# Patient Record
Sex: MALE | Race: WHITE | NOT HISPANIC OR LATINO | Employment: OTHER | URBAN - NONMETROPOLITAN AREA
[De-identification: names, ages, dates, MRNs, and addresses within clinical notes are randomized per-mention and may not be internally consistent; named-entity substitution may affect disease eponyms.]

---

## 2017-04-19 ENCOUNTER — OFFICE VISIT (OUTPATIENT)
Dept: OTOLARYNGOLOGY | Facility: CLINIC | Age: 82
End: 2017-04-19

## 2017-04-19 VITALS
HEART RATE: 80 BPM | BODY MASS INDEX: 35.84 KG/M2 | DIASTOLIC BLOOD PRESSURE: 80 MMHG | SYSTOLIC BLOOD PRESSURE: 140 MMHG | RESPIRATION RATE: 19 BRPM | TEMPERATURE: 98.3 F | HEIGHT: 71 IN | WEIGHT: 256 LBS

## 2017-04-19 DIAGNOSIS — S00.452A FOREIGN BODY IN EAR LOBE, LEFT, INITIAL ENCOUNTER: ICD-10-CM

## 2017-04-19 PROBLEM — S00.459A FOREIGN BODY IN EAR LOBE: Status: ACTIVE | Noted: 2017-04-19

## 2017-04-19 PROCEDURE — 69200 CLEAR OUTER EAR CANAL: CPT | Performed by: PHYSICIAN ASSISTANT

## 2017-04-19 RX ORDER — LOSARTAN POTASSIUM 50 MG/1
TABLET ORAL
Refills: 3 | COMMUNITY
Start: 2017-04-10

## 2017-04-19 RX ORDER — WARFARIN SODIUM 7.5 MG/1
TABLET ORAL
Refills: 1 | COMMUNITY
Start: 2017-04-10

## 2017-04-19 RX ORDER — HYDROCHLOROTHIAZIDE 12.5 MG/1
TABLET ORAL
Refills: 0 | COMMUNITY
Start: 2017-03-30

## 2017-04-19 RX ORDER — LANSOPRAZOLE 15 MG/1
15 CAPSULE, DELAYED RELEASE ORAL DAILY
COMMUNITY

## 2017-04-19 NOTE — PROGRESS NOTES
Procedure Note    Pre-operative Diagnosis: Foreign body of the left ear    Post-operative Diagnosis: same    Anesthesia: none    Procedure:  Removal of foreign body from left ear canal    Procedure Details:    Patient seated in exam chair and ear canal visualized with microscope and speculum. Foreign body viewed in medial part of left ear canal and removed with alligator forceps.     Condition:  Stable.  Patient tolerated procedure well.    Complications:  None

## 2017-06-09 ENCOUNTER — TELEPHONE (OUTPATIENT)
Dept: INTERNAL MEDICINE | Age: 82
End: 2017-06-09

## 2017-06-12 RX ORDER — CYANOCOBALAMIN 1000 UG/ML
1000 INJECTION INTRAMUSCULAR; SUBCUTANEOUS ONCE
Qty: 1 ML | Refills: 5 | Status: SHIPPED | OUTPATIENT
Start: 2017-06-12 | End: 2017-11-07 | Stop reason: ALTCHOICE

## 2017-06-19 RX ORDER — WARFARIN SODIUM 7.5 MG/1
TABLET ORAL
Qty: 96 TABLET | Refills: 5 | Status: SHIPPED | OUTPATIENT
Start: 2017-06-19 | End: 2018-06-22 | Stop reason: SDUPTHER

## 2017-06-21 ENCOUNTER — NURSE ONLY (OUTPATIENT)
Dept: INTERNAL MEDICINE | Age: 82
End: 2017-06-21

## 2017-06-21 DIAGNOSIS — Z79.01 LONG TERM (CURRENT) USE OF ANTICOAGULANTS: ICD-10-CM

## 2017-06-21 LAB
INTERNATIONAL NORMALIZATION RATIO, POC: 2.1
PROTHROMBIN TIME, POC: NORMAL

## 2017-07-19 ENCOUNTER — NURSE ONLY (OUTPATIENT)
Dept: INTERNAL MEDICINE | Age: 82
End: 2017-07-19
Payer: MEDICARE

## 2017-07-19 ENCOUNTER — ANTI-COAG VISIT (OUTPATIENT)
Dept: INTERNAL MEDICINE | Age: 82
End: 2017-07-19

## 2017-07-19 DIAGNOSIS — Z79.01 LONG TERM (CURRENT) USE OF ANTICOAGULANTS: ICD-10-CM

## 2017-07-19 DIAGNOSIS — Z79.01 LONG TERM (CURRENT) USE OF ANTICOAGULANTS: Primary | ICD-10-CM

## 2017-07-19 LAB
INTERNATIONAL NORMALIZATION RATIO, POC: 3.1
PROTHROMBIN TIME, POC: NORMAL

## 2017-07-19 PROCEDURE — 85610 PROTHROMBIN TIME: CPT | Performed by: INTERNAL MEDICINE

## 2017-08-16 ENCOUNTER — NURSE ONLY (OUTPATIENT)
Dept: INTERNAL MEDICINE | Age: 82
End: 2017-08-16
Payer: MEDICARE

## 2017-08-16 ENCOUNTER — ANTI-COAG VISIT (OUTPATIENT)
Dept: INTERNAL MEDICINE | Age: 82
End: 2017-08-16

## 2017-08-16 DIAGNOSIS — Z79.01 LONG TERM (CURRENT) USE OF ANTICOAGULANTS: Primary | ICD-10-CM

## 2017-08-16 DIAGNOSIS — Z79.01 LONG TERM (CURRENT) USE OF ANTICOAGULANTS: ICD-10-CM

## 2017-08-16 LAB
INTERNATIONAL NORMALIZATION RATIO, POC: 3.1
PROTHROMBIN TIME, POC: NORMAL

## 2017-08-16 PROCEDURE — 85610 PROTHROMBIN TIME: CPT | Performed by: INTERNAL MEDICINE

## 2017-09-12 ENCOUNTER — ANTI-COAG VISIT (OUTPATIENT)
Dept: INTERNAL MEDICINE | Age: 82
End: 2017-09-12

## 2017-09-12 ENCOUNTER — NURSE ONLY (OUTPATIENT)
Dept: INTERNAL MEDICINE | Age: 82
End: 2017-09-12
Payer: MEDICARE

## 2017-09-12 DIAGNOSIS — Z79.01 LONG TERM (CURRENT) USE OF ANTICOAGULANTS: ICD-10-CM

## 2017-09-12 DIAGNOSIS — Z79.01 LONG TERM (CURRENT) USE OF ANTICOAGULANTS: Primary | ICD-10-CM

## 2017-09-12 LAB
INTERNATIONAL NORMALIZATION RATIO, POC: 2.4
PROTHROMBIN TIME, POC: NORMAL

## 2017-09-12 PROCEDURE — 85610 PROTHROMBIN TIME: CPT | Performed by: INTERNAL MEDICINE

## 2017-09-18 RX ORDER — HYDROCHLOROTHIAZIDE 12.5 MG/1
TABLET ORAL
Qty: 90 TABLET | Refills: 3 | Status: SHIPPED | OUTPATIENT
Start: 2017-09-18 | End: 2018-06-04 | Stop reason: SDUPTHER

## 2017-09-19 DIAGNOSIS — E78.2 MIXED HYPERLIPIDEMIA: ICD-10-CM

## 2017-09-19 DIAGNOSIS — E55.9 UNSPECIFIED VITAMIN D DEFICIENCY: ICD-10-CM

## 2017-09-19 DIAGNOSIS — I10 HYPERTENSION, ESSENTIAL: Primary | ICD-10-CM

## 2017-09-20 ENCOUNTER — OFFICE VISIT (OUTPATIENT)
Dept: GASTROENTEROLOGY | Facility: CLINIC | Age: 82
End: 2017-09-20

## 2017-09-20 VITALS
BODY MASS INDEX: 35.14 KG/M2 | OXYGEN SATURATION: 98 % | HEART RATE: 66 BPM | TEMPERATURE: 97.5 F | HEIGHT: 71 IN | SYSTOLIC BLOOD PRESSURE: 136 MMHG | DIASTOLIC BLOOD PRESSURE: 80 MMHG | WEIGHT: 251 LBS

## 2017-09-20 DIAGNOSIS — K50.00 CROHN'S DISEASE OF SMALL INTESTINE WITHOUT COMPLICATION (HCC): Primary | ICD-10-CM

## 2017-09-20 DIAGNOSIS — Z86.010 HISTORY OF ADENOMATOUS POLYP OF COLON: ICD-10-CM

## 2017-09-20 PROCEDURE — 99213 OFFICE O/P EST LOW 20 MIN: CPT | Performed by: NURSE PRACTITIONER

## 2017-09-20 NOTE — PROGRESS NOTES
Chief Complaint   Patient presents with   • Colonoscopy     10-31-14 had colon     Subjective   HPI    Rayo Siddiqi is a 82 y.o. male who presents with a history of Crohn's Disease dx over 20 yr ago.   Status of disease is quiet and stable.  The severity is described as Mild.  He denies  abdominal cramping, diarrhea, bloody stool and anorexia.  Bowels will alternate between watery and normal.  Previous diagnostic test include  colonoscopy.  Currently maintained on Pentasa 4 pills tid.    Hx of colectomy (18 inches per pt) due to colon mass    CScope (Dr Schroeder) 2014 narrowing of anastomosis  CScope (Dr Schroeder) 2013 adenomatous polyp removed   CScope (Dr Schroeder) 2010 narrowing of anastomosis    Endoscopy (Dr Schroeder) 2014-normal    Past Medical History:   Diagnosis Date   • Crohn's disease    • DVT (deep venous thrombosis)      Outpatient Prescriptions Marked as Taking for the 9/20/17 encounter (Office Visit) with JUANY Aguila   Medication Sig Dispense Refill   • hydrochlorothiazide (HYDRODIURIL) 12.5 MG tablet TAKE ONE TABLET BY MOUTH DAILY  0   • lansoprazole (PREVACID) 15 MG capsule Take 15 mg by mouth Daily.     • losartan (COZAAR) 50 MG tablet TAKE ONE TABLET BY MOUTH DAILY  3   • Mesalamine (PENTASA PO) Take  by mouth.     • Multiple Vitamin (STRESS B PO) Take  by mouth.     • warfarin (COUMADIN) 7.5 MG tablet TAKE ONE TABLET BY MOUTH EVERY DAY EXCEPT ON SUNDAY TAKE 1.5 TABLETS BY MOUTH  1     No Known Allergies  Social History     Social History   • Marital status:      Spouse name: N/A   • Number of children: N/A   • Years of education: N/A     Occupational History   • Not on file.     Social History Main Topics   • Smoking status: Never Smoker   • Smokeless tobacco: Never Used   • Alcohol use No   • Drug use: No   • Sexual activity: Not on file     Other Topics Concern   • Not on file     Social History Narrative     Family History   Problem Relation Age of Onset   • No Known Problems Mother     • No Known Problems Father    • Colon cancer Neg Hx    • Esophageal cancer Neg Hx      Review of Systems   Constitutional: Negative for fatigue, fever and unexpected weight change.   HENT: Negative for hearing loss, sore throat and voice change.    Eyes: Negative for visual disturbance.   Respiratory: Negative for cough, shortness of breath and wheezing.    Cardiovascular: Negative for chest pain and palpitations.   Gastrointestinal: Negative for abdominal pain, blood in stool and vomiting.   Endocrine: Negative for polydipsia and polyuria.   Genitourinary: Negative for difficulty urinating, dysuria, hematuria and urgency.   Musculoskeletal: Negative for joint swelling and myalgias.   Skin: Negative for color change, rash and wound.   Neurological: Negative for dizziness, tremors, seizures and syncope.   Hematological: Does not bruise/bleed easily.   Psychiatric/Behavioral: Negative for agitation and confusion. The patient is not nervous/anxious.      Objective   Vitals:    09/20/17 0946   BP: 136/80   Pulse: 66   Temp: 97.5 °F (36.4 °C)   SpO2: 98%     Physical Exam   Constitutional: He is oriented to person, place, and time. He appears well-developed and well-nourished.   HENT:   Head: Normocephalic and atraumatic.   Eyes:   Pink, Nonicteric   Neck:   Global Assessment- supple. No JVD or lymphadenopathy   Cardiovascular: Normal rate, regular rhythm and normal heart sounds.  Exam reveals no gallop and no friction rub.    No murmur heard.  Pulmonary/Chest: Effort normal and breath sounds normal. No respiratory distress. He has no wheezes. He has no rales.   Inspection: Movements-Symmetrical   Abdominal: Soft. Bowel sounds are normal. He exhibits no distension and no mass. There is no tenderness. There is no rebound and no guarding.   Neurological: He is alert and oriented to person, place, and time.   General Exam-Deemed a reliable historian, able to converse without difficulty and Able to move all extremities  without difficulty     Imaging Results (most recent)     None        Assessment/Plan   Rayo was seen today for colonoscopy.    Diagnoses and all orders for this visit:    Crohn's disease of small intestine without complication  -     Case Request; Standing  -     Implement Anesthesia Orders Day of Procedure; Standing  -     Obtain Informed Consent; Standing  -     polyethylene glycol (GoLYTELY) 236 g solution; Take 3,785 mL by mouth 1 (One) Time for 1 dose. Take as directed    History of adenomatous polyp of colon  Comments:  2013    COLONOSCOPY WITH ANESTHESIA (N/A)     PCP prescribes Pentasa    Patient is to continue all blood pressure and cardiac medications prior to procedure.     Advised pt to stop ASA day prior to procedure and to stop use of NSAIDs, Fish Oil, and MV 5 days prior to procedure.  Tylenol based products are ok to take.  Pt verbalized understanding.    All risks, benefits, alternatives, and indications of colonoscopy procedure have been discussed with the patient. Risks to include perforation of the colon requiring possible surgery or colostomy, risk of bleeding from biopsies or removal of colon tissue, possibility of missing a colon polyp or cancer, or adverse drug reaction.  Benefits to include the diagnosis and management of disease of the colon and rectum. Alternatives to include barium enema, radiographic evaluation, lab testing or no intervention. Pt verbalizes understanding and agrees to proceed with procedure.

## 2017-10-06 ENCOUNTER — TELEPHONE (OUTPATIENT)
Dept: INTERNAL MEDICINE | Age: 82
End: 2017-10-06

## 2017-10-10 ENCOUNTER — TELEPHONE (OUTPATIENT)
Dept: GASTROENTEROLOGY | Facility: CLINIC | Age: 82
End: 2017-10-10

## 2017-10-10 NOTE — TELEPHONE ENCOUNTER
Called informed pt that per Dr Rg he could stop his warfarin 5 days before his colon apt pt voiced understanding

## 2017-10-11 DIAGNOSIS — Z79.01 LONG TERM CURRENT USE OF ANTICOAGULANT: Primary | ICD-10-CM

## 2017-10-24 DIAGNOSIS — E78.2 MIXED HYPERLIPIDEMIA: ICD-10-CM

## 2017-10-24 DIAGNOSIS — I10 HYPERTENSION, ESSENTIAL: ICD-10-CM

## 2017-10-24 DIAGNOSIS — E55.9 VITAMIN D DEFICIENCY: ICD-10-CM

## 2017-10-30 ENCOUNTER — ANESTHESIA (OUTPATIENT)
Dept: GASTROENTEROLOGY | Facility: HOSPITAL | Age: 82
End: 2017-10-30

## 2017-10-30 ENCOUNTER — TELEPHONE (OUTPATIENT)
Dept: GASTROENTEROLOGY | Facility: CLINIC | Age: 82
End: 2017-10-30

## 2017-10-30 ENCOUNTER — ANESTHESIA EVENT (OUTPATIENT)
Dept: GASTROENTEROLOGY | Facility: HOSPITAL | Age: 82
End: 2017-10-30

## 2017-10-30 ENCOUNTER — HOSPITAL ENCOUNTER (OUTPATIENT)
Facility: HOSPITAL | Age: 82
Setting detail: HOSPITAL OUTPATIENT SURGERY
Discharge: HOME | End: 2017-10-30
Attending: INTERNAL MEDICINE | Admitting: INTERNAL MEDICINE

## 2017-10-30 VITALS
DIASTOLIC BLOOD PRESSURE: 47 MMHG | BODY MASS INDEX: 34.79 KG/M2 | RESPIRATION RATE: 14 BRPM | SYSTOLIC BLOOD PRESSURE: 114 MMHG | WEIGHT: 243 LBS | TEMPERATURE: 97.8 F | HEIGHT: 70 IN | HEART RATE: 58 BPM | OXYGEN SATURATION: 98 %

## 2017-10-30 DIAGNOSIS — K50.00 CROHN'S DISEASE OF SMALL INTESTINE WITHOUT COMPLICATION (HCC): ICD-10-CM

## 2017-10-30 PROCEDURE — 45378 DIAGNOSTIC COLONOSCOPY: CPT | Performed by: INTERNAL MEDICINE

## 2017-10-30 PROCEDURE — 25010000002 PROPOFOL 10 MG/ML EMULSION: Performed by: NURSE ANESTHETIST, CERTIFIED REGISTERED

## 2017-10-30 RX ORDER — SODIUM CHLORIDE 9 MG/ML
500 INJECTION, SOLUTION INTRAVENOUS CONTINUOUS PRN
Status: DISCONTINUED | OUTPATIENT
Start: 2017-10-30 | End: 2017-10-30 | Stop reason: HOSPADM

## 2017-10-30 RX ORDER — SODIUM CHLORIDE 0.9 % (FLUSH) 0.9 %
3 SYRINGE (ML) INJECTION AS NEEDED
Status: DISCONTINUED | OUTPATIENT
Start: 2017-10-30 | End: 2017-10-30 | Stop reason: HOSPADM

## 2017-10-30 RX ORDER — PROPOFOL 10 MG/ML
VIAL (ML) INTRAVENOUS AS NEEDED
Status: DISCONTINUED | OUTPATIENT
Start: 2017-10-30 | End: 2017-10-30 | Stop reason: SURG

## 2017-10-30 RX ADMIN — PROPOFOL 180 MG: 10 INJECTION, EMULSION INTRAVENOUS at 09:21

## 2017-10-30 RX ADMIN — LIDOCAINE HYDROCHLORIDE 0.5 ML: 10 INJECTION, SOLUTION EPIDURAL; INFILTRATION; INTRACAUDAL; PERINEURAL at 07:50

## 2017-10-30 RX ADMIN — SODIUM CHLORIDE 500 ML: 9 INJECTION, SOLUTION INTRAVENOUS at 07:50

## 2017-10-30 NOTE — ANESTHESIA POSTPROCEDURE EVALUATION
"Patient: Rayo Siddiqi    Procedure Summary     Date Anesthesia Start Anesthesia Stop Room / Location    10/30/17 0914 0935  [facility] /  [facility]       Procedure Diagnosis Surgeon Provider    COLONOSCOPY WITH ANESTHESIA (N/A ) Crohn's disease of small intestine without complication  (Crohn's disease of small intestine without complication [K50.00]) DO Bebe Frausto, ELIANA          Anesthesia Type: general  Last vitals  BP   113/50 (10/30/17 0955)   Temp   97.8 °F (36.6 °C) (10/30/17 0726)   Pulse   59 (10/30/17 0955)   Resp   17 (10/30/17 0955)     SpO2   99 % (10/30/17 0955)     Post Anesthesia Care and Evaluation    Patient location during evaluation: bedside  Patient participation: complete - patient participated  Level of consciousness: awake and awake and alert  Pain score: 0  Pain management: adequate  Airway patency: patent  Anesthetic complications: No anesthetic complications  PONV Status: none  Cardiovascular status: acceptable  Respiratory status: acceptable  Hydration status: acceptable    Comments: Patient discharged according to acceptable Carly score per RN assessment. See nursing records for further information.     Blood pressure 113/50, pulse 59, temperature 97.8 °F (36.6 °C), temperature source Temporal Artery , resp. rate 17, height 70\" (177.8 cm), weight 243 lb (110 kg), SpO2 99 %.        "

## 2017-10-30 NOTE — ANESTHESIA PREPROCEDURE EVALUATION
Anesthesia Evaluation     Patient summary reviewed   no history of anesthetic complications:         Airway   Mallampati: II  TM distance: >3 FB  Neck ROM: full  no difficulty expected  Dental - normal exam     Pulmonary    (-) COPD, asthma, sleep apnea, not a smoker  Cardiovascular     PT is on anticoagulation therapy    (+) hypertension, DVT (on coumadin),     ROS comment: Off coumadin x5 days    Neuro/Psych  (-) seizures, TIA, CVA  GI/Hepatic/Renal/Endo    (-) liver disease, no renal disease, diabetes    ROS Comment: Crohns disease    Musculoskeletal     Abdominal    Substance History      OB/GYN          Other                                        Anesthesia Plan    ASA 3     general   total IV anesthesia  intravenous induction   Anesthetic plan and risks discussed with patient.

## 2017-11-01 LAB
ALBUMIN SERPL-MCNC: 4 G/DL (ref 3.5–5.2)
ALP BLD-CCNC: 69 U/L (ref 40–130)
ALT SERPL-CCNC: 17 U/L (ref 5–41)
ANION GAP SERPL CALCULATED.3IONS-SCNC: 16 MMOL/L (ref 7–19)
AST SERPL-CCNC: 21 U/L (ref 5–40)
BASOPHILS ABSOLUTE: 0.1 K/UL (ref 0–0.2)
BASOPHILS RELATIVE PERCENT: 0.7 % (ref 0–1)
BILIRUB SERPL-MCNC: 0.7 MG/DL (ref 0.2–1.2)
BUN BLDV-MCNC: 12 MG/DL (ref 8–23)
CALCIUM SERPL-MCNC: 9.4 MG/DL (ref 8.8–10.2)
CHLORIDE BLD-SCNC: 99 MMOL/L (ref 98–111)
CHOLESTEROL, TOTAL: 159 MG/DL (ref 160–199)
CO2: 28 MMOL/L (ref 22–29)
CREAT SERPL-MCNC: 1.3 MG/DL (ref 0.5–1.2)
EOSINOPHILS ABSOLUTE: 0.2 K/UL (ref 0–0.6)
EOSINOPHILS RELATIVE PERCENT: 2.2 % (ref 0–5)
GFR NON-AFRICAN AMERICAN: 53
GLUCOSE BLD-MCNC: 95 MG/DL (ref 74–109)
HCT VFR BLD CALC: 41.5 % (ref 42–52)
HDLC SERPL-MCNC: 52 MG/DL (ref 55–121)
HEMOGLOBIN: 14.1 G/DL (ref 14–18)
INR BLD: 1.27 (ref 0.88–1.18)
LDL CHOLESTEROL CALCULATED: 64 MG/DL
LYMPHOCYTES ABSOLUTE: 2.2 K/UL (ref 1.1–4.5)
LYMPHOCYTES RELATIVE PERCENT: 29.9 % (ref 20–40)
MCH RBC QN AUTO: 32.6 PG (ref 27–31)
MCHC RBC AUTO-ENTMCNC: 34 G/DL (ref 33–37)
MCV RBC AUTO: 95.8 FL (ref 80–94)
MONOCYTES ABSOLUTE: 0.5 K/UL (ref 0–0.9)
MONOCYTES RELATIVE PERCENT: 7.5 % (ref 0–10)
NEUTROPHILS ABSOLUTE: 4.3 K/UL (ref 1.5–7.5)
NEUTROPHILS RELATIVE PERCENT: 59.4 % (ref 50–65)
PDW BLD-RTO: 12.9 % (ref 11.5–14.5)
PLATELET # BLD: 178 K/UL (ref 130–400)
PMV BLD AUTO: 9.4 FL (ref 9.4–12.4)
POTASSIUM SERPL-SCNC: 3.3 MMOL/L (ref 3.5–5)
PROSTATE SPECIFIC ANTIGEN: 1.52 NG/ML (ref 0–4)
PROTHROMBIN TIME: 15.9 SEC (ref 12–14.6)
RBC # BLD: 4.33 M/UL (ref 4.7–6.1)
SODIUM BLD-SCNC: 143 MMOL/L (ref 136–145)
TOTAL PROTEIN: 6.7 G/DL (ref 6.6–8.7)
TRIGL SERPL-MCNC: 216 MG/DL (ref 0–149)
VITAMIN D 25-HYDROXY: 49 NG/ML
WBC # BLD: 7.2 K/UL (ref 4.8–10.8)

## 2017-11-06 RX ORDER — LANSOPRAZOLE 15 MG/1
15 CAPSULE, DELAYED RELEASE ORAL
COMMUNITY
End: 2019-02-27 | Stop reason: ALTCHOICE

## 2017-11-06 RX ORDER — MESALAMINE 250 MG/1
CAPSULE ORAL
Refills: 3 | COMMUNITY
Start: 2017-08-01 | End: 2018-02-15 | Stop reason: SDUPTHER

## 2017-11-06 RX ORDER — LOSARTAN POTASSIUM 50 MG/1
TABLET ORAL
COMMUNITY
Start: 2017-04-10 | End: 2017-12-28 | Stop reason: SDUPTHER

## 2017-11-07 ENCOUNTER — OFFICE VISIT (OUTPATIENT)
Dept: INTERNAL MEDICINE | Age: 82
End: 2017-11-07
Payer: MEDICARE

## 2017-11-07 VITALS
WEIGHT: 254 LBS | BODY MASS INDEX: 36.36 KG/M2 | HEART RATE: 64 BPM | SYSTOLIC BLOOD PRESSURE: 152 MMHG | DIASTOLIC BLOOD PRESSURE: 80 MMHG | HEIGHT: 70 IN | OXYGEN SATURATION: 96 %

## 2017-11-07 DIAGNOSIS — I87.2 DEEP VENOUS INSUFFICIENCY: Chronic | ICD-10-CM

## 2017-11-07 DIAGNOSIS — K21.9 GASTROESOPHAGEAL REFLUX DISEASE WITHOUT ESOPHAGITIS: Chronic | ICD-10-CM

## 2017-11-07 DIAGNOSIS — K50.119 CROHN'S DISEASE OF LARGE INTESTINE WITH COMPLICATION (HCC): Chronic | ICD-10-CM

## 2017-11-07 DIAGNOSIS — I10 ESSENTIAL HYPERTENSION: Chronic | ICD-10-CM

## 2017-11-07 DIAGNOSIS — E78.2 MIXED HYPERLIPIDEMIA: Chronic | ICD-10-CM

## 2017-11-07 DIAGNOSIS — M15.9 PRIMARY OSTEOARTHRITIS INVOLVING MULTIPLE JOINTS: Chronic | ICD-10-CM

## 2017-11-07 PROBLEM — M15.0 PRIMARY OSTEOARTHRITIS INVOLVING MULTIPLE JOINTS: Chronic | Status: ACTIVE | Noted: 2017-11-07

## 2017-11-07 PROCEDURE — 4040F PNEUMOC VAC/ADMIN/RCVD: CPT | Performed by: INTERNAL MEDICINE

## 2017-11-07 PROCEDURE — 1123F ACP DISCUSS/DSCN MKR DOCD: CPT | Performed by: INTERNAL MEDICINE

## 2017-11-07 PROCEDURE — G0439 PPPS, SUBSEQ VISIT: HCPCS | Performed by: INTERNAL MEDICINE

## 2017-11-07 PROCEDURE — 1036F TOBACCO NON-USER: CPT | Performed by: INTERNAL MEDICINE

## 2017-11-07 PROCEDURE — G8427 DOCREV CUR MEDS BY ELIG CLIN: HCPCS | Performed by: INTERNAL MEDICINE

## 2017-11-07 PROCEDURE — G0008 ADMIN INFLUENZA VIRUS VAC: HCPCS | Performed by: INTERNAL MEDICINE

## 2017-11-07 PROCEDURE — G8417 CALC BMI ABV UP PARAM F/U: HCPCS | Performed by: INTERNAL MEDICINE

## 2017-11-07 PROCEDURE — G8484 FLU IMMUNIZE NO ADMIN: HCPCS | Performed by: INTERNAL MEDICINE

## 2017-11-07 PROCEDURE — 90662 IIV NO PRSV INCREASED AG IM: CPT | Performed by: INTERNAL MEDICINE

## 2017-11-07 PROCEDURE — 99213 OFFICE O/P EST LOW 20 MIN: CPT | Performed by: INTERNAL MEDICINE

## 2017-11-07 ASSESSMENT — PATIENT HEALTH QUESTIONNAIRE - PHQ9
1. LITTLE INTEREST OR PLEASURE IN DOING THINGS: 0
SUM OF ALL RESPONSES TO PHQ QUESTIONS 1-9: 0
SUM OF ALL RESPONSES TO PHQ9 QUESTIONS 1 & 2: 0
2. FEELING DOWN, DEPRESSED OR HOPELESS: 0

## 2017-11-07 ASSESSMENT — ENCOUNTER SYMPTOMS
RHINORRHEA: 0
BLOOD IN STOOL: 0
SORE THROAT: 0
SINUS PRESSURE: 0
TROUBLE SWALLOWING: 0
DIARRHEA: 0
CONSTIPATION: 1
NAUSEA: 0
COUGH: 0
ABDOMINAL PAIN: 0
SHORTNESS OF BREATH: 0

## 2017-11-07 ASSESSMENT — ANXIETY QUESTIONNAIRES: GAD7 TOTAL SCORE: 0

## 2017-11-07 ASSESSMENT — LIFESTYLE VARIABLES: HOW OFTEN DO YOU HAVE A DRINK CONTAINING ALCOHOL: 0

## 2017-11-07 NOTE — PROGRESS NOTES
children: 2    Years of education: 23     Occupational History     Retired     Social History Main Topics    Smoking status: Never Smoker    Smokeless tobacco: Never Used    Alcohol use No    Drug use: No    Sexual activity: Not Currently     Partners: Female     Other Topics Concern    Not on file     Social History Narrative    No narrative on file       Consultants:   Patient Care Team:  Marie Soriano MD as PCP - General (Internal Medicine)  Dr Anitha Rothman - GI  Dr Marti Esparza - orthopedics  Dr Bertin Foster form completed by patient, reviewed and scanned into chart. Summary of HRA, and behavioral, psychosocial, cognitive and functional/safety screening results are documented in additional note within this encounter. Wt Readings from Last 3 Encounters:   11/07/17 254 lb (115.2 kg)     BP Readings from Last 3 Encounters:   11/07/17 (!) 150/80       Pertinent Physical Exam    Vitals:    11/07/17 1654   BP: (!) 150/80   Site: Left Arm   Position: Sitting   Cuff Size: Medium Adult   Pulse: 64   SpO2: 96%   Weight: 254 lb (115.2 kg)   Height: 5' 10\" (1.778 m)     Body mass index is 36.45 kg/m². The following problems were reviewed today and where indicated follow up appointments were made and/or referrals ordered.     Risk Factor Screenings with Interventions     Fall Risk:     Fall Risk Interventions:    · None indicated    Depression:     Depression Interventions:  · None indicated    Anxiety:     Anxiety Interventions:  · None indicated    Cognitive:     Cognitive Impairment Interventions:  · None indicated       Social History     Social History    Marital status:      Spouse name: Caryle Falco    Number of children: 2    Years of education: 23     Occupational History     Retired     Social History Main Topics    Smoking status: Never Smoker    Smokeless tobacco: Never Used    Alcohol use No    Drug use: No    Sexual activity: Not Currently     Partners: Female HISTORY      Fistula-in-[employer] repair    SUBTOTAL COLECTOMY      TONSILLECTOMY      TOTAL HIP ARTHROPLASTY        Family History   Problem Relation Age of Onset    Stroke Other     Other Other      atherosclerosis of extremities      Social History     Social History    Marital status:      Spouse name: Caryle Falco    Number of children: 2    Years of education: 23     Occupational History     Retired     Social History Main Topics    Smoking status: Never Smoker    Smokeless tobacco: Never Used    Alcohol use No    Drug use: No    Sexual activity: Not Currently     Partners: Female     Other Topics Concern    Not on file     Social History Narrative    No narrative on file      No Known Allergies   Current Outpatient Prescriptions   Medication Sig Dispense Refill    losartan (COZAAR) 50 MG tablet TAKE ONE TABLET BY MOUTH DAILY      PENTASA 250 MG extended release capsule TAKE THREE CAPSULES BY MOUTH FOUR TIMES DAILY  3    lansoprazole (PREVACID) 15 MG delayed release capsule Take 15 mg by mouth      hydrochlorothiazide (HYDRODIURIL) 12.5 MG tablet TAKE ONE TABLET BY MOUTH DAILY 90 tablet 3    warfarin (COUMADIN) 7.5 MG tablet TAKE ONE TABLET BY MOUTH EVERY DAY EXCEPT ON SUNDAY TAKE 1.5 TABLETS BY MOUTH (Patient taking differently: TAKE ONE TABLET BY MOUTH EVERY DAY) 96 tablet 5     No current facility-administered medications for this visit. Review of Systems   Constitutional: Negative for appetite change, fatigue, fever and unexpected weight change. HENT: Negative for congestion, ear pain, postnasal drip, rhinorrhea, sinus pressure, sore throat and trouble swallowing. Eyes: Negative for visual disturbance. Respiratory: Negative for cough and shortness of breath. Cardiovascular: Negative for chest pain, palpitations and leg swelling. Gastrointestinal: Positive for constipation (sometimes due to colonic narrowing).  Negative for abdominal pain, blood in stool, diarrhea and nausea. Genitourinary: Negative for dysuria, frequency, hematuria and urgency. Musculoskeletal: Negative for arthralgias and myalgias. Skin: Negative for pallor. Neurological: Negative for dizziness, syncope, weakness, light-headedness, numbness and headaches. Hematological: Negative for adenopathy. Does not bruise/bleed easily. Psychiatric/Behavioral: Negative for sleep disturbance. The patient is not nervous/anxious. BP (!) 150/80 (Site: Left Arm, Position: Sitting, Cuff Size: Medium Adult)   Pulse 64   Ht 5' 10\" (1.778 m)   Wt 254 lb (115.2 kg)   SpO2 96%   BMI 36.45 kg/m²    Physical Exam   Constitutional: He is oriented to person, place, and time. He appears well-developed and well-nourished. No distress. HENT:   Head: Normocephalic and atraumatic. Right Ear: External ear normal.   Left Ear: External ear normal.   Nose: Nose normal.   Mouth/Throat: Oropharynx is clear and moist.   Tympanic membranes normal   Eyes: Conjunctivae and EOM are normal. Pupils are equal, round, and reactive to light. Right eye exhibits no discharge. Left eye exhibits no discharge. No scleral icterus. Fundiscopic exam normal   Neck: No JVD present. No thyromegaly present. Cardiovascular: Normal rate, regular rhythm, normal heart sounds and intact distal pulses. Exam reveals no gallop. No murmur heard. Pulses:       Dorsalis pedis pulses are 2+ on the right side, and 2+ on the left side. Posterior tibial pulses are 2+ on the right side, and 2+ on the left side. No Carotid or abdominal bruits   Pulmonary/Chest: Effort normal and breath sounds normal. No respiratory distress. He exhibits no tenderness. Abdominal: Soft. Bowel sounds are normal. He exhibits no distension. Mass: exam limited by obesity. There is no tenderness. Musculoskeletal: Normal range of motion. He exhibits edema (trace). He exhibits no tenderness. Lymphadenopathy:     He has no cervical adenopathy.    Neurological: He

## 2017-11-14 DIAGNOSIS — I87.2 DEEP VENOUS INSUFFICIENCY: Chronic | ICD-10-CM

## 2017-11-14 DIAGNOSIS — I10 ESSENTIAL HYPERTENSION: Chronic | ICD-10-CM

## 2017-11-14 LAB
ANION GAP SERPL CALCULATED.3IONS-SCNC: 16 MMOL/L (ref 7–19)
BUN BLDV-MCNC: 14 MG/DL (ref 8–23)
CALCIUM SERPL-MCNC: 9.3 MG/DL (ref 8.8–10.2)
CHLORIDE BLD-SCNC: 101 MMOL/L (ref 98–111)
CO2: 25 MMOL/L (ref 22–29)
CREAT SERPL-MCNC: 1.3 MG/DL (ref 0.5–1.2)
GFR NON-AFRICAN AMERICAN: 53
GLUCOSE BLD-MCNC: 102 MG/DL (ref 74–109)
INR BLD: 2.05 (ref 0.88–1.18)
POTASSIUM SERPL-SCNC: 4.4 MMOL/L (ref 3.5–5)
PROTHROMBIN TIME: 23.3 SEC (ref 12–14.6)
SODIUM BLD-SCNC: 142 MMOL/L (ref 136–145)

## 2017-11-21 ENCOUNTER — TELEPHONE (OUTPATIENT)
Dept: INTERNAL MEDICINE | Age: 82
End: 2017-11-21

## 2017-12-19 ENCOUNTER — ANTI-COAG VISIT (OUTPATIENT)
Dept: INTERNAL MEDICINE | Age: 82
End: 2017-12-19

## 2017-12-19 ENCOUNTER — NURSE ONLY (OUTPATIENT)
Dept: INTERNAL MEDICINE | Age: 82
End: 2017-12-19
Payer: MEDICARE

## 2017-12-19 DIAGNOSIS — Z79.01 LONG TERM CURRENT USE OF ANTICOAGULANT THERAPY: Primary | ICD-10-CM

## 2017-12-19 DIAGNOSIS — Z79.01 LONG TERM CURRENT USE OF ANTICOAGULANT THERAPY: ICD-10-CM

## 2017-12-19 LAB
INTERNATIONAL NORMALIZATION RATIO, POC: 2.1
PROTHROMBIN TIME, POC: NORMAL

## 2017-12-19 PROCEDURE — 85610 PROTHROMBIN TIME: CPT | Performed by: INTERNAL MEDICINE

## 2017-12-19 NOTE — PROGRESS NOTES
Mr. Caren Ramirez was here today. INR today:2.1      INR Goal: 2.0-3.0    Dosing Plan  As of 12/19/2017    TTR:   53.7 % (5.7 mo)   Full instructions:   7.5 mg every day                 PLAN: CONTINUE CURRENT DOSE    NEXT COUMADIN CLINIC APT IS: 1/17/2018 AT 10 AM      [facility]  [phone]    Boogie ALVAREZ  The major complication associated with warfarin is bleeding due to excessive anticoagulation. Excessive bleeding, or hemorrhage, can occur from any area of the body, and patients on warfarin should report any falls or accidents, as well as signs or symptoms of bleeding or unusual bruising. Signs of unusual bleeding include bleeding from the gums, blood in the urine, bloody or dark stool, a nosebleed, or vomiting blood. Because the risk of bleeding increases as the INR rises, the INR is closely monitored and adjustments are made as needed to maintain the INR within the target range. Edwyna Sloan also cause skin necrosis or gangrene, which can cause dark red or black areas on the skin. This is a rare complication that may occur during the first several days of warfarin therapy. When to seek help  If there are obvious or subtle signs of bleeding, including the following, patients should call their healthcare provider immediately.   · Persistent nausea, stomach upset, or vomiting blood or other material that looks like coffee grounds   · Headaches, dizziness, or weakness   · Nosebleeds   · Dark red or brown urine   · Blood in the bowel movement or dark-colored stool   · Pain, discomfort, or swelling, especially after an injury   · After a serious fall or head injury, even if there are no other symptoms  The patient should also call if any of the following occurs:  · Bleeding from the gums after brushing the teeth   · Swelling or pain at an injection site   · Excessive menstrual bleeding or bleeding between menstrual periods   · Diarrhea, vomiting, or inability to eat

## 2017-12-29 RX ORDER — LOSARTAN POTASSIUM 50 MG/1
TABLET ORAL
Qty: 90 TABLET | Refills: 3 | Status: SHIPPED | OUTPATIENT
Start: 2017-12-29 | End: 2018-12-13 | Stop reason: SDUPTHER

## 2018-01-24 ENCOUNTER — NURSE ONLY (OUTPATIENT)
Dept: INTERNAL MEDICINE | Age: 83
End: 2018-01-24
Payer: MEDICARE

## 2018-01-24 ENCOUNTER — ANTI-COAG VISIT (OUTPATIENT)
Dept: INTERNAL MEDICINE | Age: 83
End: 2018-01-24

## 2018-01-24 DIAGNOSIS — Z79.01 LONG TERM CURRENT USE OF ANTICOAGULANT THERAPY: ICD-10-CM

## 2018-01-24 DIAGNOSIS — Z79.01 LONG TERM CURRENT USE OF ANTICOAGULANT THERAPY: Primary | ICD-10-CM

## 2018-01-24 LAB
INTERNATIONAL NORMALIZATION RATIO, POC: 2.5
PROTHROMBIN TIME, POC: NORMAL

## 2018-01-24 PROCEDURE — 85610 PROTHROMBIN TIME: CPT | Performed by: INTERNAL MEDICINE

## 2018-01-24 NOTE — PROGRESS NOTES
Mr. Edilberto Mioxn was here today. INR today:  2.5    INR Goal: 2.0-3.0    Dosing Plan  As of 1/24/2018    TTR:   61.7 % (6.9 mo)   Full instructions:   7.5 mg every day                 PLAN: CONTINUE CURRENT DOSE    NEXT COUMADIN CLINIC APT IS: 2/21/18 AT 10:00 AM        [facility]  [phone]    [place] ALVAREZ  The major complication associated with warfarin is bleeding due to excessive anticoagulation. Excessive bleeding, or hemorrhage, can occur from any area of the body, and patients on warfarin should report any falls or accidents, as well as signs or symptoms of bleeding or unusual bruising. Signs of unusual bleeding include bleeding from the gums, blood in the urine, bloody or dark stool, a nosebleed, or vomiting blood. Because the risk of bleeding increases as the INR rises, the INR is closely monitored and adjustments are made as needed to maintain the INR within the target range. Abbie Mas also cause skin necrosis or gangrene, which can cause dark red or black areas on the skin. This is a rare complication that may occur during the first several days of warfarin therapy. When to seek help  If there are obvious or subtle signs of bleeding, including the following, patients should call their healthcare provider immediately.   · Persistent nausea, stomach upset, or vomiting blood or other material that looks like coffee grounds   · Headaches, dizziness, or weakness   · Nosebleeds   · Dark red or brown urine   · Blood in the bowel movement or dark-colored stool   · Pain, discomfort, or swelling, especially after an injury   · After a serious fall or head injury, even if there are no other symptoms  The patient should also call if any of the following occurs:  · Bleeding from the gums after brushing the teeth   · Swelling or pain at an injection site   · Excessive menstrual bleeding or bleeding between menstrual periods   · Diarrhea, vomiting, or inability to and supplements can interfere with warfarin's effectiveness. After being stabilized on a particular warfarin dose, consult a healthcare provider before making major dietary changes (eg, starting a diet to lose weight, starting a nutritional supplement or vitamin). · Vitamin K  Eating an increased amount of foods rich in vitamin K can lower the prothrombin time and INR, making warfarin less effective, and potentially increasing the risk of blood clots. Patients who take warfarin should aim to eat a relatively similar amount of vitamin K each week. Some foods have a high level of vitamin K, including: kale, broccoli, spinach, bhanu or turnip greens, lettuce, [place] sprouts, and cabbage (table 1). It is not necessary to avoid these foods. However, the patient should eat a relatively similar amount on a regular basis rather than eating a large serving occasionally. · Cranberry juice  There have been mixed reports on the effect of cranberry juice in people who use warfarin to prevent blood clots. Some experts have reported that drinking cranberry juice while on warfarin can cause significant over-anticoagulation and bleeding [1]. However, a small study found that drinking one eight ounce serving of cranberry juice per day for seven days had no effect on the INR of seven men taking warfarin for atrial fibrillation [2]. It is possible that larger amounts could have a more significant effect. The best advice is probably to avoid consuming large amounts of cranberry juice, and to speak with a healthcare provider regarding any concerns about a possible interaction. · Alcohol  Chronic abuse of alcohol affects the body's ability to handle warfarin. Patients on warfarin therapy should avoid drinking alcohol on a daily basis. Alcohol should be limited to no more than one to two servings of alcohol occasionally.  In addition, drinking excessive amounts of alcohol can increase the risk of injury, and therefore cup 150   Johanny greens, frozen (cooked, drained) 1/2 cup 530   Turnip greens, frozen (cooked, drained) 1/2 cup 425   [place]rucible sprouts, frozen (cooked, drained) 1/2 cup 110   Moderate level vitamin K foods   Asparagus, frozen (cooked, drained) 1/2 cup  4 goff 72  48   Asparagus, fresh (cooked, drained) 4 goff 30   Broccoli, frozen (cooked, drained) 1/2 cup 60   Broccoli, fresh (cooked, drained) 1 spear 52   Broccoli, raw 1/2 cup 40   Lettuce (butterhead, [place], barron) 1/2 head 80   Lettuce (iceberg, crisphead) 1/2 head 65   Lettuce (yovanny, cos) 1 cup 57   Lettuce (green leaf) 1 cup 97   Okra, fresh (cooked, drained) 1/2 cup 32   Okra, frozen (cooked, drained) 1/2 cup 44   Cabbage (cooked, drained) 1/2 cup 73   Cabbage, raw 1/2 cup 21   Cabbage, olga (raw) 1/2 cup 24   Cabbage, Chinese (cooked, drained) 1/2 cup 28   Coleslaw (fast food-type) 3/4 cup 56   Sauerkraut, canned 1/2 cup 41   Peas, frozen, with pod (cooked, drained) 1/2 cup 24   Peas, fresh, with pod (cooked, drained) 1/2 cup 20   Peas, green, frozen (cooked, drained) 1/2 cup 18   Celery, raw 1/2 cup 17   Beans, green or yellow, fresh (cooked, drained) 1/2 cup 10   Oil, canola 1 tablespoon 17   Oil, olive 1 tablespoon 8   Oil, other (including peanut, sesame, safflower, corn, sunflower, soybean) 1 tablespoon 3 or less   Green tea, brewed in hot water 3.5 ounces 0.3   Data from: [employer]. USDA Nutrient Database for Standard Reference. Nutrient Data Laboratory Home Page, [employer]ca.

## 2018-02-16 RX ORDER — MESALAMINE 250 MG/1
CAPSULE ORAL
Qty: 1080 CAPSULE | Refills: 5 | Status: SHIPPED | OUTPATIENT
Start: 2018-02-16 | End: 2019-02-21 | Stop reason: SDUPTHER

## 2018-02-20 ENCOUNTER — NURSE ONLY (OUTPATIENT)
Dept: INTERNAL MEDICINE | Age: 83
End: 2018-02-20
Payer: MEDICARE

## 2018-02-20 ENCOUNTER — ANTI-COAG VISIT (OUTPATIENT)
Dept: INTERNAL MEDICINE | Age: 83
End: 2018-02-20

## 2018-02-20 DIAGNOSIS — Z79.01 LONG TERM CURRENT USE OF ANTICOAGULANT THERAPY: ICD-10-CM

## 2018-02-20 DIAGNOSIS — Z79.01 LONG TERM CURRENT USE OF ANTICOAGULANT THERAPY: Primary | ICD-10-CM

## 2018-02-20 LAB
INTERNATIONAL NORMALIZATION RATIO, POC: 2.6
PROTHROMBIN TIME, POC: NORMAL

## 2018-02-20 PROCEDURE — 85610 PROTHROMBIN TIME: CPT | Performed by: INTERNAL MEDICINE

## 2018-03-21 ENCOUNTER — NURSE ONLY (OUTPATIENT)
Dept: INTERNAL MEDICINE | Age: 83
End: 2018-03-21
Payer: MEDICARE

## 2018-03-21 ENCOUNTER — ANTI-COAG VISIT (OUTPATIENT)
Dept: INTERNAL MEDICINE | Age: 83
End: 2018-03-21

## 2018-03-21 DIAGNOSIS — Z79.01 LONG TERM CURRENT USE OF ANTICOAGULANT THERAPY: ICD-10-CM

## 2018-03-21 DIAGNOSIS — Z79.01 LONG TERM CURRENT USE OF ANTICOAGULANT THERAPY: Primary | ICD-10-CM

## 2018-03-21 LAB
INTERNATIONAL NORMALIZATION RATIO, POC: 2.2
PROTHROMBIN TIME, POC: NORMAL

## 2018-03-21 PROCEDURE — 85610 PROTHROMBIN TIME: CPT | Performed by: INTERNAL MEDICINE

## 2018-03-21 NOTE — PROGRESS NOTES
bleeding. Warfarin and medications  A number of medications, herbs, and vitamins can interact with warfarin (table 2 and table 3). This interaction may affect the action of warfarin or the other medication. If warfarin is affected, the dose may need to be adjusted (up or down) to maintain an optimal coagulation effect. Patients who take warfarin should consult with their clinician before taking any new medication, including over-the-counter (non-prescription) drugs, herbal medicines, vitamins, or any other products. Some of the most common over-the-counter pain relievers, including acetaminophen (Tylenol®), aspirin, and nonsteroidal antiinflammatory drugs (such as ibuprofen [Advil®]) and naproxen (Aleve®), enhance the anticoagulant effects of warfarin. Vitamin E may increase the anticoagulant effects of warfarin. Consult a healthcare provider before adding or changing a dose of vitamin E or any other vitamin. Wear medical identification  People who require long-term warfarin should wear a bracelet, necklace, or similar alert tag at all times. If an accident occurs and the person is too ill to explain their condition, this will help responders provide appropriate care. The alert tag should include a list of major medical conditions and the reason warfarin is needed (eg, atrial fibrillation), as well as the name and phone number of an emergency contact. One device, Medic Alert®, provides a toll-free number that emergency medical workers can call to find out a person's medical history, list of medications, family emergency contact numbers, and healthcare provider names and numbers.     GRAPHICS: Table 1  Foods with moderate to high levels of vitamin K  Food name Serving size Vitamin K (micrograms)   High level vitamin K foods   Kale, frozen (cooked or boiled, drained) 1/2 cup 570   Kale, fresh, (cooked or boiled, drained) 1/2 cup 530   Spinach, frozen (cooked or boiled, drained) 1/2 cup 514   Spinach, raw 1 cup 150   Johanny greens, frozen (cooked, drained) 1/2 cup 530   Turnip greens, frozen (cooked, drained) 1/2 cup 425   Watson sprouts, frozen (cooked, drained) 1/2 cup 110   Moderate level vitamin K foods   Asparagus, frozen (cooked, drained) 1/2 cup  4 goff 72  48   Asparagus, fresh (cooked, drained) 4 goff 30   Broccoli, frozen (cooked, drained) 1/2 cup 60   Broccoli, fresh (cooked, drained) 1 spear 52   Broccoli, raw 1/2 cup 40   Lettuce (butterhead, Harford, barron) 1/2 head 80   Lettuce (iceberg, crisphead) 1/2 head 65   Lettuce (yovanny, cos) 1 cup 57   Lettuce (green leaf) 1 cup 97   Okra, fresh (cooked, drained) 1/2 cup 32   Okra, frozen (cooked, drained) 1/2 cup 44   Cabbage (cooked, drained) 1/2 cup 73   Cabbage, raw 1/2 cup 21   Cabbage, olga (raw) 1/2 cup 24   Cabbage, Chinese (cooked, drained) 1/2 cup 28   Coleslaw (fast food-type) 3/4 cup 56   Sauerkraut, canned 1/2 cup 41   Peas, frozen, with pod (cooked, drained) 1/2 cup 24   Peas, fresh, with pod (cooked, drained) 1/2 cup 20   Peas, green, frozen (cooked, drained) 1/2 cup 18   Celery, raw 1/2 cup 17   Beans, green or yellow, fresh (cooked, drained) 1/2 cup 10   Oil, canola 1 tablespoon 17   Oil, olive 1 tablespoon 8   Oil, other (including peanut, sesame, safflower, corn, sunflower, soybean) 1 tablespoon 3 or less   Green tea, brewed in hot water 3.5 ounces 0.3   Data from: [employer]. USDA Nutrient Database for Standard Reference. Nutrient Data Laboratory Home Page, [employer].

## 2018-04-18 ENCOUNTER — ANTI-COAG VISIT (OUTPATIENT)
Dept: INTERNAL MEDICINE | Age: 83
End: 2018-04-18

## 2018-04-18 ENCOUNTER — NURSE ONLY (OUTPATIENT)
Dept: INTERNAL MEDICINE | Age: 83
End: 2018-04-18
Payer: MEDICARE

## 2018-04-18 DIAGNOSIS — Z79.01 LONG TERM CURRENT USE OF ANTICOAGULANT THERAPY: Primary | ICD-10-CM

## 2018-04-18 DIAGNOSIS — Z79.01 LONG TERM CURRENT USE OF ANTICOAGULANT THERAPY: ICD-10-CM

## 2018-04-18 LAB
INTERNATIONAL NORMALIZATION RATIO, POC: 2.5
PROTHROMBIN TIME, POC: NORMAL

## 2018-04-18 PROCEDURE — 85610 PROTHROMBIN TIME: CPT | Performed by: INTERNAL MEDICINE

## 2018-05-07 ENCOUNTER — ANTI-COAG VISIT (OUTPATIENT)
Dept: INTERNAL MEDICINE | Age: 83
End: 2018-05-07

## 2018-05-07 ENCOUNTER — NURSE ONLY (OUTPATIENT)
Dept: INTERNAL MEDICINE | Age: 83
End: 2018-05-07
Payer: MEDICARE

## 2018-05-07 ENCOUNTER — OFFICE VISIT (OUTPATIENT)
Dept: INTERNAL MEDICINE | Age: 83
End: 2018-05-07
Payer: MEDICARE

## 2018-05-07 VITALS
WEIGHT: 257.8 LBS | SYSTOLIC BLOOD PRESSURE: 118 MMHG | BODY MASS INDEX: 36.91 KG/M2 | HEIGHT: 70 IN | HEART RATE: 66 BPM | DIASTOLIC BLOOD PRESSURE: 68 MMHG | OXYGEN SATURATION: 94 %

## 2018-05-07 DIAGNOSIS — I35.8 AORTIC SYSTOLIC MURMUR ON EXAMINATION: Primary | Chronic | ICD-10-CM

## 2018-05-07 DIAGNOSIS — K21.9 GASTROESOPHAGEAL REFLUX DISEASE WITHOUT ESOPHAGITIS: Chronic | ICD-10-CM

## 2018-05-07 DIAGNOSIS — Z79.01 LONG TERM CURRENT USE OF ANTICOAGULANT THERAPY: Primary | ICD-10-CM

## 2018-05-07 DIAGNOSIS — K50.119 CROHN'S DISEASE OF LARGE INTESTINE WITH COMPLICATION (HCC): Chronic | ICD-10-CM

## 2018-05-07 DIAGNOSIS — I87.2 DEEP VENOUS INSUFFICIENCY: Chronic | ICD-10-CM

## 2018-05-07 DIAGNOSIS — I10 ESSENTIAL HYPERTENSION: Chronic | ICD-10-CM

## 2018-05-07 DIAGNOSIS — Z79.01 LONG TERM CURRENT USE OF ANTICOAGULANT THERAPY: ICD-10-CM

## 2018-05-07 DIAGNOSIS — M15.9 PRIMARY OSTEOARTHRITIS INVOLVING MULTIPLE JOINTS: Chronic | ICD-10-CM

## 2018-05-07 DIAGNOSIS — E78.2 MIXED HYPERLIPIDEMIA: Chronic | ICD-10-CM

## 2018-05-07 LAB
INTERNATIONAL NORMALIZATION RATIO, POC: 2.9
PROTHROMBIN TIME, POC: NORMAL

## 2018-05-07 PROCEDURE — 99214 OFFICE O/P EST MOD 30 MIN: CPT | Performed by: INTERNAL MEDICINE

## 2018-05-07 PROCEDURE — 1123F ACP DISCUSS/DSCN MKR DOCD: CPT | Performed by: INTERNAL MEDICINE

## 2018-05-07 PROCEDURE — 85610 PROTHROMBIN TIME: CPT | Performed by: INTERNAL MEDICINE

## 2018-05-07 PROCEDURE — G8427 DOCREV CUR MEDS BY ELIG CLIN: HCPCS | Performed by: INTERNAL MEDICINE

## 2018-05-07 PROCEDURE — 4040F PNEUMOC VAC/ADMIN/RCVD: CPT | Performed by: INTERNAL MEDICINE

## 2018-05-07 PROCEDURE — 1036F TOBACCO NON-USER: CPT | Performed by: INTERNAL MEDICINE

## 2018-05-07 PROCEDURE — G8417 CALC BMI ABV UP PARAM F/U: HCPCS | Performed by: INTERNAL MEDICINE

## 2018-05-07 ASSESSMENT — ENCOUNTER SYMPTOMS
ABDOMINAL PAIN: 0
DIARRHEA: 0
NAUSEA: 0
SHORTNESS OF BREATH: 0
COUGH: 0

## 2018-05-10 ENCOUNTER — HOSPITAL ENCOUNTER (OUTPATIENT)
Dept: NON INVASIVE DIAGNOSTICS | Age: 83
Discharge: HOME | End: 2018-05-10
Payer: MEDICARE

## 2018-05-10 DIAGNOSIS — I35.8 AORTIC SYSTOLIC MURMUR ON EXAMINATION: Chronic | ICD-10-CM

## 2018-05-10 LAB
LV EF: 58 %
LVEF MODALITY: NORMAL

## 2018-05-10 PROCEDURE — 2580000003 HC RX 258: Performed by: INTERNAL MEDICINE

## 2018-05-10 PROCEDURE — 6360000004 HC RX CONTRAST MEDICATION: Performed by: INTERNAL MEDICINE

## 2018-05-10 PROCEDURE — C8929 TTE W OR WO FOL WCON,DOPPLER: HCPCS

## 2018-05-10 RX ORDER — SODIUM CHLORIDE 0.9 % (FLUSH) 0.9 %
10 SYRINGE (ML) INJECTION PRN
Status: ACTIVE | OUTPATIENT
Start: 2018-05-10 | End: 2018-05-11

## 2018-05-10 RX ADMIN — Medication 10 ML: at 13:02

## 2018-05-10 RX ADMIN — PERFLUTREN 2.2 MG: 6.52 INJECTION, SUSPENSION INTRAVENOUS at 13:05

## 2018-05-19 PROBLEM — I35.0 NONRHEUMATIC AORTIC VALVE STENOSIS: Chronic | Status: ACTIVE | Noted: 2018-05-19

## 2018-05-21 ENCOUNTER — TELEPHONE (OUTPATIENT)
Dept: INTERNAL MEDICINE | Age: 83
End: 2018-05-21

## 2018-05-21 DIAGNOSIS — I35.0 AORTIC STENOSIS, MODERATE: Primary | ICD-10-CM

## 2018-05-30 ENCOUNTER — NURSE ONLY (OUTPATIENT)
Dept: INTERNAL MEDICINE | Age: 83
End: 2018-05-30
Payer: MEDICARE

## 2018-05-30 DIAGNOSIS — Z79.01 LONG TERM CURRENT USE OF ANTICOAGULANT THERAPY: Primary | ICD-10-CM

## 2018-05-30 LAB
INTERNATIONAL NORMALIZATION RATIO, POC: 2.8
PROTHROMBIN TIME, POC: NORMAL

## 2018-05-30 PROCEDURE — 85610 PROTHROMBIN TIME: CPT | Performed by: INTERNAL MEDICINE

## 2018-06-04 RX ORDER — HYDROCHLOROTHIAZIDE 12.5 MG/1
TABLET ORAL
Qty: 90 TABLET | Refills: 3 | Status: SHIPPED | OUTPATIENT
Start: 2018-06-04 | End: 2018-11-13

## 2018-06-12 ENCOUNTER — NURSE ONLY (OUTPATIENT)
Dept: INTERNAL MEDICINE | Age: 83
End: 2018-06-12
Payer: MEDICARE

## 2018-06-12 DIAGNOSIS — Z79.01 LONG TERM CURRENT USE OF ANTICOAGULANT THERAPY: Primary | ICD-10-CM

## 2018-06-12 LAB
INTERNATIONAL NORMALIZATION RATIO, POC: 2.6
PROTHROMBIN TIME, POC: NORMAL

## 2018-06-12 PROCEDURE — 85610 PROTHROMBIN TIME: CPT | Performed by: INTERNAL MEDICINE

## 2018-06-22 RX ORDER — WARFARIN SODIUM 7.5 MG/1
TABLET ORAL
Qty: 90 TABLET | Refills: 3 | Status: SHIPPED | OUTPATIENT
Start: 2018-06-22 | End: 2019-05-28 | Stop reason: SDUPTHER

## 2018-06-26 RX ORDER — WARFARIN SODIUM 7.5 MG/1
TABLET ORAL
Qty: 96 TABLET | Refills: 3 | Status: SHIPPED | OUTPATIENT
Start: 2018-06-26 | End: 2018-11-13 | Stop reason: SDUPTHER

## 2018-07-11 ENCOUNTER — NURSE ONLY (OUTPATIENT)
Dept: INTERNAL MEDICINE | Age: 83
End: 2018-07-11
Payer: MEDICARE

## 2018-07-11 DIAGNOSIS — I87.2 DEEP VENOUS INSUFFICIENCY: Chronic | ICD-10-CM

## 2018-07-11 DIAGNOSIS — Z79.01 LONG TERM CURRENT USE OF ANTICOAGULANT THERAPY: Primary | ICD-10-CM

## 2018-07-11 LAB
INTERNATIONAL NORMALIZATION RATIO, POC: 2.4
PROTHROMBIN TIME, POC: NORMAL

## 2018-07-11 PROCEDURE — 85610 PROTHROMBIN TIME: CPT | Performed by: INTERNAL MEDICINE

## 2018-08-15 ENCOUNTER — NURSE ONLY (OUTPATIENT)
Dept: INTERNAL MEDICINE | Age: 83
End: 2018-08-15
Payer: MEDICARE

## 2018-08-15 DIAGNOSIS — Z79.01 LONG TERM CURRENT USE OF ANTICOAGULANT THERAPY: ICD-10-CM

## 2018-08-15 DIAGNOSIS — I35.8 AORTIC SYSTOLIC MURMUR ON EXAMINATION: Primary | Chronic | ICD-10-CM

## 2018-08-15 LAB
INTERNATIONAL NORMALIZATION RATIO, POC: 2.4
PROTHROMBIN TIME, POC: NORMAL

## 2018-08-15 PROCEDURE — 85610 PROTHROMBIN TIME: CPT | Performed by: INTERNAL MEDICINE

## 2018-08-15 NOTE — PROGRESS NOTES
Mr. Ken Sloan was here today. INR today:   Results for orders placed or performed in visit on 08/15/18   POCT INR   Result Value Ref Range    INR 2.4     Protime       INR Goal: 2.0-3.0    Dosing Plan  As of 8/15/2018    TTR:   80.7 % (1.1 y)   Full warfarin instructions:   3.75 mg on Wed; 7.5 mg all other days          PLAN: CONTINUE CURRENT DOSE  NEXT COUMADIN CLINIC APT IS: 9/13/2018 at 1:30pm    Dr. Joy Hsieh 8/22/18 at 2:00pm    [address] [place] [address] [phone]  Summer Hours:  Tuesday's-   11:12am-10:80pm  Wednesday's- 11:12am-10:80pm  Thursday's-  6:30am-4:30pm  IF IT'S AN EMERGENCY, PLEASE CALL 911 OR GO TO YOUR NEAREST EMERGENCY ROOM. IF UNABLE TO REACH COUMADIN CLINIC, [address] [place] [address] Rd, [phone].

## 2018-08-22 ENCOUNTER — OFFICE VISIT (OUTPATIENT)
Dept: CARDIOLOGY | Age: 83
End: 2018-08-22
Payer: MEDICARE

## 2018-08-22 VITALS
HEIGHT: 70 IN | BODY MASS INDEX: 36.51 KG/M2 | DIASTOLIC BLOOD PRESSURE: 80 MMHG | RESPIRATION RATE: 18 BRPM | HEART RATE: 66 BPM | SYSTOLIC BLOOD PRESSURE: 126 MMHG | WEIGHT: 255 LBS

## 2018-08-22 DIAGNOSIS — I10 ESSENTIAL HYPERTENSION: Primary | Chronic | ICD-10-CM

## 2018-08-22 DIAGNOSIS — I35.8 AORTIC SYSTOLIC MURMUR ON EXAMINATION: Chronic | ICD-10-CM

## 2018-08-22 DIAGNOSIS — I35.0 NONRHEUMATIC AORTIC VALVE STENOSIS: Chronic | ICD-10-CM

## 2018-08-22 PROCEDURE — G8417 CALC BMI ABV UP PARAM F/U: HCPCS | Performed by: INTERNAL MEDICINE

## 2018-08-22 PROCEDURE — 99204 OFFICE O/P NEW MOD 45 MIN: CPT | Performed by: INTERNAL MEDICINE

## 2018-08-22 PROCEDURE — 1101F PT FALLS ASSESS-DOCD LE1/YR: CPT | Performed by: INTERNAL MEDICINE

## 2018-08-22 PROCEDURE — 93000 ELECTROCARDIOGRAM COMPLETE: CPT | Performed by: INTERNAL MEDICINE

## 2018-08-22 PROCEDURE — 1123F ACP DISCUSS/DSCN MKR DOCD: CPT | Performed by: INTERNAL MEDICINE

## 2018-08-22 PROCEDURE — 1036F TOBACCO NON-USER: CPT | Performed by: INTERNAL MEDICINE

## 2018-08-22 PROCEDURE — 4040F PNEUMOC VAC/ADMIN/RCVD: CPT | Performed by: INTERNAL MEDICINE

## 2018-08-22 PROCEDURE — G8427 DOCREV CUR MEDS BY ELIG CLIN: HCPCS | Performed by: INTERNAL MEDICINE

## 2018-08-22 ASSESSMENT — ENCOUNTER SYMPTOMS: SHORTNESS OF BREATH: 0

## 2018-08-22 NOTE — PROGRESS NOTES
non-tender  Cardio - No jugular venous distension                Clear s1 s2, no gallop, rub, 2/6 systolic murmur best heard at the right upper sternal border                No edema, normal pulses  Resp - Normal effort, Clear to auscultation bilaterally  GI - abdomen soft, non-tender, no hepatosplenomegaly  Skin - warm and dry; no rashes  Psych - A+O x 3, normal affect    Lab Results   Component Value Date    CREATININE 1.2 04/30/2018    CREATININE 1.3 11/14/2017    CREATININE 1.3 11/01/2017    CREATININE 0.9 07/04/2011    CREATININE 0.9 06/30/2011    CREATININE 0.8 06/28/2011    HGB 14.1 04/30/2018    HGB 14.1 11/01/2017    HGB 15.6 09/24/2014    HGB 15.1 05/24/2011       ECG 08/22/18  Sinus rhythm, right bundle branch block    TTE 5/18   1. Moderate aortic stenosis   2. Normal LV systolic function (estimated LV EF 55-60%)   3. Grade I diastolic dysfunction (impaired relaxation) with normal left   atrial pressure   4. Mild mitral regurgitation   5. Mild tricuspid regurgitation; estimated RVSP of at least 26 mmHg     Assessment, Recommendations, & Plan:  82 y.o. male with fatigue, moderate aortic stenosis, hypertension    Fatigue - likely related to his sleep habits, counseled him on this, he does not snore and there has been no noticeable apneic events according to his wife. Aortic stenosis - We discussed at length the pathophysiology and natural history of AS. We also discussed signs and symptoms related to severe aortic stenosis. I described the treatment options for severe AS which included  surgery, TAVR, and medical management/palliation and associated outcomes of each. At this time, the patient has moderate aortic stenosis, and monitoring is needed. We will repeat a transthoracic echo in its months. Did explain about TAVR as I think he will be a candidate for that mainly because of his age and weight, when the time comes.   I also explained that because of his right bundle branch block that he has a 50%

## 2018-09-13 ENCOUNTER — NURSE ONLY (OUTPATIENT)
Dept: INTERNAL MEDICINE | Age: 83
End: 2018-09-13
Payer: MEDICARE

## 2018-09-13 DIAGNOSIS — Z79.01 LONG TERM CURRENT USE OF ANTICOAGULANT THERAPY: Primary | ICD-10-CM

## 2018-09-13 LAB
INTERNATIONAL NORMALIZATION RATIO, POC: 2.4
PROTHROMBIN TIME, POC: NORMAL

## 2018-09-13 PROCEDURE — 85610 PROTHROMBIN TIME: CPT | Performed by: INTERNAL MEDICINE

## 2018-10-05 ENCOUNTER — TELEPHONE (OUTPATIENT)
Dept: INTERNAL MEDICINE | Age: 83
End: 2018-10-05

## 2018-10-08 NOTE — TELEPHONE ENCOUNTER
Pt was notified of this. I tried to contact Dr. Mylene Tierney office, but they were out to lunch. I will try back later.

## 2018-10-18 ENCOUNTER — NURSE ONLY (OUTPATIENT)
Dept: INTERNAL MEDICINE | Age: 83
End: 2018-10-18
Payer: MEDICARE

## 2018-10-18 DIAGNOSIS — Z79.01 LONG TERM CURRENT USE OF ANTICOAGULANT THERAPY: Primary | ICD-10-CM

## 2018-10-18 LAB
INTERNATIONAL NORMALIZATION RATIO, POC: 3.6
PROTHROMBIN TIME, POC: NORMAL

## 2018-10-18 PROCEDURE — 85610 PROTHROMBIN TIME: CPT | Performed by: INTERNAL MEDICINE

## 2018-11-06 ENCOUNTER — NURSE ONLY (OUTPATIENT)
Dept: INTERNAL MEDICINE | Age: 83
End: 2018-11-06
Payer: MEDICARE

## 2018-11-06 DIAGNOSIS — I87.2 DEEP VENOUS INSUFFICIENCY: Chronic | ICD-10-CM

## 2018-11-06 DIAGNOSIS — E78.2 MIXED HYPERLIPIDEMIA: Chronic | ICD-10-CM

## 2018-11-06 DIAGNOSIS — M15.9 PRIMARY OSTEOARTHRITIS INVOLVING MULTIPLE JOINTS: Chronic | ICD-10-CM

## 2018-11-06 DIAGNOSIS — K50.119 CROHN'S DISEASE OF LARGE INTESTINE WITH COMPLICATION (HCC): Chronic | ICD-10-CM

## 2018-11-06 DIAGNOSIS — Z79.01 LONG TERM CURRENT USE OF ANTICOAGULANT THERAPY: Primary | ICD-10-CM

## 2018-11-06 DIAGNOSIS — I10 ESSENTIAL HYPERTENSION: Chronic | ICD-10-CM

## 2018-11-06 DIAGNOSIS — K21.9 GASTROESOPHAGEAL REFLUX DISEASE WITHOUT ESOPHAGITIS: Chronic | ICD-10-CM

## 2018-11-06 DIAGNOSIS — Z79.01 LONG TERM CURRENT USE OF ANTICOAGULANT THERAPY: ICD-10-CM

## 2018-11-06 DIAGNOSIS — I35.8 AORTIC SYSTOLIC MURMUR ON EXAMINATION: Chronic | ICD-10-CM

## 2018-11-06 LAB
ALBUMIN SERPL-MCNC: 3.9 G/DL (ref 3.5–5.2)
ALP BLD-CCNC: 71 U/L (ref 40–130)
ALT SERPL-CCNC: 17 U/L (ref 5–41)
ANION GAP SERPL CALCULATED.3IONS-SCNC: 18 MMOL/L (ref 7–19)
AST SERPL-CCNC: 17 U/L (ref 5–40)
BASOPHILS ABSOLUTE: 0.1 K/UL (ref 0–0.2)
BASOPHILS RELATIVE PERCENT: 1 % (ref 0–1)
BILIRUB SERPL-MCNC: 0.6 MG/DL (ref 0.2–1.2)
BUN BLDV-MCNC: 21 MG/DL (ref 8–23)
CALCIUM SERPL-MCNC: 9.8 MG/DL (ref 8.8–10.2)
CHLORIDE BLD-SCNC: 104 MMOL/L (ref 98–111)
CHOLESTEROL, TOTAL: 203 MG/DL (ref 160–199)
CO2: 20 MMOL/L (ref 22–29)
CREAT SERPL-MCNC: 1.4 MG/DL (ref 0.5–1.2)
EOSINOPHILS ABSOLUTE: 0.1 K/UL (ref 0–0.6)
EOSINOPHILS RELATIVE PERCENT: 1.8 % (ref 0–5)
GFR NON-AFRICAN AMERICAN: 49
GLUCOSE BLD-MCNC: 104 MG/DL (ref 74–109)
HCT VFR BLD CALC: 39.6 % (ref 42–52)
HDLC SERPL-MCNC: 49 MG/DL (ref 55–121)
HEMOGLOBIN: 13.4 G/DL (ref 14–18)
INTERNATIONAL NORMALIZATION RATIO, POC: 2.7
LDL CHOLESTEROL CALCULATED: 91 MG/DL
LYMPHOCYTES ABSOLUTE: 2.3 K/UL (ref 1.1–4.5)
LYMPHOCYTES RELATIVE PERCENT: 32 % (ref 20–40)
MCH RBC QN AUTO: 32.8 PG (ref 27–31)
MCHC RBC AUTO-ENTMCNC: 33.8 G/DL (ref 33–37)
MCV RBC AUTO: 97.1 FL (ref 80–94)
MONOCYTES ABSOLUTE: 0.5 K/UL (ref 0–0.9)
MONOCYTES RELATIVE PERCENT: 6.7 % (ref 0–10)
NEUTROPHILS ABSOLUTE: 4.2 K/UL (ref 1.5–7.5)
NEUTROPHILS RELATIVE PERCENT: 58.1 % (ref 50–65)
PDW BLD-RTO: 13.1 % (ref 11.5–14.5)
PLATELET # BLD: 176 K/UL (ref 130–400)
PMV BLD AUTO: 9.5 FL (ref 9.4–12.4)
POTASSIUM SERPL-SCNC: 4.4 MMOL/L (ref 3.5–5)
PROTHROMBIN TIME, POC: NORMAL
RBC # BLD: 4.08 M/UL (ref 4.7–6.1)
SODIUM BLD-SCNC: 142 MMOL/L (ref 136–145)
TOTAL PROTEIN: 6.7 G/DL (ref 6.6–8.7)
TRIGL SERPL-MCNC: 314 MG/DL (ref 0–149)
WBC # BLD: 7.3 K/UL (ref 4.8–10.8)

## 2018-11-06 PROCEDURE — 85610 PROTHROMBIN TIME: CPT | Performed by: INTERNAL MEDICINE

## 2018-11-06 NOTE — PROGRESS NOTES
Mr. Jasbir Jurado was here today. INR today:   Results for orders placed or performed in visit on 11/06/18   POCT INR   Result Value Ref Range    INR 2.7     Protime       INR Goal: 2.0-3.0    Dosing Plan  As of 11/6/2018    TTR:   77.8 % (1.4 y)   Full warfarin instructions:   3.75 mg on Wed; 7.5 mg all other days              PLAN: CONTINUE CURRENT DOSE  NEXT COUMADIN CLINIC APT IS: 11/13/2018 AT 11:00AM    Coumadin Clinic Hours  Tuesday 7:30am - 4:00pm  Wednesday 7:30am - 4:00pm  Thursday 7:30am - 4:00pm    IF IT'S AN EMERGENCY, PLEASE CALL 911 OR GO TO YOUR NEAREST EMERGENCY ROOM. [facility] INTERNAL MEDICINE COUMADIN CLINIC [phone]  IF UNABLE TO REACH COUMADIN CLINIC, [address] [place] [address] Rd, [phone].

## 2018-11-13 ENCOUNTER — OFFICE VISIT (OUTPATIENT)
Dept: INTERNAL MEDICINE | Age: 83
End: 2018-11-13
Payer: MEDICARE

## 2018-11-13 VITALS
DIASTOLIC BLOOD PRESSURE: 64 MMHG | BODY MASS INDEX: 36.28 KG/M2 | SYSTOLIC BLOOD PRESSURE: 118 MMHG | HEIGHT: 70 IN | WEIGHT: 253.4 LBS | HEART RATE: 60 BPM | OXYGEN SATURATION: 93 %

## 2018-11-13 DIAGNOSIS — E53.8 VITAMIN B12 DEFICIENCY: ICD-10-CM

## 2018-11-13 DIAGNOSIS — I87.2 DEEP VENOUS INSUFFICIENCY: Chronic | ICD-10-CM

## 2018-11-13 DIAGNOSIS — Z23 NEED FOR PROPHYLACTIC VACCINATION AGAINST STREPTOCOCCUS PNEUMONIAE (PNEUMOCOCCUS): ICD-10-CM

## 2018-11-13 DIAGNOSIS — Z79.01 LONG TERM CURRENT USE OF ANTICOAGULANT THERAPY: ICD-10-CM

## 2018-11-13 DIAGNOSIS — E78.2 MIXED HYPERLIPIDEMIA: Primary | Chronic | ICD-10-CM

## 2018-11-13 DIAGNOSIS — K21.9 GASTROESOPHAGEAL REFLUX DISEASE WITHOUT ESOPHAGITIS: Chronic | ICD-10-CM

## 2018-11-13 DIAGNOSIS — M15.9 PRIMARY OSTEOARTHRITIS INVOLVING MULTIPLE JOINTS: Chronic | ICD-10-CM

## 2018-11-13 DIAGNOSIS — K50.119 CROHN'S DISEASE OF LARGE INTESTINE WITH COMPLICATION (HCC): Chronic | ICD-10-CM

## 2018-11-13 DIAGNOSIS — I10 ESSENTIAL HYPERTENSION: Chronic | ICD-10-CM

## 2018-11-13 DIAGNOSIS — Z23 NEED FOR PROPHYLACTIC VACCINATION AND INOCULATION AGAINST VARICELLA: ICD-10-CM

## 2018-11-13 DIAGNOSIS — Z23 NEED FOR PROPHYLACTIC VACCINATION AGAINST DIPHTHERIA-TETANUS-PERTUSSIS (DTP): ICD-10-CM

## 2018-11-13 DIAGNOSIS — Z23 FLU VACCINE NEED: ICD-10-CM

## 2018-11-13 DIAGNOSIS — I35.0 NONRHEUMATIC AORTIC VALVE STENOSIS: Chronic | ICD-10-CM

## 2018-11-13 DIAGNOSIS — N18.30 CKD (CHRONIC KIDNEY DISEASE) STAGE 3, GFR 30-59 ML/MIN (HCC): Chronic | ICD-10-CM

## 2018-11-13 PROCEDURE — 90662 IIV NO PRSV INCREASED AG IM: CPT | Performed by: INTERNAL MEDICINE

## 2018-11-13 PROCEDURE — G0439 PPPS, SUBSEQ VISIT: HCPCS | Performed by: INTERNAL MEDICINE

## 2018-11-13 PROCEDURE — 4040F PNEUMOC VAC/ADMIN/RCVD: CPT | Performed by: INTERNAL MEDICINE

## 2018-11-13 PROCEDURE — 90732 PPSV23 VACC 2 YRS+ SUBQ/IM: CPT | Performed by: INTERNAL MEDICINE

## 2018-11-13 PROCEDURE — G8482 FLU IMMUNIZE ORDER/ADMIN: HCPCS | Performed by: INTERNAL MEDICINE

## 2018-11-13 PROCEDURE — 99213 OFFICE O/P EST LOW 20 MIN: CPT | Performed by: INTERNAL MEDICINE

## 2018-11-13 PROCEDURE — G8427 DOCREV CUR MEDS BY ELIG CLIN: HCPCS | Performed by: INTERNAL MEDICINE

## 2018-11-13 PROCEDURE — 1101F PT FALLS ASSESS-DOCD LE1/YR: CPT | Performed by: INTERNAL MEDICINE

## 2018-11-13 PROCEDURE — 1123F ACP DISCUSS/DSCN MKR DOCD: CPT | Performed by: INTERNAL MEDICINE

## 2018-11-13 PROCEDURE — G0008 ADMIN INFLUENZA VIRUS VAC: HCPCS | Performed by: INTERNAL MEDICINE

## 2018-11-13 PROCEDURE — G0009 ADMIN PNEUMOCOCCAL VACCINE: HCPCS | Performed by: INTERNAL MEDICINE

## 2018-11-13 PROCEDURE — G8417 CALC BMI ABV UP PARAM F/U: HCPCS | Performed by: INTERNAL MEDICINE

## 2018-11-13 PROCEDURE — 1036F TOBACCO NON-USER: CPT | Performed by: INTERNAL MEDICINE

## 2018-11-13 RX ORDER — CYANOCOBALAMIN 1000 UG/ML
INJECTION INTRAMUSCULAR; SUBCUTANEOUS
Qty: 6 ML | Refills: 0 | Status: SHIPPED | OUTPATIENT
Start: 2018-11-13 | End: 2018-12-26 | Stop reason: SDUPTHER

## 2018-11-13 RX ORDER — HYDROCHLOROTHIAZIDE 12.5 MG/1
12.5 TABLET ORAL DAILY
COMMUNITY
End: 2019-08-15

## 2018-11-13 ASSESSMENT — ENCOUNTER SYMPTOMS
BACK PAIN: 0
COUGH: 0
COLOR CHANGE: 0
SORE THROAT: 0
CONSTIPATION: 0
RHINORRHEA: 0
NAUSEA: 0
SINUS PRESSURE: 0
DIARRHEA: 1
SHORTNESS OF BREATH: 0
TROUBLE SWALLOWING: 0
ABDOMINAL PAIN: 0
BLOOD IN STOOL: 0

## 2018-11-13 ASSESSMENT — PATIENT HEALTH QUESTIONNAIRE - PHQ9
SUM OF ALL RESPONSES TO PHQ QUESTIONS 1-9: 0
SUM OF ALL RESPONSES TO PHQ QUESTIONS 1-9: 0

## 2018-11-13 ASSESSMENT — ANXIETY QUESTIONNAIRES: GAD7 TOTAL SCORE: 0

## 2018-11-13 ASSESSMENT — LIFESTYLE VARIABLES: HOW OFTEN DO YOU HAVE A DRINK CONTAINING ALCOHOL: 0

## 2018-11-13 NOTE — PROGRESS NOTES
Spouse name: Chantal Dennis Number of children: 2    Years of education: 23     Occupational History     Retired     Social History Main Topics    Smoking status: Never Smoker    Smokeless tobacco: Never Used    Alcohol use No    Drug use: No    Sexual activity: Not Currently     Partners: Female     Other Topics Concern    Not on file     Social History Narrative    No narrative on file      No Known Allergies   Current Outpatient Prescriptions   Medication Sig Dispense Refill    Tdap (ADACEL) 5-2-15.5 LF-MCG/0.5 injection Inject 0.5 mLs into the muscle once for 1 dose 0.5 mL 0    zoster recombinant adjuvanted vaccine (SHINGRIX) 50 MCG/0.5ML SUSR injection 50 MCG IM then repeat 2-6 months. 0.5 mL 1    hydrochlorothiazide (HYDRODIURIL) 12.5 MG tablet Take 12.5 mg by mouth daily      cyanocobalamin 1000 MCG/ML injection monthly 6 mL 0    Cholecalciferol (VITAMIN D3) 5000 units TABS Take by mouth      B Complex-C-E-Zn (STRESS FORMULA/ZINC PO) Take by mouth      warfarin (COUMADIN) 7.5 MG tablet Take one tablet daily except on Wednesdays take one-half tablet. 90 tablet 3    PENTASA 250 MG extended release capsule TAKE THREE CAPSULES BY MOUTH FOUR TIMES DAILY 1080 capsule 5    losartan (COZAAR) 50 MG tablet TAKE ONE TABLET BY MOUTH DAILY 90 tablet 3    lansoprazole (PREVACID) 15 MG delayed release capsule Take 15 mg by mouth       No current facility-administered medications for this visit. Review of Systems   Constitutional: Negative for fatigue, fever and unexpected weight change. HENT: Negative for ear pain, postnasal drip, rhinorrhea, sinus pressure, sore throat and trouble swallowing. Eyes: Negative for visual disturbance. Respiratory: Negative for cough and shortness of breath. Cardiovascular: Negative for chest pain, palpitations and leg swelling. Gastrointestinal: Positive for diarrhea (per HPI). Negative for abdominal pain, blood in stool, constipation and nausea. List   Diagnosis    Long term current use of anticoagulant therapy    Essential hypertension    Cro[facility]'s disease of large intestine with complication ([facility]    Mixed hyperlipidemia    Deep venous insufficiency    Primary osteoarthritis involving multiple joints    Gastroesophageal reflux disease without esophagitis    Aortic systolic murmur on examination - suspect aortic stenosis    Nonrheumatic aortic valve stenosis    CKD (chronic kidney disease) stage 3, GFR 30-59 ml/min ([facility])       DIAGNOSES:    ICD-10-CM    1. Mixed hyperlipidemia E78.2 Comprehensive Metabolic Panel     Lipid Panel   2. Need for prophylactic vaccination against Streptococcus pneumoniae (pneumococcus) Z23 Pneumococcal polysaccharide vaccine 23-valent PPSV23   3. Need for prophylactic vaccination against diphtheria-tetanus-pertussis (DTP) Z23 Tdap (ADACEL) 5-2-15.5 LF-MCG/0.5 injection   4. Need for prophylactic vaccination and inoculation against varicella Z23 zoster recombinant adjuvanted vaccine (SHINGRIX) 50 MCG/0.5ML SUSR injection   5. Flu vaccine need Z23 INFLUENZA, HIGH DOSE, 65 YRS +, IM, PF, PREFILL SYR, 0.5ML (FLUZONE HD)   6. Vitamin B12 deficiency E53.8 CBC   7. Deep venous insufficiency I87.2    8. Essential hypertension I10 Comprehensive Metabolic Panel     CBC   9. Nonrheumatic aortic valve stenosis I35.0    10. Crohn's disease of large intestine with complication ([facility] Q94.941    11. Gastroesophageal reflux disease without esophagitis K21.9    12. Primary osteoarthritis involving multiple joints M15.0 CBC   13. Long term current use of anticoagulant therapy Z79.01 CBC   14.  CKD (chronic kidney disease) stage 3, GFR 30-59 ml/min ([facility]) N18.3         Orders Placed This Encounter   Procedures    Pneumococcal polysaccharide vaccine 23-valent PPSV23    INFLUENZA, HIGH DOSE, 65 YRS +, IM, PF, PREFILL SYR, 0.5ML (FLUZONE HD)    Comprehensive Metabolic Panel    CBC    Lipid Panel          New Prescriptions    CYANOCOBALAMIN 1000 MCG/ML INJECTION    monthly    TDAP (ADACEL) 5-2-15.5 LF-MCG/0.5 INJECTION    Inject 0.5 mLs into the muscle once for 1 dose    ZOSTER RECOMBINANT ADJUVANTED VACCINE (SHINGRIX) 50 MCG/0.5ML SUSR INJECTION    50 MCG IM then repeat 2-6 months. ASSESSMENT/PLAN:  He appears stable from a standpoint of his aortic stenosis. He will need an echocardiogram in May. Blood pressure remains stable. Lipids are fair with continued increase in triglycerides. Diet and weight Loss discussed. His renal function is generally stable but needs to be monitored closely. He has had some intermittent diarrhea and will continue Pentasa and should this worsen he will need to see Dr. Nadeem Stevens. I will arrange follow-up with Dr. Sylvia Sanders in 6 months with a CMP lipid CBC.

## 2018-12-06 ENCOUNTER — NURSE ONLY (OUTPATIENT)
Dept: INTERNAL MEDICINE | Age: 83
End: 2018-12-06
Payer: MEDICARE

## 2018-12-06 DIAGNOSIS — Z79.01 LONG TERM CURRENT USE OF ANTICOAGULANT THERAPY: Primary | ICD-10-CM

## 2018-12-06 LAB
INTERNATIONAL NORMALIZATION RATIO, POC: 3.2
PROTHROMBIN TIME, POC: NORMAL

## 2018-12-06 PROCEDURE — 85610 PROTHROMBIN TIME: CPT | Performed by: INTERNAL MEDICINE

## 2018-12-13 RX ORDER — LOSARTAN POTASSIUM 50 MG/1
TABLET ORAL
Qty: 90 TABLET | Refills: 3 | Status: SHIPPED | OUTPATIENT
Start: 2018-12-13 | End: 2019-09-04

## 2018-12-26 RX ORDER — CYANOCOBALAMIN 1000 UG/ML
INJECTION INTRAMUSCULAR; SUBCUTANEOUS
Qty: 10 ML | Refills: 0 | Status: SHIPPED | OUTPATIENT
Start: 2018-12-26 | End: 2019-11-11 | Stop reason: SDUPTHER

## 2019-01-03 ENCOUNTER — NURSE ONLY (OUTPATIENT)
Dept: INTERNAL MEDICINE | Age: 84
End: 2019-01-03
Payer: MEDICARE

## 2019-01-03 DIAGNOSIS — Z79.01 LONG TERM CURRENT USE OF ANTICOAGULANT THERAPY: Primary | ICD-10-CM

## 2019-01-03 LAB
INTERNATIONAL NORMALIZATION RATIO, POC: 2.1
PROTHROMBIN TIME, POC: NORMAL

## 2019-01-03 PROCEDURE — 85610 PROTHROMBIN TIME: CPT | Performed by: INTERNAL MEDICINE

## 2019-01-30 ENCOUNTER — NURSE ONLY (OUTPATIENT)
Dept: INTERNAL MEDICINE | Age: 84
End: 2019-01-30
Payer: MEDICARE

## 2019-01-30 DIAGNOSIS — Z79.01 LONG TERM CURRENT USE OF ANTICOAGULANT THERAPY: Primary | ICD-10-CM

## 2019-01-30 LAB
INTERNATIONAL NORMALIZATION RATIO, POC: 3.1
PROTHROMBIN TIME, POC: NORMAL

## 2019-01-30 PROCEDURE — 85610 PROTHROMBIN TIME: CPT | Performed by: INTERNAL MEDICINE

## 2019-02-21 ENCOUNTER — HOSPITAL ENCOUNTER (OUTPATIENT)
Dept: NON INVASIVE DIAGNOSTICS | Age: 84
Discharge: HOME | End: 2019-02-21
Payer: MEDICARE

## 2019-02-21 LAB
LV EF: 55 %
LVEF MODALITY: NORMAL

## 2019-02-21 PROCEDURE — 93306 TTE W/DOPPLER COMPLETE: CPT

## 2019-02-22 RX ORDER — MESALAMINE 250 MG/1
CAPSULE ORAL
Qty: 1080 CAPSULE | Refills: 3 | Status: SHIPPED | OUTPATIENT
Start: 2019-02-22 | End: 2019-12-11 | Stop reason: SDUPTHER

## 2019-02-27 ENCOUNTER — OFFICE VISIT (OUTPATIENT)
Dept: INTERNAL MEDICINE | Age: 84
End: 2019-02-27
Payer: MEDICARE

## 2019-02-27 ENCOUNTER — NURSE ONLY (OUTPATIENT)
Dept: INTERNAL MEDICINE | Age: 84
End: 2019-02-27
Payer: MEDICARE

## 2019-02-27 ENCOUNTER — OFFICE VISIT (OUTPATIENT)
Dept: CARDIOLOGY | Age: 84
End: 2019-02-27
Payer: MEDICARE

## 2019-02-27 VITALS
WEIGHT: 255 LBS | HEART RATE: 76 BPM | BODY MASS INDEX: 36.51 KG/M2 | HEIGHT: 70 IN | DIASTOLIC BLOOD PRESSURE: 64 MMHG | SYSTOLIC BLOOD PRESSURE: 116 MMHG

## 2019-02-27 VITALS
SYSTOLIC BLOOD PRESSURE: 120 MMHG | BODY MASS INDEX: 36.42 KG/M2 | WEIGHT: 254.4 LBS | HEART RATE: 80 BPM | TEMPERATURE: 98.8 F | OXYGEN SATURATION: 93 % | DIASTOLIC BLOOD PRESSURE: 80 MMHG | HEIGHT: 70 IN

## 2019-02-27 DIAGNOSIS — Z79.01 LONG TERM CURRENT USE OF ANTICOAGULANT THERAPY: ICD-10-CM

## 2019-02-27 DIAGNOSIS — R05.9 COUGH: ICD-10-CM

## 2019-02-27 DIAGNOSIS — I87.2 DEEP VENOUS INSUFFICIENCY: Primary | Chronic | ICD-10-CM

## 2019-02-27 DIAGNOSIS — J34.89 STUFFY AND RUNNY NOSE: Primary | ICD-10-CM

## 2019-02-27 DIAGNOSIS — I10 ESSENTIAL HYPERTENSION: Primary | ICD-10-CM

## 2019-02-27 DIAGNOSIS — J06.9 UPPER RESPIRATORY TRACT INFECTION, UNSPECIFIED TYPE: ICD-10-CM

## 2019-02-27 DIAGNOSIS — R09.81 CONGESTION OF NASAL SINUS: ICD-10-CM

## 2019-02-27 DIAGNOSIS — I35.0 MODERATE AORTIC STENOSIS: ICD-10-CM

## 2019-02-27 LAB
INTERNATIONAL NORMALIZATION RATIO, POC: 3
PROTHROMBIN TIME, POC: NORMAL

## 2019-02-27 PROCEDURE — G8482 FLU IMMUNIZE ORDER/ADMIN: HCPCS | Performed by: PHYSICIAN ASSISTANT

## 2019-02-27 PROCEDURE — 1036F TOBACCO NON-USER: CPT | Performed by: NURSE PRACTITIONER

## 2019-02-27 PROCEDURE — G8482 FLU IMMUNIZE ORDER/ADMIN: HCPCS | Performed by: NURSE PRACTITIONER

## 2019-02-27 PROCEDURE — G8427 DOCREV CUR MEDS BY ELIG CLIN: HCPCS | Performed by: NURSE PRACTITIONER

## 2019-02-27 PROCEDURE — G8427 DOCREV CUR MEDS BY ELIG CLIN: HCPCS | Performed by: PHYSICIAN ASSISTANT

## 2019-02-27 PROCEDURE — 99213 OFFICE O/P EST LOW 20 MIN: CPT | Performed by: PHYSICIAN ASSISTANT

## 2019-02-27 PROCEDURE — 99213 OFFICE O/P EST LOW 20 MIN: CPT | Performed by: NURSE PRACTITIONER

## 2019-02-27 PROCEDURE — 1101F PT FALLS ASSESS-DOCD LE1/YR: CPT | Performed by: PHYSICIAN ASSISTANT

## 2019-02-27 PROCEDURE — 1036F TOBACCO NON-USER: CPT | Performed by: PHYSICIAN ASSISTANT

## 2019-02-27 PROCEDURE — G8417 CALC BMI ABV UP PARAM F/U: HCPCS | Performed by: NURSE PRACTITIONER

## 2019-02-27 PROCEDURE — 85610 PROTHROMBIN TIME: CPT | Performed by: INTERNAL MEDICINE

## 2019-02-27 PROCEDURE — 4040F PNEUMOC VAC/ADMIN/RCVD: CPT | Performed by: NURSE PRACTITIONER

## 2019-02-27 PROCEDURE — G8417 CALC BMI ABV UP PARAM F/U: HCPCS | Performed by: PHYSICIAN ASSISTANT

## 2019-02-27 PROCEDURE — 1123F ACP DISCUSS/DSCN MKR DOCD: CPT | Performed by: PHYSICIAN ASSISTANT

## 2019-02-27 PROCEDURE — 1123F ACP DISCUSS/DSCN MKR DOCD: CPT | Performed by: NURSE PRACTITIONER

## 2019-02-27 PROCEDURE — 4040F PNEUMOC VAC/ADMIN/RCVD: CPT | Performed by: PHYSICIAN ASSISTANT

## 2019-02-27 PROCEDURE — 1101F PT FALLS ASSESS-DOCD LE1/YR: CPT | Performed by: NURSE PRACTITIONER

## 2019-02-27 RX ORDER — DEXTROMETHORPHAN POLISTIREX 30 MG/5ML
60 SUSPENSION ORAL 2 TIMES DAILY PRN
Qty: 148 ML | Refills: 0 | Status: SHIPPED | OUTPATIENT
Start: 2019-02-27 | End: 2019-03-09

## 2019-02-27 RX ORDER — METHYLPREDNISOLONE 4 MG/1
TABLET ORAL
Qty: 1 TABLET | Refills: 0 | Status: SHIPPED | OUTPATIENT
Start: 2019-02-27 | End: 2019-03-05

## 2019-02-27 RX ORDER — BENZONATATE 200 MG/1
200 CAPSULE ORAL 3 TIMES DAILY PRN
Qty: 30 CAPSULE | Refills: 0 | Status: SHIPPED | OUTPATIENT
Start: 2019-02-27 | End: 2022-07-01 | Stop reason: SDUPTHER

## 2019-02-27 RX ORDER — CEFDINIR 300 MG/1
300 CAPSULE ORAL 2 TIMES DAILY
Qty: 20 CAPSULE | Refills: 0 | Status: SHIPPED | OUTPATIENT
Start: 2019-02-27 | End: 2019-03-09

## 2019-02-27 ASSESSMENT — ENCOUNTER SYMPTOMS
DIARRHEA: 0
COLOR CHANGE: 0
SINUS PRESSURE: 0
EYE REDNESS: 0
WHEEZING: 0
VOMITING: 0
EYE PAIN: 0
NAUSEA: 0
BACK PAIN: 0
ABDOMINAL PAIN: 0
SHORTNESS OF BREATH: 0
CHEST TIGHTNESS: 0
PHOTOPHOBIA: 0
SORE THROAT: 0
VOICE CHANGE: 1
RHINORRHEA: 1
COUGH: 1
CONSTIPATION: 0

## 2019-02-27 ASSESSMENT — PATIENT HEALTH QUESTIONNAIRE - PHQ9
2. FEELING DOWN, DEPRESSED OR HOPELESS: 0
SUM OF ALL RESPONSES TO PHQ QUESTIONS 1-9: 0
1. LITTLE INTEREST OR PLEASURE IN DOING THINGS: 0
SUM OF ALL RESPONSES TO PHQ9 QUESTIONS 1 & 2: 0
SUM OF ALL RESPONSES TO PHQ QUESTIONS 1-9: 0

## 2019-03-26 ENCOUNTER — NURSE ONLY (OUTPATIENT)
Dept: INTERNAL MEDICINE | Age: 84
End: 2019-03-26
Payer: MEDICARE

## 2019-03-26 DIAGNOSIS — Z79.01 LONG TERM CURRENT USE OF ANTICOAGULANT THERAPY: Primary | ICD-10-CM

## 2019-03-26 LAB
INTERNATIONAL NORMALIZATION RATIO, POC: 2.4
PROTHROMBIN TIME, POC: NORMAL

## 2019-03-26 PROCEDURE — 85610 PROTHROMBIN TIME: CPT | Performed by: INTERNAL MEDICINE

## 2019-04-23 ENCOUNTER — NURSE ONLY (OUTPATIENT)
Dept: INTERNAL MEDICINE | Age: 84
End: 2019-04-23
Payer: MEDICARE

## 2019-04-23 DIAGNOSIS — Z79.01 LONG TERM CURRENT USE OF ANTICOAGULANT THERAPY: Primary | ICD-10-CM

## 2019-04-23 LAB
INTERNATIONAL NORMALIZATION RATIO, POC: 2.4
PROTHROMBIN TIME, POC: NORMAL

## 2019-04-23 PROCEDURE — 85610 PROTHROMBIN TIME: CPT | Performed by: FAMILY MEDICINE

## 2019-04-24 DIAGNOSIS — I10 ESSENTIAL HYPERTENSION: Chronic | ICD-10-CM

## 2019-04-24 DIAGNOSIS — Z79.01 LONG TERM CURRENT USE OF ANTICOAGULANT THERAPY: ICD-10-CM

## 2019-04-24 DIAGNOSIS — E53.8 VITAMIN B12 DEFICIENCY: ICD-10-CM

## 2019-04-24 DIAGNOSIS — E78.2 MIXED HYPERLIPIDEMIA: Chronic | ICD-10-CM

## 2019-04-24 DIAGNOSIS — M15.9 PRIMARY OSTEOARTHRITIS INVOLVING MULTIPLE JOINTS: Chronic | ICD-10-CM

## 2019-04-24 LAB
ALBUMIN SERPL-MCNC: 3.9 G/DL (ref 3.5–5.2)
ALP BLD-CCNC: 71 U/L (ref 40–130)
ALT SERPL-CCNC: 15 U/L (ref 5–41)
ANION GAP SERPL CALCULATED.3IONS-SCNC: 11 MMOL/L (ref 7–19)
AST SERPL-CCNC: 23 U/L (ref 5–40)
BILIRUB SERPL-MCNC: 0.6 MG/DL (ref 0.2–1.2)
BUN BLDV-MCNC: 21 MG/DL (ref 8–23)
CALCIUM SERPL-MCNC: 9.6 MG/DL (ref 8.8–10.2)
CHLORIDE BLD-SCNC: 107 MMOL/L (ref 98–111)
CHOLESTEROL, TOTAL: 190 MG/DL (ref 160–199)
CO2: 23 MMOL/L (ref 22–29)
CREAT SERPL-MCNC: 1.3 MG/DL (ref 0.5–1.2)
GFR NON-AFRICAN AMERICAN: 53
GLUCOSE BLD-MCNC: 103 MG/DL (ref 74–109)
HCT VFR BLD CALC: 40.2 % (ref 42–52)
HDLC SERPL-MCNC: 54 MG/DL (ref 55–121)
HEMOGLOBIN: 13.8 G/DL (ref 14–18)
LDL CHOLESTEROL CALCULATED: 97 MG/DL
MCH RBC QN AUTO: 33.6 PG (ref 27–31)
MCHC RBC AUTO-ENTMCNC: 34.3 G/DL (ref 33–37)
MCV RBC AUTO: 97.8 FL (ref 80–94)
PDW BLD-RTO: 13.2 % (ref 11.5–14.5)
PLATELET # BLD: 170 K/UL (ref 130–400)
PMV BLD AUTO: 9.3 FL (ref 9.4–12.4)
POTASSIUM SERPL-SCNC: 4.2 MMOL/L (ref 3.5–5)
RBC # BLD: 4.11 M/UL (ref 4.7–6.1)
SODIUM BLD-SCNC: 141 MMOL/L (ref 136–145)
TOTAL PROTEIN: 6.9 G/DL (ref 6.6–8.7)
TRIGL SERPL-MCNC: 195 MG/DL (ref 0–149)
WBC # BLD: 6.5 K/UL (ref 4.8–10.8)

## 2019-05-01 ENCOUNTER — OFFICE VISIT (OUTPATIENT)
Dept: PRIMARY CARE CLINIC | Age: 84
End: 2019-05-01
Payer: MEDICARE

## 2019-05-01 VITALS
HEIGHT: 67 IN | WEIGHT: 256 LBS | HEART RATE: 76 BPM | SYSTOLIC BLOOD PRESSURE: 111 MMHG | BODY MASS INDEX: 40.18 KG/M2 | TEMPERATURE: 98 F | OXYGEN SATURATION: 98 % | DIASTOLIC BLOOD PRESSURE: 69 MMHG

## 2019-05-01 DIAGNOSIS — H35.3221 EXUDATIVE AGE-RELATED MACULAR DEGENERATION OF LEFT EYE WITH ACTIVE CHOROIDAL NEOVASCULARIZATION (HCC): ICD-10-CM

## 2019-05-01 DIAGNOSIS — I35.0 NONRHEUMATIC AORTIC VALVE STENOSIS: Chronic | ICD-10-CM

## 2019-05-01 DIAGNOSIS — K56.51 INTESTINAL ADHESIONS WITH PARTIAL OBSTRUCTION (HCC): ICD-10-CM

## 2019-05-01 DIAGNOSIS — N18.30 CHRONIC KIDNEY DISEASE, STAGE III (MODERATE) (HCC): ICD-10-CM

## 2019-05-01 DIAGNOSIS — I50.30 DIASTOLIC CONGESTIVE HEART FAILURE WITH PRESERVED LEFT VENTRICULAR FUNCTION, NYHA CLASS 2 (HCC): ICD-10-CM

## 2019-05-01 DIAGNOSIS — E66.01 MORBID OBESITY WITH BMI OF 40.0-44.9, ADULT (HCC): ICD-10-CM

## 2019-05-01 DIAGNOSIS — I10 ESSENTIAL HYPERTENSION: ICD-10-CM

## 2019-05-01 DIAGNOSIS — K50.119 CROHN'S DISEASE OF LARGE INTESTINE WITH COMPLICATION (HCC): Chronic | ICD-10-CM

## 2019-05-01 DIAGNOSIS — I87.2 DEEP VENOUS INSUFFICIENCY: Chronic | ICD-10-CM

## 2019-05-01 DIAGNOSIS — Z90.49 S/P PARTIAL RESECTION OF COLON: Primary | ICD-10-CM

## 2019-05-01 PROCEDURE — G8427 DOCREV CUR MEDS BY ELIG CLIN: HCPCS | Performed by: FAMILY MEDICINE

## 2019-05-01 PROCEDURE — G8417 CALC BMI ABV UP PARAM F/U: HCPCS | Performed by: FAMILY MEDICINE

## 2019-05-01 PROCEDURE — 1123F ACP DISCUSS/DSCN MKR DOCD: CPT | Performed by: FAMILY MEDICINE

## 2019-05-01 PROCEDURE — 4040F PNEUMOC VAC/ADMIN/RCVD: CPT | Performed by: FAMILY MEDICINE

## 2019-05-01 PROCEDURE — 1036F TOBACCO NON-USER: CPT | Performed by: FAMILY MEDICINE

## 2019-05-01 PROCEDURE — 99204 OFFICE O/P NEW MOD 45 MIN: CPT | Performed by: FAMILY MEDICINE

## 2019-05-01 ASSESSMENT — ENCOUNTER SYMPTOMS
COLOR CHANGE: 0
SORE THROAT: 0
RHINORRHEA: 0
COUGH: 0
EYE ITCHING: 0
NAUSEA: 0
SHORTNESS OF BREATH: 1
ABDOMINAL PAIN: 0

## 2019-05-01 NOTE — PATIENT INSTRUCTIONS
Start flonase (fluticasone), 2 sprays once a day each nostril for 2 weeks. Then use it just one spray each nostril daily. Try not to suck it up your nose, blow your nose, or wash it out with other nasal sprays for at least 30 minutes.   (Generic [fluticasone]  is available over the counter at [facility])

## 2019-05-01 NOTE — PROGRESS NOTES
Edward Rendon is a 82 y.o. male who presents today for   Chief Complaint   Patient presents with   [address]     Dr Hussein Martin Transfer        HPI  Patient is here for establishing today. Has h/o crohn's and DVT. On coumadin. He has h/o recurrent partial large bowel obstruction. Gets colonoscopy every 3 yrs, has scar tissue at site of resection. Has AS monitored w/ echo. Grade 2 diastolic dysfunction noted, but normal EF. Pt has some exertional dyspnea. No change in PMH, family, social, or surgical history unless mentioned above. Review of Systems   Constitutional: Positive for fatigue. Negative for chills and fever. HENT: Negative for rhinorrhea and sore throat. Eyes: Negative for itching and visual disturbance. Respiratory: Positive for shortness of breath. Negative for cough. Cardiovascular: Negative for chest pain and palpitations. Gastrointestinal: Negative for abdominal pain and nausea. Endocrine: Negative for polydipsia and polyuria. Genitourinary: Negative for dysuria and frequency. Musculoskeletal: Negative for arthralgias and myalgias. Skin: Negative for color change and rash. Allergic/Immunologic: Negative for environmental allergies and food allergies. Neurological: Negative for dizziness and light-headedness. Hematological: Negative for adenopathy. Does not bruise/bleed easily. Psychiatric/Behavioral: Negative for dysphoric mood. The patient is not nervous/anxious.            Past Medical History:   Diagnosis Date    Cholelithiasis     CKD (chronic kidney disease) stage 3, GFR 30-59 ml/min ([facility] 11/13/2018    Crohn's disease of large intestine with complication ([facility] 70/6/6863    Deep venous insufficiency 11/7/2017    Essential hypertension 11/7/2017    Gastroesophageal reflux disease without esophagitis 11/7/2017    Heart murmur     Mixed hyperlipidemia 11/7/2017    Nonrheumatic aortic valve stenosis 5/19/2018    Primary osteoarthritis involving multiple joints 11/7/2017    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[facility]) 2002       Current Outpatient Medications   Medication Sig Dispense Refill    PENTASA 250 MG extended release capsule TAKE THREE CAPSULES BY MOUTH FOUR TIMES DAILY 1080 capsule 3    cyanocobalamin 1000 MCG/ML injection monthly 10 mL 0    losartan (COZAAR) 50 MG tablet TAKE ONE TABLET BY MOUTH DAILY 90 tablet 3    hydrochlorothiazide (HYDRODIURIL) 12.5 MG tablet Take 12.5 mg by mouth daily      Cholecalciferol (VITAMIN D3) 5000 units TABS Take by mouth      B Complex-C-E-Zn (STRESS FORMULA/ZINC PO) Take by mouth      warfarin (COUMADIN) 7.5 MG tablet Take one tablet daily except on Wednesdays take one-half tablet. 90 tablet 3     No current facility-administered medications for this visit. No Known Allergies    Past Surgical History:   Procedure Laterality Date    CATARACT REMOVAL WITH IMPLANT  2018    OTHER SURGICAL HISTORY      Fistula-in-ano repair    SUBTOTAL COLECTOMY      TONSILLECTOMY      TOTAL HIP ARTHROPLASTY         Social History     Tobacco Use    Smoking status: Never Smoker    Smokeless tobacco: Never Used   Substance Use Topics    Alcohol use: No    Drug use: No       Family History   Problem Relation Age of Onset    Stroke Other     Other Other         atherosclerosis of extremities       /69   Pulse 76   Temp 98 °F (36.7 °C) (Temporal)   Ht 5' 7\" (1.702 m)   Wt 256 lb (116.1 kg)   SpO2 98%   BMI 40.10 kg/m²     Physical Exam   Constitutional: He is oriented to person, place, and time. He appears well-developed and well-nourished. Non-toxic appearance. No distress. HENT:   Head: Normocephalic and atraumatic. Not macrocephalic and not microcephalic. Right Ear: External ear normal. No drainage. No decreased hearing is noted. Left Ear: External ear normal. No drainage. No decreased hearing is noted. Nose: Nose normal. No rhinorrhea or nasal deformity.    Mouth/Throat: Uvula is midline, No cyanosis or erythema (on head, neck, face, extremities). No pallor. Nails show no clubbing. Psychiatric: He has a normal mood and affect. His speech is normal and behavior is normal. Judgment and thought content normal. Cognition and memory are normal.   Nursing note and vitals reviewed. Assessment:    ICD-10-CM    1. S/P partial resection of colon Z90.49    2. Morbid obesity with BMI of 40.0-44.9, adult ([facility] E66.01     Z68.41    3. Chronic kidney disease, stage III (moderate) ([facility]) N18.3 TSH 3RD GENERATION     CBC Auto Differential   4. Essential hypertension I10 Lipid Panel     Comprehensive Metabolic Panel     Urinalysis     TSH 3RD GENERATION   5. Intestinal adhesions with partial obstruction ([facility]) K56.51    6. 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 choroidal neovascularization ([facility]) H35.3221 TSH 3RD GENERATION   7. Crohn's disease of large intestine with complication ([facility] F62.831    8. Nonrheumatic aortic valve stenosis I35.0    9. Deep venous insufficiency I87.2    10. Diastolic congestive heart failure with preserved left ventricular function, NYHA class 2 ([facility]) I50.30        Plan:   High risk for full LBO and needs to alert me asap of GI changes. Pt is high risk w/ colonoscopies at his age. Repeat next and likelly stop  As pt is too high risk for resection of bowel. Would suggest no surgery unless EF or diastolic failure or inc LA pressure develops. Pt to stay w/ optho for eye tx.     Orders Placed This Encounter   Procedures    Lipid Panel     Standing Status:   Standing     Number of Occurrences:   15     Standing Expiration Date:   4/13/2030     Order Specific Question:   Is Patient Fasting?/# of Hours     Answer:   yes/8 hrs    Comprehensive Metabolic Panel     Standing Status:   Standing     Number of Occurrences:   20     Standing Expiration Date:   4/28/2029    Urinalysis     Standing Status:   Standing     Number of Occurrences:   15     Standing Expiration Date:   4/28/2029    TSH 3RD GENERATION     Standing Status:   Standing     Number of Occurrences:   15     Standing Expiration Date:   4/13/2030    CBC Auto Differential     Standing Status:   Standing     Number of Occurrences:   20     Standing Expiration Date:   4/28/2029     No orders of the defined types were placed in this encounter. There are no discontinued medications. Patient Instructions   Start flonase (fluticasone), 2 sprays once a day each nostril for 2 weeks. Then use it just one spray each nostril daily. Try not to suck it up your nose, blow your nose, or wash it out with other nasal sprays for at least 30 minutes. (Generic [fluticasone]  is available over the counter at [facility])       Patient given educational handouts and has had all questions answered. Patient voices understanding and agrees to plans along with risks and benefits of plan. Patient isinstructed to continue prior meds, diet, and exercise plans unless instructed otherwise. Patient agrees to follow up as instructed and sooner if needed. Patient agrees to go to ER if condition becomes emergent. Notesmay be completed with dictation device and spelling errors may occur. Return in about 6 months (around 11/1/2019) for medicare AW.

## 2019-05-08 ENCOUNTER — TELEPHONE (OUTPATIENT)
Dept: PRIMARY CARE CLINIC | Age: 84
End: 2019-05-08

## 2019-05-08 NOTE — TELEPHONE ENCOUNTER
KEISHA Coon requested to have an appointment with @coumadin clinic@ 5/30/19 around 1:30. Would like a call back     No action is required at this time. Thank you.

## 2019-05-20 ENCOUNTER — TELEPHONE (OUTPATIENT)
Dept: PRIMARY CARE CLINIC | Age: 84
End: 2019-05-20

## 2019-05-20 NOTE — TELEPHONE ENCOUNTER
----- Message from Alexsandra Holbrook MD sent at 5/17/2019  6:17 AM CDT -----  [employer] message sent to patient about results and plan. Can we also please call the patient to let them know? Thank you! Can we see if patient uses coumadin clinic or will be using us for management of INR?

## 2019-05-22 ENCOUNTER — NURSE ONLY (OUTPATIENT)
Dept: INTERNAL MEDICINE | Age: 84
End: 2019-05-22
Payer: MEDICARE

## 2019-05-22 DIAGNOSIS — Z79.01 LONG TERM CURRENT USE OF ANTICOAGULANT THERAPY: Primary | ICD-10-CM

## 2019-05-22 LAB
INTERNATIONAL NORMALIZATION RATIO, POC: 2.4
PROTHROMBIN TIME, POC: NORMAL

## 2019-05-22 PROCEDURE — 85610 PROTHROMBIN TIME: CPT | Performed by: FAMILY MEDICINE

## 2019-05-28 RX ORDER — WARFARIN SODIUM 7.5 MG/1
TABLET ORAL
Qty: 90 TABLET | Refills: 3 | Status: SHIPPED | OUTPATIENT
Start: 2019-05-28 | End: 2020-06-29

## 2019-06-20 ENCOUNTER — NURSE ONLY (OUTPATIENT)
Dept: INTERNAL MEDICINE | Age: 84
End: 2019-06-20
Payer: MEDICARE

## 2019-06-20 ENCOUNTER — TELEPHONE (OUTPATIENT)
Dept: INTERNAL MEDICINE | Age: 84
End: 2019-06-20

## 2019-06-20 DIAGNOSIS — Z79.01 LONG TERM CURRENT USE OF ANTICOAGULANT THERAPY: Primary | ICD-10-CM

## 2019-06-20 LAB
INTERNATIONAL NORMALIZATION RATIO, POC: 2.9
PROTHROMBIN TIME, POC: NORMAL

## 2019-06-20 PROCEDURE — 85610 PROTHROMBIN TIME: CPT | Performed by: FAMILY MEDICINE

## 2019-06-20 NOTE — TELEPHONE ENCOUNTER
Tried to call pt and let him know he could come in at 8 am in the morning to call and let someone know if he wants that spot,

## 2019-06-20 NOTE — TELEPHONE ENCOUNTER
Patient here today for Coumadin Clinic. Patient c/o of cough and congestion and requested an appt with Dr. Jovan Corral. Patient informed that Dr. Jovan Corral is out of office today and other providers schedules are full. I advised him he could go to [address] [place] [address], that they would have access to his records, but pt declined. He stated that he would rather see Dr. Jovan Corral.     Dr. Isabel Owusu schedule is full for tomorrow also, anywhere that he could be worked in??     Please call patient

## 2019-06-20 NOTE — PROGRESS NOTES
Mr. Chino Hernandez was here today. INR today:   Results for orders placed or performed in visit on 06/20/19   POCT INR   Result Value Ref Range    INR 2.9     Protime     INR Goal: 2.0-3.0    Dosing Plan  As of 6/20/2019    TTR:   78.1 % (2 y)   Full warfarin instructions:   6/20: 3.75 mg; Otherwise 3.75 mg every Wed; 7.5 mg all other days            PLAN: TAKE 1/2 TABLET TONIGHT, THEN CONTINUE CURRENT DOSE  NEXT COUMADIN CLINIC APT IS: 7/17/2019 AT 1:00PM    Coumadin Clinic Hours  Tuesday 7:30am - 4:00pm  Wednesday 7:30am - 4:00pm  Thursday 7:30am - 4:00pm  IF IT'S AN EMERGENCY, PLEASE CALL 911 OR GO TO YOUR NEAREST EMERGENCY ROOM. [facility] INTERNAL MEDICINE COUMADIN CLINIC [phone]  IF UNABLE TO REACH COUMADIN CLINIC, [address] [place] [address], [phone].

## 2019-06-21 ENCOUNTER — OFFICE VISIT (OUTPATIENT)
Dept: PRIMARY CARE CLINIC | Age: 84
End: 2019-06-21
Payer: MEDICARE

## 2019-06-21 VITALS
RESPIRATION RATE: 20 BRPM | OXYGEN SATURATION: 97 % | SYSTOLIC BLOOD PRESSURE: 136 MMHG | HEART RATE: 70 BPM | TEMPERATURE: 98 F | BODY MASS INDEX: 40.1 KG/M2 | DIASTOLIC BLOOD PRESSURE: 70 MMHG | WEIGHT: 256 LBS

## 2019-06-21 DIAGNOSIS — J40 SINOBRONCHITIS: Primary | ICD-10-CM

## 2019-06-21 DIAGNOSIS — J32.9 SINOBRONCHITIS: Primary | ICD-10-CM

## 2019-06-21 PROCEDURE — 4040F PNEUMOC VAC/ADMIN/RCVD: CPT | Performed by: FAMILY MEDICINE

## 2019-06-21 PROCEDURE — 1123F ACP DISCUSS/DSCN MKR DOCD: CPT | Performed by: FAMILY MEDICINE

## 2019-06-21 PROCEDURE — G8417 CALC BMI ABV UP PARAM F/U: HCPCS | Performed by: FAMILY MEDICINE

## 2019-06-21 PROCEDURE — 99213 OFFICE O/P EST LOW 20 MIN: CPT | Performed by: FAMILY MEDICINE

## 2019-06-21 PROCEDURE — 1036F TOBACCO NON-USER: CPT | Performed by: FAMILY MEDICINE

## 2019-06-21 PROCEDURE — G8427 DOCREV CUR MEDS BY ELIG CLIN: HCPCS | Performed by: FAMILY MEDICINE

## 2019-06-21 RX ORDER — DEXTROMETHORPHAN HYDROBROMIDE AND PROMETHAZINE HYDROCHLORIDE 15; 6.25 MG/5ML; MG/5ML
5 SYRUP ORAL 4 TIMES DAILY PRN
Qty: 240 ML | Refills: 1 | Status: SHIPPED | OUTPATIENT
Start: 2019-06-21 | End: 2019-06-21 | Stop reason: SDUPTHER

## 2019-06-21 RX ORDER — AMOXICILLIN AND CLAVULANATE POTASSIUM 875; 125 MG/1; MG/1
1 TABLET, FILM COATED ORAL 2 TIMES DAILY
Qty: 20 TABLET | Refills: 0 | Status: SHIPPED | OUTPATIENT
Start: 2019-06-21 | End: 2019-07-01

## 2019-06-21 RX ORDER — CETIRIZINE HYDROCHLORIDE 10 MG/1
10 TABLET ORAL DAILY
Qty: 90 TABLET | Refills: 3 | Status: ON HOLD | OUTPATIENT
Start: 2019-06-21 | End: 2020-12-21

## 2019-06-21 RX ORDER — DEXTROMETHORPHAN HYDROBROMIDE AND PROMETHAZINE HYDROCHLORIDE 15; 6.25 MG/5ML; MG/5ML
5 SYRUP ORAL 4 TIMES DAILY PRN
Qty: 473 ML | Refills: 1 | Status: SHIPPED | OUTPATIENT
Start: 2019-06-21 | End: 2019-07-05

## 2019-06-21 RX ORDER — PREDNISONE 10 MG/1
30 TABLET ORAL DAILY
Qty: 15 TABLET | Refills: 0 | Status: SHIPPED | OUTPATIENT
Start: 2019-06-21 | End: 2019-06-26

## 2019-06-21 RX ORDER — FLUTICASONE PROPIONATE 50 MCG
1 SPRAY, SUSPENSION (ML) NASAL DAILY
Qty: 2 BOTTLE | Refills: 1 | Status: SHIPPED | OUTPATIENT
Start: 2019-06-21 | End: 2021-04-27 | Stop reason: ALTCHOICE

## 2019-06-21 ASSESSMENT — ENCOUNTER SYMPTOMS
RHINORRHEA: 1
NAUSEA: 0
VOMITING: 0
WHEEZING: 0
SORE THROAT: 1
COUGH: 1
ABDOMINAL PAIN: 0
CHEST TIGHTNESS: 0
CONSTIPATION: 0
SHORTNESS OF BREATH: 0
DIARRHEA: 0

## 2019-06-21 NOTE — PROGRESS NOTES
Lizette Gilliland is a 82 y.o. male who presents today for   Chief Complaint   Patient presents with    Cough    Congestion       HPI  Patient is here for acute visit for ongoing cough that keeps him up at night. He was battling a small cold a couple weeks ago and he notes that has only improved slightly and then it started the worsened again. He denies any fever chills but he does feel more fatigued and tired. He denies any recent pneumonia. No change in PMH, family, social, or surgical history unless mentioned above. Review of Systems   Constitutional: Positive for fatigue. Negative for chills and fever. HENT: Positive for congestion, rhinorrhea, sneezing and sore throat. Respiratory: Positive for cough. Negative for chest tightness, shortness of breath and wheezing. Cardiovascular: Negative for chest pain, palpitations and leg swelling. Gastrointestinal: Negative for abdominal pain, constipation, diarrhea, nausea and vomiting. Genitourinary: Negative for difficulty urinating, dysuria and frequency.           Past Medical History:   Diagnosis Date    Cholelithiasis     CKD (chronic kidney disease) stage 3, GFR 30-59 ml/min ([facility] [address] 11/13/2018    Crohn's disease of large intestine with complication ([facility] [address] 53/9/2606    Deep venous insufficiency 11/7/2017    Essential hypertension 11/7/2017    Gastroesophageal reflux disease without esophagitis 11/7/2017    Heart murmur     Mixed hyperlipidemia 11/7/2017    Nonrheumatic aortic valve stenosis 5/19/2018    Primary osteoarthritis involving multiple joints 11/7/2017    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HCC) 2002       Current Outpatient Medications   Medication Sig Dispense Refill    amoxicillin-clavulanate (AUGMENTIN) 875-125 MG per tablet Take 1 tablet by mouth 2 times daily for 10 days 20 tablet 0    fluticasone (FLONASE) 50 MCG/ACT nasal spray 1 spray by Each Nostril route daily 2 Bottle 1    predniSONE (DELTASONE) 10 MG tablet Take 3 tablets by mouth daily for 5 days 15 tablet 0    promethazine-dextromethorphan (PROMETHAZINE-DM) 6.25-15 MG/5ML syrup Take 5 mLs by mouth 4 times daily as needed for Cough 473 mL 1    cetirizine (ZYRTEC) 10 MG tablet Take 1 tablet by mouth daily 90 tablet 3    warfarin (COUMADIN) 7.5 MG tablet TAKE ONE TABLET BY MOUTH DAILY EXCEPT ON WEDNESDAY TAKE 1/2 TABLET 90 tablet 3    PENTASA 250 MG extended release capsule TAKE THREE CAPSULES BY MOUTH FOUR TIMES DAILY 1080 capsule 3    cyanocobalamin 1000 MCG/ML injection monthly 10 mL 0    losartan (COZAAR) 50 MG tablet TAKE ONE TABLET BY MOUTH DAILY 90 tablet 3    hydrochlorothiazide (HYDRODIURIL) 12.5 MG tablet Take 12.5 mg by mouth daily      Cholecalciferol (VITAMIN D3) 5000 units TABS Take by mouth      B Complex-C-E-Zn (STRESS FORMULA/ZINC PO) Take by mouth       No current facility-administered medications for this visit. No Known Allergies    Past Surgical History:   Procedure Laterality Date    CATARACT REMOVAL WITH IMPLANT  2018    OTHER SURGICAL HISTORY      Fistula-in-ano repair    SUBTOTAL COLECTOMY      TONSILLECTOMY      TOTAL HIP ARTHROPLASTY         Social History     Tobacco Use    Smoking status: Never Smoker    Smokeless tobacco: Never Used   Substance Use Topics    Alcohol use: No    Drug use: No       Family History   Problem Relation Age of Onset    Stroke Other     Other Other         atherosclerosis of extremities       /70   Pulse 70   Temp 98 °F (36.7 °C)   Resp 20   Wt 256 lb (116.1 kg)   SpO2 97%   BMI 40.10 kg/m²     Physical Exam   Constitutional: He is oriented to person, place, and time. He appears well-developed and well-nourished. Non-toxic appearance. No distress. HENT:   Head: Normocephalic and atraumatic. Cardiovascular: Normal rate, regular rhythm, normal heart sounds and intact distal pulses. Exam reveals no gallop and no friction rub. No murmur heard.   Pulmonary/Chest: Effort normal. No respiratory distress. He has decreased breath sounds. He has wheezes. He has no rales. He exhibits no tenderness. Abdominal: Soft. Bowel sounds are normal. He exhibits no distension and no mass. There is no tenderness. There is no rebound and no guarding. Musculoskeletal:        Right lower leg: He exhibits no edema. Left lower leg: He exhibits no edema. Neurological: He is alert and oriented to person, place, and time. Coordination and gait normal.   Skin: Skin is warm and dry. He is not diaphoretic. No cyanosis. Nails show no clubbing. Nursing note and vitals reviewed. Assessment:    ICD-10-CM    1. Sinobronchitis J32.9     J40        Plan:   Patient appears to have the above diagnosis. I will go ahead and treat him of Augmentin prednisone along with promethazine dextro mature thing cough syrup. If not improving by Monday or Tuesday, we will do a to view chest x-ray to rule out any ongoing consolidation. If he continues to have postnasal drip issues, we will refer to ENT. He is a start using Flonase that he has a home daily  No orders of the defined types were placed in this encounter.     Orders Placed This Encounter   Medications    DISCONTD: promethazine-dextromethorphan (PROMETHAZINE-DM) 6.25-15 MG/5ML syrup     Sig: Take 5 mLs by mouth 4 times daily as needed for Cough     Dispense:  240 mL     Refill:  1    amoxicillin-clavulanate (AUGMENTIN) 875-125 MG per tablet     Sig: Take 1 tablet by mouth 2 times daily for 10 days     Dispense:  20 tablet     Refill:  0    fluticasone (FLONASE) 50 MCG/ACT nasal spray     Si spray by Each Nostril route daily     Dispense:  2 Bottle     Refill:  1    predniSONE (DELTASONE) 10 MG tablet     Sig: Take 3 tablets by mouth daily for 5 days     Dispense:  15 tablet     Refill:  0    promethazine-dextromethorphan (PROMETHAZINE-DM) 6.25-15 MG/5ML syrup     Sig: Take 5 mLs by mouth 4 times daily as needed for Cough     Dispense:  473 mL     Refill:  1    cetirizine (ZYRTEC) 10 MG tablet     Sig: Take 1 tablet by mouth daily     Dispense:  90 tablet     Refill:  3        Medications Discontinued During This Encounter   Medication Reason    promethazine-dextromethorphan (PROMETHAZINE-DM) 6.25-15 MG/5ML syrup REORDER     There are no Patient Instructions on file for this visit. Patient given educational handouts and has had all questions answered. Patient voices understanding and agrees to plans along with risks and benefits of plan. Patient isinstructed to continue prior meds, diet, and exercise plans unless instructed otherwise. Patient agrees to follow up as instructed and sooner if needed. Patient agrees to go to ER if condition becomes emergent. Notesmay be completed with dictation device and spelling errors may occur. No follow-ups on file.

## 2019-06-24 RX ORDER — DEXTROMETHORPHAN HYDROBROMIDE AND PROMETHAZINE HYDROCHLORIDE 15; 6.25 MG/5ML; MG/5ML
SYRUP ORAL
Qty: 240 ML | Refills: 1 | Status: SHIPPED | OUTPATIENT
Start: 2019-06-24 | End: 2020-10-28

## 2019-06-25 ENCOUNTER — ANTI-COAG VISIT (OUTPATIENT)
Dept: PRIMARY CARE CLINIC | Age: 84
End: 2019-06-25
Payer: MEDICARE

## 2019-06-25 DIAGNOSIS — Z79.01 LONG TERM CURRENT USE OF ANTICOAGULANT THERAPY: ICD-10-CM

## 2019-06-25 PROCEDURE — 93793 ANTICOAG MGMT PT WARFARIN: CPT | Performed by: FAMILY MEDICINE

## 2019-06-25 NOTE — Clinical Note
Can we see if pt is in coumadin clinic, if so please let me know so we can make note of it. If someone is with coumadin clinic then I just let bandar handle it.

## 2019-06-26 NOTE — PROGRESS NOTES
Continue current dosage, recheck INR  in 1 week if on weekly regimen or 4 weeks if by external lab or office based lab.

## 2019-07-03 ENCOUNTER — TELEPHONE (OUTPATIENT)
Dept: INTERNAL MEDICINE | Age: 84
End: 2019-07-03

## 2019-07-05 ENCOUNTER — ANTI-COAG VISIT (OUTPATIENT)
Dept: PRIMARY CARE CLINIC | Age: 84
End: 2019-07-05

## 2019-07-05 DIAGNOSIS — Z79.01 LONG TERM CURRENT USE OF ANTICOAGULANT THERAPY: ICD-10-CM

## 2019-07-17 ENCOUNTER — NURSE ONLY (OUTPATIENT)
Dept: INTERNAL MEDICINE | Age: 84
End: 2019-07-17
Payer: MEDICARE

## 2019-07-17 DIAGNOSIS — Z79.01 LONG TERM CURRENT USE OF ANTICOAGULANT THERAPY: Primary | ICD-10-CM

## 2019-07-17 LAB
INTERNATIONAL NORMALIZATION RATIO, POC: 2
PROTHROMBIN TIME, POC: NORMAL

## 2019-07-17 PROCEDURE — 85610 PROTHROMBIN TIME: CPT | Performed by: FAMILY MEDICINE

## 2019-08-14 ENCOUNTER — NURSE ONLY (OUTPATIENT)
Dept: INTERNAL MEDICINE | Age: 84
End: 2019-08-14
Payer: MEDICARE

## 2019-08-14 DIAGNOSIS — Z79.01 LONG TERM CURRENT USE OF ANTICOAGULANT THERAPY: Primary | ICD-10-CM

## 2019-08-14 LAB
INTERNATIONAL NORMALIZATION RATIO, POC: 2.6
PROTHROMBIN TIME, POC: NORMAL

## 2019-08-14 PROCEDURE — 85610 PROTHROMBIN TIME: CPT | Performed by: FAMILY MEDICINE

## 2019-08-14 PROCEDURE — 93793 ANTICOAG MGMT PT WARFARIN: CPT | Performed by: FAMILY MEDICINE

## 2019-08-15 RX ORDER — HYDROCHLOROTHIAZIDE 12.5 MG/1
TABLET ORAL
Qty: 90 TABLET | Refills: 3 | Status: SHIPPED | OUTPATIENT
Start: 2019-08-15 | End: 2020-09-01 | Stop reason: SDUPTHER

## 2019-09-04 ENCOUNTER — NURSE ONLY (OUTPATIENT)
Dept: INTERNAL MEDICINE | Age: 84
End: 2019-09-04
Payer: MEDICARE

## 2019-09-04 ENCOUNTER — OFFICE VISIT (OUTPATIENT)
Dept: CARDIOLOGY | Age: 84
End: 2019-09-04
Payer: MEDICARE

## 2019-09-04 VITALS
DIASTOLIC BLOOD PRESSURE: 72 MMHG | SYSTOLIC BLOOD PRESSURE: 114 MMHG | WEIGHT: 245 LBS | BODY MASS INDEX: 37.13 KG/M2 | HEIGHT: 68 IN | HEART RATE: 80 BPM

## 2019-09-04 DIAGNOSIS — I35.0 NONRHEUMATIC AORTIC VALVE STENOSIS: Primary | ICD-10-CM

## 2019-09-04 DIAGNOSIS — Z79.01 LONG TERM CURRENT USE OF ANTICOAGULANT THERAPY: Primary | ICD-10-CM

## 2019-09-04 LAB
INTERNATIONAL NORMALIZATION RATIO, POC: 2
PROTHROMBIN TIME, POC: NORMAL

## 2019-09-04 PROCEDURE — 85610 PROTHROMBIN TIME: CPT | Performed by: FAMILY MEDICINE

## 2019-09-04 PROCEDURE — 93793 ANTICOAG MGMT PT WARFARIN: CPT | Performed by: FAMILY MEDICINE

## 2019-09-04 PROCEDURE — 99213 OFFICE O/P EST LOW 20 MIN: CPT | Performed by: INTERNAL MEDICINE

## 2019-09-04 RX ORDER — LOSARTAN POTASSIUM 50 MG/1
25 TABLET ORAL DAILY
Qty: 90 TABLET | Refills: 3 | Status: SHIPPED | OUTPATIENT
Start: 2019-09-04 | End: 2020-02-20 | Stop reason: SDUPTHER

## 2019-09-04 ASSESSMENT — ENCOUNTER SYMPTOMS
VOMITING: 0
BLOOD IN STOOL: 0
BACK PAIN: 0
WHEEZING: 0
DIARRHEA: 0
COUGH: 0
SHORTNESS OF BREATH: 0
ABDOMINAL PAIN: 0
ABDOMINAL DISTENTION: 0

## 2019-09-04 NOTE — PROGRESS NOTES
[facility]  Cardiology Office Note  [place] [address]  Phone: [phone]  Fax: [phone]                            Date:  9/4/2019  Patient: Diana Buchanan  Age:  82 y.o., 1935    Referral: Sha Ro MD      PROBLEM LIST:    Patient Active Problem List    Diagnosis Date Noted    S/P partial resection of colon 05/01/2019     Priority: Low    Intestinal adhesions with partial obstruction ([address] 05/01/2019     Priority: Low    Diastolic congestive heart failure with preserved left ventricular function, NYHA class 2 ([address] 05/01/2019     Priority: Low    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 choroidal neovascularization ([address] 05/01/2019     Priority: Low    Morbid obesity with BMI of 40.0-44.9, adult ([address] 05/01/2019     Priority: Low    Chronic kidney disease, stage III (moderate) ([address] 11/13/2018     Priority: Low    Nonrheumatic aortic valve stenosis 05/19/2018     Priority: Low    Aortic systolic murmur on examination - suspect aortic stenosis 05/07/2018     Priority: Low    Essential hypertension 11/07/2017     Priority: Low    Crohn's disease of large intestine with complication ([address] 72/14/6831     Priority: Low    Mixed hyperlipidemia 11/07/2017     Priority: Low    Deep venous insufficiency 11/07/2017     Priority: Low    Primary osteoarthritis involving multiple joints 11/07/2017     Priority: Low    Gastroesophageal reflux disease without esophagitis 11/07/2017     Priority: Low    Long term current use of anticoagulant therapy 06/21/2017     Priority: Low     Overview Note:     Updating Deprecated Diagnoses       1. Moderate to severe aortic stenosis, mean gradient 26 mmHg, aortic valve area 1.02 cm² (echo February 2019), normal LV ejection fraction. 2.  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 3.  Crohn's disease.   4. joints 11/7/2017    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[address] 2002       Past Surgical History:      Procedure Laterality Date    CATARACT REMOVAL WITH IMPLANT  2018    OTHER SURGICAL HISTORY      Fistula-in-ano repair    SKIN CANCER EXCISION      SUBTOTAL COLECTOMY      TONSILLECTOMY      TOTAL HIP ARTHROPLASTY         Medications:  Current Outpatient Medications   Medication Sig Dispense Refill    lansoprazole (PREVACID SOLUTAB) 15 MG disintegrating tablet Take 15 mg by mouth daily      losartan (COZAAR) 50 MG tablet Take 0.5 tablets by mouth daily 90 tablet 3    hydrochlorothiazide (HYDRODIURIL) 12.5 MG tablet TAKE ONE TABLET BY MOUTH DAILY 90 tablet 3    promethazine-dextromethorphan (PROMETHAZINE-DM) 6.25-15 MG/5ML syrup TAKE FIVE MLS BY MOUTH FOUR TIMES DAILY AS NEEDED FOR COUGH 240 mL 1    fluticasone (FLONASE) 50 MCG/ACT nasal spray 1 spray by Each Nostril route daily 2 Bottle 1    cetirizine (ZYRTEC) 10 MG tablet Take 1 tablet by mouth daily 90 tablet 3    warfarin (COUMADIN) 7.5 MG tablet TAKE ONE TABLET BY MOUTH DAILY EXCEPT ON WEDNESDAY TAKE 1/2 TABLET 90 tablet 3    PENTASA 250 MG extended release capsule TAKE THREE CAPSULES BY MOUTH FOUR TIMES DAILY 1080 capsule 3    cyanocobalamin 1000 MCG/ML injection monthly 10 mL 0    Cholecalciferol (VITAMIN D3) 5000 units TABS Take by mouth      B Complex-C-E-Zn (STRESS FORMULA/ZINC PO) Take by mouth       No current facility-administered medications for this visit. Allergies:  Patient has no known allergies.     Past Social History:  Social History     Socioeconomic History    Marital status:      Spouse name: Rigoberto Tovar    Number of children: 2    Years of education: 23    Highest education level: Not on file   Occupational History     Employer: RETIRED   Social Needs    Financial resource strain: Not on file    Food insecurity:     Worry: Not on file     Inability: Not on file    Transportation needs:     Medical: Not on file respiratory distress. He has no wheezes. He has no rales. Abdominal: Soft. Bowel sounds are normal. He exhibits no distension and no mass. There is no hepatosplenomegaly. There is no tenderness. There is no rebound and no guarding. Musculoskeletal: He exhibits no edema or deformity. Neurological: He is alert and oriented to person, place, and time. He displays normal reflexes. He exhibits normal muscle tone. Coordination normal.   Skin: Skin is warm. No rash noted. No erythema. No pallor. Psychiatric: He has a normal mood and affect. Labs:   CBC: No results for input(s): WBC, HGB, HCT, PLT in the last 72 hours. BMP:No results for input(s): NA, K, CO2, BUN, CREATININE, LABGLOM, GLUCOSE in the last 72 hours. BNP: No results for input(s): BNP in the last 72 hours. PT/INR:   Recent Labs     09/04/19  1332   INR 2.0     APTT:No results for input(s): APTT in the last 72 hours. CARDIAC ENZYMES:No results for input(s): CKTOTAL, CKMB, CKMBINDEX, TROPONINI in the last 72 hours. FASTING LIPID PANEL:  Lab Results   Component Value Date    HDL 54 04/24/2019    LDLCALC 97 04/24/2019    TRIG 195 04/24/2019     LIVER PROFILE:No results for input(s): AST, ALT, LABALBU in the last 72 hours. Imaging:          ASSESSMENT and PLAN:    51-year-old gentleman with past medical history of moderate to severe aortic stenosis, mean gradient 26 mmHg, hypertension, Crohn's disease, right leg DVT with chronic leg swelling on anticoagulation, CKD stage III presenting for follow-up evaluation. 1.  He does report a slight increase in his fatigue and decrease in his activity level. His blood pressures also appear on the lower side. I have reduced his Cozaar down to 25 mg daily. His aortic valve does need to be re-looked at. There was some progression from last year with a mean gradient increasing to 26 mmHg in February. Repeat echo is being scheduled. I have discussed with him the possibility of TAVR.   He will

## 2019-09-06 ENCOUNTER — HOSPITAL ENCOUNTER (OUTPATIENT)
Dept: NON INVASIVE DIAGNOSTICS | Age: 84
Discharge: HOME | End: 2019-09-06
Payer: MEDICARE

## 2019-09-06 LAB
LV EF: 58 %
LVEF MODALITY: NORMAL

## 2019-09-06 PROCEDURE — 93306 TTE W/DOPPLER COMPLETE: CPT

## 2019-09-09 ENCOUNTER — TELEPHONE (OUTPATIENT)
Dept: CARDIOLOGY | Age: 84
End: 2019-09-09

## 2019-10-02 ENCOUNTER — NURSE ONLY (OUTPATIENT)
Dept: INTERNAL MEDICINE | Age: 84
End: 2019-10-02
Payer: MEDICARE

## 2019-10-02 DIAGNOSIS — Z79.01 LONG TERM CURRENT USE OF ANTICOAGULANT THERAPY: Primary | ICD-10-CM

## 2019-10-02 LAB
INTERNATIONAL NORMALIZATION RATIO, POC: 2.4
PROTHROMBIN TIME, POC: NORMAL

## 2019-10-02 PROCEDURE — 85610 PROTHROMBIN TIME: CPT | Performed by: FAMILY MEDICINE

## 2019-10-02 PROCEDURE — 93793 ANTICOAG MGMT PT WARFARIN: CPT | Performed by: FAMILY MEDICINE

## 2019-10-30 ENCOUNTER — NURSE ONLY (OUTPATIENT)
Dept: INTERNAL MEDICINE | Age: 84
End: 2019-10-30
Payer: MEDICARE

## 2019-10-30 DIAGNOSIS — Z79.01 LONG TERM CURRENT USE OF ANTICOAGULANT THERAPY: Primary | ICD-10-CM

## 2019-10-30 LAB
INTERNATIONAL NORMALIZATION RATIO, POC: 1.9
PROTHROMBIN TIME, POC: NORMAL

## 2019-10-30 PROCEDURE — 93793 ANTICOAG MGMT PT WARFARIN: CPT | Performed by: FAMILY MEDICINE

## 2019-10-30 PROCEDURE — 85610 PROTHROMBIN TIME: CPT | Performed by: FAMILY MEDICINE

## 2019-11-11 RX ORDER — CYANOCOBALAMIN 1000 UG/ML
INJECTION INTRAMUSCULAR; SUBCUTANEOUS
Qty: 10 ML | Refills: 0 | Status: SHIPPED | OUTPATIENT
Start: 2019-11-11 | End: 2019-11-26 | Stop reason: SDUPTHER

## 2019-11-13 DIAGNOSIS — N18.30 CHRONIC KIDNEY DISEASE, STAGE III (MODERATE) (HCC): ICD-10-CM

## 2019-11-13 DIAGNOSIS — I10 ESSENTIAL HYPERTENSION: ICD-10-CM

## 2019-11-13 DIAGNOSIS — H35.3221 EXUDATIVE AGE-RELATED MACULAR DEGENERATION OF LEFT EYE WITH ACTIVE CHOROIDAL NEOVASCULARIZATION (HCC): ICD-10-CM

## 2019-11-13 LAB
ALBUMIN SERPL-MCNC: 4.3 G/DL (ref 3.5–5.2)
ALP BLD-CCNC: 82 U/L (ref 40–130)
ALT SERPL-CCNC: 17 U/L (ref 5–41)
ANION GAP SERPL CALCULATED.3IONS-SCNC: 14 MMOL/L (ref 7–19)
AST SERPL-CCNC: 23 U/L (ref 5–40)
BASOPHILS ABSOLUTE: 0.1 K/UL (ref 0–0.2)
BASOPHILS RELATIVE PERCENT: 0.9 % (ref 0–1)
BILIRUB SERPL-MCNC: 0.7 MG/DL (ref 0.2–1.2)
BILIRUBIN URINE: NEGATIVE
BLOOD, URINE: NEGATIVE
BUN BLDV-MCNC: 20 MG/DL (ref 8–23)
CALCIUM SERPL-MCNC: 9.6 MG/DL (ref 8.8–10.2)
CHLORIDE BLD-SCNC: 105 MMOL/L (ref 98–111)
CHOLESTEROL, TOTAL: 191 MG/DL (ref 160–199)
CLARITY: ABNORMAL
CO2: 23 MMOL/L (ref 22–29)
COLOR: YELLOW
CREAT SERPL-MCNC: 1.3 MG/DL (ref 0.5–1.2)
EOSINOPHILS ABSOLUTE: 0.1 K/UL (ref 0–0.6)
EOSINOPHILS RELATIVE PERCENT: 1.6 % (ref 0–5)
GFR NON-AFRICAN AMERICAN: 53
GLUCOSE BLD-MCNC: 99 MG/DL (ref 74–109)
GLUCOSE URINE: NEGATIVE MG/DL
HCT VFR BLD CALC: 43.3 % (ref 42–52)
HDLC SERPL-MCNC: 56 MG/DL (ref 55–121)
HEMOGLOBIN: 14.6 G/DL (ref 14–18)
IMMATURE GRANULOCYTES #: 0 K/UL
KETONES, URINE: NEGATIVE MG/DL
LDL CHOLESTEROL CALCULATED: 100 MG/DL
LEUKOCYTE ESTERASE, URINE: NEGATIVE
LYMPHOCYTES ABSOLUTE: 1.7 K/UL (ref 1.1–4.5)
LYMPHOCYTES RELATIVE PERCENT: 29.7 % (ref 20–40)
MCH RBC QN AUTO: 33.3 PG (ref 27–31)
MCHC RBC AUTO-ENTMCNC: 33.7 G/DL (ref 33–37)
MCV RBC AUTO: 98.6 FL (ref 80–94)
MONOCYTES ABSOLUTE: 0.4 K/UL (ref 0–0.9)
MONOCYTES RELATIVE PERCENT: 6.7 % (ref 0–10)
NEUTROPHILS ABSOLUTE: 3.5 K/UL (ref 1.5–7.5)
NEUTROPHILS RELATIVE PERCENT: 60.6 % (ref 50–65)
NITRITE, URINE: NEGATIVE
PDW BLD-RTO: 13.1 % (ref 11.5–14.5)
PH UA: 5.5 (ref 5–8)
PLATELET # BLD: 159 K/UL (ref 130–400)
PMV BLD AUTO: 9.8 FL (ref 9.4–12.4)
POTASSIUM SERPL-SCNC: 4.4 MMOL/L (ref 3.5–5)
PROTEIN UA: NEGATIVE MG/DL
RBC # BLD: 4.39 M/UL (ref 4.7–6.1)
SODIUM BLD-SCNC: 142 MMOL/L (ref 136–145)
SPECIFIC GRAVITY UA: 1.02 (ref 1–1.03)
TOTAL PROTEIN: 7.3 G/DL (ref 6.6–8.7)
TRIGL SERPL-MCNC: 177 MG/DL (ref 0–149)
UROBILINOGEN, URINE: 0.2 E.U./DL
WBC # BLD: 5.8 K/UL (ref 4.8–10.8)

## 2019-11-15 LAB — TSH, 3RD GENERATION: 3.97 MU/L (ref 0.3–4)

## 2019-11-20 ENCOUNTER — OFFICE VISIT (OUTPATIENT)
Dept: PRIMARY CARE CLINIC | Age: 84
End: 2019-11-20
Payer: MEDICARE

## 2019-11-20 VITALS
BODY MASS INDEX: 39.05 KG/M2 | DIASTOLIC BLOOD PRESSURE: 72 MMHG | TEMPERATURE: 97.8 F | WEIGHT: 248.8 LBS | HEART RATE: 78 BPM | HEIGHT: 67 IN | OXYGEN SATURATION: 97 % | RESPIRATION RATE: 14 BRPM | SYSTOLIC BLOOD PRESSURE: 128 MMHG

## 2019-11-20 DIAGNOSIS — Z00.00 ROUTINE GENERAL MEDICAL EXAMINATION AT A HEALTH CARE FACILITY: Primary | ICD-10-CM

## 2019-11-20 DIAGNOSIS — I50.30 DIASTOLIC CONGESTIVE HEART FAILURE WITH PRESERVED LEFT VENTRICULAR FUNCTION, NYHA CLASS 2 (HCC): ICD-10-CM

## 2019-11-20 DIAGNOSIS — I35.0 NONRHEUMATIC AORTIC VALVE STENOSIS: Chronic | ICD-10-CM

## 2019-11-20 DIAGNOSIS — I10 ESSENTIAL HYPERTENSION: Chronic | ICD-10-CM

## 2019-11-20 DIAGNOSIS — N18.30 CHRONIC KIDNEY DISEASE, STAGE III (MODERATE) (HCC): Chronic | ICD-10-CM

## 2019-11-20 DIAGNOSIS — K50.119 CROHN'S DISEASE OF LARGE INTESTINE WITH COMPLICATION (HCC): Chronic | ICD-10-CM

## 2019-11-20 PROCEDURE — G8427 DOCREV CUR MEDS BY ELIG CLIN: HCPCS | Performed by: FAMILY MEDICINE

## 2019-11-20 PROCEDURE — 1036F TOBACCO NON-USER: CPT | Performed by: FAMILY MEDICINE

## 2019-11-20 PROCEDURE — G8417 CALC BMI ABV UP PARAM F/U: HCPCS | Performed by: FAMILY MEDICINE

## 2019-11-20 PROCEDURE — G8482 FLU IMMUNIZE ORDER/ADMIN: HCPCS | Performed by: FAMILY MEDICINE

## 2019-11-20 PROCEDURE — 90686 IIV4 VACC NO PRSV 0.5 ML IM: CPT | Performed by: FAMILY MEDICINE

## 2019-11-20 PROCEDURE — 1123F ACP DISCUSS/DSCN MKR DOCD: CPT | Performed by: FAMILY MEDICINE

## 2019-11-20 PROCEDURE — G0439 PPPS, SUBSEQ VISIT: HCPCS | Performed by: FAMILY MEDICINE

## 2019-11-20 PROCEDURE — G0008 ADMIN INFLUENZA VIRUS VAC: HCPCS | Performed by: FAMILY MEDICINE

## 2019-11-20 PROCEDURE — 4040F PNEUMOC VAC/ADMIN/RCVD: CPT | Performed by: FAMILY MEDICINE

## 2019-11-20 PROCEDURE — 99213 OFFICE O/P EST LOW 20 MIN: CPT | Performed by: FAMILY MEDICINE

## 2019-11-20 ASSESSMENT — PATIENT HEALTH QUESTIONNAIRE - PHQ9
SUM OF ALL RESPONSES TO PHQ QUESTIONS 1-9: 0
SUM OF ALL RESPONSES TO PHQ QUESTIONS 1-9: 0

## 2019-11-20 ASSESSMENT — ENCOUNTER SYMPTOMS
CHEST TIGHTNESS: 0
CONSTIPATION: 0
VOMITING: 0
COUGH: 0
NAUSEA: 0
ABDOMINAL PAIN: 0
SHORTNESS OF BREATH: 0
WHEEZING: 0
DIARRHEA: 0

## 2019-11-20 ASSESSMENT — LIFESTYLE VARIABLES: HOW OFTEN DO YOU HAVE A DRINK CONTAINING ALCOHOL: 0

## 2019-11-26 DIAGNOSIS — E53.8 VITAMIN B 12 DEFICIENCY: Primary | ICD-10-CM

## 2019-11-26 RX ORDER — CYANOCOBALAMIN 1000 UG/ML
INJECTION INTRAMUSCULAR; SUBCUTANEOUS
Qty: 10 ML | Refills: 0 | Status: SHIPPED | OUTPATIENT
Start: 2019-11-26 | End: 2021-04-21

## 2019-12-04 ENCOUNTER — NURSE ONLY (OUTPATIENT)
Dept: INTERNAL MEDICINE | Age: 84
End: 2019-12-04
Payer: MEDICARE

## 2019-12-04 DIAGNOSIS — Z79.01 LONG TERM CURRENT USE OF ANTICOAGULANT THERAPY: ICD-10-CM

## 2019-12-04 DIAGNOSIS — I87.2 DEEP VENOUS INSUFFICIENCY: Primary | ICD-10-CM

## 2019-12-04 LAB
INTERNATIONAL NORMALIZATION RATIO, POC: 1.9
PROTHROMBIN TIME, POC: NORMAL

## 2019-12-04 PROCEDURE — 85610 PROTHROMBIN TIME: CPT | Performed by: FAMILY MEDICINE

## 2019-12-04 PROCEDURE — 93793 ANTICOAG MGMT PT WARFARIN: CPT | Performed by: FAMILY MEDICINE

## 2019-12-17 RX ORDER — MESALAMINE 250 MG/1
CAPSULE ORAL
Qty: 1080 CAPSULE | Refills: 3 | Status: SHIPPED | OUTPATIENT
Start: 2019-12-17 | End: 2020-09-01 | Stop reason: SDUPTHER

## 2020-01-08 ENCOUNTER — NURSE ONLY (OUTPATIENT)
Dept: INTERNAL MEDICINE | Age: 85
End: 2020-01-08
Payer: MEDICARE

## 2020-01-08 LAB
INTERNATIONAL NORMALIZATION RATIO, POC: 2.2
PROTHROMBIN TIME, POC: NORMAL

## 2020-01-08 PROCEDURE — 93793 ANTICOAG MGMT PT WARFARIN: CPT | Performed by: FAMILY MEDICINE

## 2020-01-08 PROCEDURE — 85610 PROTHROMBIN TIME: CPT | Performed by: FAMILY MEDICINE

## 2020-01-08 NOTE — PROGRESS NOTES
Mr. Cece Carbone was here today. INR today:   Results for orders placed or performed in visit on 01/08/20   POCT INR   Result Value Ref Range    INR 2.2     Protime       INR Goal: 2.0-3.0    Dosing Plan  As of 1/8/2020    TTR:   77.3 % (2.5 y)   Full warfarin instructions:   3.75 mg every Wed; 7.5 mg all other days            PLAN: CONTINUE CURRENT DOSE  NEXT COUMADIN CLINIC APT IS: 02/05/20 @ 1:15pm     [facility] [phone]    Summer Hours:  Tuesday's-   11:12am-10:80pm  Wednesday's- 11:12am-10:80pm  Thursday's-  6:30am-4:30pm    IF IT'S AN EMERGENCY, PLEASE CALL 911 OR GO TO YOUR NEAREST EMERGENCY ROOM. IF UNABLE TO REACH COUMADIN CLINIC, [address] [place] [address] Rd, [phone].

## 2020-02-05 ENCOUNTER — NURSE ONLY (OUTPATIENT)
Dept: INTERNAL MEDICINE | Age: 85
End: 2020-02-05
Payer: MEDICARE

## 2020-02-05 LAB
INTERNATIONAL NORMALIZATION RATIO, POC: 2.1
PROTHROMBIN TIME, POC: NORMAL

## 2020-02-05 PROCEDURE — 93793 ANTICOAG MGMT PT WARFARIN: CPT | Performed by: FAMILY MEDICINE

## 2020-02-05 PROCEDURE — 85610 PROTHROMBIN TIME: CPT | Performed by: FAMILY MEDICINE

## 2020-02-20 ENCOUNTER — OFFICE VISIT (OUTPATIENT)
Dept: PRIMARY CARE CLINIC | Age: 85
End: 2020-02-20
Payer: MEDICARE

## 2020-02-20 VITALS
BODY MASS INDEX: 39.71 KG/M2 | WEIGHT: 253 LBS | HEIGHT: 67 IN | DIASTOLIC BLOOD PRESSURE: 70 MMHG | HEART RATE: 90 BPM | OXYGEN SATURATION: 94 % | TEMPERATURE: 99.8 F | SYSTOLIC BLOOD PRESSURE: 128 MMHG

## 2020-02-20 PROCEDURE — G8482 FLU IMMUNIZE ORDER/ADMIN: HCPCS | Performed by: FAMILY MEDICINE

## 2020-02-20 PROCEDURE — 1036F TOBACCO NON-USER: CPT | Performed by: FAMILY MEDICINE

## 2020-02-20 PROCEDURE — G8427 DOCREV CUR MEDS BY ELIG CLIN: HCPCS | Performed by: FAMILY MEDICINE

## 2020-02-20 PROCEDURE — G8417 CALC BMI ABV UP PARAM F/U: HCPCS | Performed by: FAMILY MEDICINE

## 2020-02-20 PROCEDURE — 4040F PNEUMOC VAC/ADMIN/RCVD: CPT | Performed by: FAMILY MEDICINE

## 2020-02-20 PROCEDURE — 99213 OFFICE O/P EST LOW 20 MIN: CPT | Performed by: FAMILY MEDICINE

## 2020-02-20 PROCEDURE — 1123F ACP DISCUSS/DSCN MKR DOCD: CPT | Performed by: FAMILY MEDICINE

## 2020-02-20 RX ORDER — LOSARTAN POTASSIUM 50 MG/1
50 TABLET ORAL DAILY
Qty: 90 TABLET | Refills: 3 | Status: SHIPPED | OUTPATIENT
Start: 2020-02-20 | End: 2020-09-01

## 2020-02-20 RX ORDER — PREDNISONE 10 MG/1
30 TABLET ORAL DAILY
Qty: 15 TABLET | Refills: 0 | Status: SHIPPED | OUTPATIENT
Start: 2020-02-20 | End: 2020-02-25

## 2020-02-20 RX ORDER — GUAIFENESIN AND CODEINE PHOSPHATE 100; 10 MG/5ML; MG/5ML
5 SOLUTION ORAL 3 TIMES DAILY PRN
Qty: 100 ML | Refills: 0 | Status: SHIPPED | OUTPATIENT
Start: 2020-02-20 | End: 2020-02-27

## 2020-02-20 RX ORDER — AMOXICILLIN AND CLAVULANATE POTASSIUM 875; 125 MG/1; MG/1
1 TABLET, FILM COATED ORAL 2 TIMES DAILY
Qty: 20 TABLET | Refills: 0 | Status: SHIPPED | OUTPATIENT
Start: 2020-02-20 | End: 2020-03-01

## 2020-02-20 ASSESSMENT — ENCOUNTER SYMPTOMS
SORE THROAT: 1
RHINORRHEA: 1
ABDOMINAL PAIN: 0
CONSTIPATION: 0
WHEEZING: 1
SHORTNESS OF BREATH: 0
CHEST TIGHTNESS: 0
VOMITING: 0
COUGH: 1
DIARRHEA: 0
NAUSEA: 0

## 2020-02-20 ASSESSMENT — PATIENT HEALTH QUESTIONNAIRE - PHQ9
1. LITTLE INTEREST OR PLEASURE IN DOING THINGS: 0
SUM OF ALL RESPONSES TO PHQ9 QUESTIONS 1 & 2: 0
SUM OF ALL RESPONSES TO PHQ QUESTIONS 1-9: 0
2. FEELING DOWN, DEPRESSED OR HOPELESS: 0
SUM OF ALL RESPONSES TO PHQ QUESTIONS 1-9: 0

## 2020-02-20 NOTE — PATIENT INSTRUCTIONS
Please arrive 15 minutes early to next follow up appointment in 1 week or schedule an appointment sooner if needed.

## 2020-02-20 NOTE — PROGRESS NOTES
Carey Bui is a 82 y.o. male who presents today for   Chief Complaint   Patient presents with    Cough    Nasal Congestion       HPI  Patient is here for acute cough and congestion x3 days. He reports a mild fever and wheezing. He denies SOB but c/o chest and abd myalgias due to excessive coughing. Pt states he has been taking an antihistamine. He reports previous decrease in Losartan dosage due to a lower BP recording. No change in PMH, family, social, or surgical history unless mentioned above. Review of Systems   Constitutional: Positive for fatigue and fever. Negative for chills. HENT: Positive for congestion, rhinorrhea, sneezing and sore throat. Respiratory: Positive for cough and wheezing. Negative for chest tightness and shortness of breath. Cardiovascular: Negative for chest pain, palpitations and leg swelling. Gastrointestinal: Negative for abdominal pain, constipation, diarrhea, nausea and vomiting. Genitourinary: Negative for difficulty urinating, dysuria and frequency. Musculoskeletal: Positive for myalgias.        Past Medical History:   Diagnosis Date    Cholelithiasis     CKD (chronic kidney disease) stage 3, GFR 30-59 ml/min ([facility] [address] 11/13/2018    Crohn's disease of large intestine with complication ([facility] [address] 76/3/6191    Deep venous insufficiency 11/7/2017    Essential hypertension 11/7/2017    Gastroesophageal reflux disease without esophagitis 11/7/2017    Heart murmur     Mixed hyperlipidemia 11/7/2017    Nonrheumatic aortic valve stenosis 5/19/2018    Primary osteoarthritis involving multiple joints 11/7/2017    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HCC) 2002       Current Outpatient Medications   Medication Sig Dispense Refill    predniSONE (DELTASONE) 10 MG tablet Take 3 tablets by mouth daily for 5 days 15 tablet 0    amoxicillin-clavulanate (AUGMENTIN) 875-125 MG per tablet Take 1 tablet by mouth 2 times daily for 10 days 20 tablet 0    guaiFENesin-codeine (CHERATUSSIN AC) 100-10 MG/5ML syrup Take 5 mLs by mouth 3 times daily as needed for Cough for up to 7 days. 100 mL 0    losartan (COZAAR) 50 MG tablet Take 1 tablet by mouth daily 90 tablet 3    PENTASA 250 MG extended release capsule TAKE THREE CAPSULES BY MOUTH FOUR TIMES DAILY 1080 capsule 3    cyanocobalamin 1000 MCG/ML injection monthly 10 mL 0    lansoprazole (PREVACID SOLUTAB) 15 MG disintegrating tablet Take 15 mg by mouth daily      hydrochlorothiazide (HYDRODIURIL) 12.5 MG tablet TAKE ONE TABLET BY MOUTH DAILY 90 tablet 3    promethazine-dextromethorphan (PROMETHAZINE-DM) 6.25-15 MG/5ML syrup TAKE FIVE MLS BY MOUTH FOUR TIMES DAILY AS NEEDED FOR COUGH 240 mL 1    fluticasone (FLONASE) 50 MCG/ACT nasal spray 1 spray by Each Nostril route daily 2 Bottle 1    cetirizine (ZYRTEC) 10 MG tablet Take 1 tablet by mouth daily 90 tablet 3    warfarin (COUMADIN) 7.5 MG tablet TAKE ONE TABLET BY MOUTH DAILY EXCEPT ON WEDNESDAY TAKE 1/2 TABLET 90 tablet 3    Cholecalciferol (VITAMIN D3) 5000 units TABS Take by mouth      B Complex-C-E-Zn (STRESS FORMULA/ZINC PO) Take by mouth       No current facility-administered medications for this visit. No Known Allergies    Past Surgical History:   Procedure Laterality Date    CATARACT REMOVAL WITH IMPLANT  2018    OTHER SURGICAL HISTORY      Fistula-in-ano repair    SKIN CANCER EXCISION      SUBTOTAL COLECTOMY      TONSILLECTOMY      TOTAL HIP ARTHROPLASTY         Social History     Tobacco Use    Smoking status: Never Smoker    Smokeless tobacco: Never Used   Substance Use Topics    Alcohol use: No    Drug use: No       Family History   Problem Relation Age of Onset    Stroke Other     Other Other         atherosclerosis of extremities       /70   Pulse 90   Temp 99.8 °F (37.7 °C)   Ht 5' 7\" (1.702 m)   Wt 253 lb (114.8 kg)   SpO2 94%   BMI 39.63 kg/m²     Physical Exam  Vitals signs and nursing note reviewed.    Constitutional: General: He is not in acute distress. Appearance: He is well-developed. He is ill-appearing. He is not toxic-appearing or diaphoretic. HENT:      Head: Normocephalic and atraumatic. Right Ear: Hearing, tympanic membrane, ear canal and external ear normal.      Left Ear: Hearing, tympanic membrane, ear canal and external ear normal.      Nose: Mucosal edema and rhinorrhea present. Mouth/Throat:      Pharynx: Uvula midline. Posterior oropharyngeal erythema present. No oropharyngeal exudate. Cardiovascular:      Rate and Rhythm: Normal rate and regular rhythm. Heart sounds: Normal heart sounds. No murmur. No friction rub. No gallop. Pulmonary:      Effort: Pulmonary effort is normal. No respiratory distress. Breath sounds: Wheezing (b/l course ) present. No rales. Chest:      Chest wall: No tenderness. Abdominal:      General: Bowel sounds are normal. There is no distension. Palpations: Abdomen is soft. There is no mass. Tenderness: There is no abdominal tenderness. There is no guarding or rebound. Musculoskeletal:      Right lower leg: No edema. Left lower leg: No edema. Skin:     General: Skin is warm and dry. Nails: There is no clubbing. Neurological:      Mental Status: He is alert and oriented to person, place, and time. Coordination: Coordination normal.      Gait: Gait normal.         Assessment:    ICD-10-CM    1. Acute bronchitis, unspecified organism J20.9 guaiFENesin-codeine (CHERATUSSIN AC) 100-10 MG/5ML syrup   2. Essential hypertension I10        Plan:   Agree with prior decrease of Losartan but based on rebound HTN that occurred, doing well back on full dose. No orders of the defined types were placed in this encounter.     Orders Placed This Encounter   Medications    predniSONE (DELTASONE) 10 MG tablet     Sig: Take 3 tablets by mouth daily for 5 days     Dispense:  15 tablet     Refill:  0    amoxicillin-clavulanate (AUGMENTIN) 875-125 MG per tablet     Sig: Take 1 tablet by mouth 2 times daily for 10 days     Dispense:  20 tablet     Refill:  0    guaiFENesin-codeine (CHERATUSSIN AC) 100-10 MG/5ML syrup     Sig: Take 5 mLs by mouth 3 times daily as needed for Cough for up to 7 days. Dispense:  100 mL     Refill:  0     Reduce doses taken as pain becomes manageable    losartan (COZAAR) 50 MG tablet     Sig: Take 1 tablet by mouth daily     Dispense:  90 tablet     Refill:  3     This prescription was filled on 12/13/2018. Any refills authorized will be placed on file. Medications Discontinued During This Encounter   Medication Reason    losartan (COZAAR) 50 MG tablet REORDER     Patient Instructions   Please arrive 15 minutes early to next follow up appointment in 1 week or schedule an appointment sooner if needed. Patient given educational handouts and has had all questions answered. Patient voices understanding and agrees to plans along with risks and benefits of plan. Patient isinstructed to continue prior meds, diet, and exercise plans unless instructed otherwise. Patient agrees to follow up as instructed and sooner if needed. Patient agrees to go to ER if condition becomes emergent. Notesmay be completed with dictation device and spelling errors may occur. Mandy Aggarwal am scribing for and in the presence of Dr. William Dempsey. 2/20/2020   I, Dr. Janina Olivarez, the medical provider for the encounter with patient on 2/26/2020 at 4:56 PM have reviewed my scribe's documentation in earnest and take sole ownership of the intellectual property represented in documentation by my medical scribe and myself within this document. Some errors may occur in proofreading. Return in about 1 week (around 2/27/2020) for f/u bronchitis .

## 2020-02-27 ENCOUNTER — OFFICE VISIT (OUTPATIENT)
Dept: PRIMARY CARE CLINIC | Age: 85
End: 2020-02-27
Payer: MEDICARE

## 2020-02-27 VITALS
HEART RATE: 61 BPM | DIASTOLIC BLOOD PRESSURE: 76 MMHG | WEIGHT: 248 LBS | OXYGEN SATURATION: 95 % | SYSTOLIC BLOOD PRESSURE: 120 MMHG | BODY MASS INDEX: 38.84 KG/M2 | TEMPERATURE: 98.8 F

## 2020-02-27 PROCEDURE — 99214 OFFICE O/P EST MOD 30 MIN: CPT | Performed by: FAMILY MEDICINE

## 2020-02-27 PROCEDURE — 4040F PNEUMOC VAC/ADMIN/RCVD: CPT | Performed by: FAMILY MEDICINE

## 2020-02-27 PROCEDURE — G8428 CUR MEDS NOT DOCUMENT: HCPCS | Performed by: FAMILY MEDICINE

## 2020-02-27 PROCEDURE — G8417 CALC BMI ABV UP PARAM F/U: HCPCS | Performed by: FAMILY MEDICINE

## 2020-02-27 PROCEDURE — G8482 FLU IMMUNIZE ORDER/ADMIN: HCPCS | Performed by: FAMILY MEDICINE

## 2020-02-27 PROCEDURE — 1123F ACP DISCUSS/DSCN MKR DOCD: CPT | Performed by: FAMILY MEDICINE

## 2020-02-27 PROCEDURE — 1036F TOBACCO NON-USER: CPT | Performed by: FAMILY MEDICINE

## 2020-02-27 ASSESSMENT — ENCOUNTER SYMPTOMS
VOMITING: 0
CHEST TIGHTNESS: 0
WHEEZING: 0
COUGH: 1
CONSTIPATION: 0
SHORTNESS OF BREATH: 0
ABDOMINAL PAIN: 0
DIARRHEA: 0
NAUSEA: 0

## 2020-02-27 NOTE — PROGRESS NOTES
Stevo Mo is a 82 y.o. male who presents today for   Chief Complaint   Patient presents with    URI     One week follow up. Feeling better but still has chest congestion and cough. HPI  Patient is here for f/u URI. He states his breathing has improved. He denies CP but notes ongoing cough and chest congestion. He reports taking Robitussin for his cough. He denies vomiting but states he has diarrhea due to medication and Crohn's disease. His multiple other complications related to age including CHF and macular degeneration. Denies any changes in these. Denies any increased lower extremity swelling. No change in PMH, family, social, or surgical history unless mentioned above. Review of Systems   Constitutional: Negative for chills and fever. Respiratory: Positive for cough. Negative for chest tightness, shortness of breath and wheezing. Chest congestion   Cardiovascular: Negative for chest pain, palpitations and leg swelling. Gastrointestinal: Negative for abdominal pain, constipation, diarrhea, nausea and vomiting. Genitourinary: Negative for difficulty urinating, dysuria and frequency.        Past Medical History:   Diagnosis Date    Cholelithiasis     CKD (chronic kidney disease) stage 3, GFR 30-59 ml/min ([facility] 11/13/2018    Crohn's disease of large intestine with complication ([facility] 24/7/1505    Deep venous insufficiency 11/7/2017    Essential hypertension 11/7/2017    Gastroesophageal reflux disease without esophagitis 11/7/2017    Heart murmur     Mixed hyperlipidemia 11/7/2017    Nonrheumatic aortic valve stenosis 5/19/2018    Primary osteoarthritis involving multiple joints 11/7/2017    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HCC) 2002       Current Outpatient Medications   Medication Sig Dispense Refill    Dextromethorphan-guaiFENesin (ROBITUSSIN COLD COUGH+ CHEST PO) Take by mouth      losartan (COZAAR) 50 MG tablet Take 1 tablet by mouth daily 90 tablet 3    PENTASA 250 MG ear effusion (purulent) is present. Mouth/Throat:      Pharynx: Posterior oropharyngeal erythema present. Cardiovascular:      Rate and Rhythm: Normal rate and regular rhythm. Heart sounds: Normal heart sounds. No murmur. No friction rub. No gallop. Pulmonary:      Effort: Pulmonary effort is normal. No respiratory distress. Breath sounds: Wheezing present. No rales. Chest:      Chest wall: No tenderness. Abdominal:      General: Bowel sounds are normal. There is no distension. Palpations: Abdomen is soft. There is no mass. Tenderness: There is no abdominal tenderness. There is no guarding or rebound. Musculoskeletal:      Right lower leg: No edema. Left lower leg: No edema. Skin:     General: Skin is warm and dry. Nails: There is no clubbing. Neurological:      Mental Status: He is alert and oriented to person, place, and time. Coordination: Coordination normal.      Gait: Gait normal.         Assessment:    ICD-10-CM    1. 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 choroidal neovascularization ([facility] H35.3221    2. Diastolic congestive heart failure with preserved left ventricular function, NYHA class 2 ([facility]) I50.30    3. Crohn's disease of large intestine with complication ([facility] V30.104    4. Morbid obesity with BMI of 40.0-44.9, adult ([facility] E66.01     Z68.41        Plan:   If any relapse, pt and wife to see provider immediately. Improving but high risk for relapse. Chronic conditions at baseline. Very high risk continue to watch closely  No orders of the defined types were placed in this encounter. No orders of the defined types were placed in this encounter. There are no discontinued medications. There are no Patient Instructions on file for this visit. Patient given educational handouts and has had all questions answered. Patient voices understanding and agrees to plans along with risks and benefits of plan.  Patient isinstructed to continue prior meds, diet, and exercise plans unless instructed otherwise. Patient agrees to follow up as instructed and sooner if needed. Patient agrees to go to ER if condition becomes emergent. Notesmay be completed with dictation device and spelling errors may occur. Escobar Viramontes, arthur scribing for and in the presence of Dr. Thong Mckeon. 3/4/2020   I, Dr. Jed Oseguera, the medical provider for the encounter with patient on 3/4/2020 at 7:27 AM have reviewed my scribe's documentation in earnest and take sole ownership of the intellectual property represented in documentation by my medical scribe and myself within this document. Some errors may occur in proofreading. Return if symptoms worsen or fail to improve.

## 2020-03-06 ENCOUNTER — ANTI-COAG VISIT (OUTPATIENT)
Dept: INTERNAL MEDICINE | Age: 85
End: 2020-03-06
Payer: MEDICARE

## 2020-03-06 LAB — INTERNATIONAL NORMALIZATION RATIO, POC: 1.8

## 2020-03-06 PROCEDURE — 93793 ANTICOAG MGMT PT WARFARIN: CPT | Performed by: INTERNAL MEDICINE

## 2020-03-06 PROCEDURE — 85610 PROTHROMBIN TIME: CPT | Performed by: NURSE PRACTITIONER

## 2020-03-06 NOTE — PROGRESS NOTES
Mr. Kellie Beltran was here today. INR today:   Results for orders placed or performed in visit on 03/06/20   POCT INR   Result Value Ref Range    INR 1.8      INR Goal: 2.0-3.0    Dosing Plan  As of 3/6/2020    TTR:   76.6 % (2.7 y)   Full warfarin instructions:   3/6: 11.25 mg; Otherwise 3.75 mg every Wed; 7.5 mg all other days            PLAN: TAKE 11.25MG TONIGHT, THEN CONTINUE CURRENT DOSE  NEXT COUMADIN CLINIC APT IS: 3/18/2020 AT 1:15PM      IF IT'S AN EMERGENCY, PLEASE CALL 911 OR GO TO YOUR NEAREST EMERGENCY ROOM. [facility] INTERNAL MEDICINE COUMADIN CLINIC [phone]  IF UNABLE TO REACH COUMADIN CLINIC, [address] [place] [address], [phone].

## 2020-03-18 ENCOUNTER — ANTI-COAG VISIT (OUTPATIENT)
Dept: INTERNAL MEDICINE | Age: 85
End: 2020-03-18
Payer: MEDICARE

## 2020-03-18 LAB — INTERNATIONAL NORMALIZATION RATIO, POC: 2.7

## 2020-03-18 PROCEDURE — 93793 ANTICOAG MGMT PT WARFARIN: CPT | Performed by: FAMILY MEDICINE

## 2020-03-18 PROCEDURE — 85610 PROTHROMBIN TIME: CPT | Performed by: FAMILY MEDICINE

## 2020-04-10 ENCOUNTER — TELEPHONE (OUTPATIENT)
Dept: INTERNAL MEDICINE | Age: 85
End: 2020-04-10

## 2020-04-10 NOTE — TELEPHONE ENCOUNTER
Patient called, he declined home INR monitoring at this time. He wants to wait another week or 2 before coming in for INR check.

## 2020-04-28 ENCOUNTER — ANTI-COAG VISIT (OUTPATIENT)
Dept: INTERNAL MEDICINE | Age: 85
End: 2020-04-28
Payer: MEDICARE

## 2020-04-28 LAB — INTERNATIONAL NORMALIZATION RATIO, POC: 2.6

## 2020-04-28 PROCEDURE — 85610 PROTHROMBIN TIME: CPT | Performed by: FAMILY MEDICINE

## 2020-04-28 PROCEDURE — 93793 ANTICOAG MGMT PT WARFARIN: CPT | Performed by: FAMILY MEDICINE

## 2020-05-13 ENCOUNTER — TELEPHONE (OUTPATIENT)
Dept: PRIMARY CARE CLINIC | Age: 85
End: 2020-05-13

## 2020-05-13 NOTE — TELEPHONE ENCOUNTER
[facility] has an upcoming appt on 5/26/2020. If labs are needed prior to this appointment, please ensure active orders are available as I have made him aware of our walk-in hours for the lab. If they are not needed, please contact patient to advise. Thank you. *Lab orders in chart are over a year old. New lab orders may need to be entered?  Thanks

## 2020-06-11 ENCOUNTER — NURSE ONLY (OUTPATIENT)
Dept: INTERNAL MEDICINE | Age: 85
End: 2020-06-11
Payer: MEDICARE

## 2020-06-11 LAB — INTERNATIONAL NORMALIZATION RATIO, POC: 2.7

## 2020-06-11 PROCEDURE — 93793 ANTICOAG MGMT PT WARFARIN: CPT | Performed by: NURSE PRACTITIONER

## 2020-06-11 PROCEDURE — 85610 PROTHROMBIN TIME: CPT | Performed by: NURSE PRACTITIONER

## 2020-06-29 RX ORDER — WARFARIN SODIUM 7.5 MG/1
TABLET ORAL
Qty: 90 TABLET | Refills: 3 | Status: SHIPPED | OUTPATIENT
Start: 2020-06-29 | End: 2021-04-21

## 2020-07-15 ENCOUNTER — ANTI-COAG VISIT (OUTPATIENT)
Dept: INTERNAL MEDICINE | Age: 85
End: 2020-07-15
Payer: MEDICARE

## 2020-07-15 LAB — INTERNATIONAL NORMALIZATION RATIO, POC: 3.9

## 2020-07-15 PROCEDURE — 85610 PROTHROMBIN TIME: CPT | Performed by: FAMILY MEDICINE

## 2020-07-15 PROCEDURE — 93793 ANTICOAG MGMT PT WARFARIN: CPT | Performed by: FAMILY MEDICINE

## 2020-07-15 NOTE — PROGRESS NOTES
Mr. Khushi Beverly was here today. INR today:   Results for orders placed or performed in visit on 07/15/20   POCT INR   Result Value Ref Range    INR 3.9      INR Goal: 2.0-3.0    Dosing Plan  As of 7/15/2020    TTR:   76.8 % (3 y)   Full warfarin instructions:   7/15: Hold; 7/16: Hold; 7/17: 3.75 mg; Otherwise 3.75 mg every Wed; 7.5 mg all other days            PLAN: HOLD TONIGHT AND TOMORROW, THEN TAKE 3.75MG FRI, THEN RESUME, THEN  CONTINUE CURRENT DOSE  NEXT COUMADIN CLINIC APT IS: 7/29/2020 AT 1:30PM    Pt denies any change in diet or medications. IF IT'S AN EMERGENCY, PLEASE CALL 911 OR GO TO YOUR NEAREST EMERGENCY ROOM. [facility] INTERNAL MEDICINE COUMADIN CLINIC [phone]  IF UNABLE TO REACH COUMADIN CLINIC, [address] [place] [address], [phone].

## 2020-07-29 ENCOUNTER — ANTI-COAG VISIT (OUTPATIENT)
Dept: INTERNAL MEDICINE | Age: 85
End: 2020-07-29
Payer: MEDICARE

## 2020-07-29 LAB — INTERNATIONAL NORMALIZATION RATIO, POC: 3

## 2020-07-29 PROCEDURE — 85610 PROTHROMBIN TIME: CPT | Performed by: FAMILY MEDICINE

## 2020-07-29 PROCEDURE — 93793 ANTICOAG MGMT PT WARFARIN: CPT | Performed by: FAMILY MEDICINE

## 2020-07-29 NOTE — PROGRESS NOTES
Mr. Khushi Beverly was here today. INR today:   Results for orders placed or performed in visit on 07/29/20   POCT INR   Result Value Ref Range    INR 3.0      INR Goal: 2.0-3.0    Dosing Plan  As of 7/29/2020    TTR:   75.8 % (3.1 y)   Full warfarin instructions:   7/29: Hold; Otherwise 3.75 mg every Wed; 7.5 mg all other days            PLAN: HOLD TONIGHT, THEN CONTINUE CURRENT DOSE  NEXT COUMADIN CLINIC APT IS: 8/25/2020 AT 1:30PM    COUMADIN CLINIC IS MOVING TO PRIMARY [facility]ARE, [address]    IF IT'S AN EMERGENCY, PLEASE CALL 911 OR GO TO YOUR NEAREST EMERGENCY ROOM. [facility] INTERNAL MEDICINE COUMADIN CLINIC [phone]  IF UNABLE TO REACH COUMADIN CLINIC, [address] [place] [address] Rd, [phone].

## 2020-08-25 ENCOUNTER — ANTI-COAG VISIT (OUTPATIENT)
Dept: INTERNAL MEDICINE | Age: 85
End: 2020-08-25
Payer: MEDICARE

## 2020-08-25 LAB — INTERNATIONAL NORMALIZATION RATIO, POC: 1.9

## 2020-08-25 PROCEDURE — 93793 ANTICOAG MGMT PT WARFARIN: CPT | Performed by: FAMILY MEDICINE

## 2020-08-25 PROCEDURE — 85610 PROTHROMBIN TIME: CPT | Performed by: FAMILY MEDICINE

## 2020-08-25 NOTE — PROGRESS NOTES
Mr. Swapna Gillespie was here today. INR today:   Results for orders placed or performed in visit on 08/25/20   POCT INR   Result Value Ref Range    INR 1.9      INR Goal: 2.0-3.0    Dosing Plan  As of 8/25/2020    TTR:   76.2 % (3.2 y)   Full warfarin instructions:   8/26: 7.5 mg; Otherwise 3.75 mg every Wed; 7.5 mg all other days            PLAN: TAKE 7.5MG TOMORROW, THEN CONTINUE CURRENT DOSE  NEXT COUMADIN CLINIC APT IS: 9/22/2020 AT 1:00PM      IF IT'S AN EMERGENCY, PLEASE CALL 911 OR GO TO YOUR NEAREST EMERGENCY ROOM. [facility] INTERNAL MEDICINE COUMADIN CLINIC [phone]  IF UNABLE TO REACH COUMADIN CLINIC, [address] [place] [address] Rd, [phone].

## 2020-09-01 RX ORDER — HYDROCHLOROTHIAZIDE 12.5 MG/1
TABLET ORAL
Qty: 90 TABLET | Refills: 1 | OUTPATIENT
Start: 2020-09-01 | End: 2020-11-18 | Stop reason: ALTCHOICE

## 2020-09-01 RX ORDER — LOSARTAN POTASSIUM 50 MG/1
TABLET ORAL
Qty: 90 TABLET | Refills: 0 | Status: SHIPPED | OUTPATIENT
Start: 2020-09-01 | End: 2021-03-09

## 2020-09-01 NOTE — TELEPHONE ENCOUNTER
Cecelia Gee called to request a refill on his medication.       Last office visit : 2020   Next office visit : 2020     Requested Prescriptions     Pending Prescriptions Disp Refills    losartan (COZAAR) 50 MG tablet [Pharmacy Med Name: losartan 50 mg tablet] 90 tablet 1     Si Sharon DL Lozano MA

## 2020-09-14 ENCOUNTER — OFFICE VISIT (OUTPATIENT)
Dept: GASTROENTEROLOGY | Facility: CLINIC | Age: 85
End: 2020-09-14

## 2020-09-14 VITALS
OXYGEN SATURATION: 97 % | WEIGHT: 251 LBS | BODY MASS INDEX: 39.39 KG/M2 | HEART RATE: 88 BPM | DIASTOLIC BLOOD PRESSURE: 76 MMHG | HEIGHT: 67 IN | SYSTOLIC BLOOD PRESSURE: 126 MMHG | TEMPERATURE: 98.4 F

## 2020-09-14 DIAGNOSIS — K50.00 CROHN'S DISEASE OF SMALL INTESTINE WITHOUT COMPLICATION (HCC): Primary | ICD-10-CM

## 2020-09-14 DIAGNOSIS — Z79.01 ANTICOAGULATED: ICD-10-CM

## 2020-09-14 PROCEDURE — 99214 OFFICE O/P EST MOD 30 MIN: CPT | Performed by: NURSE PRACTITIONER

## 2020-09-14 RX ORDER — CETIRIZINE HYDROCHLORIDE 10 MG/1
10 TABLET ORAL DAILY
COMMUNITY

## 2020-09-14 NOTE — PROGRESS NOTES
Chief Complaint   Patient presents with   • Colonoscopy     10- had colon 3 year recall for survellance       PCP: Brent Hobson MD  REFER: No ref. provider found      Subjective   HPI    Rayo Siddiqi is a 85 y.o. male who presents with a history of Crohn's Disease diagnosis over 23 years ago.    Status of disease is quiet and stable.  The severity is described as Mild.  He denies  abdominal cramping, diarrhea, bloody stool and anorexia.  Previous diagnostic test include  colonoscopy (2017).  Maintained on pentasa 3 tab 4 times per day.    Sometimes bowels will not move for 1-2 days, this is noted more if he eats BBQ, steak.    Hx of colectomy (18 inches per pt) due to colon mass     CScope (Dr Schroeder) 2014 narrowing of anastomosis  CScope (Dr Schroeder) 2013 adenomatous polyp removed   CScope (Dr Schroeder) 2010 narrowing of anastomosis     Endoscopy (Dr Schroeder) 2014-normal      Past Medical History:   Diagnosis Date   • Crohn's disease (CMS/HCC)    • DVT (deep venous thrombosis) (CMS/HCC)    • History of transfusion    • Hypertension    • PONV (postoperative nausea and vomiting)    • Spinal headache      Outpatient Medications Marked as Taking for the 9/14/20 encounter (Office Visit) with Sriram Junior APRN   Medication Sig Dispense Refill   • cetirizine (zyrTEC) 10 MG tablet Take 10 mg by mouth Daily.     • hydrochlorothiazide (HYDRODIURIL) 12.5 MG tablet TAKE ONE TABLET BY MOUTH DAILY  0   • lansoprazole (PREVACID) 15 MG capsule Take 15 mg by mouth Daily.     • losartan (COZAAR) 50 MG tablet TAKE ONE TABLET BY MOUTH DAILY  3   • Mesalamine (PENTASA PO) Take  by mouth.     • Multiple Vitamin (STRESS B PO) Take  by mouth.     • warfarin (COUMADIN) 7.5 MG tablet TAKE ONE TABLET BY MOUTH EVERY DAY EXCEPT ON SUNDAY TAKE 1.5 TABLETS BY MOUTH  1     No Known Allergies  Social History     Socioeconomic History   • Marital status:      Spouse name: Not on file   • Number of children: Not on file   • Years  of education: Not on file   • Highest education level: Not on file   Tobacco Use   • Smoking status: Never Smoker   • Smokeless tobacco: Never Used   Substance and Sexual Activity   • Alcohol use: No   • Drug use: No     Family History   Problem Relation Age of Onset   • No Known Problems Mother    • No Known Problems Father    • Colon cancer Neg Hx    • Esophageal cancer Neg Hx      Review of Systems   Constitutional: Negative for fatigue, fever and unexpected weight change.   HENT: Negative for hearing loss, sore throat and voice change.    Eyes: Negative for visual disturbance.   Respiratory: Negative for cough, shortness of breath and wheezing.    Cardiovascular: Negative for chest pain and palpitations.   Gastrointestinal: Negative for abdominal pain, blood in stool and vomiting.   Endocrine: Negative for polydipsia and polyuria.   Genitourinary: Negative for difficulty urinating, dysuria, hematuria and urgency.   Musculoskeletal: Negative for joint swelling and myalgias.   Skin: Negative for color change, rash and wound.   Neurological: Negative for dizziness, tremors, seizures and syncope.   Hematological: Does not bruise/bleed easily.   Psychiatric/Behavioral: Negative for agitation and confusion. The patient is not nervous/anxious.      Objective   Vitals:    09/14/20 1308   BP: 126/76   Pulse: 88   Temp: 98.4 °F (36.9 °C)   SpO2: 97%     Physical Exam  Constitutional:       Appearance: He is well-developed.   HENT:      Head: Normocephalic and atraumatic.   Eyes:      Comments: Pink, Nonicteric   Neck:      Comments: Global Assessment- supple. No JVD or lymphadenopathy  Cardiovascular:      Rate and Rhythm: Normal rate and regular rhythm.      Heart sounds: Normal heart sounds. No murmur. No friction rub. No gallop.    Pulmonary:      Effort: Pulmonary effort is normal. No respiratory distress.      Breath sounds: Normal breath sounds. No wheezing or rales.   Abdominal:      General: Bowel sounds are  normal. There is no distension.      Palpations: Abdomen is soft. There is no mass.      Tenderness: There is no abdominal tenderness. There is no guarding or rebound.   Neurological:      Mental Status: He is alert and oriented to person, place, and time.      Comments: General Exam-Deemed a reliable historian, able to converse without difficulty and Able to move all extremities without difficulty       Imaging Results (Most Recent)     None        Body mass index is 39.31 kg/m².  Assessment/Plan   Rayo was seen today for colonoscopy.    Diagnoses and all orders for this visit:    Crohn's disease of small intestine without complication (CMS/HCC)  -     Case Request; Standing  -     Case Request    Anticoagulated    Other orders  -     polyethylene glycol (GoLYTELY) 236 g solution; Take 3,785 mL by mouth 1 (One) Time for 1 dose. Take as directed          COLONOSCOPY WITH ANESTHESIA (N/A)    Patient is to continue all blood pressure and cardiac medications prior to procedure and has been advised to take medications morning of procedure   Pt verbalized understanding     Patient is to hold their anticoagulation medication per the direction of their prescribing physician, Dr Hobson. This is to prevent any risk or complication from bleeding intra and post procedure. If they develop bleeding post procedure they are to go the emergency department for further evaluation and treatment immediately-will need to hold plavix x 5 days prior to procedure per Dr Schroeder protocol      All risks, benefits, alternatives, and indications of colonoscopy procedure have been discussed with the patient. Risks to include perforation of the colon requiring possible surgery or colostomy, risk of bleeding from biopsies or removal of colon tissue, possibility of missing a colon polyp or cancer, or adverse drug reaction.  Benefits to include the diagnosis and management of disease of the colon and rectum. Alternatives to include barium enema,  radiographic evaluation, lab testing or no intervention. Pt verbalizes understanding and agrees to proceed with procedure.      Patient's Body mass index is 39.31 kg/m². BMI is above normal parameters. Recommendations include: no follow up.        Sriram Junior, JUANY  09/14/20

## 2020-09-21 ENCOUNTER — TRANSCRIBE ORDERS (OUTPATIENT)
Dept: ADMINISTRATIVE | Facility: HOSPITAL | Age: 85
End: 2020-09-21

## 2020-09-21 DIAGNOSIS — Z01.818 PRE-OP TESTING: Primary | ICD-10-CM

## 2020-09-22 ENCOUNTER — TELEPHONE (OUTPATIENT)
Dept: GASTROENTEROLOGY | Facility: CLINIC | Age: 85
End: 2020-09-22

## 2020-09-22 ENCOUNTER — NURSE ONLY (OUTPATIENT)
Dept: PRIMARY CARE CLINIC | Age: 85
End: 2020-09-22
Payer: MEDICARE

## 2020-09-22 LAB — INTERNATIONAL NORMALIZATION RATIO, POC: 1.8

## 2020-09-22 PROCEDURE — 85610 PROTHROMBIN TIME: CPT | Performed by: FAMILY MEDICINE

## 2020-09-22 PROCEDURE — 93793 ANTICOAG MGMT PT WARFARIN: CPT | Performed by: FAMILY MEDICINE

## 2020-09-25 ENCOUNTER — LAB (OUTPATIENT)
Dept: LAB | Facility: HOSPITAL | Age: 85
End: 2020-09-25

## 2020-09-25 PROCEDURE — C9803 HOPD COVID-19 SPEC COLLECT: HCPCS | Performed by: INTERNAL MEDICINE

## 2020-09-25 PROCEDURE — U0003 INFECTIOUS AGENT DETECTION BY NUCLEIC ACID (DNA OR RNA); SEVERE ACUTE RESPIRATORY SYNDROME CORONAVIRUS 2 (SARS-COV-2) (CORONAVIRUS DISEASE [COVID-19]), AMPLIFIED PROBE TECHNIQUE, MAKING USE OF HIGH THROUGHPUT TECHNOLOGIES AS DESCRIBED BY CMS-2020-01-R: HCPCS | Performed by: INTERNAL MEDICINE

## 2020-09-26 LAB
COVID LABCORP PRIORITY: NORMAL
SARS-COV-2 RNA RESP QL NAA+PROBE: NOT DETECTED

## 2020-09-28 ENCOUNTER — ANESTHESIA EVENT (OUTPATIENT)
Dept: GASTROENTEROLOGY | Facility: HOSPITAL | Age: 85
End: 2020-09-28

## 2020-09-28 ENCOUNTER — HOSPITAL ENCOUNTER (OUTPATIENT)
Facility: HOSPITAL | Age: 85
Setting detail: HOSPITAL OUTPATIENT SURGERY
Discharge: HOME | End: 2020-09-28
Attending: INTERNAL MEDICINE | Admitting: INTERNAL MEDICINE

## 2020-09-28 ENCOUNTER — ANESTHESIA (OUTPATIENT)
Dept: GASTROENTEROLOGY | Facility: HOSPITAL | Age: 85
End: 2020-09-28

## 2020-09-28 VITALS
TEMPERATURE: 98.2 F | SYSTOLIC BLOOD PRESSURE: 118 MMHG | HEIGHT: 67 IN | WEIGHT: 242 LBS | RESPIRATION RATE: 18 BRPM | HEART RATE: 61 BPM | BODY MASS INDEX: 37.98 KG/M2 | OXYGEN SATURATION: 96 % | DIASTOLIC BLOOD PRESSURE: 67 MMHG

## 2020-09-28 DIAGNOSIS — K50.00 CROHN'S DISEASE OF SMALL INTESTINE WITHOUT COMPLICATION (HCC): ICD-10-CM

## 2020-09-28 PROCEDURE — 45378 DIAGNOSTIC COLONOSCOPY: CPT | Performed by: INTERNAL MEDICINE

## 2020-09-28 PROCEDURE — 25010000002 PROPOFOL 10 MG/ML EMULSION: Performed by: NURSE ANESTHETIST, CERTIFIED REGISTERED

## 2020-09-28 RX ORDER — SODIUM CHLORIDE 9 MG/ML
500 INJECTION, SOLUTION INTRAVENOUS CONTINUOUS PRN
Status: DISCONTINUED | OUTPATIENT
Start: 2020-09-28 | End: 2020-09-28 | Stop reason: HOSPADM

## 2020-09-28 RX ORDER — PROPOFOL 10 MG/ML
VIAL (ML) INTRAVENOUS AS NEEDED
Status: DISCONTINUED | OUTPATIENT
Start: 2020-09-28 | End: 2020-09-28 | Stop reason: SURG

## 2020-09-28 RX ORDER — SODIUM CHLORIDE 0.9 % (FLUSH) 0.9 %
10 SYRINGE (ML) INJECTION AS NEEDED
Status: DISCONTINUED | OUTPATIENT
Start: 2020-09-28 | End: 2020-09-28 | Stop reason: HOSPADM

## 2020-09-28 RX ADMIN — SODIUM CHLORIDE 500 ML: 9 INJECTION, SOLUTION INTRAVENOUS at 11:21

## 2020-09-28 RX ADMIN — PROPOFOL 170 MG: 10 INJECTION, EMULSION INTRAVENOUS at 11:48

## 2020-09-28 RX ADMIN — LIDOCAINE HYDROCHLORIDE 100 MG: 20 INJECTION, SOLUTION INTRAVENOUS at 11:48

## 2020-09-28 NOTE — ANESTHESIA POSTPROCEDURE EVALUATION
Patient: Rayo Siddiqi    Procedure Summary     Date: 09/28/20 Room / Location: [facility] ENDOSCOPY 5 / [facility] ENDOS[facility]    Anesthesia Start: 1146 Anesthesia Stop: 1201    Procedure: COLONOSCOPY WITH ANESTHESIA (N/A ) Diagnosis:       Crohn's disease of small intestine without complication (CMS/HCC)      (Crohn's disease of small intestine without complication (CMS/HCC) [K50.00])    Surgeon: Manuelito Schroeder DO Provider: Theresa Blake CRNA    Anesthesia Type: MAC ASA Status: 3          Anesthesia Type: MAC    Vitals  Vitals Value Taken Time   /67 09/28/20 1215   Temp     Pulse 63 09/28/20 1216   Resp 18 09/28/20 1215   SpO2 95 % 09/28/20 1216   Vitals shown include unvalidated device data.        Post Anesthesia Care and Evaluation    Patient location during evaluation: PHASE II  Level of consciousness: awake and alert  Pain management: adequate  Airway patency: patent  Anesthetic complications: No anesthetic complications  PONV Status: none  Cardiovascular status: acceptable  Respiratory status: acceptable  Hydration status: acceptable  No anesthesia care post op

## 2020-09-28 NOTE — ANESTHESIA PREPROCEDURE EVALUATION
Anesthesia Evaluation     history of anesthetic complications: PONV  NPO Solid Status: > 8 hours  NPO Liquid Status: > 2 hours           Airway   Mallampati: I  TM distance: >3 FB  Neck ROM: full  Dental      Pulmonary    (-) sleep apnea, not a smoker  Cardiovascular   Exercise tolerance: poor (<4 METS)    (+) hypertension, DVT resolved,     ROS comment: Echo 9/2019  Summary   Mild mitral regurgitation is present.   Mitral valve leaflets are mildly thickened with preserved leaflet   mobility.   The aortic valve is trileaflet, moderately thickened & calcified with   moderately reduced leaflet separation.   Moderate aortic stenosis; no evidence of aortic regurgitation.   Tricuspid valve is structurally normal.   Mild tricuspid regurgitation; estimated RVSP of at least 32mmHg.   Mildly dilated left atrium.   Normal left ventricular size with preserved LV function and an estimated   ejection fraction of approximately 55-60%.   Mildly dilated right atrium.    Neuro/Psych  (+) headaches,     (-) seizures, TIA, CVA  GI/Hepatic/Renal/Endo    (-) liver disease, no renal disease, diabetes    ROS Comment: crohns disease    Musculoskeletal     Abdominal    Substance History      OB/GYN          Other                        Anesthesia Plan    ASA 3     MAC     intravenous induction     Anesthetic plan, all risks, benefits, and alternatives have been provided, discussed and informed consent has been obtained with: patient.

## 2020-09-29 ENCOUNTER — TELEPHONE (OUTPATIENT)
Dept: GASTROENTEROLOGY | Facility: CLINIC | Age: 85
End: 2020-09-29

## 2020-10-07 ENCOUNTER — NURSE ONLY (OUTPATIENT)
Dept: INTERNAL MEDICINE | Age: 85
End: 2020-10-07
Payer: MEDICARE

## 2020-10-07 LAB — INTERNATIONAL NORMALIZATION RATIO, POC: 2.3

## 2020-10-07 PROCEDURE — 85610 PROTHROMBIN TIME: CPT | Performed by: STUDENT IN AN ORGANIZED HEALTH CARE EDUCATION/TRAINING PROGRAM

## 2020-10-07 PROCEDURE — 93793 ANTICOAG MGMT PT WARFARIN: CPT | Performed by: STUDENT IN AN ORGANIZED HEALTH CARE EDUCATION/TRAINING PROGRAM

## 2020-10-07 NOTE — PROGRESS NOTES
Mr. Herbie Montilla was here today. INR today:   Results for orders placed or performed in visit on 10/07/20   POCT INR   Result Value Ref Range    INR 2.3      INR Goal: 2.0-3.0    Dosing Plan  As of 10/7/2020    TTR:   74.2 % (3.3 y)   Full warfarin instructions:   3.75 mg every Wed; 7.5 mg all other days            PLAN: CONTINUE CURRENT DOSE  NEXT COUMADIN CLINIC APT IS: 11/5/2020 at 1:15pm        IF IT'S AN EMERGENCY, PLEASE CALL 911 OR GO TO YOUR NEAREST EMERGENCY ROOM. [facility] INTERNAL MEDICINE COUMADIN CLINIC [phone]  IF UNABLE TO REACH COUMADIN CLINIC, [address] [place] [address] Rd, [phone].

## 2020-10-28 ENCOUNTER — OFFICE VISIT (OUTPATIENT)
Dept: CARDIOLOGY | Age: 85
End: 2020-10-28
Payer: MEDICARE

## 2020-10-28 VITALS
DIASTOLIC BLOOD PRESSURE: 80 MMHG | SYSTOLIC BLOOD PRESSURE: 132 MMHG | HEART RATE: 64 BPM | BODY MASS INDEX: 40.34 KG/M2 | WEIGHT: 257 LBS | HEIGHT: 67 IN

## 2020-10-28 PROBLEM — Z86.718 HISTORY OF DVT (DEEP VEIN THROMBOSIS): Status: ACTIVE | Noted: 2020-10-28

## 2020-10-28 PROBLEM — I35.0 MODERATE AORTIC STENOSIS: Status: ACTIVE | Noted: 2020-10-28

## 2020-10-28 PROBLEM — R09.89 BILATERAL CAROTID BRUITS: Status: ACTIVE | Noted: 2020-10-28

## 2020-10-28 PROBLEM — R42 DIZZINESS: Status: ACTIVE | Noted: 2020-10-28

## 2020-10-28 PROCEDURE — 1036F TOBACCO NON-USER: CPT | Performed by: NURSE PRACTITIONER

## 2020-10-28 PROCEDURE — 4040F PNEUMOC VAC/ADMIN/RCVD: CPT | Performed by: NURSE PRACTITIONER

## 2020-10-28 PROCEDURE — G8417 CALC BMI ABV UP PARAM F/U: HCPCS | Performed by: NURSE PRACTITIONER

## 2020-10-28 PROCEDURE — G8484 FLU IMMUNIZE NO ADMIN: HCPCS | Performed by: NURSE PRACTITIONER

## 2020-10-28 PROCEDURE — G8427 DOCREV CUR MEDS BY ELIG CLIN: HCPCS | Performed by: NURSE PRACTITIONER

## 2020-10-28 PROCEDURE — 99214 OFFICE O/P EST MOD 30 MIN: CPT | Performed by: NURSE PRACTITIONER

## 2020-10-28 PROCEDURE — 1123F ACP DISCUSS/DSCN MKR DOCD: CPT | Performed by: NURSE PRACTITIONER

## 2020-10-28 RX ORDER — LANSOPRAZOLE 15 MG/1
15 CAPSULE, DELAYED RELEASE ORAL DAILY
COMMUNITY
End: 2021-04-27 | Stop reason: ALTCHOICE

## 2020-10-28 NOTE — PATIENT INSTRUCTIONS
New instructions for today:  If your legs start to swell more, you can double the HCTZ for 3-4 days. Echocardiogram -  No prep. [place] at the [facility] and [address] located on the first floor of [place] [address] through hospital main entrance and turn immediately to your left. Date/Time:   Takes approximately 30 min. An echocardiogram uses sound waves to produce images of your heart. This commonly used test allows your doctor to see how your heart is beating and pumping blood. Your doctor can use the images from an echocardiogram to identify various abnormalities in the heart muscle and valves. This test has 2 parts:   Ø You will be asked to disrobe from the waist up and given a gown to wear. The technologist will then hook up an EKG monitor to you for the entire exam.   Ø You will then have an ultrasound of your heart (echocardiogram) to assess the heart muscle, heart valves and heart function. You may eat and take any medicines before the exam.     If you need to change your appointment, please call outpatient scheduling at [phone]. Carotid Ultrasound   -  No prep. Takes approximately 30 minutes. Carotid ultrasound is a safe, painless procedure that uses sound waves to examine the structure and function of the carotid arteries in the neck. You have two carotid arteries, one on each side of the neck, which deliver blood from the heart to the brain. Carotid ultrasound can reveal whether an artery has any blockage and how well blood flows through the artery. Carotid ultrasound is usually used to screen for blockages that indicate an increased risk of stroke. Results from a carotid ultrasound can help your doctor determine what kind of treatment you may need to lower your risk. If you need to change your appointment, please call outpatient scheduling at [phone]. Patient Instructions:  Continue current medications as prescribed. Always keep a current medication list. Bring your medications to every office visit. Continue to follow up with primary care provider for non cardiac medical problems. Call the office with any problems, questions or concerns at [phone]. If you have been asked to keep a blood pressure log, do so for 2 weeks. Call the office to report readings to the triage nurse at [phone]. Follow up with cardiologist as scheduled. The following educational material has been included in this after visit summary for your review: Life simple 7. Heart health. Leg edema. Life simple 7  1) Manage blood pressure - high blood pressure is a major risk factor for heart disease and stroke. Keeping blood pressure in health range reduces strain on your heart, arteries and kidneys. Blood pressure goal is less than 130/80. 2) Control cholesterol - contributes to plaque, which can clog arteries and lead to heart disease and stroke. When you control your cholesterol you are giving your arteries their best chance to remain clear. It is recommended that you get cholesterol lab work done once a year. 3) Reduce blood sugar - most of the food we eat is turning into glucose or blood sugar that our body uses for energy. Over time, high levels of blood sugar can damage your heart, kidneys, eyes and nerves. 4) Get active - living an active life is one of the most rewarding gifts you can give yourself and those you love. Simply put, daily physical activity increases your length and quality of life. Strive to exercise 15 minutes most days of the week. 5)  Eat better - A healthy diet is one of your best weapons for fighting cardiovascular disease. When you eat a heart healthy diet, you improve your chances for feeling good and staying healthy for life. 6)  Lose weight - when you shed extra fat an unnecessary pounds, you reduce the burden on your hear, lungs, blood vessels and skeleton.   You give yourself the gift of active living, you lower your blood pressure and help yourself feel better. 7) Stop smoking - cigarette smokers have a higher risk of developing cardiovascular disease. If  You smoke, quitting is the best thing you can do for your health. Check American Heart Association on line for more information on Life's Simple 7 and tips for healthy living. A Healthy Heart: Care Instructions  Your Care Instructions     Coronary artery disease, also called heart disease, occurs when a substance called plaque builds up in the vessels that supply oxygen-rich blood to your heart muscle. This can narrow the blood vessels and reduce blood flow. A heart attack happens when blood flow is completely blocked. A high-fat diet, smoking, and other factors increase the risk of heart disease. Your doctor has found that you have a chance of having heart disease. You can do lots of things to keep your heart healthy. It may not be easy, but you can change your diet, exercise more, and quit smoking. These steps really work to lower your chance of heart disease. Follow-up care is a key part of your treatment and safety. Be sure to make and go to all appointments, and call your doctor if you are having problems. It's also a good idea to know your test results and keep a list of the medicines you take. How can you care for yourself at home? Diet  · Use less salt when you cook and eat. This helps lower your blood pressure. Taste food before salting. Add only a little salt when you think you need it. With time, your taste buds will adjust to less salt. · Eat fewer snack items, fast foods, canned soups, and other high-salt, high-fat, processed foods. · Read food labels and try to avoid saturated and trans fats. They increase your risk of heart disease by raising cholesterol levels. · Limit the amount of solid fat-butter, margarine, and shortening-you eat. Use olive, peanut, or canola oil when you cook.  Bake, broil, and steam foods instead of frying them. · Eat a variety of fruit and vegetables every day. Dark green, deep orange, red, or yellow fruits and vegetables are especially good for you. Examples include spinach, carrots, peaches, and berries. · Foods high in fiber can reduce your cholesterol and provide important vitamins and minerals. High-fiber foods include whole-grain cereals and breads, oatmeal, beans, brown rice, citrus fruits, and apples. · Eat lean proteins. Heart-healthy proteins include seafood, lean meats and poultry, eggs, beans, peas, nuts, seeds, and soy products. · Limit drinks and foods with added sugar. These include candy, desserts, and soda pop. Lifestyle changes  · If your doctor recommends it, get more exercise. Walking is a good choice. Bit by bit, increase the amount you walk every day. Try for at least 30 minutes on most days of the week. You also may want to swim, bike, or do other activities. · Do not smoke. If you need help quitting, talk to your doctor about stop-smoking programs and medicines. These can increase your chances of quitting for good. Quitting smoking may be the most important step you can take to protect your heart. It is never too late to quit. · Limit alcohol to 2 drinks a day for men and 1 drink a day for women. Too much alcohol can cause health problems. · Manage other health problems such as diabetes, high blood pressure, and high cholesterol. If you think you may have a problem with alcohol or drug use, talk to your doctor. Medicines  · Take your medicines exactly as prescribed. Call your doctor if you think you are having a problem with your medicine. · If your doctor recommends aspirin, take the amount directed each day. Make sure you take aspirin and not another kind of pain reliever, such as acetaminophen (Tylenol). When should you call for help? [id number] if you have symptoms of a heart attack.  These may include:  · Chest pain or pressure, or a strange feeling in the chest.  · Sweating. · Shortness of breath. · Pain, pressure, or a strange feeling in the back, neck, jaw, or upper belly or in one or both shoulders or arms. · Lightheadedness or sudden weakness. · A fast or irregular heartbeat. After you call 911, the  may tell you to chew 1 adult-strength or 2 to 4 low-dose aspirin. Wait for an ambulance. Do not try to drive yourself. Watch closely for changes in your health, and be sure to contact your doctor if you have any problems. Where can you learn more? Go to https://StageitpeWrightspeedeb.[employer]. org and sign in to your Zill[employer]te account. Enter P742 in the Eventifier box to learn more about \"A Healthy Heart: Care Instructions. \"     If you do not have an account, please click on the \"Sign Up Now\" link. Current as of: December 16, 2019               Content Version: 12.5  © 2006-2020 [employer]. Care instructions adapted under license by [facility] [employer] [facility] ([facility]). If you have questions about a medical condition or this instruction, always ask your healthcare professional. [place] [address] any warranty or liability for your use of this information. Patient Education        Leg and Ankle Edema: Care Instructions  Your Care Instructions  Swelling in the legs, ankles, and feet is called edema. It is common after you sit or stand for a while. Long plane flights or car rides often cause swelling in the legs and feet. You may also have swelling if you have to stand for long periods of time at your job. Problems with the veins in the legs (varicose veins) and changes in hormones can also cause swelling. Sometimes the swelling in the ankles and feet is caused by a more serious problem, such as heart failure, infection, blood clots, or liver or kidney disease. Follow-up care is a key part of your treatment and safety. Be sure to make and go to all appointments, and call your doctor if you are having problems.  It's also a good idea to know your test results and keep a list of the medicines you take. How can you care for yourself at home? · If your doctor gave you medicine, take it as prescribed. Call your doctor if you think you are having a problem with your medicine. · Whenever you are resting, raise your legs up. Try to keep the swollen area higher than the level of your heart. · Take breaks from standing or sitting in one position. ? Walk around to increase the blood flow in your lower legs. ? Move your feet and ankles often while you stand, or tighten and relax your leg muscles. · Wear support stockings. Put them on in the morning, before swelling gets worse. · Eat a balanced diet. Lose weight if you need to. · Limit the amount of salt (sodium) in your diet. Salt holds fluid in the body and may increase swelling. When should you call for help? Call 911 anytime you think you may need emergency care. For example, call if:    · You have symptoms of a blood clot in your lung (called a pulmonary embolism). These may include:  ? Sudden chest pain. ? Trouble breathing. ? Coughing up blood. Call your doctor now or seek immediate medical care if:    · You have signs of a blood clot, such as:  ? Pain in your calf, back of the knee, thigh, or groin. ? Redness and swelling in your leg or groin.     · You have symptoms of infection, such as:  ? Increased pain, swelling, warmth, or redness. ? Red streaks or pus. ? A fever. Watch closely for changes in your health, and be sure to contact your doctor if:    · Your swelling is getting worse.     · You have new or worsening pain in your legs.     · You do not get better as expected. Where can you learn more? Go to https://Zero LocuspeCompology.[employer]. org and sign in to your FP Complete account. Enter K206 in the QingCloud box to learn more about \"Leg and Ankle Edema: Care Instructions. \"     If you do not have an account, please click on the \"Sign Up Now\" link.  Current as of: June 26, 2019               Content Version: 12.6  © 6353-0801 [employer]. Care instructions adapted under license by [facility] ([facility]). If you have questions about a medical condition or this instruction, always ask your healthcare professional. [address] any warranty or liability for your use of this information.

## 2020-10-28 NOTE — PROGRESS NOTES
[facility], [place]. [address], [facility] [address] [facility]  [phone] office  [phone] fax      OFFICE VISIT:  10/28/2020    June Ramirez - : 1935  Reason For Visit:  Dedra Cheung is a 82 y.o. male who is here for 6 Month Follow-Up (patient complains of edema off and on, but no other symptoms.) and Hypertension    History:   Diagnosis Orders   1. Essential hypertension  VL DUP CAROTID BILATERAL   2. Diastolic congestive heart failure with preserved left ventricular function, NYHA class 2 ([address]     3. Mixed hyperlipidemia     4. Chronic anticoagulation     5. Moderate aortic stenosis  ECHO Complete 2D W Doppler W Color   6. Bilateral carotid bruits  ECHO Complete 2D W Doppler W Color    VL DUP CAROTID BILATERAL   7. Dizziness  VL DUP CAROTID BILATERAL   8. History of DVT (deep vein thrombosis)       The patient presents today for cardiology follow up. PCP follows coumadin for hx DVT of RLE diagnosed 12 years ago. The patient has stable lower extremity edema R>L. He wears compression socks at times and elevates legs. The patient limits sodium intake. The patient denies symptoms to suggest myocardial ischemia, diastolic heart failure exacerbation or arrhythmia. BP is well controlled on current regimen. The patient's PCP monitors cholesterol. Subjective  Dedra Cheung denies exertional chest pain, shortness of breath, orthopnea, paroxysmal nocturnal dyspnea, syncope, presyncope, sensed arrhythmia and fatigue. The patient denies numbness or weakness to suggest cerebrovascular accident or transient ischemic attack. + chronic but stable lower extremity edema R>L.     June Ramirez has the following history as recorded in [facility]:  Patient Active Problem List   Diagnosis Code    Chronic anticoagulation Z79.01    Essential hypertension I10    Crohn's disease of large intestine with complication ([address] G80.169    Mixed hyperlipidemia E78.2    Deep venous insufficiency I87.2    Primary osteoarthritis involving multiple joints M89.49    Gastroesophageal reflux disease without esophagitis K21.9    Aortic systolic murmur on examination - suspect aortic stenosis I35.8    Nonrheumatic aortic valve stenosis I35.0    Chronic kidney disease, stage III (moderate) N18.30    S/P partial resection of colon Z90.49    Intestinal adhesions with partial obstruction ([facility]) R58.90    Diastolic congestive heart failure with preserved left ventricular function, NYHA class 2 ([facility]) I50.30    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 choroidal neovascularization ([facility]) H35.3221    Morbid obesity with BMI of 40.0-44.9, adult ([facility]) E66.01, Z68.41     Past Medical History:   Diagnosis Date    Cholelithiasis     CKD (chronic kidney disease) stage 3, GFR 30-59 ml/min 11/13/2018    Crohn's disease of large intestine with complication ([facility] 42/4/2981    Deep venous insufficiency 11/7/2017    Essential hypertension 11/7/2017    Gastroesophageal reflux disease without esophagitis 11/7/2017    Heart murmur     Mixed hyperlipidemia 11/7/2017    Nonrheumatic aortic valve stenosis 5/19/2018    Primary osteoarthritis involving multiple joints 11/7/2017    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[facility] 2002     Past Surgical History:   Procedure Laterality Date    CATARACT REMOVAL WITH IMPLANT  2018    OTHER SURGICAL HISTORY      Fistula-in-ano repair    SKIN CANCER EXCISION      SUBTOTAL COLECTOMY      TONSILLECTOMY      TOTAL HIP ARTHROPLASTY       Family History   Problem Relation Age of Onset    Stroke Other     Other Other         atherosclerosis of extremities     Social History     Tobacco Use    Smoking status: Never Smoker    Smokeless tobacco: Never Used   Substance Use Topics    Alcohol use: No      Current Outpatient Medications   Medication Sig Dispense Refill    VITAMIN E COMPLEX PO Take 5,000 Units by mouth daily      lansoprazole (PREVACID) 15 MG delayed release capsule Take 15 mg by mouth daily      mesalamine (PENTASA) 250 MG extended release capsule TAKE THREE CAPSULES BY MOUTH FOUR TIMES DAILY 1080 capsule 1    hydroCHLOROthiazide (HYDRODIURIL) 12.5 MG tablet TAKE ONE TABLET BY MOUTH DAILY 90 tablet 1    losartan (COZAAR) 50 MG tablet TAKE ONE TABLET BY MOUTH DAILY 90 tablet 0    warfarin (COUMADIN) 7.5 MG tablet TAKE ONE TABLET BY MOUTH DAILY EXCEPT ON WEDNESDAY TAKE ONE-HALF TABLET 90 tablet 3    cyanocobalamin 1000 MCG/ML injection monthly 10 mL 0    fluticasone (FLONASE) 50 MCG/ACT nasal spray 1 spray by Each Nostril route daily (Patient taking differently: 1 spray by Each Nostril route as needed ) 2 Bottle 1    cetirizine (ZYRTEC) 10 MG tablet Take 1 tablet by mouth daily 90 tablet 3    Cholecalciferol (VITAMIN D3) 5000 units TABS Take by mouth      B Complex-C-E-Zn (STRESS FORMULA/ZINC PO) Take by mouth       No current facility-administered medications for this visit. Allergies: Patient has no known allergies. Review of Systems  Constitutional - no appetite change, or unexpected weight change. No fever, chills or diaphoresis. No significant change in activity level or new onset of fatigue. HEENT - no significant rhinorrhea or epistaxis. No tinnitus or significant hearing loss. Eyes - no sudden vision change or amaurosis. No corneal arcus, xantholasma, subconjunctival hemorrhage or discharge. Respiratory - no significant wheezing, stridor, apnea or cough. No dyspnea on exertion or shortness of air. Cardiovascular - no exertional chest pain to suggest myocardial ischemia. No orthopnea or PND. No sensation of sustained arrythmia. No occurrence of slow heart rate. No palpitations. No claudication. Gastrointestinal - no abdominal swelling or pain. No blood in stool. No severe constipation, diarrhea, nausea, or vomiting. Genitourinary - no dysuria, frequency, or urgency. No flank pain or hematuria. Musculoskeletal - no back pain or myalgia. No problems with gait. Extremities - no clubbing or cyanosis. + chronic but stable lower extremity edema R>L. Skin - no color change or rash. No pallor. No new surgical incision. Neurologic - no speech difficulty, facial asymmetry or lateralizing weakness. No seizures, presyncope or syncope. No significant dizziness. Hematologic - no easy bruising or excessive bleeding. Psychiatric - no severe anxiety or insomnia. No confusion. All other review of systems are negative. Objective  Vital Signs - /80   Pulse 64   Ht 5' 7\" (1.702 m)   Wt 257 lb (116.6 kg)   BMI 40.25 kg/m²   General - Ursula Duarte is alert, cooperative, and pleasant. Well groomed. No acute distress. Body habitus - Body mass index is 40.25 kg/m². HEENT - Head is normocephalic. No circumoral cyanosis. Dentition is normal.  EYES -   Lids normal without ptosis. No discharge, edema or subconjunctival hemorrhage. Neck - Symmetrical without apparent mass or lymphadenopathy. Respiratory - Normal respiratory effort without use of accessory muscles. Ausculatation reveals vesicular breath sounds without crackles, wheezes, rub or rhonchi. Cardiovascular - No jugular venous distention. Auscultation reveals regular rate and rhythm. No audible clicks, gallop or rub.  2-3/6 murmur radiates to both carotids. No lower extremity varicosities. Abdominal -  No visible distention, mass or pulsations. Extremities - No clubbing or cyanosis. No statis dermatitis or ulcers. 1+ pitting bilateral lower extremity edema R>L. Hx of DVT right leg. Musculoskeletal -   No Osler's nodes. No kyphosis or scoliosis. Gait is even and regular without limp or shuffle. Ambulates without assistance. Skin -  Warm and dry; no rash or pallor. No new surgical wound. Neurological - No focal neurological deficits. Thought processes coherent. No apparent tremor. Oriented to person, place and time. Psychiatric -  Appropriate affect and mood. Assessment:     Diagnosis Orders   1. Essential hypertension  VL DUP CAROTID BILATERAL   2. Diastolic congestive heart failure with preserved left ventricular function, NYHA class 2 ([address]     3. Mixed hyperlipidemia     4. Chronic anticoagulation     5. Moderate aortic stenosis  ECHO Complete 2D W Doppler W Color   6. Bilateral carotid bruits  ECHO Complete 2D W Doppler W Color    VL DUP CAROTID BILATERAL   7. Dizziness  VL DUP CAROTID BILATERAL   8. History of DVT (deep vein thrombosis)       Data reviewed:  9/6/19 echo   Conclusions    Summary   Mild mitral regurgitation is present. Mitral valve leaflets are mildly thickened with preserved leaflet   mobility. The aortic valve is trileaflet, moderately thickened & calcified with   moderately reduced leaflet separation. Moderate aortic stenosis; no evidence of aortic regurgitation. Tricuspid valve is structurally normal.   Mild tricuspid regurgitation; estimated RVSP of at least 32mmHg. Mildly dilated left atrium. Normal left ventricular size with preserved LV function and an estimated   ejection fraction of approximately 55-60%. Mildly dilated right atrium.     Signature    ----------------------------------------------------------------   Electronically signed by Samir Hudson MD(Interpreting   physician) on 09/06/2019 04:35 PM   ----------------------------------------------------------------   AV Velocity:0.53 cm/s                MV Peak E-Wave: 73.5 cm/s   AV Peak Gradient: 38.44 mmHg         MV Peak A-Wave: 128 cm/s   AV Mean Gradient: 24 mmHg            MV E/A Ratio: 0.57 %   AV Area (Continuity):0.53 cm^2       MV Peak Gradient: 2.16 mmHg    Lab Results   Component Value Date    WBC 5.8 11/13/2019    HGB 14.6 11/13/2019    HCT 43.3 11/13/2019    MCV 98.6 (H) 11/13/2019     11/13/2019     Lab Results   Component Value Date     11/13/2019     07/04/2011    K 4.4 11/13/2019    K problems. Call the office with any problems, questions or concerns at [phone]. Cardiology follow up: 6 months Dr. Betzy Ivy. Educational included in patient instructions. Heart health. Leg edema.      Vadim Miller, APRN

## 2020-11-05 ENCOUNTER — ANTI-COAG VISIT (OUTPATIENT)
Dept: PRIMARY CARE CLINIC | Age: 85
End: 2020-11-05
Payer: MEDICARE

## 2020-11-05 LAB — INTERNATIONAL NORMALIZATION RATIO, POC: 2.3

## 2020-11-05 PROCEDURE — 93793 ANTICOAG MGMT PT WARFARIN: CPT | Performed by: FAMILY MEDICINE

## 2020-11-05 PROCEDURE — 85610 PROTHROMBIN TIME: CPT | Performed by: FAMILY MEDICINE

## 2020-11-05 NOTE — PROGRESS NOTES
Mr. Matheus Obando was here today. INR today:   Results for orders placed or performed in visit on 11/05/20   POCT INR   Result Value Ref Range    INR 2.3      INR Goal: 2.0-3.0    Dosing Plan  As of 11/5/2020    TTR:   74.8 % (3.4 y)   Full warfarin instructions:   3.75 mg every Wed; 7.5 mg all other days            PLAN: CONTINUE CURRENT DOSE  NEXT COUMADIN CLINIC APT IS: 12/3/2020 @1:30    Coumadin Clinic Hours  Tuesday 7:30am - 4:00pm  Wednesday 7:30am - 4:00pm  Thursday 7:30am - 4:00pm    IF IT'S AN EMERGENCY, PLEASE CALL 911 OR GO TO YOUR NEAREST EMERGENCY ROOM. [facility] COUMADIN CLINIC [phone]  IF UNABLE TO REACH COUMADIN CLINIC, [address] [place] [address] Rd, [phone].

## 2020-11-10 ENCOUNTER — HOSPITAL ENCOUNTER (OUTPATIENT)
Dept: NON INVASIVE DIAGNOSTICS | Age: 85
End: 2020-11-10
Payer: MEDICARE

## 2020-11-10 ENCOUNTER — HOSPITAL ENCOUNTER (OUTPATIENT)
Dept: NON INVASIVE DIAGNOSTICS | Age: 85
Discharge: HOME | End: 2020-11-10
Payer: MEDICARE

## 2020-11-10 LAB
LV EF: 58 %
LVEF MODALITY: NORMAL

## 2020-11-10 PROCEDURE — 93306 TTE W/DOPPLER COMPLETE: CPT

## 2020-11-11 ENCOUNTER — HOSPITAL ENCOUNTER (OUTPATIENT)
Dept: VASCULAR LAB | Age: 85
Discharge: HOME | End: 2020-11-11
Payer: MEDICARE

## 2020-11-11 DIAGNOSIS — N18.30 CHRONIC KIDNEY DISEASE, STAGE III (MODERATE) (HCC): ICD-10-CM

## 2020-11-11 DIAGNOSIS — I10 ESSENTIAL HYPERTENSION: ICD-10-CM

## 2020-11-11 LAB
ALBUMIN SERPL-MCNC: 4.2 G/DL (ref 3.5–5.2)
ALP BLD-CCNC: 98 U/L (ref 40–130)
ALT SERPL-CCNC: 17 U/L (ref 5–41)
ANION GAP SERPL CALCULATED.3IONS-SCNC: 13 MMOL/L (ref 7–19)
AST SERPL-CCNC: 24 U/L (ref 5–40)
BASOPHILS ABSOLUTE: 0.1 K/UL (ref 0–0.2)
BASOPHILS RELATIVE PERCENT: 0.9 % (ref 0–1)
BILIRUB SERPL-MCNC: 0.4 MG/DL (ref 0.2–1.2)
BUN BLDV-MCNC: 20 MG/DL (ref 8–23)
CALCIUM SERPL-MCNC: 9.4 MG/DL (ref 8.8–10.2)
CHLORIDE BLD-SCNC: 106 MMOL/L (ref 98–111)
CHOLESTEROL, TOTAL: 168 MG/DL (ref 160–199)
CO2: 21 MMOL/L (ref 22–29)
CREAT SERPL-MCNC: 1.2 MG/DL (ref 0.5–1.2)
EOSINOPHILS ABSOLUTE: 0.2 K/UL (ref 0–0.6)
EOSINOPHILS RELATIVE PERCENT: 2.5 % (ref 0–5)
GFR AFRICAN AMERICAN: >59
GFR NON-AFRICAN AMERICAN: 58
GLUCOSE BLD-MCNC: 96 MG/DL (ref 74–109)
HCT VFR BLD CALC: 42 % (ref 42–52)
HDLC SERPL-MCNC: 56 MG/DL (ref 55–121)
HEMOGLOBIN: 13.9 G/DL (ref 14–18)
IMMATURE GRANULOCYTES #: 0 K/UL
LDL CHOLESTEROL CALCULATED: 84 MG/DL
LYMPHOCYTES ABSOLUTE: 2 K/UL (ref 1.1–4.5)
LYMPHOCYTES RELATIVE PERCENT: 31 % (ref 20–40)
MCH RBC QN AUTO: 32 PG (ref 27–31)
MCHC RBC AUTO-ENTMCNC: 33.1 G/DL (ref 33–37)
MCV RBC AUTO: 96.6 FL (ref 80–94)
MONOCYTES ABSOLUTE: 0.4 K/UL (ref 0–0.9)
MONOCYTES RELATIVE PERCENT: 6.4 % (ref 0–10)
NEUTROPHILS ABSOLUTE: 3.8 K/UL (ref 1.5–7.5)
NEUTROPHILS RELATIVE PERCENT: 58.9 % (ref 50–65)
PDW BLD-RTO: 13.3 % (ref 11.5–14.5)
PLATELET # BLD: 170 K/UL (ref 130–400)
PMV BLD AUTO: 10.1 FL (ref 9.4–12.4)
POTASSIUM SERPL-SCNC: 4.3 MMOL/L (ref 3.5–5)
RBC # BLD: 4.35 M/UL (ref 4.7–6.1)
SODIUM BLD-SCNC: 140 MMOL/L (ref 136–145)
TOTAL PROTEIN: 6.9 G/DL (ref 6.6–8.7)
TRIGL SERPL-MCNC: 140 MG/DL (ref 0–149)
TSH SERPL DL<=0.05 MIU/L-ACNC: 3.58 UIU/ML (ref 0.27–4.2)
WBC # BLD: 6.4 K/UL (ref 4.8–10.8)

## 2020-11-11 PROCEDURE — 93880 EXTRACRANIAL BILAT STUDY: CPT

## 2020-11-18 ENCOUNTER — OFFICE VISIT (OUTPATIENT)
Dept: PRIMARY CARE CLINIC | Age: 85
End: 2020-11-18
Payer: MEDICARE

## 2020-11-18 VITALS
HEIGHT: 67 IN | BODY MASS INDEX: 39.71 KG/M2 | WEIGHT: 253 LBS | SYSTOLIC BLOOD PRESSURE: 122 MMHG | DIASTOLIC BLOOD PRESSURE: 74 MMHG | TEMPERATURE: 98.2 F | OXYGEN SATURATION: 98 % | HEART RATE: 68 BPM

## 2020-11-18 PROCEDURE — G0439 PPPS, SUBSEQ VISIT: HCPCS | Performed by: FAMILY MEDICINE

## 2020-11-18 PROCEDURE — G8482 FLU IMMUNIZE ORDER/ADMIN: HCPCS | Performed by: FAMILY MEDICINE

## 2020-11-18 PROCEDURE — 1123F ACP DISCUSS/DSCN MKR DOCD: CPT | Performed by: FAMILY MEDICINE

## 2020-11-18 PROCEDURE — 4040F PNEUMOC VAC/ADMIN/RCVD: CPT | Performed by: FAMILY MEDICINE

## 2020-11-18 RX ORDER — FUROSEMIDE 20 MG/1
TABLET ORAL
Qty: 90 TABLET | Refills: 1 | Status: ON HOLD | OUTPATIENT
Start: 2020-11-18 | End: 2021-01-22 | Stop reason: HOSPADM

## 2020-11-18 ASSESSMENT — PATIENT HEALTH QUESTIONNAIRE - PHQ9
SUM OF ALL RESPONSES TO PHQ QUESTIONS 1-9: 0
SUM OF ALL RESPONSES TO PHQ9 QUESTIONS 1 & 2: 0
1. LITTLE INTEREST OR PLEASURE IN DOING THINGS: 0
2. FEELING DOWN, DEPRESSED OR HOPELESS: 0

## 2020-11-18 ASSESSMENT — LIFESTYLE VARIABLES: HOW OFTEN DO YOU HAVE A DRINK CONTAINING ALCOHOL: 0

## 2020-11-24 ENCOUNTER — OFFICE VISIT (OUTPATIENT)
Dept: CARDIOLOGY | Age: 85
End: 2020-11-24
Payer: MEDICARE

## 2020-11-24 VITALS
DIASTOLIC BLOOD PRESSURE: 80 MMHG | WEIGHT: 240 LBS | HEIGHT: 67 IN | SYSTOLIC BLOOD PRESSURE: 136 MMHG | BODY MASS INDEX: 37.67 KG/M2 | HEART RATE: 74 BPM

## 2020-11-24 PROCEDURE — 1123F ACP DISCUSS/DSCN MKR DOCD: CPT | Performed by: INTERNAL MEDICINE

## 2020-11-24 PROCEDURE — 4040F PNEUMOC VAC/ADMIN/RCVD: CPT | Performed by: INTERNAL MEDICINE

## 2020-11-24 PROCEDURE — G8417 CALC BMI ABV UP PARAM F/U: HCPCS | Performed by: INTERNAL MEDICINE

## 2020-11-24 PROCEDURE — 1036F TOBACCO NON-USER: CPT | Performed by: INTERNAL MEDICINE

## 2020-11-24 PROCEDURE — G8428 CUR MEDS NOT DOCUMENT: HCPCS | Performed by: INTERNAL MEDICINE

## 2020-11-24 PROCEDURE — G8482 FLU IMMUNIZE ORDER/ADMIN: HCPCS | Performed by: INTERNAL MEDICINE

## 2020-11-24 PROCEDURE — 99214 OFFICE O/P EST MOD 30 MIN: CPT | Performed by: INTERNAL MEDICINE

## 2020-11-24 NOTE — PROGRESS NOTES
[facility], [place].   [address], [facility] [address] [facility]  [phone] office  [phone] fax      OFFICE VISIT:  2020    Miley Laws - : 1935  Reason For Visit:  Meredith Cook is a 82 y.o. male who is here for referral regarding aortic valve stenosis and possible TAVR      History:  He is known patient in our practice and he was following with our nurse practitioner during months, he is known to have history of hypertension hyperlipidemia aortic stenosis, GERD, history of DVT and chronic kidney disease    The patient presents today for cardiology follow up.  + Occasional fatigue bilateral leg swelling which he takes Lasix to help with the symptoms    On follow-up echo regarding aortic stenosis, he underwent recent echo that showed his aortic stenosis has progressed to severe range with heavily calcified and restricted valve with mean gradient of 40 and peak gradient of 62 mmHg, based on that he was referred to me for evaluation regarding TAVR given his advanced age and frailty        Miley Laws has the following history as recorded in [facility]:  Patient Active Problem List   Diagnosis Code    Chronic anticoagulation Z79.01    Essential hypertension I10    Crohn's disease of large intestine with complication ([address] O30.501    Mixed hyperlipidemia E78.2    Deep venous insufficiency I87.2    Primary osteoarthritis involving multiple joints M89.49    Gastroesophageal reflux disease without esophagitis K21.9    Aortic systolic murmur on examination - suspect aortic stenosis I35.8    Nonrheumatic aortic valve stenosis I35.0    Chronic kidney disease, stage III (moderate) N18.30    S/P partial resection of colon Z90.49    Intestinal adhesions with partial obstruction ([address] V88.59    Diastolic congestive heart failure with preserved left ventricular function, NYHA class 2 ([facility]) I50.30    Exudative age-related macular degeneration TABLET BY MOUTH DAILY EXCEPT ON WEDNESDAY TAKE ONE-HALF TABLET 90 tablet 3    cyanocobalamin 1000 MCG/ML injection monthly 10 mL 0    fluticasone (FLONASE) 50 MCG/ACT nasal spray 1 spray by Each Nostril route daily (Patient taking differently: 1 spray by Each Nostril route as needed ) 2 Bottle 1    cetirizine (ZYRTEC) 10 MG tablet Take 1 tablet by mouth daily 90 tablet 3    Cholecalciferol (VITAMIN D3) 5000 units TABS Take by mouth      B Complex-C-E-Zn (STRESS FORMULA/ZINC PO) Take by mouth       No current facility-administered medications for this visit. Allergies: Patient has no known allergies. Review of Systems  Constitutional  no appetite change, or unexpected weight change. No fever, chills or diaphoresis. History of fatigue and leg swelling  HEENT  no significant rhinorrhea or epistaxis. No tinnitus or significant hearing loss. Eyes  no sudden vision change or amaurosis. No corneal arcus, xantholasma, subconjunctival hemorrhage or discharge. Respiratory  no significant wheezing, stridor, apnea or cough. No dyspnea on exertion or shortness of air. Cardiovascular  no exertional chest pain to suggest myocardial ischemia. No orthopnea or PND. No sensation of sustained arrythmia. No occurrence of slow heart rate. No palpitations. No claudication. Gastrointestinal  no abdominal swelling or pain. No blood in stool. No severe constipation, diarrhea, nausea, or vomiting. Genitourinary  no dysuria, frequency, or urgency. No flank pain or hematuria. Musculoskeletal  no back pain or myalgia. No problems with gait. Extremities - no clubbing or cyanosis. + chronic but stable lower extremity edema R>L. Skin  no color change or rash. No pallor. No new surgical incision. Neurologic  no speech difficulty, facial asymmetry or lateralizing weakness. No seizures, presyncope or syncope. No significant dizziness. Hematologic  no easy bruising or excessive bleeding. 11/11/2020    CREATININE 0.9 07/04/2011    GLUCOSE 96 11/11/2020    CALCIUM 9.4 11/11/2020      Lab Results   Component Value Date    CHOL 168 11/11/2020    CHOL 191 11/13/2019    CHOL 190 04/24/2019     Lab Results   Component Value Date    TRIG 140 11/11/2020    TRIG 177 (H) 11/13/2019    TRIG 195 (H) 04/24/2019     Lab Results   Component Value Date    HDL 56 11/11/2020    HDL 56 11/13/2019    HDL 54 (L) 04/24/2019     Lab Results   Component Value Date    LDLCALC 84 11/11/2020    LDLCALC 100 11/13/2019    LDLCALC 97 04/24/2019       BP Readings from Last 3 Encounters:   11/24/20 136/80   11/18/20 122/74   10/28/20 132/80    Pulse Readings from Last 3 Encounters:   11/24/20 74   11/18/20 68   10/28/20 64        Wt Readings from Last 3 Encounters:   11/24/20 240 lb (108.9 kg)   11/18/20 253 lb (114.8 kg)   10/28/20 257 lb (116.6 kg)       Recent Results (from the past 1008 hour(s))   POCT INR    Collection Time: 11/05/20  1:20 PM   Result Value Ref Range    INR 2.3    Lipid Panel    Collection Time: 11/11/20  9:40 AM   Result Value Ref Range    Cholesterol, Total 168 160 - 199 mg/dL    Triglycerides 140 0 - 149 mg/dL    HDL 56 55 - 121 mg/dL    LDL Calculated 84 <100 mg/dL   CBC Auto Differential    Collection Time: 11/11/20  9:40 AM   Result Value Ref Range    WBC 6.4 4.8 - 10.8 K/uL    RBC 4.35 (L) 4.70 - 6.10 M/uL    Hemoglobin 13.9 (L) 14.0 - 18.0 g/dL    Hematocrit 42.0 42.0 - 52.0 %    MCV 96.6 (H) 80.0 - 94.0 fL    MCH 32.0 (H) 27.0 - 31.0 pg    MCHC 33.1 33.0 - 37.0 g/dL    RDW 13.3 11.5 - 14.5 %    Platelets 872 769 - 323 K/uL    MPV 10.1 9.4 - 12.4 fL    Neutrophils % 58.9 50.0 - 65.0 %    Lymphocytes % 31.0 20.0 - 40.0 %    Monocytes % 6.4 0.0 - 10.0 %    Eosinophils % 2.5 0.0 - 5.0 %    Basophils % 0.9 0.0 - 1.0 %    Neutrophils Absolute 3.8 1.5 - 7.5 K/uL    Immature Granulocytes # 0.0 K/uL    Lymphocytes Absolute 2.0 1.1 - 4.5 K/uL    Monocytes Absolute 0.40 0.00 - 0.90 K/uL    Eosinophils Absolute 0. 20 0.00 - 0.60 K/uL    Basophils Absolute 0.10 0.00 - 0.20 K/uL   Comprehensive Metabolic Panel    Collection Time: 11/11/20  9:40 AM   Result Value Ref Range    Sodium 140 136 - 145 mmol/L    Potassium 4.3 3.5 - 5.0 mmol/L    Chloride 106 98 - 111 mmol/L    CO2 21 (L) 22 - 29 mmol/L    Anion Gap 13 7 - 19 mmol/L    Glucose 96 74 - 109 mg/dL    BUN 20 8 - 23 mg/dL    CREATININE 1.2 0.5 - 1.2 mg/dL    GFR Non-African American 58 (A) >60    GFR African American >59 >59    Calcium 9.4 8.8 - 10.2 mg/dL    Total Protein 6.9 6.6 - 8.7 g/dL    Alb 4.2 3.5 - 5.2 g/dL    Total Bilirubin 0.4 0.2 - 1.2 mg/dL    Alkaline Phosphatase 98 40 - 130 U/L    ALT 17 5 - 41 U/L    AST 24 5 - 40 U/L   TSH without Reflex    Collection Time: 11/11/20  9:40 AM   Result Value Ref Range    TSH 3.580 0.270 - 4.200 uIU/mL       Data reviewed:  9/6/19 echo   Conclusions    Summary   Mild mitral regurgitation is present. Mitral valve leaflets are mildly thickened with preserved leaflet   mobility. The aortic valve is trileaflet, moderately thickened & calcified with   moderately reduced leaflet separation. Moderate aortic stenosis; no evidence of aortic regurgitation. Tricuspid valve is structurally normal.   Mild tricuspid regurgitation; estimated RVSP of at least 32mmHg. Mildly dilated left atrium. Normal left ventricular size with preserved LV function and an estimated   ejection fraction of approximately 55-60%. Mildly dilated right atrium.     Signature    ----------------------------------------------------------------   Electronically signed by Riya Goldstein MD(Interpreting   physician) on 09/06/2019 04:35 PM   ----------------------------------------------------------------   AV Velocity:0.53 cm/s                MV Peak E-Wave: 73.5 cm/s   AV Peak Gradient: 38.44 mmHg         MV Peak A-Wave: 128 cm/s   AV Mean Gradient: 24 mmHg            MV E/A Ratio: 0.57 %   AV Area (Continuity):0.53 cm^2       MV Peak Gradient: 2.16 mmHg    ---------    2D echo November 10, 2020   Summary   Mitral valve leaflets are mildly thickened with preserved leaflet   mobility. Mitral annular calcification is present. Mild to moderate mitral regurgitation is present. Thickened and calcified aortic valve leaflets with restricted mobility c/w   calcific aortic stenosis severe with mean gradient 40 mm hg gradients have   increased since prior study   Tricuspid valve is structurally normal.   Mild tricuspid regurgitation with estimated RVSP of 25 mm Hg. Mildly dilated left atrium. Normal left ventricular size with preserved LV function and an estimated   ejection fraction of approximately 55-60%. Mild concentric left ventricular hypertrophy. Impaired relaxation compatible with diastolic dysfunction. ( reversed E/A   ratio)   No regional wall motion abnormalities. Signature      ----------------------------------------------------------------   Electronically signed by Radha Ibarra MD(Interpreting   physician) on 11/11/2020 07:31 AM   ----------------------------------------------------------------      Assessment and plan    Severe aortic stenosis-based on the recent echo, his aortic still has progressed in severity based on the valve morphology and the transvalvular gradients.   He would be high risk candidate for open surgical valve replacement, after evaluation in the valve clinic today, I feel he is a good candidate for transcatheter aortic valve replacement therapy TAVR pending the work-up today I discussed with the patient this option    We discussed also on the benefits, risks and alternatives and she agreed to proceed with TAVR evaluation    We will proceed with heart catheterization to assess coronary anatomy and consider revascularization prior to TAVR if needed, he will also need CT scan of the abdomen chest and pelvis to determine the best approach for TAVR,  to determine the valve type/size needed     We will also obtain surgical consultation with CT surgery as a part of TAVR work-up        Sangita Up MD, [facility]  Interventional Cardiologist, Endovascular Specialist   Medical Director, Structural Heart Program   [facility]

## 2020-12-02 ENCOUNTER — TELEPHONE (OUTPATIENT)
Dept: CARDIOLOGY | Age: 85
End: 2020-12-02

## 2020-12-02 NOTE — TELEPHONE ENCOUNTER
Called and spoke with patient, have cath scheduled for 12/21/20 at 1200 with arrival of 1000. Patient is to be NPO after midnight. Patient instructed to arrive through front entrance of hospital and make immediate left. Patient advised they can have one person with them but they both must wear a mask. Patient advised may take morning medications with sip of water. Also advised patient must have COVID testing completed on Thursday 12/17/20 anywhere from 800-1100 am at [facility]. Advised patient that they will be able to proceed with procedure as long as test results are negative. Patient made aware that if testing is not resulted evening prior to procedure that they may have to be rescheduled and possibly retested. Given instructions on where to go and to self quarantine between testing and procedure. Patient does not have IV dye allergy. Patient verbally understood. Called and spoke to Gay in cath lab on 12/2/20 and scheduled procedure.

## 2020-12-07 ENCOUNTER — ANTI-COAG VISIT (OUTPATIENT)
Dept: PRIMARY CARE CLINIC | Age: 85
End: 2020-12-07
Payer: MEDICARE

## 2020-12-07 LAB — INTERNATIONAL NORMALIZATION RATIO, POC: 2.3

## 2020-12-07 PROCEDURE — 85610 PROTHROMBIN TIME: CPT | Performed by: FAMILY MEDICINE

## 2020-12-07 PROCEDURE — 93793 ANTICOAG MGMT PT WARFARIN: CPT | Performed by: FAMILY MEDICINE

## 2020-12-07 ASSESSMENT — ENCOUNTER SYMPTOMS
CHEST TIGHTNESS: 0
COUGH: 0
WHEEZING: 0
ABDOMINAL PAIN: 0
VOMITING: 0
DIARRHEA: 0
NAUSEA: 0
SHORTNESS OF BREATH: 0
CONSTIPATION: 0

## 2020-12-07 NOTE — PATIENT INSTRUCTIONS
Personalized Preventive Plan for [facility] - 11/18/2020  Medicare offers a range of preventive health benefits. Some of the tests and screenings are paid in full while other may be subject to a deductible, co-insurance, and/or copay. Some of these benefits include a comprehensive review of your medical history including lifestyle, illnesses that may run in your family, and various assessments and screenings as appropriate. After reviewing your medical record and screening and assessments performed today your provider may have ordered immunizations, labs, imaging, and/or referrals for you. A list of these orders (if applicable) as well as your Preventive Care list are included within your After Visit Summary for your review. Other Preventive Recommendations:    · A preventive eye exam performed by an eye specialist is recommended every 1-2 years to screen for glaucoma; cataracts, macular degeneration, and other eye disorders. · A preventive dental visit is recommended every 6 months. · Try to get at least 150 minutes of exercise per week or 10,000 steps per day on a pedometer . · Order or download the FREE \"Exercise & Physical Activity: Your Everyday Guide\" from [employer]. Call 4-347.588.5352 or search [employer] online. · You need 7534-8726 mg of calcium and 2279-3672 IU of vitamin D per day. It is possible to meet your calcium requirement with diet alone, but a vitamin D supplement is usually necessary to meet this goal.  · When exposed to the sun, use a sunscreen that protects against both UVA and UVB radiation with an SPF of 30 or greater. Reapply every 2 to 3 hours or after sweating, drying off with a towel, or swimming. · Always wear a seat belt when traveling in a car. Always wear a helmet when riding a bicycle or motorcycle.

## 2020-12-07 NOTE — PROGRESS NOTES
Mr. Jamison Chávez was here today. INR today:   Results for orders placed or performed in visit on 12/07/20   POCT INR   Result Value Ref Range    INR 2.3      INR Goal: 2.0-3.0    Dosing Plan  As of 12/7/2020    TTR:   75.5 % (3.4 y)   Full warfarin instructions:   3.75 mg every Wed; 7.5 mg all other days            PLAN: CONTINUE CURRENT DOSE  NEXT COUMADIN CLINIC APT IS: 12/29/2020 @11:30  Will be holding two doses prior to heart chath scheduled in 12/21/20. Coumadin Clinic Hours  Tuesday 7:30am - 4:00pm  Wednesday 7:30am - 4:00pm  Thursday 7:30am - 4:00pm    IF IT'S AN EMERGENCY, PLEASE CALL 911 OR GO TO YOUR NEAREST EMERGENCY ROOM. [facility] [phone]  IF UNABLE TO REACH COUMADIN CLINIC, [address] [place] [address], [phone].

## 2020-12-07 NOTE — PROGRESS NOTES
Maya Madera is a 82 y.o. male who presents today for   Chief Complaint   Patient presents with    Medicare AWV    Leg Swelling     bilateral       HPI  Patient is here for follow-up for chronic disease and annual wellness visit. Doing well overall. He does note though he has some lower extremity swelling at times. He has a history of DVT in the past.  Also has a history of a multitude of cardiac disease and Crohn's disease. Denies any issues with his current medications or any symptom control    No change in PMH, family, social, or surgical history unless mentioned above. Review of Systems   Constitutional: Negative for chills and fever. Respiratory: Negative for cough, chest tightness, shortness of breath and wheezing. Cardiovascular: Positive for leg swelling. Negative for chest pain and palpitations. Gastrointestinal: Negative for abdominal pain, constipation, diarrhea, nausea and vomiting. Genitourinary: Negative for difficulty urinating, dysuria and frequency. Past Medical History:   Diagnosis Date    Cholelithiasis     CKD (chronic kidney disease) stage 3, GFR 30-59 ml/min 11/13/2018    Cro[facility]'s disease of large intestine with complication ([facility] 54/7/7626    Deep venous insufficiency 11/7/2017    Essential hypertension 11/7/2017    Gastroesophageal reflux disease without esophagitis 11/7/2017    Heart murmur     Mixed hyperlipidemia 11/7/2017    Nonrheumatic aortic valve stenosis 5/19/2018    Primary osteoarthritis involving multiple joints 11/7/2017    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HCC) 2002       Current Outpatient Medications   Medication Sig Dispense Refill    furosemide (LASIX) 20 MG tablet Take 1/2 tab daily in the morning daily.   If you swell more, increase to 20 mg daily 90 tablet 1    VITAMIN E COMPLEX PO Take 5,000 Units by mouth daily      lansoprazole (PREVACID) 15 MG delayed release capsule Take 15 mg by mouth daily      mesalamine (PENTASA) 250 MG extended release capsule TAKE THREE CAPSULES BY MOUTH FOUR TIMES DAILY 1080 capsule 1    losartan (COZAAR) 50 MG tablet TAKE ONE TABLET BY MOUTH DAILY 90 tablet 0    warfarin (COUMADIN) 7.5 MG tablet TAKE ONE TABLET BY MOUTH DAILY EXCEPT ON WEDNESDAY TAKE ONE-HALF TABLET 90 tablet 3    cyanocobalamin 1000 MCG/ML injection monthly 10 mL 0    fluticasone (FLONASE) 50 MCG/ACT nasal spray 1 spray by Each Nostril route daily (Patient taking differently: 1 spray by Each Nostril route as needed ) 2 Bottle 1    cetirizine (ZYRTEC) 10 MG tablet Take 1 tablet by mouth daily 90 tablet 3    Cholecalciferol (VITAMIN D3) 5000 units TABS Take by mouth      B Complex-C-E-Zn (STRESS FORMULA/ZINC PO) Take by mouth       No current facility-administered medications for this visit. No Known Allergies    Past Surgical History:   Procedure Laterality Date    CATARACT REMOVAL WITH IMPLANT  2018    OTHER SURGICAL HISTORY      Fistula-in-ano repair    SKIN CANCER EXCISION      SUBTOTAL COLECTOMY      TONSILLECTOMY      TOTAL HIP ARTHROPLASTY         Social History     Tobacco Use    Smoking status: Never Smoker    Smokeless tobacco: Never Used   Substance Use Topics    Alcohol use: No    Drug use: No       Family History   Problem Relation Age of Onset    Stroke Other     Other Other         atherosclerosis of extremities       /74 (Site: Left Upper Arm)   Pulse 68   Temp 98.2 °F (36.8 °C)   Ht 5' 7\" (1.702 m)   Wt 253 lb (114.8 kg)   SpO2 98%   BMI 39.63 kg/m²     Physical Exam  Vitals signs and nursing note reviewed. Constitutional:       General: He is not in acute distress. Appearance: He is well-developed. He is not toxic-appearing or diaphoretic. HENT:      Head: Normocephalic and atraumatic. Cardiovascular:      Rate and Rhythm: Normal rate and regular rhythm. Heart sounds: Murmur (4/6 AV valve systolic ejection radiates to carotids b/l) present. No friction rub. No gallop.     Pulmonary: Effort: Pulmonary effort is normal. No respiratory distress. Breath sounds: Normal breath sounds. No wheezing or rales. Chest:      Chest wall: No tenderness. Abdominal:      General: Bowel sounds are normal. There is no distension. Palpations: Abdomen is soft. There is no mass. Tenderness: There is no abdominal tenderness. There is no guarding or rebound. Musculoskeletal:      Right lower leg: Edema present. Left lower leg: Edema (trace b/l pittingpretibial) present. Skin:     General: Skin is warm and dry. Nails: There is no clubbing. Neurological:      Mental Status: He is alert and oriented to person, place, and time. Coordination: Coordination normal.      Gait: Gait normal.         Assessment:    ICD-10-CM    1. Routine general medical examination at a health care facility  Z00.00    2. Crohn's disease of large intestine with complication ([facility]  N30.451    3. Essential hypertension  I10    4. Mixed hyperlipidemia  E78.2    5. Nonrheumatic aortic valve stenosis  I35.0    6. Stage 3a chronic kidney disease  N18.31    7. 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 choroidal neovascularization ([facility]  H35.3221    8. Diastolic congestive heart failure with preserved left ventricular function, NYHA class 2 ([facility])  I50.30        Plan:   Patient to use Lasix on a as needed basis. Overall cardiac status appears to be stable, likely having some lower extremity venous stasis secondary to history of DVT as well. No orders of the defined types were placed in this encounter. Orders Placed This Encounter   Medications    furosemide (LASIX) 20 MG tablet     Sig: Take 1/2 tab daily in the morning daily.   If you swell more, increase to 20 mg daily     Dispense:  90 tablet     Refill:  1     Medications Discontinued During This Encounter   Medication Reason    hydroCHLOROthiazide (HYDRODIURIL) 12.5 MG tablet Therapy completed     Patient Instructions Personalized Preventive Plan for Clarise Schaumann - 11/18/2020  Medicare offers a range of preventive health benefits. Some of the tests and screenings are paid in full while other may be subject to a deductible, co-insurance, and/or copay. Some of these benefits include a comprehensive review of your medical history including lifestyle, illnesses that may run in your family, and various assessments and screenings as appropriate. After reviewing your medical record and screening and assessments performed today your provider may have ordered immunizations, labs, imaging, and/or referrals for you. A list of these orders (if applicable) as well as your Preventive Care list are included within your After Visit Summary for your review. Other Preventive Recommendations:    · A preventive eye exam performed by an eye specialist is recommended every 1-2 years to screen for glaucoma; cataracts, macular degeneration, and other eye disorders. · A preventive dental visit is recommended every 6 months. · Try to get at least 150 minutes of exercise per week or 10,000 steps per day on a pedometer . · Order or download the FREE \"Exercise & Physical Activity: Your Everyday Guide\" from [employer]. Call 6-716.835.9595 or search [employer] online. · You need 3280-3536 mg of calcium and 9987-8703 IU of vitamin D per day. It is possible to meet your calcium requirement with diet alone, but a vitamin D supplement is usually necessary to meet this goal.  · When exposed to the sun, use a sunscreen that protects against both UVA and UVB radiation with an SPF of 30 or greater. Reapply every 2 to 3 hours or after sweating, drying off with a towel, or swimming. · Always wear a seat belt when traveling in a car. Always wear a helmet when riding a bicycle or motorcycle. Patient given educational handouts and has had all questions answered.   Patient voices understanding and agrees to plans along with risks and benefits of plan. Patient isinstructed to continue prior meds, diet, and exercise plans unless instructed otherwise. Patient agrees to follow up as instructed and sooner if needed. Patient agrees to go to ER if condition becomes emergent. Notesmay be completed with dictation device and spelling errors may occur. Materials may be copied and pasted from a notepad outside of EMR, all of which, I, Dr. Arik Smyth MD, take sole intellectual ownership of and have approved adding to my note. Return for Medicare Annual Wellness Visit in 1 year.

## 2020-12-07 NOTE — PROGRESS NOTES
Crohn's disease of large intestine with complication ([facility] [address] 42/0/3405    Deep venous insufficiency 11/7/2017    Essential hypertension 11/7/2017    Gastroesophageal reflux disease without esophagitis 11/7/2017    Heart murmur     Mixed hyperlipidemia 11/7/2017    Nonrheumatic aortic valve stenosis 5/19/2018    Primary osteoarthritis involving multiple joints 11/7/2017    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[facility] [address] 2002       Past Surgical History:   Procedure Laterality Date    CATARACT REMOVAL WITH IMPLANT  2018    OTHER SURGICAL HISTORY      Fistula-in-ano repair    SKIN CANCER EXCISION      SUBTOTAL COLECTOMY      TONSILLECTOMY      TOTAL HIP ARTHROPLASTY           Family History   Problem Relation Age of Onset    Stroke Other     Other Other         atherosclerosis of extremities       CareTeam (Including outside providers/suppliers regularly involved in providing care):   Patient Care Team:  Stacie Cedeno MD as PCP - General (Family Medicine)  Stacie Cedeno MD as PCP - [facility] Provider  Tho Schrader MD as Consulting Physician (Interventional Cardiology)  Jameson Gutierres MD as Consulting Physician (Cardiology)    Wt Readings from Last 3 Encounters:   11/24/20 240 lb (108.9 kg)   11/18/20 253 lb (114.8 kg)   10/28/20 257 lb (116.6 kg)     Vitals:    11/18/20 1013   BP: 122/74   Site: Left Upper Arm   Pulse: 68   Temp: 98.2 °F (36.8 °C)   SpO2: 98%   Weight: 253 lb (114.8 kg)   Height: 5' 7\" (1.702 m)     Body mass index is 39.63 kg/m². Based upon direct observation of the patient, evaluation of cognition reveals recent and remote memory intact. Patient's complete Health Risk Assessment and screening values have been reviewed and are found in Flowsheets. The following problems were reviewed today and where indicated follow up appointments were made and/or referrals ordered.     Positive Risk Factor Screenings with Interventions:       General Health and ACP:  General  In general, how would you say your health is?: Good  In the past 7 days, have you experienced any of the following?  New or Increased Pain, New or Increased Fatigue, Loneliness, Social Isolation, Stress or Anger?: None of These  Do you get the social and emotional support that you need?: Yes  Do you have a Living Will?: Yes  Advance Directives     Power of  Living Will ACP-Advance Directive ACP-Power of     Not on File Not on File [address] Interventions:  · No Living Will: ACP documents already completed- patient asked to provide copy to the office    Health Habits/Nutrition:  Health Habits/Nutrition  Do you exercise for at least 20 minutes 2-3 times per week?: Yes  Have you lost any weight without trying in the past 3 months?: No  Do you eat fewer than 2 meals per day?: No  Have you seen a dentist within the past year?: Yes  Body mass index: (!) 39.62  Health Habits/Nutrition Interventions:  · Inadequate physical activity:  educational materials provided to promote increased physical activity  · Nutritional issues:  educational materials to promote weight loss provided    Hearing/Vision:  No exam data present  Hearing/Vision  Do you or your family notice any trouble with your hearing?: (!) Yes  Do you have difficulty driving, watching TV, or doing any of your daily activities because of your eyesight?: No  Have you had an eye exam within the past year?: Yes  Hearing/Vision Interventions:  · Hearing concerns:  patient declines any further evaluation/treatment for hearing issues    Personalized Preventive Plan   Current Health Maintenance Status  Immunization History   Administered Date(s) Administered    Influenza, High Dose (Fluzone 65 yrs and older) 11/07/2017, 11/13/2018, 10/01/2020    Influenza, Quadv, IM, PF (6 mo and older Fluzone, Flulaval, Fluarix, and 3 yrs and older Afluria) 11/20/2019    Pneumococcal Conjugate 13-valent ([id number]) 09/30/2016    Pneumococcal Polysaccharide ([id number]) 11/13/2018        Health Maintenance   Topic Date Due    DTaP/Tdap/Td vaccine (1 - Tdap) 04[birth year]18/1956    Shingles Vaccine (1 of 2) 04/18/1987    PSA counseling  11/01/2018    Annual Wellness Visit (AWV)  05/29/2019    Colon cancer screen colonoscopy  10/30/2020    Potassium monitoring  11/11/2021    Creatinine monitoring  11/11/2021    Flu vaccine  Completed    Pneumococcal 65+ years Vaccine  Completed    Hepatitis A vaccine  Aged Out    Hepatitis B vaccine  Aged Out    Hib vaccine  Aged Out    Meningococcal (ACWY) vaccine  Aged Out     Recommendations for [employer] Due: see orders and patient instructions/AVS.  . Recommended screening schedule for the next 5-10 years is provided to the patient in written form: see Patient Instructions/AVS.    Evelyn Daniels was seen today for medicare awv and leg swelling. Diagnoses and all orders for this visit:    Routine general medical examination at a health care facility    Other orders  -     furosemide (LASIX) 20 MG tablet; Take 1/2 tab daily in the morning daily.   If you swell more, increase to 20 mg daily

## 2020-12-14 ENCOUNTER — VIRTUAL VISIT (OUTPATIENT)
Dept: PRIMARY CARE CLINIC | Age: 85
End: 2020-12-14
Payer: MEDICARE

## 2020-12-14 PROCEDURE — 1036F TOBACCO NON-USER: CPT | Performed by: FAMILY MEDICINE

## 2020-12-14 PROCEDURE — G8482 FLU IMMUNIZE ORDER/ADMIN: HCPCS | Performed by: FAMILY MEDICINE

## 2020-12-14 PROCEDURE — 99214 OFFICE O/P EST MOD 30 MIN: CPT | Performed by: FAMILY MEDICINE

## 2020-12-14 PROCEDURE — 4040F PNEUMOC VAC/ADMIN/RCVD: CPT | Performed by: FAMILY MEDICINE

## 2020-12-14 PROCEDURE — 1123F ACP DISCUSS/DSCN MKR DOCD: CPT | Performed by: FAMILY MEDICINE

## 2020-12-14 PROCEDURE — G8428 CUR MEDS NOT DOCUMENT: HCPCS | Performed by: FAMILY MEDICINE

## 2020-12-14 PROCEDURE — G8417 CALC BMI ABV UP PARAM F/U: HCPCS | Performed by: FAMILY MEDICINE

## 2020-12-14 ASSESSMENT — ENCOUNTER SYMPTOMS
NAUSEA: 0
DIARRHEA: 0
CONSTIPATION: 0
VOMITING: 0
WHEEZING: 0
ABDOMINAL PAIN: 0
COUGH: 0
SHORTNESS OF BREATH: 0
CHEST TIGHTNESS: 0

## 2020-12-14 NOTE — PROGRESS NOTES
2020    TELEHEALTH EVALUATION -- Audio/Visual (During [id number] public health emergency)    HPI:    Kar Carballo (:  1935) has requested an audio/video evaluation for the following concern(s):    Patient presents today via video visit for aortic valve stenosis that is severe. He is having some inc Sx of foot swelling. EF was nomral lately but having increased mitral valve disease daily. He is having some swelling in the LE that we added 10- mg lasix daily. This helps but only about 40%. He notes lower extremities remain cold    Review of Systems   Constitutional: Negative for chills and fever. Respiratory: Negative for cough, chest tightness, shortness of breath and wheezing. Cardiovascular: Positive for leg swelling. Negative for chest pain and palpitations. Gastrointestinal: Negative for abdominal pain, constipation, diarrhea, nausea and vomiting. Genitourinary: Negative for difficulty urinating, dysuria and frequency. Prior to Visit Medications    Medication Sig Taking? Authorizing Provider   furosemide (LASIX) 20 MG tablet Take 1/2 tab daily in the morning daily.   If you swell more, increase to 20 mg daily  Zara Sanchez MD   VITAMIN E COMPLEX PO Take 5,000 Units by mouth daily  Historical Provider, MD   lansoprazole (PREVACID) 15 MG delayed release capsule Take 15 mg by mouth daily  Historical Provider, MD   mesalamine (PENTASA) 250 MG extended release capsule TAKE THREE CAPSULES BY MOUTH FOUR TIMES DAILY  Zara Sanchez MD   losartan (COZAAR) 50 MG tablet TAKE ONE TABLET BY MOUTH Joseph Ji MD   warfarin (COUMADIN) 7.5 MG tablet TAKE ONE TABLET BY MOUTH Thomas Haines MD   cyanocobalamin 1000 MCG/ML injection monthly  Zara Sanchez MD   fluticasone [facility]) 50 MCG/ACT nasal spray 1 spray by Each Nostril route daily  Patient taking differently: 1 spray by Each Nostril route as needed   Zara Sanchez MD Cholecalciferol (VITAMIN D3) 5000 units TABS Take by mouth  Historical Provider, MD OLSON Complex-C-E-Zn (STRESS FORMULA/ZINC PO) Take by mouth  Historical Provider, MD       Social History     Tobacco Use    Smoking status: Never Smoker    Smokeless tobacco: Never Used   Substance Use Topics    Alcohol use: No    Drug use: No        No Known Allergies,   Past Medical History:   Diagnosis Date    Cancer ([facility] [address]     skin    CHF (congestive heart failure) (HCC)     Cholelithiasis     CKD (chronic kidney disease) stage 3, GFR 30-59 ml/min 11/13/2018    Crohn's disease of large intestine with complication ([facility] [address] 08/9/1125    Deep venous insufficiency 11/7/2017    Essential hypertension 11/7/2017    Gastroesophageal reflux disease without esophagitis 11/7/2017    Heart murmur     Hx of blood clots     Mixed hyperlipidemia 11/7/2017    Nonrheumatic aortic valve stenosis 5/19/2018    Primary osteoarthritis involving multiple joints 11/7/2017    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[facility] [address] 2002   ,   Past Surgical History:   Procedure Laterality Date    CATARACT REMOVAL WITH IMPLANT  2018    OTHER SURGICAL HISTORY      Fistula-in-ano repair    SKIN CANCER EXCISION      SUBTOTAL COLECTOMY      TONSILLECTOMY      TOTAL HIP ARTHROPLASTY     ,   Social History     Tobacco Use    Smoking status: Never Smoker    Smokeless tobacco: Never Used   Substance Use Topics    Alcohol use: No    Drug use: No   ,   Family History   Problem Relation Age of Onset    Stroke Other     Other Other         atherosclerosis of extremities   ,   Immunization History   Administered Date(s) Administered    Influenza, High Dose (Fluzone 65 yrs and older) 11/07/2017, 11/13/2018, 10/01/2020    Influenza, Quadv, IM, PF (6 mo and older Fluzone, Flulaval, Fluarix, and 3 yrs and older Afluria) 11/20/2019    Pneumococcal Conjugate 13-valent ([id number]) 09/30/2016    Pneumococcal Polysaccharide ([id number]) 11/13/2018   , Health Maintenance   Topic Date Due    DTaP/Tdap/Td vaccine (1 - Tdap) 04/18/1956    Shingles Vaccine (1 of 2) 04/18/1987    PSA counseling  11/01/2018    Colon cancer screen colonoscopy  10/30/2020    Annual Wellness Visit (AWV)  11/19/2021    Potassium monitoring  12/21/2021    Creatinine monitoring  12/21/2021    Flu vaccine  Completed    Pneumococcal 65+ years Vaccine  Completed    Hepatitis A vaccine  Aged Out    Hepatitis B vaccine  Aged Out    Hib vaccine  Aged Out    Meningococcal (ACWY) vaccine  Aged Out       PHYSICAL EXAMINATION:  [ INSTRUCTIONS:  \"[x]\" Indicates a positive item  \"[]\" Indicates a negative item  -- DELETE ALL ITEMS NOT EXAMINED]  Vital Signs: (As obtained by patient/caregiver or practitioner observation)    Blood pressure-  Heart rate-    Respiratory rate-    Temperature-  Pulse oximetry-     Constitutional: [x] Appears well-developed and well-nourished [] No apparent distress      [] Abnormal-   Mental status  [x] Alert and awake  [x] Oriented to person/place/time [x]Able to follow commands      Eyes:  EOM    [x]  Normal  [] Abnormal-  Sclera  [x]  Normal  [] Abnormal -         Discharge []  None visible  [] Abnormal -    HENT:   [x] Normocephalic, atraumatic.   [] Abnormal   [x] Mouth/Throat: Mucous membranes are moist.     External Ears [x] Normal  [] Abnormal-     Neck: [x] No visualized mass     Pulmonary/Chest: [x] Respiratory effort normal.  [x] No visualized signs of difficulty breathing or respiratory distress        [] Abnormal-      Musculoskeletal:   [x] Normal gait with no signs of ataxia         [x] Normal range of motion of neck        [] Abnormal-       Neurological:        [x] No Facial Asymmetry (Cranial nerve 7 motor function) (limited exam to video visit)          [x] No gaze palsy        [] Abnormal-         Skin:        [x] No significant exanthematous lesions or discoloration noted on facial skin         [] Abnormal- Psychiatric:       [x] Normal Affect [x] No Hallucinations        [] Abnormal-     Other pertinent observable physical exam findings-     ASSESSMENT/PLAN:    ICD-10-CM    1. Nonrheumatic aortic valve stenosis  I35.0    2. Diastolic congestive heart failure with preserved left ventricular function, NYHA class 2 ([facility])  I50.30    3. Chronic anticoagulation  Z79.01        Increase to 20 mg for lasix daily. Having some secondary MV damage from AS stenosis. Stop coumadin 2 days prior to procedure catheter. If he has negative cath, likely safe to have valve from the standpoint of circulation. Pt would be high risk for open heart surgery based on his age and RF but based on his age and ability to have the procedure. No orders of the defined types were placed in this encounter. No orders of the defined types were placed in this encounter. Return if symptoms worsen or fail to improve. Matheus Obando is a 82 y.o. male being evaluated by a Virtual Visit (video visit) encounter to address concerns as mentioned above. A caregiver was present when appropriate. Due to this being a TeleHealth encounter (During [place] [id number] public health emergency), evaluation of the following organ systems was limited: Vitals/Constitutional/EENT/Resp/CV/GI//MS/Neuro/Skin/Heme-Lymph-Imm. Pursuant to the emergency declaration under the [address] [place] [address] authority and the [employer] and [employer] Act, this Virtual Visit was conducted with patient's (and/or legal guardian's) consent, to reduce the patient's risk of exposure to COVID-19 and provide necessary medical care. The patient (and/or legal guardian) has also been advised to contact this office for worsening conditions or problems, and seek emergency medical treatment and/or call 911 if deemed necessary. Services were provided through a video synchronous discussion virtually to substitute for in-person clinic visit. Patient and provider were located at their individual homes or provider at secure site for business. It is possible that material may be posted from a notepad that is used for a Dragon dictation device for dictating notes outside the EMR and I am the original author of all of this content. --Aliya Ronquillo MD on 12/22/2020 at 7:25 PM    An electronic signature was used to authenticate this note.

## 2020-12-17 ENCOUNTER — OFFICE VISIT (OUTPATIENT)
Age: 85
End: 2020-12-17

## 2020-12-17 VITALS — HEART RATE: 75 BPM | OXYGEN SATURATION: 95 % | TEMPERATURE: 97.4 F

## 2020-12-18 LAB — SARS-COV-2, NAA: NOT DETECTED

## 2020-12-21 ENCOUNTER — HOSPITAL ENCOUNTER (OUTPATIENT)
Dept: CARDIAC CATH/INVASIVE PROCEDURES | Age: 85
Discharge: HOME | End: 2020-12-21
Attending: INTERNAL MEDICINE | Admitting: INTERNAL MEDICINE
Payer: MEDICARE

## 2020-12-21 VITALS
WEIGHT: 244 LBS | TEMPERATURE: 97.7 F | SYSTOLIC BLOOD PRESSURE: 144 MMHG | OXYGEN SATURATION: 96 % | HEIGHT: 67 IN | RESPIRATION RATE: 12 BRPM | HEART RATE: 59 BPM | DIASTOLIC BLOOD PRESSURE: 74 MMHG | BODY MASS INDEX: 38.3 KG/M2

## 2020-12-21 LAB
ALBUMIN SERPL-MCNC: 4.2 G/DL (ref 3.5–5.2)
ALP BLD-CCNC: 113 U/L (ref 40–130)
ALT SERPL-CCNC: 13 U/L (ref 5–41)
ANION GAP SERPL CALCULATED.3IONS-SCNC: 10 MMOL/L (ref 7–19)
AST SERPL-CCNC: 17 U/L (ref 5–40)
BILIRUB SERPL-MCNC: 0.9 MG/DL (ref 0.2–1.2)
BUN BLDV-MCNC: 18 MG/DL (ref 8–23)
CALCIUM SERPL-MCNC: 9.7 MG/DL (ref 8.8–10.2)
CHLORIDE BLD-SCNC: 106 MMOL/L (ref 98–111)
CO2: 27 MMOL/L (ref 22–29)
CREAT SERPL-MCNC: 1.2 MG/DL (ref 0.5–1.2)
EKG P AXIS: 58 DEGREES
EKG P-R INTERVAL: 244 MS
EKG Q-T INTERVAL: 416 MS
EKG QRS DURATION: 146 MS
EKG QTC CALCULATION (BAZETT): 435 MS
EKG T AXIS: 39 DEGREES
GFR AFRICAN AMERICAN: >59
GFR NON-AFRICAN AMERICAN: 58
GLUCOSE BLD-MCNC: 108 MG/DL (ref 74–109)
HCT VFR BLD CALC: 41.7 % (ref 42–52)
HEMOGLOBIN: 13.9 G/DL (ref 14–18)
INR BLD: 1.28 (ref 0.88–1.18)
MCH RBC QN AUTO: 32.9 PG (ref 27–31)
MCHC RBC AUTO-ENTMCNC: 33.3 G/DL (ref 33–37)
MCV RBC AUTO: 98.6 FL (ref 80–94)
PDW BLD-RTO: 13.8 % (ref 11.5–14.5)
PLATELET # BLD: 153 K/UL (ref 130–400)
PMV BLD AUTO: 9 FL (ref 9.4–12.4)
POTASSIUM SERPL-SCNC: 4.1 MMOL/L (ref 3.5–5)
PROTHROMBIN TIME: 16 SEC (ref 12–14.6)
RBC # BLD: 4.23 M/UL (ref 4.7–6.1)
SODIUM BLD-SCNC: 143 MMOL/L (ref 136–145)
TOTAL PROTEIN: 7.1 G/DL (ref 6.6–8.7)
WBC # BLD: 7.5 K/UL (ref 4.8–10.8)

## 2020-12-21 PROCEDURE — 6360000002 HC RX W HCPCS

## 2020-12-21 PROCEDURE — 6370000000 HC RX 637 (ALT 250 FOR IP): Performed by: INTERNAL MEDICINE

## 2020-12-21 PROCEDURE — 99152 MOD SED SAME PHYS/QHP 5/>YRS: CPT | Performed by: INTERNAL MEDICINE

## 2020-12-21 PROCEDURE — 2500000003 HC RX 250 WO HCPCS

## 2020-12-21 PROCEDURE — 93010 ELECTROCARDIOGRAM REPORT: CPT | Performed by: INTERNAL MEDICINE

## 2020-12-21 PROCEDURE — 99153 MOD SED SAME PHYS/QHP EA: CPT

## 2020-12-21 PROCEDURE — 80053 COMPREHEN METABOLIC PANEL: CPT

## 2020-12-21 PROCEDURE — C1894 INTRO/SHEATH, NON-LASER: HCPCS

## 2020-12-21 PROCEDURE — 85610 PROTHROMBIN TIME: CPT

## 2020-12-21 PROCEDURE — 36415 COLL VENOUS BLD VENIPUNCTURE: CPT

## 2020-12-21 PROCEDURE — 93454 CORONARY ARTERY ANGIO S&I: CPT | Performed by: INTERNAL MEDICINE

## 2020-12-21 PROCEDURE — 93454 CORONARY ARTERY ANGIO S&I: CPT

## 2020-12-21 PROCEDURE — 99152 MOD SED SAME PHYS/QHP 5/>YRS: CPT

## 2020-12-21 PROCEDURE — 93005 ELECTROCARDIOGRAM TRACING: CPT | Performed by: INTERNAL MEDICINE

## 2020-12-21 PROCEDURE — 2580000003 HC RX 258: Performed by: INTERNAL MEDICINE

## 2020-12-21 PROCEDURE — 2709999900 HC NON-CHARGEABLE SUPPLY

## 2020-12-21 PROCEDURE — 85027 COMPLETE CBC AUTOMATED: CPT

## 2020-12-21 PROCEDURE — C1769 GUIDE WIRE: HCPCS

## 2020-12-21 PROCEDURE — 6360000004 HC RX CONTRAST MEDICATION: Performed by: INTERNAL MEDICINE

## 2020-12-21 RX ORDER — SODIUM CHLORIDE 0.9 % (FLUSH) 0.9 %
10 SYRINGE (ML) INJECTION PRN
Status: DISCONTINUED | OUTPATIENT
Start: 2020-12-21 | End: 2020-12-21 | Stop reason: HOSPADM

## 2020-12-21 RX ORDER — SODIUM CHLORIDE 9 MG/ML
INJECTION, SOLUTION INTRAVENOUS CONTINUOUS
Status: DISCONTINUED | OUTPATIENT
Start: 2020-12-21 | End: 2020-12-21 | Stop reason: HOSPADM

## 2020-12-21 RX ORDER — ASPIRIN 325 MG
325 TABLET ORAL ONCE
Status: COMPLETED | OUTPATIENT
Start: 2020-12-21 | End: 2020-12-21

## 2020-12-21 RX ORDER — SODIUM CHLORIDE 0.9 % (FLUSH) 0.9 %
10 SYRINGE (ML) INJECTION EVERY 12 HOURS SCHEDULED
Status: DISCONTINUED | OUTPATIENT
Start: 2020-12-21 | End: 2020-12-21 | Stop reason: HOSPADM

## 2020-12-21 RX ORDER — ONDANSETRON 2 MG/ML
4 INJECTION INTRAMUSCULAR; INTRAVENOUS EVERY 6 HOURS PRN
Status: DISCONTINUED | OUTPATIENT
Start: 2020-12-21 | End: 2020-12-21 | Stop reason: HOSPADM

## 2020-12-21 RX ORDER — NITROGLYCERIN 0.4 MG/1
0.4 TABLET SUBLINGUAL EVERY 5 MIN PRN
Status: DISCONTINUED | OUTPATIENT
Start: 2020-12-21 | End: 2020-12-21 | Stop reason: HOSPADM

## 2020-12-21 RX ADMIN — SODIUM CHLORIDE: 9 INJECTION, SOLUTION INTRAVENOUS at 10:58

## 2020-12-21 RX ADMIN — ASPIRIN 325 MG ORAL TABLET 325 MG: 325 PILL ORAL at 12:54

## 2020-12-21 RX ADMIN — IOPAMIDOL 121 ML: 612 INJECTION, SOLUTION INTRAVENOUS at 15:23

## 2020-12-21 NOTE — H&P
[facility], [place]. [address], [facility], [address] [facility]  [phone] office  [phone] fax      OFFICE VISIT:  2020    Toan Sexton - : 1935  Reason For Visit:  Shonna Lopez is a 82 y.o. male who is here for No chief complaint on file. History:   Diagnosis Orders   1. Essential hypertension  VL DUP CAROTID BILATERAL   2. Diastolic congestive heart failure with preserved left ventricular function, NYHA class 2 ([facility]     3. Mixed hyperlipidemia     4. Chronic anticoagulation     5. Moderate aortic stenosis  ECHO Complete 2D W Doppler W Color   6. Bilateral carotid bruits  ECHO Complete 2D W Doppler W Color    VL DUP CAROTID BILATERAL   7. Dizziness  VL DUP CAROTID BILATERAL   8. History of DVT (deep vein thrombosis)       The patient presents today for cardiology follow up. PCP follows coumadin for hx DVT of RLE diagnosed 12 years ago. The patient has stable lower extremity edema R>L. He wears compression socks at times and elevates legs. The patient limits sodium intake. The patient denies symptoms to suggest myocardial ischemia, diastolic heart failure exacerbation or arrhythmia. BP is well controlled on current regimen. The patient's PCP monitors cholesterol. Subjective  Shonna Lopez denies exertional chest pain, shortness of breath, orthopnea, paroxysmal nocturnal dyspnea, syncope, presyncope, sensed arrhythmia and fatigue. The patient denies numbness or weakness to suggest cerebrovascular accident or transient ischemic attack. + chronic but stable lower extremity edema R>L.     Toan Sexton has the following history as recorded in [facility]:  Patient Active Problem List   Diagnosis Code    Chronic anticoagulation Z79.01    Essential hypertension I10    Crohn's disease of large intestine with complication ([facility] M56.382    Mixed hyperlipidemia E78.2    Deep venous insufficiency I87.2  Primary osteoarthritis involving multiple joints M89.49    Gastroesophageal reflux disease without esophagitis K21.9    Aortic systolic murmur on examination - suspect aortic stenosis I35.8    Nonrheumatic aortic valve stenosis I35.0    Chronic kidney disease, stage III (moderate) N18.30    S/P partial resection of colon Z90.49    Intestinal adhesions with partial obstruction ([facility]) Y21.18    Diastolic congestive heart failure with preserved left ventricular function, NYHA class 2 ([facility]) I50.30    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 choroidal neovascularization ([facility] [address] H35.3221    Morbid obesity with BMI of 40.0-44.9, adult ([facility]) E66.01, Z68.41    Bilateral carotid bruits R09.89    History of DVT (deep vein thrombosis) Z86.718    Dizziness R42    Moderate aortic stenosis I35.0     Past Medical History:   Diagnosis Date    Cancer ([address]     skin    CHF (congestive heart failure) ([facility])     Cholelithiasis     CKD (chronic kidney disease) stage 3, GFR 30-59 ml/min 11/13/2018    Crohn's disease of large intestine with complication ([address] 81/7/7144    Deep venous insufficiency 11/7/2017    Essential hypertension 11/7/2017    Gastroesophageal reflux disease without esophagitis 11/7/2017    Heart murmur     Hx of blood clots     Mixed hyperlipidemia 11/7/2017    Nonrheumatic aortic valve stenosis 5/19/2018    Primary osteoarthritis involving multiple joints 11/7/2017    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[address] 2002     Past Surgical History:   Procedure Laterality Date    CATARACT REMOVAL WITH IMPLANT  2018    OTHER SURGICAL HISTORY      Fistula-in-ano repair    SKIN CANCER EXCISION      SUBTOTAL COLECTOMY      TONSILLECTOMY      TOTAL HIP ARTHROPLASTY       Family History   Problem Relation Age of Onset    Stroke Other     Other Other         atherosclerosis of extremities     Social History     Tobacco Use    Smoking status: Never Smoker  Smokeless tobacco: Never Used   Substance Use Topics    Alcohol use: No      Current Facility-Administered Medications   Medication Dose Route Frequency Provider Last Rate Last Admin    0.9 % sodium chloride infusion   Intravenous Continuous Jazzy Wayne MD 75 mL/hr at 12/21/20 1058 New Bag at 12/21/20 1058    sodium chloride flush 0.9 % injection 10 mL  10 mL Intravenous 2 times per day Jazzy Wayne MD        sodium chloride flush 0.9 % injection 10 mL  10 mL Intravenous PRN Jazzy Wayne MD        ondansetron (ZOFRAN) injection 4 mg  4 mg Intravenous Q6H PRN Jazzy Wayne MD        nitroGLYCERIN (NITROSTAT) SL tablet 0.4 mg  0.4 mg Sublingual Q5 Min PRN Jazzy Wayne MD           Allergies: Patient has no known allergies. Review of Systems  Constitutional  no appetite change, or unexpected weight change. No fever, chills or diaphoresis. No significant change in activity level or new onset of fatigue. HEENT  no significant rhinorrhea or epistaxis. No tinnitus or significant hearing loss. Eyes  no sudden vision change or amaurosis. No corneal arcus, xantholasma, subconjunctival hemorrhage or discharge. Respiratory  no significant wheezing, stridor, apnea or cough. No dyspnea on exertion or shortness of air. Cardiovascular  no exertional chest pain to suggest myocardial ischemia. No orthopnea or PND. No sensation of sustained arrythmia. No occurrence of slow heart rate. No palpitations. No claudication. Gastrointestinal  no abdominal swelling or pain. No blood in stool. No severe constipation, diarrhea, nausea, or vomiting. Genitourinary  no dysuria, frequency, or urgency. No flank pain or hematuria. Musculoskeletal  no back pain or myalgia. No problems with gait. Extremities - no clubbing or cyanosis. + chronic but stable lower extremity edema R>L. Skin  no color change or rash. No pallor. No new surgical incision. Neurologic  no speech difficulty, facial asymmetry or lateralizing weakness. No seizures, presyncope or syncope. No significant dizziness. Hematologic  no easy bruising or excessive bleeding. Psychiatric  no severe anxiety or insomnia. No confusion. All other review of systems are negative. Objective  Vital Signs - /84   Pulse 70   Temp 97.7 °F (36.5 °C) (Temporal)   Resp 17   Ht 5' 7\" (1.702 m)   Wt 244 lb (110.7 kg)   SpO2 94%   BMI 38.22 kg/m²   General - Nikkie Saravia is alert, cooperative, and pleasant. Well groomed. No acute distress. Body habitus - Body mass index is 38.22 kg/m². HEENT  Head is normocephalic. No circumoral cyanosis. Dentition is normal.  EYES -   Lids normal without ptosis. No discharge, edema or subconjunctival hemorrhage. Neck - Symmetrical without apparent mass or lymphadenopathy. Respiratory - Normal respiratory effort without use of accessory muscles. Ausculatation reveals vesicular breath sounds without crackles, wheezes, rub or rhonchi. Cardiovascular  No jugular venous distention. Auscultation reveals regular rate and rhythm. No audible clicks, gallop or rub.  2-3/6 murmur radiates to both carotids. No lower extremity varicosities. Abdominal -  No visible distention, mass or pulsations. Extremities - No clubbing or cyanosis. No statis dermatitis or ulcers. 1+ pitting bilateral lower extremity edema R>L. Hx of DVT right leg. Musculoskeletal -   No Osler's nodes. No kyphosis or scoliosis. Gait is even and regular without limp or shuffle. Ambulates without assistance. Skin -  Warm and dry; no rash or pallor. No new surgical wound. Neurological - No focal neurological deficits. Thought processes coherent. No apparent tremor. Oriented to person, place and time. Psychiatric -  Appropriate affect and mood. Assessment:     Diagnosis Orders   1.  Essential hypertension  VL DUP CAROTID BILATERAL 2. Diastolic congestive heart failure with preserved left ventricular function, NYHA class 2 ([address]     3. Mixed hyperlipidemia     4. Chronic anticoagulation     5. Moderate aortic stenosis  ECHO Complete 2D W Doppler W Color   6. Bilateral carotid bruits  ECHO Complete 2D W Doppler W Color    VL DUP CAROTID BILATERAL   7. Dizziness  VL DUP CAROTID BILATERAL   8. History of DVT (deep vein thrombosis)       Data reviewed:  9/6/19 echo   Conclusions    Summary   Mild mitral regurgitation is present. Mitral valve leaflets are mildly thickened with preserved leaflet   mobility. The aortic valve is trileaflet, moderately thickened & calcified with   moderately reduced leaflet separation. Moderate aortic stenosis; no evidence of aortic regurgitation. Tricuspid valve is structurally normal.   Mild tricuspid regurgitation; estimated RVSP of at least 32mmHg. Mildly dilated left atrium. Normal left ventricular size with preserved LV function and an estimated   ejection fraction of approximately 55-60%. Mildly dilated right atrium.     Signature    ----------------------------------------------------------------   Electronically signed by Lance Stewart MD(Interpreting   physician) on 09/06/2019 04:35 PM   ----------------------------------------------------------------   AV Velocity:0.53 cm/s                MV Peak E-Wave: 73.5 cm/s   AV Peak Gradient: 38.44 mmHg         MV Peak A-Wave: 128 cm/s   AV Mean Gradient: 24 mmHg            MV E/A Ratio: 0.57 %   AV Area (Continuity):0.53 cm^2       MV Peak Gradient: 2.16 mmHg    Lab Results   Component Value Date    WBC 7.5 12/21/2020    HGB 13.9 (L) 12/21/2020    HCT 41.7 (L) 12/21/2020    MCV 98.6 (H) 12/21/2020     12/21/2020     Lab Results   Component Value Date     12/21/2020     07/04/2011    K 4.1 12/21/2020    K 4.2 07/04/2011     12/21/2020     07/04/2011    CO2 27 12/21/2020    BUN 18 12/21/2020    CREATININE 1.2 12/21/2020 CREATININE 0.9 07/04/2011    GLUCOSE 108 12/21/2020    CALCIUM 9.7 12/21/2020      Lab Results   Component Value Date    CHOL 168 11/11/2020    CHOL 191 11/13/2019    CHOL 190 04/24/2019     Lab Results   Component Value Date    TRIG 140 11/11/2020    TRIG 177 (H) 11/13/2019    TRIG 195 (H) 04/24/2019     Lab Results   Component Value Date    HDL 56 11/11/2020    HDL 56 11/13/2019    HDL 54 (L) 04/24/2019     Lab Results   Component Value Date    LDLCALC 84 11/11/2020    LDLCALC 100 11/13/2019    LDLCALC 97 04/24/2019     HTN - normotensive on current regimen. Hx moderate AS - 2019 echo showed mean gradient 24; peak gradient 38.44 and DON 0.53. Schedule 2D echocardiogram.    Bilateral carotid bruit - schedule carotid ultrasound. Hyperlipidemia -      Diastolic heart failure - NYHA class II - stable on current medical management. Continued low sodium diet and daily weight log. Hx RLE - DVT - on coumadin with no bleeding probems. Patient is compliant with medication regimen.     BP Readings from Last 3 Encounters:   12/21/20 133/84   11/24/20 136/80   11/18/20 122/74    Pulse Readings from Last 3 Encounters:   12/21/20 70   12/17/20 75   11/24/20 74        Wt Readings from Last 3 Encounters:   12/21/20 244 lb (110.7 kg)   11/24/20 240 lb (108.9 kg)   11/18/20 253 lb (114.8 kg)       Recent Results (from the past 1008 hour(s))   ECHO Complete 2D W Doppler W Color    Collection Time: 11/10/20  3:55 PM   Result Value Ref Range    Left Ventricular Ejection Fraction 58     LVEF MODALITY ECHO    Lipid Panel    Collection Time: 11/11/20  9:40 AM   Result Value Ref Range    Cholesterol, Total 168 160 - 199 mg/dL    Triglycerides 140 0 - 149 mg/dL    HDL 56 55 - 121 mg/dL    LDL Calculated 84 <100 mg/dL   CBC Auto Differential    Collection Time: 11/11/20  9:40 AM   Result Value Ref Range    WBC 6.4 4.8 - 10.8 K/uL    RBC 4.35 (L) 4.70 - 6.10 M/uL    Hemoglobin 13.9 (L) 14.0 - 18.0 g/dL Hematocrit 42.0 42.0 - 52.0 %    MCV 96.6 (H) 80.0 - 94.0 fL    MCH 32.0 (H) 27.0 - 31.0 pg    MCHC 33.1 33.0 - 37.0 g/dL    RDW 13.3 11.5 - 14.5 %    Platelets 974 949 - 136 K/uL    MPV 10.1 9.4 - 12.4 fL    Neutrophils % 58.9 50.0 - 65.0 %    Lymphocytes % 31.0 20.0 - 40.0 %    Monocytes % 6.4 0.0 - 10.0 %    Eosinophils % 2.5 0.0 - 5.0 %    Basophils % 0.9 0.0 - 1.0 %    Neutrophils Absolute 3.8 1.5 - 7.5 K/uL    Immature Granulocytes # 0.0 K/uL    Lymphocytes Absolute 2.0 1.1 - 4.5 K/uL    Monocytes Absolute 0.40 0.00 - 0.90 K/uL    Eosinophils Absolute 0.20 0.00 - 0.60 K/uL    Basophils Absolute 0.10 0.00 - 0.20 K/uL   Comprehensive Metabolic Panel    Collection Time: 11/11/20  9:40 AM   Result Value Ref Range    Sodium 140 136 - 145 mmol/L    Potassium 4.3 3.5 - 5.0 mmol/L    Chloride 106 98 - 111 mmol/L    CO2 21 (L) 22 - 29 mmol/L    Anion Gap 13 7 - 19 mmol/L    Glucose 96 74 - 109 mg/dL    BUN 20 8 - 23 mg/dL    CREATININE 1.2 0.5 - 1.2 mg/dL    GFR Non-African American 58 (A) >60    GFR African American >59 >59    Calcium 9.4 8.8 - 10.2 mg/dL    Total Protein 6.9 6.6 - 8.7 g/dL    Alb 4.2 3.5 - 5.2 g/dL    Total Bilirubin 0.4 0.2 - 1.2 mg/dL    Alkaline Phosphatase 98 40 - 130 U/L    ALT 17 5 - 41 U/L    AST 24 5 - 40 U/L   TSH without Reflex    Collection Time: 11/11/20  9:40 AM   Result Value Ref Range    TSH 3.580 0.270 - 4.200 uIU/mL   POCT INR    Collection Time: 12/07/20 11:33 AM   Result Value Ref Range    INR 2.3    COVID-19    Collection Time: 12/17/20  9:34 AM   Result Value Ref Range    SARS-CoV-2, JUAN PABLO NOT DETECTED Not Detected   CBC    Collection Time: 12/21/20 10:32 AM   Result Value Ref Range    WBC 7.5 4.8 - 10.8 K/uL    RBC 4.23 (L) 4.70 - 6.10 M/uL    Hemoglobin 13.9 (L) 14.0 - 18.0 g/dL    Hematocrit 41.7 (L) 42.0 - 52.0 %    MCV 98.6 (H) 80.0 - 94.0 fL    MCH 32.9 (H) 27.0 - 31.0 pg    MCHC 33.3 33.0 - 37.0 g/dL    RDW 13.8 11.5 - 14.5 %    Platelets 210 345 - 421 K/uL MPV 9.0 (L) 9.4 - 12.4 fL   Comprehensive Metabolic Panel    Collection Time: 12/21/20 10:32 AM   Result Value Ref Range    Sodium 143 136 - 145 mmol/L    Potassium 4.1 3.5 - 5.0 mmol/L    Chloride 106 98 - 111 mmol/L    CO2 27 22 - 29 mmol/L    Anion Gap 10 7 - 19 mmol/L    Glucose 108 74 - 109 mg/dL    BUN 18 8 - 23 mg/dL    CREATININE 1.2 0.5 - 1.2 mg/dL    GFR Non-African American 58 (A) >60    GFR African American >59 >59    Calcium 9.7 8.8 - 10.2 mg/dL    Total Protein 7.1 6.6 - 8.7 g/dL    Alb 4.2 3.5 - 5.2 g/dL    Total Bilirubin 0.9 0.2 - 1.2 mg/dL    Alkaline Phosphatase 113 40 - 130 U/L    ALT 13 5 - 41 U/L    AST 17 5 - 40 U/L   Protime-INR    Collection Time: 12/21/20 10:32 AM   Result Value Ref Range    Protime 16.0 (H) 12.0 - 14.6 sec    INR 1.28 (H) 0.88 - 1.18   Electrocardiogram, 12-lead     Collection Time: 12/21/20 10:45 AM   Result Value Ref Range    P-R Interval 244 ms    QRS Duration 146 ms    Q-T Interval 416 ms    QTc Calculation (Bazett) 435 ms    P Axis 58 degrees    T Axis 39 degrees     Plan  Previous cardiac history and records reviewed. Continue current medical management. Schedule 2D echo and carotid ultrasound. Continue other current medications as directed. Continue to follow up with primary care provider for non cardiac medical problems. Call the office with any problems, questions or concerns at [phone]. Cardiology follow up: 6 months Dr. Katlyn Gomez. Educational included in patient instructions. Heart health. Leg edema. RUBEN Bills       Risks, benefits, alternatives of Heart cath/PCI discussed with the patient   I explained the procedure to the patient in detail and fully informed consent obtained.   Acceptable Mallampati score  Consent for moderate conscious sedation  ASA 3      Adonis Ag MD, [facility] [place], [address], Endovascular Specialist   Medical Director, Structural Heart Program   [facility]

## 2020-12-29 ENCOUNTER — ANTI-COAG VISIT (OUTPATIENT)
Dept: PRIMARY CARE CLINIC | Age: 85
End: 2020-12-29
Payer: MEDICARE

## 2020-12-29 DIAGNOSIS — I87.2 DEEP VENOUS INSUFFICIENCY: ICD-10-CM

## 2020-12-29 DIAGNOSIS — Z79.01 CHRONIC ANTICOAGULATION: ICD-10-CM

## 2020-12-29 LAB — INTERNATIONAL NORMALIZATION RATIO, POC: 1.6

## 2020-12-29 PROCEDURE — 85610 PROTHROMBIN TIME: CPT | Performed by: FAMILY MEDICINE

## 2020-12-29 PROCEDURE — 93793 ANTICOAG MGMT PT WARFARIN: CPT | Performed by: FAMILY MEDICINE

## 2020-12-29 NOTE — PROGRESS NOTES
Mr. Saul Bangura was here today. INR today:   Results for orders placed or performed in visit on 12/29/20   POCT INR   Result Value Ref Range    INR 1.6      INR Goal: 2.0-3.0    Dosing Plan  As of 12/29/2020    TTR:  74.5 % (3.5 y)   Full warfarin instructions:  12/29: 11.25 mg; 12/30: 7.5 mg; Otherwise 3.75 mg every Wed; 7.5 mg all other days            PLAN: increase tonight's dose to 11.25 mg and tomorrow to 7.5 mg, then CONTINUE CURRENT DOSE  NEXT COUMADIN CLINIC APT IS: 1/5/2021 before CT    Coumadin Clinic Hours  Tuesday 7:30am - 4:00pm  Wednesday 7:30am - 4:00pm  Thursday 7:30am - 4:00pm    IF IT'S AN EMERGENCY, PLEASE CALL 911 OR GO TO YOUR NEAREST EMERGENCY ROOM. [facility] INTERNAL MEDICINE COUMADIN CLINIC [phone]  IF UNABLE TO REACH COUMADIN CLINIC, [address] [place] [address], [phone].

## 2021-01-05 ENCOUNTER — ANTI-COAG VISIT (OUTPATIENT)
Dept: PRIMARY CARE CLINIC | Age: 86
End: 2021-01-05
Payer: MEDICARE

## 2021-01-05 ENCOUNTER — HOSPITAL ENCOUNTER (OUTPATIENT)
Dept: CT IMAGING | Age: 86
Discharge: HOME | End: 2021-01-05
Payer: MEDICARE

## 2021-01-05 ENCOUNTER — OFFICE VISIT (OUTPATIENT)
Dept: CARDIOTHORACIC SURGERY | Age: 86
End: 2021-01-05
Payer: MEDICARE

## 2021-01-05 VITALS
OXYGEN SATURATION: 95 % | DIASTOLIC BLOOD PRESSURE: 88 MMHG | RESPIRATION RATE: 18 BRPM | SYSTOLIC BLOOD PRESSURE: 124 MMHG | BODY MASS INDEX: 38.61 KG/M2 | HEART RATE: 94 BPM | WEIGHT: 246 LBS | HEIGHT: 67 IN

## 2021-01-05 DIAGNOSIS — I87.2 DEEP VENOUS INSUFFICIENCY: ICD-10-CM

## 2021-01-05 DIAGNOSIS — Z79.01 CHRONIC ANTICOAGULATION: ICD-10-CM

## 2021-01-05 DIAGNOSIS — I35.0 SEVERE AORTIC STENOSIS: ICD-10-CM

## 2021-01-05 DIAGNOSIS — I25.10 CAD IN NATIVE ARTERY: ICD-10-CM

## 2021-01-05 DIAGNOSIS — I35.0 NONRHEUMATIC AORTIC VALVE STENOSIS: Primary | ICD-10-CM

## 2021-01-05 LAB — INTERNATIONAL NORMALIZATION RATIO, POC: 2

## 2021-01-05 PROCEDURE — 85610 PROTHROMBIN TIME: CPT | Performed by: FAMILY MEDICINE

## 2021-01-05 PROCEDURE — G8417 CALC BMI ABV UP PARAM F/U: HCPCS | Performed by: THORACIC SURGERY (CARDIOTHORACIC VASCULAR SURGERY)

## 2021-01-05 PROCEDURE — 4040F PNEUMOC VAC/ADMIN/RCVD: CPT | Performed by: THORACIC SURGERY (CARDIOTHORACIC VASCULAR SURGERY)

## 2021-01-05 PROCEDURE — 1123F ACP DISCUSS/DSCN MKR DOCD: CPT | Performed by: THORACIC SURGERY (CARDIOTHORACIC VASCULAR SURGERY)

## 2021-01-05 PROCEDURE — 71275 CT ANGIOGRAPHY CHEST: CPT

## 2021-01-05 PROCEDURE — 99204 OFFICE O/P NEW MOD 45 MIN: CPT | Performed by: THORACIC SURGERY (CARDIOTHORACIC VASCULAR SURGERY)

## 2021-01-05 PROCEDURE — G8427 DOCREV CUR MEDS BY ELIG CLIN: HCPCS | Performed by: THORACIC SURGERY (CARDIOTHORACIC VASCULAR SURGERY)

## 2021-01-05 PROCEDURE — G8482 FLU IMMUNIZE ORDER/ADMIN: HCPCS | Performed by: THORACIC SURGERY (CARDIOTHORACIC VASCULAR SURGERY)

## 2021-01-05 PROCEDURE — 93793 ANTICOAG MGMT PT WARFARIN: CPT | Performed by: FAMILY MEDICINE

## 2021-01-05 PROCEDURE — 74174 CTA ABD&PLVS W/CONTRAST: CPT

## 2021-01-05 PROCEDURE — 1036F TOBACCO NON-USER: CPT | Performed by: THORACIC SURGERY (CARDIOTHORACIC VASCULAR SURGERY)

## 2021-01-05 PROCEDURE — 6360000004 HC RX CONTRAST MEDICATION: Performed by: INTERNAL MEDICINE

## 2021-01-05 RX ADMIN — IOPAMIDOL 90 ML: 755 INJECTION, SOLUTION INTRAVENOUS at 13:23

## 2021-01-05 NOTE — PROGRESS NOTES
Mr. Gricelda Dumont was here today. INR today:   Results for orders placed or performed in visit on 01/05/21   POCT INR   Result Value Ref Range    INR 2.0      INR Goal: 2.0-3.0    Dosing Plan  As of 1/5/2021    TTR:  74.1 % (3.5 y)   Full warfarin instructions:  1/6: 7.5 mg; Otherwise 3.75 mg every Wed; 7.5 mg all other days            PLAN: INCREASE TOMORROW'S DOSE TO 7.5 MF, THEN CONTINUE CURRENT DOSE  NEXT COUMADIN CLINIC APT IS: [email or web address]  Coumadin Clinic Hours  Tuesday 7:30am - 4:00pm  Wednesday 7:30am - 4:00pm  Thursday 7:30am - 4:00pm    IF IT'S AN EMERGENCY, PLEASE CALL 911 OR GO TO YOUR NEAREST EMERGENCY ROOM. [facility] INTERNAL MEDICINE COUMADIN CLINIC [phone]  IF UNABLE TO REACH COUMADIN CLINIC, [address] [place] [address], [phone].

## 2021-01-07 NOTE — PROGRESS NOTES
[address] [facility] Cardiothoracic Surgery  [address], [place] [address]  Phone: [phone]  Fax: [phone]    Name: Merlinda Prows  : 1935    DATE: 2021        The patient is an 24-year-old morbidly obese male who has a known history of progressive aortic stenosis. A recent echocardiogram performed on 11/10/2020 now reveals a mean gradient of 40 mmHg across his aortic valve which has increased from 24 mm in 2019. His ejection fraction remains preserved at 55 to 60%. . He underwent cardiac catheterization on 2020 by Dr. Carmina Claude. This reveals a diffusely diseased LAD with a 70 to 80% mid third stenosis and a 90% distal stenosis at the apex. The circumflex system has a 90% stenosis of a small second obtuse marginal branch. The RCA is a dominant system with a 70% distal stenosis. He is asymptomatic from his coronary disease. He has noticed increasing symptoms of dyspnea on exertion and shortness of breath over the last 6 months. Because of his age and overall frailty it was recommended he undergo transcatheter aortic valve replacement and therefore he was referred to me for surgical evaluation prior to proceeding with TAVR.     Past Medical History:   Diagnosis Date    Cancer [facility])     skin    CHF (congestive heart failure) (HCC)     Cholelithiasis     CKD (chronic kidney disease) stage 3, GFR 30-59 ml/min 2018    Crohn's disease of large intestine with complication ([address]     Deep venous insufficiency 2017    Essential hypertension 2017    Gastroesophageal reflux disease without esophagitis 2017    Heart murmur     Hx of blood clots     Mixed hyperlipidemia 2017    Nonrheumatic aortic valve stenosis 2018    Primary osteoarthritis involving multiple joints 2017    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[address]      Past Surgical History:  Smokeless tobacco: Never Used   Substance and Sexual Activity    Alcohol use: No    Drug use: No    Sexual activity: Not Currently     Partners: Female   Lifestyle    Physical activity     Days per week: Not on file     Minutes per session: Not on file    Stress: Not on file   Relationships    Social connections     Talks on phone: Not on file     Gets together: Not on file     Attends [employer] service: Not on file     Active member of club or organization: Not on file     Attends meetings of clubs or organizations: Not on file     Relationship status: Not on file    Intimate partner violence     Fear of current or ex partner: Not on file     Emotionally abused: Not on file     Physically abused: Not on file     Forced sexual activity: Not on file   Other Topics Concern    Not on file   Social History Narrative    Not on file         ROS:   HEENT: denies neck pain, sore throat, blurred vision, diplopia. Respiratory: chronic shortness of breath, worse over the last 6 months. Denies any hemoptysis or wheezes. Cardiovascular: denies angina, palpitations, lower extremity edema. GI: denies abdominal pain, nausea, vomiting, constipation. : denies urinary frequency, or dysuria. Musculoskeletal: No joint pain or swelling. Neurologic: denies diplopia, headache, or ataxia. The other 14 systems have been reviewed and are negative    Physical Exam: /88 (Site: Left Upper Arm, Position: Sitting, Cuff Size: Medium Adult)   Pulse 94   Resp 18   Ht 5' 7\" (1.702 m)   Wt 246 lb (111.6 kg)   SpO2 95%   BMI 38.53 kg/m²   Frail-appearing elderly gentleman who is quite obese  ENT: Throat is clear., Mucosa clear., Septum intact., No pyorrhea or abscess. Neck: Neck is soft., No cervical masses. , Throat is clear., Neck veins are not distended. , Thyroid is normal size. Respiratory: Clear lung sounds., Normal., No wheezes or rhonchi., No use of accessory muscles. Cardiovascular: Cardiac sounds are clear., Regular rate and rhythm., No carotid bruits. , Peripheral pulses are intact., Extremities exhibit no edema or varicosities. ,  There is a grade 3/6 systolic ejection murmur that is heard best along the left sternal border and radiates into the carotid arteries  Abdomen: Abdomen is soft., No masses or tenderness. , Liver and spleen not palpable., Normal bowel sounds.,  Quite obese  Lymphatic: No lymphadenopathy in the cervical, axillary, or femoral areas. Musculoskeletal: No clubbing or cyanosis. , Normal muscle tone., Normal range of motion upper and lower extremities. , No joint inflammation or swelling. Skin: No rashes, lesions, or ulcers., No swelling or edema. Neurologic exam: No lateralizing neurologic findings. , Cranial nerves II-XII are normal., Examination of sensation is normal.  Psychiatric: Patient exhibits normal judgement and is oriented to name, time, and place., No anxiety or depression. This frail elderly 53-year-old gentleman has progressive severe calcific aortic stenosis which has now become quite symptomatic. He now has a 40 mm mean gradient with preserved LV function. He does appear to have significant multivessel coronary disease as well particularly his LAD and RCA. There is no question that he would be at high risk for combined surgical aortic valve replacement and multivessel coronary bypass grafting. I have believe his best option would be to undergo stenting of his RCA and LAD followed by TAVR 2 weeks later. I discussed this procedure in detail with the patient and his wife the plan is to discuss his case at our weekly valve conference and then proceed from there.      Electronically signed by Adeline Quezada MD on 1/7/2021 at 1:38 PM

## 2021-01-15 ENCOUNTER — TELEPHONE (OUTPATIENT)
Dept: CARDIOLOGY CLINIC | Age: 86
End: 2021-01-15

## 2021-01-15 NOTE — TELEPHONE ENCOUNTER
Called and spoke with patient, have TAVR scheduled for 1/21/21 at 1200 with arrival of 1000. Patient is to be NPO after midnight. Patient instructed to arrive through front entrance of hospital and make immediate left. Patient advised they can have one person with them but they both must wear a mask. Patient advised may take morning medications with sip of water. Also advised patient must have COVID testing completed on 1/18/21 anywhere from 800-1100 am at [facility]. Advised patient that they will be able to proceed with procedure as long as test results are negative. Patient made aware that if testing is not resulted evening prior to procedure that they may have to be rescheduled and possibly retested. Given instructions on where to go and to self quarantine between testing and procedure. Patient does not have IV dye allergy. Patient was also instructed to hold his coumadin 2 days prior to procedure. Patient verbally understood.

## 2021-01-18 ENCOUNTER — OFFICE VISIT (OUTPATIENT)
Age: 86
End: 2021-01-18

## 2021-01-18 VITALS — HEART RATE: 74 BPM | TEMPERATURE: 97.9 F | OXYGEN SATURATION: 98 %

## 2021-01-18 DIAGNOSIS — Z11.59 SCREENING FOR VIRAL DISEASE: Primary | ICD-10-CM

## 2021-01-18 LAB — SARS-COV-2, PCR: NOT DETECTED

## 2021-01-18 PROCEDURE — 99999 PR OFFICE/OUTPT VISIT,PROCEDURE ONLY: CPT | Performed by: NURSE PRACTITIONER

## 2021-01-20 ENCOUNTER — ANESTHESIA EVENT (OUTPATIENT)
Dept: CARDIAC CATH/INVASIVE PROCEDURES | Age: 86
DRG: 267 | End: 2021-01-20
Payer: MEDICARE

## 2021-01-21 ENCOUNTER — ANESTHESIA (OUTPATIENT)
Dept: CARDIAC CATH/INVASIVE PROCEDURES | Age: 86
DRG: 267 | End: 2021-01-21
Payer: MEDICARE

## 2021-01-21 ENCOUNTER — APPOINTMENT (OUTPATIENT)
Dept: GENERAL RADIOLOGY | Age: 86
DRG: 267 | End: 2021-01-21
Attending: INTERNAL MEDICINE
Payer: MEDICARE

## 2021-01-21 ENCOUNTER — HOSPITAL ENCOUNTER (INPATIENT)
Dept: CARDIAC CATH/INVASIVE PROCEDURES | Age: 86
LOS: 3 days | Discharge: HOME | DRG: 267 | End: 2021-01-24
Attending: INTERNAL MEDICINE | Admitting: INTERNAL MEDICINE
Payer: MEDICARE

## 2021-01-21 VITALS
DIASTOLIC BLOOD PRESSURE: 73 MMHG | OXYGEN SATURATION: 98 % | RESPIRATION RATE: 1 BRPM | SYSTOLIC BLOOD PRESSURE: 147 MMHG | TEMPERATURE: 97 F

## 2021-01-21 DIAGNOSIS — I38 VALVULAR HEART DISEASE: Primary | ICD-10-CM

## 2021-01-21 PROBLEM — Z95.2 S/P TAVR (TRANSCATHETER AORTIC VALVE REPLACEMENT): Status: ACTIVE | Noted: 2021-01-21

## 2021-01-21 LAB
ABO/RH: NORMAL
ALBUMIN SERPL-MCNC: 4.4 G/DL (ref 3.5–5.2)
ALP BLD-CCNC: 128 U/L (ref 40–130)
ALT SERPL-CCNC: 15 U/L (ref 5–41)
ANION GAP SERPL CALCULATED.3IONS-SCNC: 16 MMOL/L (ref 7–19)
ANTIBODY SCREEN: NORMAL
AST SERPL-CCNC: 21 U/L (ref 5–40)
BILIRUB SERPL-MCNC: 1 MG/DL (ref 0.2–1.2)
BUN BLDV-MCNC: 13 MG/DL (ref 8–23)
CALCIUM SERPL-MCNC: 9.7 MG/DL (ref 8.8–10.2)
CHLORIDE BLD-SCNC: 100 MMOL/L (ref 98–111)
CO2: 24 MMOL/L (ref 22–29)
CREAT SERPL-MCNC: 1.2 MG/DL (ref 0.5–1.2)
GFR AFRICAN AMERICAN: >59
GFR NON-AFRICAN AMERICAN: 58
GLUCOSE BLD-MCNC: 100 MG/DL (ref 74–109)
HCT VFR BLD CALC: 45.1 % (ref 42–52)
HEMOGLOBIN: 15 G/DL (ref 14–18)
INR BLD: 1.44 (ref 0.88–1.18)
MCH RBC QN AUTO: 32.4 PG (ref 27–31)
MCHC RBC AUTO-ENTMCNC: 33.3 G/DL (ref 33–37)
MCV RBC AUTO: 97.4 FL (ref 80–94)
PDW BLD-RTO: 13.4 % (ref 11.5–14.5)
PLATELET # BLD: 173 K/UL (ref 130–400)
PMV BLD AUTO: 9.8 FL (ref 9.4–12.4)
POTASSIUM SERPL-SCNC: 3.6 MMOL/L (ref 3.5–5)
PRO-BNP: 556 PG/ML (ref 0–1800)
PROTHROMBIN TIME: 17.6 SEC (ref 12–14.6)
RBC # BLD: 4.63 M/UL (ref 4.7–6.1)
SODIUM BLD-SCNC: 140 MMOL/L (ref 136–145)
TOTAL PROTEIN: 7.4 G/DL (ref 6.6–8.7)
WBC # BLD: 10.3 K/UL (ref 4.8–10.8)

## 2021-01-21 PROCEDURE — 6360000002 HC RX W HCPCS: Performed by: NURSE ANESTHETIST, CERTIFIED REGISTERED

## 2021-01-21 PROCEDURE — 6360000002 HC RX W HCPCS

## 2021-01-21 PROCEDURE — 33225 L VENTRIC PACING LEAD ADD-ON: CPT

## 2021-01-21 PROCEDURE — 6360000002 HC RX W HCPCS: Performed by: INTERNAL MEDICINE

## 2021-01-21 PROCEDURE — 99152 MOD SED SAME PHYS/QHP 5/>YRS: CPT

## 2021-01-21 PROCEDURE — 83880 ASSAY OF NATRIURETIC PEPTIDE: CPT

## 2021-01-21 PROCEDURE — 86901 BLOOD TYPING SEROLOGIC RH(D): CPT

## 2021-01-21 PROCEDURE — 86850 RBC ANTIBODY SCREEN: CPT

## 2021-01-21 PROCEDURE — 33361 REPLACE AORTIC VALVE PERQ: CPT | Performed by: INTERNAL MEDICINE

## 2021-01-21 PROCEDURE — 99153 MOD SED SAME PHYS/QHP EA: CPT

## 2021-01-21 PROCEDURE — 92953 TEMPORARY EXTERNAL PACING: CPT

## 2021-01-21 PROCEDURE — 2580000003 HC RX 258: Performed by: NURSE PRACTITIONER

## 2021-01-21 PROCEDURE — 02H63JZ INSERTION OF PACEMAKER LEAD INTO RIGHT ATRIUM, PERCUTANEOUS APPROACH: ICD-10-PCS | Performed by: INTERNAL MEDICINE

## 2021-01-21 PROCEDURE — 0JH606Z INSERTION OF PACEMAKER, DUAL CHAMBER INTO CHEST SUBCUTANEOUS TISSUE AND FASCIA, OPEN APPROACH: ICD-10-PCS | Performed by: INTERNAL MEDICINE

## 2021-01-21 PROCEDURE — 7100000000 HC PACU RECOVERY - FIRST 15 MIN

## 2021-01-21 PROCEDURE — 93355 ECHO TRANSESOPHAGEAL (TEE): CPT

## 2021-01-21 PROCEDURE — 99152 MOD SED SAME PHYS/QHP 5/>YRS: CPT | Performed by: INTERNAL MEDICINE

## 2021-01-21 PROCEDURE — 3E0102A INTRODUCTION OF ANTI-INFECTIVE ENVELOPE INTO SUBCUTANEOUS TISSUE, OPEN APPROACH: ICD-10-PCS | Performed by: INTERNAL MEDICINE

## 2021-01-21 PROCEDURE — 85610 PROTHROMBIN TIME: CPT

## 2021-01-21 PROCEDURE — 2580000003 HC RX 258: Performed by: INTERNAL MEDICINE

## 2021-01-21 PROCEDURE — 71045 X-RAY EXAM CHEST 1 VIEW: CPT

## 2021-01-21 PROCEDURE — C1760 CLOSURE DEV, VASC: HCPCS

## 2021-01-21 PROCEDURE — 33208 INSRT HEART PM ATRIAL & VENT: CPT

## 2021-01-21 PROCEDURE — B246ZZ4 ULTRASONOGRAPHY OF RIGHT AND LEFT HEART, TRANSESOPHAGEAL: ICD-10-PCS | Performed by: INTERNAL MEDICINE

## 2021-01-21 PROCEDURE — 7100000001 HC PACU RECOVERY - ADDTL 15 MIN

## 2021-01-21 PROCEDURE — 5A1223Z PERFORMANCE OF CARDIAC PACING, CONTINUOUS: ICD-10-PCS | Performed by: INTERNAL MEDICINE

## 2021-01-21 PROCEDURE — C1894 INTRO/SHEATH, NON-LASER: HCPCS

## 2021-01-21 PROCEDURE — 2500000003 HC RX 250 WO HCPCS: Performed by: NURSE ANESTHETIST, CERTIFIED REGISTERED

## 2021-01-21 PROCEDURE — 6360000004 HC RX CONTRAST MEDICATION: Performed by: NURSE ANESTHETIST, CERTIFIED REGISTERED

## 2021-01-21 PROCEDURE — 33208 INSRT HEART PM ATRIAL & VENT: CPT | Performed by: INTERNAL MEDICINE

## 2021-01-21 PROCEDURE — 3700000001 HC ADD 15 MINUTES (ANESTHESIA)

## 2021-01-21 PROCEDURE — 85027 COMPLETE CBC AUTOMATED: CPT

## 2021-01-21 PROCEDURE — 80053 COMPREHEN METABOLIC PANEL: CPT

## 2021-01-21 PROCEDURE — 33206 INSERT HEART PM ATRIAL: CPT

## 2021-01-21 PROCEDURE — 2780000010 HC IMPLANT OTHER

## 2021-01-21 PROCEDURE — 93325 DOPPLER ECHO COLOR FLOW MAPG: CPT

## 2021-01-21 PROCEDURE — 3700000000 HC ANESTHESIA ATTENDED CARE

## 2021-01-21 PROCEDURE — 2580000003 HC RX 258: Performed by: NURSE ANESTHETIST, CERTIFIED REGISTERED

## 2021-01-21 PROCEDURE — 2000000000 HC ICU R&B

## 2021-01-21 PROCEDURE — 33361 REPLACE AORTIC VALVE PERQ: CPT

## 2021-01-21 PROCEDURE — C1898 LEAD, PMKR, OTHER THAN TRANS: HCPCS

## 2021-01-21 PROCEDURE — 86900 BLOOD TYPING SEROLOGIC ABO: CPT

## 2021-01-21 PROCEDURE — 2500000003 HC RX 250 WO HCPCS

## 2021-01-21 PROCEDURE — 93321 DOPPLER ECHO F-UP/LMTD STD: CPT

## 2021-01-21 PROCEDURE — C1769 GUIDE WIRE: HCPCS

## 2021-01-21 PROCEDURE — 02RF38Z REPLACEMENT OF AORTIC VALVE WITH ZOOPLASTIC TISSUE, PERCUTANEOUS APPROACH: ICD-10-PCS | Performed by: INTERNAL MEDICINE

## 2021-01-21 PROCEDURE — 2709999900 HC NON-CHARGEABLE SUPPLY

## 2021-01-21 PROCEDURE — 36415 COLL VENOUS BLD VENIPUNCTURE: CPT

## 2021-01-21 PROCEDURE — 02HK3JZ INSERTION OF PACEMAKER LEAD INTO RIGHT VENTRICLE, PERCUTANEOUS APPROACH: ICD-10-PCS | Performed by: INTERNAL MEDICINE

## 2021-01-21 PROCEDURE — C1785 PMKR, DUAL, RATE-RESP: HCPCS

## 2021-01-21 PROCEDURE — 2780000006 HC MISC HEART VALVE

## 2021-01-21 RX ORDER — SODIUM CHLORIDE 0.9 % (FLUSH) 0.9 %
10 SYRINGE (ML) INJECTION EVERY 12 HOURS SCHEDULED
Status: DISCONTINUED | OUTPATIENT
Start: 2021-01-21 | End: 2021-01-24 | Stop reason: HOSPADM

## 2021-01-21 RX ORDER — CHLORHEXIDINE GLUCONATE 4 G/100ML
SOLUTION TOPICAL ONCE
Status: CANCELLED | OUTPATIENT
Start: 2021-01-22

## 2021-01-21 RX ORDER — HEPARIN SODIUM 1000 [USP'U]/ML
INJECTION, SOLUTION INTRAVENOUS; SUBCUTANEOUS PRN
Status: DISCONTINUED | OUTPATIENT
Start: 2021-01-21 | End: 2021-01-21

## 2021-01-21 RX ORDER — PROTAMINE SULFATE 10 MG/ML
INJECTION, SOLUTION INTRAVENOUS PRN
Status: DISCONTINUED | OUTPATIENT
Start: 2021-01-21 | End: 2021-01-21 | Stop reason: SDUPTHER

## 2021-01-21 RX ORDER — LIDOCAINE HYDROCHLORIDE 10 MG/ML
INJECTION, SOLUTION EPIDURAL; INFILTRATION; INTRACAUDAL; PERINEURAL PRN
Status: DISCONTINUED | OUTPATIENT
Start: 2021-01-21 | End: 2021-01-21 | Stop reason: SDUPTHER

## 2021-01-21 RX ORDER — FUROSEMIDE 20 MG/1
10 TABLET ORAL DAILY
Status: DISCONTINUED | OUTPATIENT
Start: 2021-01-22 | End: 2021-01-24 | Stop reason: HOSPADM

## 2021-01-21 RX ORDER — LOSARTAN POTASSIUM 50 MG/1
50 TABLET ORAL DAILY
Status: DISCONTINUED | OUTPATIENT
Start: 2021-01-22 | End: 2021-01-24 | Stop reason: HOSPADM

## 2021-01-21 RX ORDER — FUROSEMIDE 20 MG/1
10 TABLET ORAL DAILY
Status: CANCELLED | OUTPATIENT
Start: 2021-01-21

## 2021-01-21 RX ORDER — SODIUM CHLORIDE 0.9 % (FLUSH) 0.9 %
10 SYRINGE (ML) INJECTION EVERY 12 HOURS SCHEDULED
Status: DISCONTINUED | OUTPATIENT
Start: 2021-01-21 | End: 2021-01-21 | Stop reason: SDUPTHER

## 2021-01-21 RX ORDER — SODIUM CHLORIDE 9 MG/ML
INJECTION, SOLUTION INTRAVENOUS CONTINUOUS
Status: CANCELLED | OUTPATIENT
Start: 2021-01-22

## 2021-01-21 RX ORDER — PROMETHAZINE HYDROCHLORIDE 25 MG/ML
6.25 INJECTION, SOLUTION INTRAMUSCULAR; INTRAVENOUS
Status: DISCONTINUED | OUTPATIENT
Start: 2021-01-21 | End: 2021-01-21

## 2021-01-21 RX ORDER — SODIUM CHLORIDE 0.9 % (FLUSH) 0.9 %
10 SYRINGE (ML) INJECTION PRN
Status: DISCONTINUED | OUTPATIENT
Start: 2021-01-21 | End: 2021-01-24 | Stop reason: HOSPADM

## 2021-01-21 RX ORDER — SUCCINYLCHOLINE CHLORIDE 20 MG/ML
INJECTION INTRAMUSCULAR; INTRAVENOUS PRN
Status: DISCONTINUED | OUTPATIENT
Start: 2021-01-21 | End: 2021-01-21 | Stop reason: SDUPTHER

## 2021-01-21 RX ORDER — MORPHINE SULFATE 4 MG/ML
2 INJECTION, SOLUTION INTRAMUSCULAR; INTRAVENOUS EVERY 5 MIN PRN
Status: DISCONTINUED | OUTPATIENT
Start: 2021-01-21 | End: 2021-01-21

## 2021-01-21 RX ORDER — DIPHENHYDRAMINE HYDROCHLORIDE 50 MG/ML
12.5 INJECTION INTRAMUSCULAR; INTRAVENOUS
Status: DISCONTINUED | OUTPATIENT
Start: 2021-01-21 | End: 2021-01-21

## 2021-01-21 RX ORDER — LOSARTAN POTASSIUM 50 MG/1
1 TABLET ORAL DAILY
Status: CANCELLED | OUTPATIENT
Start: 2021-01-21

## 2021-01-21 RX ORDER — PANTOPRAZOLE SODIUM 40 MG/1
40 TABLET, DELAYED RELEASE ORAL
Status: CANCELLED | OUTPATIENT
Start: 2021-01-22

## 2021-01-21 RX ORDER — ONDANSETRON 2 MG/ML
INJECTION INTRAMUSCULAR; INTRAVENOUS PRN
Status: DISCONTINUED | OUTPATIENT
Start: 2021-01-21 | End: 2021-01-21 | Stop reason: SDUPTHER

## 2021-01-21 RX ORDER — PROPOFOL 10 MG/ML
INJECTION, EMULSION INTRAVENOUS PRN
Status: DISCONTINUED | OUTPATIENT
Start: 2021-01-21 | End: 2021-01-21 | Stop reason: SDUPTHER

## 2021-01-21 RX ORDER — PANTOPRAZOLE SODIUM 40 MG/1
40 TABLET, DELAYED RELEASE ORAL
Status: DISCONTINUED | OUTPATIENT
Start: 2021-01-22 | End: 2021-01-24 | Stop reason: HOSPADM

## 2021-01-21 RX ORDER — MORPHINE SULFATE 4 MG/ML
4 INJECTION, SOLUTION INTRAMUSCULAR; INTRAVENOUS EVERY 5 MIN PRN
Status: DISCONTINUED | OUTPATIENT
Start: 2021-01-21 | End: 2021-01-21

## 2021-01-21 RX ORDER — ACETAMINOPHEN 325 MG/1
650 TABLET ORAL EVERY 4 HOURS PRN
Status: DISCONTINUED | OUTPATIENT
Start: 2021-01-21 | End: 2021-01-24 | Stop reason: HOSPADM

## 2021-01-21 RX ORDER — HYDROMORPHONE HYDROCHLORIDE 1 MG/ML
0.25 INJECTION, SOLUTION INTRAMUSCULAR; INTRAVENOUS; SUBCUTANEOUS EVERY 5 MIN PRN
Status: DISCONTINUED | OUTPATIENT
Start: 2021-01-21 | End: 2021-01-21

## 2021-01-21 RX ORDER — MEPERIDINE HYDROCHLORIDE 50 MG/ML
12.5 INJECTION INTRAMUSCULAR; INTRAVENOUS; SUBCUTANEOUS EVERY 5 MIN PRN
Status: DISCONTINUED | OUTPATIENT
Start: 2021-01-21 | End: 2021-01-21

## 2021-01-21 RX ORDER — NICARDIPINE HYDROCHLORIDE 2.5 MG/ML
INJECTION INTRAVENOUS PRN
Status: DISCONTINUED | OUTPATIENT
Start: 2021-01-21 | End: 2021-01-21 | Stop reason: SDUPTHER

## 2021-01-21 RX ORDER — SODIUM CHLORIDE 0.9 % (FLUSH) 0.9 %
10 SYRINGE (ML) INJECTION PRN
Status: DISCONTINUED | OUTPATIENT
Start: 2021-01-21 | End: 2021-01-21 | Stop reason: SDUPTHER

## 2021-01-21 RX ORDER — ENALAPRILAT 2.5 MG/2ML
1.25 INJECTION INTRAVENOUS
Status: DISCONTINUED | OUTPATIENT
Start: 2021-01-21 | End: 2021-01-21

## 2021-01-21 RX ORDER — SODIUM CHLORIDE 9 MG/ML
INJECTION, SOLUTION INTRAVENOUS CONTINUOUS
Status: DISCONTINUED | OUTPATIENT
Start: 2021-01-21 | End: 2021-01-24 | Stop reason: HOSPADM

## 2021-01-21 RX ORDER — LABETALOL HYDROCHLORIDE 5 MG/ML
5 INJECTION, SOLUTION INTRAVENOUS EVERY 10 MIN PRN
Status: DISCONTINUED | OUTPATIENT
Start: 2021-01-21 | End: 2021-01-21

## 2021-01-21 RX ORDER — MV-MIN/FA/VIT K/LUTEIN/ZEAXANT 200MCG-5MG
1 CAPSULE ORAL DAILY
COMMUNITY
End: 2022-09-06

## 2021-01-21 RX ORDER — ROCURONIUM BROMIDE 10 MG/ML
INJECTION, SOLUTION INTRAVENOUS PRN
Status: DISCONTINUED | OUTPATIENT
Start: 2021-01-21 | End: 2021-01-21 | Stop reason: SDUPTHER

## 2021-01-21 RX ORDER — EPHEDRINE SULFATE 50 MG/ML
INJECTION, SOLUTION INTRAVENOUS PRN
Status: DISCONTINUED | OUTPATIENT
Start: 2021-01-21 | End: 2021-01-21 | Stop reason: SDUPTHER

## 2021-01-21 RX ORDER — HYDROMORPHONE HYDROCHLORIDE 1 MG/ML
0.5 INJECTION, SOLUTION INTRAMUSCULAR; INTRAVENOUS; SUBCUTANEOUS EVERY 5 MIN PRN
Status: DISCONTINUED | OUTPATIENT
Start: 2021-01-21 | End: 2021-01-21

## 2021-01-21 RX ORDER — FENTANYL CITRATE 50 UG/ML
INJECTION, SOLUTION INTRAMUSCULAR; INTRAVENOUS PRN
Status: DISCONTINUED | OUTPATIENT
Start: 2021-01-21 | End: 2021-01-21 | Stop reason: SDUPTHER

## 2021-01-21 RX ORDER — HEPARIN SODIUM 5000 [USP'U]/ML
INJECTION, SOLUTION INTRAVENOUS; SUBCUTANEOUS PRN
Status: DISCONTINUED | OUTPATIENT
Start: 2021-01-21 | End: 2021-01-21 | Stop reason: SDUPTHER

## 2021-01-21 RX ORDER — HYDRALAZINE HYDROCHLORIDE 20 MG/ML
5 INJECTION INTRAMUSCULAR; INTRAVENOUS EVERY 10 MIN PRN
Status: DISCONTINUED | OUTPATIENT
Start: 2021-01-21 | End: 2021-01-21

## 2021-01-21 RX ORDER — NITROGLYCERIN 20 MG/100ML
INJECTION INTRAVENOUS PRN
Status: DISCONTINUED | OUTPATIENT
Start: 2021-01-21 | End: 2021-01-21 | Stop reason: SDUPTHER

## 2021-01-21 RX ORDER — DEXAMETHASONE SODIUM PHOSPHATE 10 MG/ML
INJECTION, SOLUTION INTRAMUSCULAR; INTRAVENOUS PRN
Status: DISCONTINUED | OUTPATIENT
Start: 2021-01-21 | End: 2021-01-21 | Stop reason: SDUPTHER

## 2021-01-21 RX ADMIN — PHENYLEPHRINE HYDROCHLORIDE 40 MCG/MIN: 10 INJECTION INTRAVENOUS at 11:05

## 2021-01-21 RX ADMIN — EPHEDRINE SULFATE 20 MG: 50 INJECTION INTRAMUSCULAR; INTRAVENOUS; SUBCUTANEOUS at 11:41

## 2021-01-21 RX ADMIN — LIDOCAINE HYDROCHLORIDE 50 MG: 10 INJECTION, SOLUTION EPIDURAL; INFILTRATION; INTRACAUDAL; PERINEURAL at 10:58

## 2021-01-21 RX ADMIN — SUGAMMADEX 200 MG: 100 INJECTION, SOLUTION INTRAVENOUS at 12:56

## 2021-01-21 RX ADMIN — Medication 2000 MG: at 20:54

## 2021-01-21 RX ADMIN — PROPOFOL 100 MG: 10 INJECTION, EMULSION INTRAVENOUS at 10:58

## 2021-01-21 RX ADMIN — SODIUM CHLORIDE: 9 INJECTION, SOLUTION INTRAVENOUS at 10:00

## 2021-01-21 RX ADMIN — PHENYLEPHRINE HYDROCHLORIDE 50 MCG: 10 INJECTION INTRAVENOUS at 12:03

## 2021-01-21 RX ADMIN — LIDOCAINE HYDROCHLORIDE 50 MG: 10 INJECTION, SOLUTION EPIDURAL; INFILTRATION; INTRACAUDAL; PERINEURAL at 13:12

## 2021-01-21 RX ADMIN — SODIUM CHLORIDE, PRESERVATIVE FREE 10 ML: 5 INJECTION INTRAVENOUS at 20:55

## 2021-01-21 RX ADMIN — SODIUM CHLORIDE: 9 INJECTION, SOLUTION INTRAVENOUS at 10:53

## 2021-01-21 RX ADMIN — IOPAMIDOL 265 ML: 612 INJECTION, SOLUTION INTRAVENOUS at 13:21

## 2021-01-21 RX ADMIN — DEXAMETHASONE SODIUM PHOSPHATE 10 MG: 10 INJECTION, SOLUTION INTRAMUSCULAR; INTRAVENOUS at 11:26

## 2021-01-21 RX ADMIN — ROCURONIUM BROMIDE 45 MG: 10 INJECTION, SOLUTION INTRAVENOUS at 11:15

## 2021-01-21 RX ADMIN — EPHEDRINE SULFATE 10 MG: 50 INJECTION INTRAMUSCULAR; INTRAVENOUS; SUBCUTANEOUS at 11:38

## 2021-01-21 RX ADMIN — HEPARIN SODIUM 2000 UNITS: 5000 INJECTION, SOLUTION INTRAVENOUS; SUBCUTANEOUS at 11:51

## 2021-01-21 RX ADMIN — ROCURONIUM BROMIDE 10 MG: 10 INJECTION, SOLUTION INTRAVENOUS at 12:01

## 2021-01-21 RX ADMIN — NICARDIPINE HYDROCHLORIDE 1 MG: 25 INJECTION INTRAVENOUS at 11:47

## 2021-01-21 RX ADMIN — ROCURONIUM BROMIDE 10 MG: 10 INJECTION, SOLUTION INTRAVENOUS at 12:52

## 2021-01-21 RX ADMIN — ROCURONIUM BROMIDE 20 MG: 10 INJECTION, SOLUTION INTRAVENOUS at 12:13

## 2021-01-21 RX ADMIN — SUCCINYLCHOLINE CHLORIDE 100 MG: 20 INJECTION, SOLUTION INTRAMUSCULAR; INTRAVENOUS at 10:58

## 2021-01-21 RX ADMIN — PROTAMINE SULFATE 40 MG: 10 INJECTION, SOLUTION INTRAVENOUS at 12:43

## 2021-01-21 RX ADMIN — PROPOFOL 50 MG: 10 INJECTION, EMULSION INTRAVENOUS at 11:03

## 2021-01-21 RX ADMIN — PROPOFOL 50 MG: 10 INJECTION, EMULSION INTRAVENOUS at 11:10

## 2021-01-21 RX ADMIN — HEPARIN SODIUM 10000 UNITS: 5000 INJECTION, SOLUTION INTRAVENOUS; SUBCUTANEOUS at 11:37

## 2021-01-21 RX ADMIN — PHENYLEPHRINE HYDROCHLORIDE 200 MCG: 10 INJECTION INTRAVENOUS at 11:25

## 2021-01-21 RX ADMIN — ONDANSETRON HYDROCHLORIDE 4 MG: 2 SOLUTION INTRAMUSCULAR; INTRAVENOUS at 12:39

## 2021-01-21 RX ADMIN — PHENYLEPHRINE HYDROCHLORIDE 200 MCG: 10 INJECTION INTRAVENOUS at 11:22

## 2021-01-21 RX ADMIN — NITROGLYCERIN 25 MCG: 20 INJECTION INTRAVENOUS at 11:45

## 2021-01-21 RX ADMIN — PHENYLEPHRINE HYDROCHLORIDE 200 MCG: 10 INJECTION INTRAVENOUS at 11:01

## 2021-01-21 RX ADMIN — SODIUM CHLORIDE, PRESERVATIVE FREE 10 ML: 5 INJECTION INTRAVENOUS at 10:05

## 2021-01-21 RX ADMIN — ROCURONIUM BROMIDE 5 MG: 10 INJECTION, SOLUTION INTRAVENOUS at 10:58

## 2021-01-21 RX ADMIN — FENTANYL CITRATE 100 MCG: 50 INJECTION, SOLUTION INTRAMUSCULAR; INTRAVENOUS at 10:58

## 2021-01-21 ASSESSMENT — ENCOUNTER SYMPTOMS
DYSPNEA ACTIVITY LEVEL: AFTER AMBULATING 1 FLIGHT OF STAIRS
SHORTNESS OF BREATH: 0

## 2021-01-21 ASSESSMENT — LIFESTYLE VARIABLES: SMOKING_STATUS: 0

## 2021-01-21 ASSESSMENT — PAIN SCALES - GENERAL: PAINLEVEL_OUTOF10: 0

## 2021-01-21 NOTE — PROGRESS NOTES
Wife at bedside signed consent for pacemaker. To cath lab via bed with cardiac monitor and RN. Wife shown to waiting room.

## 2021-01-21 NOTE — LETTER
[facility]  Cardiac Rehab Department  [address] [facility] [address]  [phone]  Toll Free [phone]          January 25, 2021    Dear Lisa Crowder,    Please find this informational packet that has been sent to you on heart disease and the guidelines you are to follow concerning your present cardiac condition and immediate recovery. Due to your recent transcatheter aortic valve replacement (TAVR) you now qualify for participation in a Phase II Outpatient Cardiac Rehab Program.  This elective service has been shown to significantly reduce cardiac mortality by 26-31% and increase longevity by as much as 5 years among patients such as yourself! A brochure has been included in this mailing for the purpose of providing you with a brief overview of program components. The [address] [facility] Program is currently serving patients in accordance with all mandated regulatory COVID-19 guidelines and practices. Since you live locally you should contact us at [phone] to set up a free, no obligation orientation and assessment appointment to learn more and take full advantage of this life changing opportunity. Furthermore, feel free to reach out to us with any questions or concerns and our staff will be more than pleased to assist you. Thank you. To the betterment of your health,        [facility] Cardiac Rehab Staff    CHARAN Arce, MA  Registered Nurse  Exercise Physiologist

## 2021-01-21 NOTE — LETTER
[facility]  Cardiac Rehab Department  [address] [facility] [address]  [phone]  Toll Free [phone]              January 25, 2021    Dear Merlinda Prows,    Please find enclosed information concerning the care of your pacemaker insertion site and the guidelines you should follow for the next four weeks of your recovery. Each of these recommendations apply unless you were specifically instructed otherwise by your cardiologist.    If you have not already received one, a transtelephonic patient transmitter has been ordered for you on your behalf. When in your possession and appropriate, unbox the transmitter, plug it in and complete your first pacemaker check by following the instructional pictures in the easy-to-follow pamphlet or on the transmitter itself. In the event you have a 'smartphone', an yovanny may have been downloaded on your phone during your hospitilization to allow you to use your phone for the same purpose. If any questions or concerns arise or you experience difficulty completing the steps stated above, you should contact your cardiologist's office for assistance. Rest assured that the use of your transmitter will be reviewed with you as needed during your upcoming follow-up visit in Dr. Adams Johns office or via teleconference. Thank you. To Your [facility],        [facility] Cardiac Rehab Staff    CHARAN Meehan, MA  Registered Nurse  Exercise Physiologist

## 2021-01-21 NOTE — ANESTHESIA PROCEDURE NOTES
Central Venous Line:    A central venous line was placed using ultrasound guidance, in the OR for the following indication(s): central venous access and cardiac pacing.  1/21/2021 12:45 PM    Sterility preparation included the following: hand hygiene performed prior to procedure, maximum sterile barriers used and sterile technique used to drape from head to toe. The patient was placed in supine position. The right  A 6 Fr (size), 10 (length), introducer single lumen was placed. During the procedure, the following specific steps were taken: target vein identified, needle advanced into vein and blood aspirated and guidewire advanced into vein. Intravenous verification was obtained by ultrasound, venous blood return and x-ray. Post insertion care included: all ports aspirated, all ports flushed easily, guidewire removed intact, Biopatch applied, line sutured in place and dressing applied. During the procedure the patient experienced: patient tolerated procedure well with no complications, EBL 0mL and EBL < 5mL. Anesthesia type: general.. No    Additional notes:  Pacer wires placed via Dr. Bri Boston to apex of RV and pt now paced at 60 bpm.  Staffing  Performed: Anesthesiologist   Anesthesiologist: Nirmal Johnson DO  Preanesthetic Checklist  Completed: patient identified, IV checked, site marked, risks and benefits discussed, surgical consent, monitors and equipment checked, pre-op evaluation, timeout performed, anesthesia consent given, oxygen available and patient being monitored

## 2021-01-21 NOTE — ANESTHESIA PROCEDURE NOTES
Procedure Performed: RAMAN       Start Time:  1/21/2021 11:31 AM       End Time:   1/21/2021 11:31 AM    Preanesthesia Checklist:  Patient identified, IV assessed, risks and benefits discussed, monitors and equipment assessed, procedure being performed at surgeon's request and anesthesia consent obtained. General Procedure Information  Diagnostic Indications for Echo:  assessment of ascending aorta, assessment of surgical repair, defect repair evaluation, hemodynamic monitoring and assessment of valve function  Physician Requesting Echo: Jameel Dennis MD  Location performed:  OR  Intubated  Bite block placed  Heart visualized  Probe Insertion:  Easy  Probe Type:  Mulitplane  Modalities:  2D only, color flow mapping and continuous wave Doppler    Echocardiographic and Doppler Measurements    Ventricles    Right Ventricle:  Cavity size normal.  Hypertrophy not present. Thrombus not present. Global function normal.    Left Ventricle:  Cavity size normal.  Hypertrophy present. Thrombus not present. Global Function normal.      Ventricular Regional Function:  1- Basal Anteroseptal:  normal  2- Basal Anterior:  normal  3- Basal Anterolateral:  normal  4- Basal Inferolateral:  normal  5- Basal Inferior:  normal  6- Basal Inferoseptal:  normal  7- Mid Anteroseptal:  normal  8- Mid Anterior:  normal  9- Mid Anterolateral:  normal  10- Mid Inferolateral:  normal  11- Mid Inferior:  normal  12- Mid Inferoseptal:  normal  13- Apical Anterior:  normal  14- Apical Lateral:  normal  15- Apical Inferior:  normal  16- Apical Septal:  normal  17- [place]:  normal      Valves    Aortic Valve: Annulus calcified. Stenosis severe. Mean Gradient: 45 mmHg. Regurgitation mild. Leaflets calcified and thickened. Leaflet motions restricted. Mitral Valve: Annulus calcified. Stenosis not present. Regurgitation severe. Leaflets calcified.   Leaflet motions normal.      Tricuspid Valve:

## 2021-01-21 NOTE — ANESTHESIA POSTPROCEDURE EVALUATION
Department of Anesthesiology  Postprocedure Note    Patient: Saul Bangura  MRN: [id number]  YOB: 1935  Date of evaluation: 1/21/2021  Time:  1:43 PM     Procedure Summary     Date: 01/21/21 Room / Location: [facility] CATH LAB    Anesthesia Start: 1054 Anesthesia Stop:     Procedure: CATH LAB WITH ANESTHESIA Diagnosis:       Nonrheumatic aortic (valve) stenosis      S/P TAVR (transcatheter aortic valve replacement)    Scheduled Providers: RUBEN Painter CRNA Responsible Provider: RUBEN Painter CRNA    Anesthesia Type: general ASA Status: 4          Anesthesia Type: general    Joan Phase I: Joan Score: 8    Joan Phase II:      Last vitals: Reviewed and per EMR flowsheets.        Anesthesia Post Evaluation    Patient location during evaluation: PACU  Patient participation: complete - patient participated  Level of consciousness: awake and alert  Pain score: 0  Airway patency: patent  Nausea & Vomiting: no nausea and no vomiting  Complications: no  Cardiovascular status: blood pressure returned to baseline  Respiratory status: acceptable, spontaneous ventilation and face mask  Hydration status: stable

## 2021-01-21 NOTE — H&P
[facility] Cardiothoracic Surgery  [address], [place] [address]  Phone: [phone]  Fax: [phone]    Name: Azalia Canales  : 1935    DATE: 2021        The patient is an 82-year-old morbidly obese male who has a known history of progressive aortic stenosis. A recent echocardiogram performed on 11/10/2020 now reveals a mean gradient of 40 mmHg across his aortic valve which has increased from 24 mm in 2019. His ejection fraction remains preserved at 55 to 60%. . He underwent cardiac catheterization on 2020 by Dr. Ellyn Andrea. This reveals a diffusely diseased LAD with a 70 to 80% mid third stenosis and a 90% distal stenosis at the apex. The circumflex system has a 90% stenosis of a small second obtuse marginal branch. The RCA is a dominant system with a 70% distal stenosis. He is asymptomatic from his coronary disease. He has noticed increasing symptoms of dyspnea on exertion and shortness of breath over the last 6 months. Because of his age and overall frailty it was recommended he undergo transcatheter aortic valve replacement and therefore he was referred to me for surgical evaluation prior to proceeding with TAVR.     Past Medical History:   Diagnosis Date    Cancer [facility])     skin    CHF (congestive heart failure) (HCC)     Cholelithiasis     CKD (chronic kidney disease) stage 3, GFR 30-59 ml/min 2018    Crohn's disease of large intestine with complication ([address]     Deep venous insufficiency 2017    Essential hypertension 2017    Gastroesophageal reflux disease without esophagitis 2017    Heart murmur     Hx of blood clots     Mixed hyperlipidemia 2017    Nonrheumatic aortic valve stenosis 2018    Primary osteoarthritis involving multiple joints 2017    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[address]      Past Surgical History:   Procedure Laterality Date    CARDIAC SURGERY      CARDIAC CATH    CATARACT REMOVAL WITH IMPLANT  2018    OTHER SURGICAL HISTORY      Fistula-in-ano repair    SKIN CANCER EXCISION      SUBTOTAL COLECTOMY      TONSILLECTOMY      TOTAL HIP ARTHROPLASTY       Current Facility-Administered Medications   Medication Dose Route Frequency Provider Last Rate Last Admin    0.9 % sodium chloride infusion   Intravenous Continuous Chin Longo APRN - CNP 75 mL/hr at 01/21/21 1000 New Bag at 01/21/21 1000    sodium chloride flush 0.9 % injection 10 mL  10 mL Intravenous 2 times per day Alphonsus Sacks, APRN - CNP        sodium chloride flush 0.9 % injection 10 mL  10 mL Intravenous PRN Chin Longo APRN - CNP   10 mL at 01/21/21 1005     No Known Allergies  Family History   Problem Relation Age of Onset    Stroke Other     Other Other         atherosclerosis of extremities     Social History     Socioeconomic History    Marital status:      Spouse name: Anastacia Ventura    Number of children: 2    Years of education: 23    Highest education level: Not on file   Occupational History     Employer: RETIRED   Social Needs    Financial resource strain: Not on file    Food insecurity     Worry: Not on file     Inability: Not on file   [employer] needs     Medical: Not on file     Non-medical: Not on file   Tobacco Use    Smoking status: Never Smoker    Smokeless tobacco: Never Used   Substance and Sexual Activity    Alcohol use: No    Drug use: No    Sexual activity: Not Currently     Partners: Female   Lifestyle    Physical activity     Days per week: Not on file     Minutes per session: Not on file    Stress: Not on file   Relationships    Social connections     Talks on phone: Not on file     Gets together: Not on file     Attends Taoist service: Not on file     Active member of club or organization: Not on file     Attends meetings of clubs or organizations: Not on file     Relationship status: Not on file    Intimate partner violence     Fear of current or ex partner: Not on file     Emotionally abused: Not on file     Physically abused: Not on file     Forced sexual activity: Not on file   Other Topics Concern    Not on file   Social History Narrative    Not on file         ROS:   HEENT: denies neck pain, sore throat, blurred vision, diplopia. Respiratory: chronic shortness of breath, worse over the last 6 months. Denies any hemoptysis or wheezes. Cardiovascular: denies angina, palpitations, lower extremity edema. GI: denies abdominal pain, nausea, vomiting, constipation. : denies urinary frequency, or dysuria. Musculoskeletal: No joint pain or swelling. Neurologic: denies diplopia, headache, or ataxia. The other 14 systems have been reviewed and are negative    Physical Exam: BP (!) 145/100   Pulse 74   Temp 97.2 °F (36.2 °C) (Temporal)   Resp 19   Ht 5' 7\" (1.702 m)   Wt 235 lb (106.6 kg)   SpO2 98%   BMI 36.81 kg/m²   Frail-appearing elderly gentleman who is quite obese  ENT: Throat is clear., Mucosa clear., Septum intact., No pyorrhea or abscess. Neck: Neck is soft., No cervical masses. , Throat is clear., Neck veins are not distended. , Thyroid is normal size. Respiratory: Clear lung sounds., Normal., No wheezes or rhonchi., No use of accessory muscles. Cardiovascular: Cardiac sounds are clear., Regular rate and rhythm., No carotid bruits. , Peripheral pulses are intact., Extremities exhibit no edema or varicosities. ,  There is a grade 3/6 systolic ejection murmur that is heard best along the left sternal border and radiates into the carotid arteries  Abdomen: Abdomen is soft., No masses or tenderness. , Liver and spleen not palpable., Normal bowel sounds.,  Quite obese  Lymphatic: No lymphadenopathy in the cervical, axillary, or femoral areas. Musculoskeletal: No clubbing or cyanosis. , Normal muscle tone., Normal range of motion upper and lower extremities. , No joint inflammation or swelling. Skin: No rashes, lesions, or ulcers., No swelling or edema. Neurologic exam: No lateralizing neurologic findings. , Cranial nerves II-XII are normal., Examination of sensation is normal.  Psychiatric: Patient exhibits normal judgement and is oriented to name, time, and place., No anxiety or depression. This frail elderly 77-year-old gentleman has progressive severe calcific aortic stenosis which has now become quite symptomatic. He now has a 40 mm mean gradient with preserved LV function. He does appear to have significant multivessel coronary disease as well particularly his LAD and RCA. There is no question that he would be at high risk for combined surgical aortic valve replacement and multivessel coronary bypass grafting. I have believe his best option would be to undergo stenting of his RCA and LAD followed by TAVR 2 weeks later. I discussed this procedure in detail with the patient and his wife the plan is to discuss his case at our weekly valve conference and then proceed from there.     --------------------    Addendum:  ----------------  Patient was discussed during our TAVR meeting   No need for PCI prior to TAVR     Risks, benefits, alternatives of TAVR discussed with the patient   I explained the procedure to the patient in detail and fully informed consent obtained.   Acceptable Mallampati score  Consent for general anesthesia will be obtained by anesthesia team  ASA 3        Arthur Shore MD, [facility]  Interventional Cardiologist, Endovascular Specialist   Medical Director, Structural Heart Program   [facility]    Electronically signed by Arthur Shore MD on 1/21/2021 at 11:13 AM

## 2021-01-21 NOTE — OP NOTE
Operative Note      Patient: Shari Cooper  YOB: 1935  MRN: [id number]    Date of Procedure: 1/21/2021    Pre-Op Diagnosis: Complete heart block post TAVR    Post-Op Diagnosis: Same       Procedure: Dual-chamber pacemaker placement. : Jinny Braga MD    Anesthesia: Moderate conscious sedation  Anesthesia: Lidocaine LA  Sedation: Versed 1 mg; Fentanyl 50 mg  Start time: 9666  Stop time: 1734  ASA Class: 3  Estimated blood loss: Less than 20 mL  An independent trained observer pushed medications at my direction. The patient was monitored continuously with the ECG, pulse oximetry, blood pressure monitoring, and direct observation for level of consciousness. Complications: None    Detailed Description of Procedure: After obtaining informed written consent, the patient was brought to the catheterization laboratory where the left chest area was prepared in the usual sterile fashion. The patient was monitored continuously with ECG, pulse oximetry, blood pressure monitoring and direct observation. Additionally, please review the \"[facility] Hemodynamic Procedure Report\", which is generated electronically through the [employer] This report includes additional details regarding this procedure including, but not limited to:     1. Patient Data,   2. Admission,   3. Procedure,   4. Hemodynamics,   5. Vital Signs,   6. Medications, including conscious sedation medications given during the procedure (as note above),   7. Procedure Log,   8. Device Usage,   9. Signature Audit [place], and,   10. Signatures. It should be noted, that I sign the \"Signatures\" line electronically through the \"HPF Def Portals\" tab on my computer. The 's hands were scrubbed in a betadine solution for 5 minutes. Moderate conscious sedation was administered at my direction. Utilizing local anesthesia and a percutaneous technique, a single puncture was made in the left subclavian vein.  Utilizing a combination of sharp and blunt dissection, a pacemaker pocket was created. A second puncture was then made in the left subclavian vein. The right ventricular and right atrial leads were inserted without difficulty and appropriate thresholds were obtained. The lead anchors were secured to the floor of the pacemaker pocket. The pacemaker pocket was copiously irrigated with antibiotic solution. The leads were attached to the generator. An antibiotic pouch was placed around the generator and leads and placed in the pocket. The subcutaneous and cutaneous tissues were approximated, and a sterile dressing applied. He was then taken to his room in stable and satisfactory condition. Complications:  none    Technical Information:       Model # Serial #        Right Atrial Lead (Medtronic) 5076 -52 [id number]   Right Ventricular Lead (Medtronic) 5076 -58 [id number]          Pulse Width (ms) Voltage (V) Current (mA) Impedance (Ohms) P / R Wave (mV)            Right Atrial Lead 0.4  2.1   589  4.5   Right Ventricular Lead 0.5  1.0   682          Generator Product Name Model #: Serial #:        [id number] XT  MRI  [id number] [id number]             IMPRESSION:    1. Successful insertion of a dual chamber rate response pacemaker for complete heart block post TAVR 1/21/2021  2. Successful insertion of a right atrial lead  3. Successful insertion of a right ventricular lead  4. Successful pacemaker pocket creation  5. Successful placement of an antibiotic pouch  6.   Supervision of the administration of moderate conscious sedation      Electronically signed by Adán Lockwood MD on 1/21/21      Electronically signed by Adán Lockwood MD on 1/21/2021 at 5:48 PM

## 2021-01-21 NOTE — ANESTHESIA PRE PROCEDURE
Department of Anesthesiology  Preprocedure Note       Name:  Robyn Craft   Age:  82 y.o.  :  1935                                          MRN:  [id number]         Date:  2021      Surgeon: * No surgeons listed *    Procedure: * No procedures listed *    Medications prior to admission:   Prior to Admission medications    Medication Sig Start Date End Date Taking? Authorizing Provider   furosemide (LASIX) 20 MG tablet Take 1/2 tab daily in the morning daily. If you swell more, increase to 20 mg daily 20   Luisa Vang MD   VITAMIN E COMPLEX PO Take 5,000 Units by mouth daily    Historical Provider, MD   lansoprazole (PREVACID) 15 MG delayed release capsule Take 15 mg by mouth daily    Historical Provider, MD   mesalamine (PENTASA) 250 MG extended release capsule TAKE THREE CAPSULES BY MOUTH FOUR TIMES DAILY 20   Luisa Vang MD   losartan (COZAAR) 50 MG tablet TAKE ONE TABLET BY MOUTH DAILY 20   Luisa Vang MD   warfarin (COUMADIN) 7.5 MG tablet TAKE ONE TABLET BY MOUTH DAILY EXCEPT ON WEDNESDAY TAKE ONE-HALF TABLET 20   Luisa Vang MD   cyanocobalamin 1000 MCG/ML injection monthly 19   Luisa Vang MD   fluticasone [facility]) 50 MCG/ACT nasal spray 1 spray by Each Nostril route daily  Patient taking differently: 1 spray by Each Nostril route as needed  19   Luisa Vang MD   Cholecalciferol (VITAMIN D3) 5000 units TABS Take by mouth    Historical Provider, MD   B Complex-C-E-Zn (STRESS FORMULA/ZINC PO) Take by mouth    Historical Provider, MD       Current medications:    Current Outpatient Medications   Medication Sig Dispense Refill    furosemide (LASIX) 20 MG tablet Take 1/2 tab daily in the morning daily.   If you swell more, increase to 20 mg daily 90 tablet 1    VITAMIN E COMPLEX PO Take 5,000 Units by mouth daily      lansoprazole (PREVACID) 15 MG delayed release capsule Take 15 mg by mouth daily  mesalamine (PENTASA) 250 MG extended release capsule TAKE THREE CAPSULES BY MOUTH FOUR TIMES DAILY 1080 capsule 1    losartan (COZAAR) 50 MG tablet TAKE ONE TABLET BY MOUTH DAILY 90 tablet 0    warfarin (COUMADIN) 7.5 MG tablet TAKE ONE TABLET BY MOUTH DAILY EXCEPT ON WEDNESDAY TAKE ONE-HALF TABLET 90 tablet 3    cyanocobalamin 1000 MCG/ML injection monthly 10 mL 0    fluticasone (FLONASE) 50 MCG/ACT nasal spray 1 spray by Each Nostril route daily (Patient taking differently: 1 spray by Each Nostril route as needed ) 2 Bottle 1    Cholecalciferol (VITAMIN D3) 5000 units TABS Take by mouth      B Complex-C-E-Zn (STRESS FORMULA/ZINC PO) Take by mouth       No current facility-administered medications for this encounter.         Allergies:  No Known Allergies    Problem List:    Patient Active Problem List   Diagnosis Code    Chronic anticoagulation Z79.01    Essential hypertension I10    Crohn's disease of large intestine with complication ([facility] U35.376    Mixed hyperlipidemia E78.2    Deep venous insufficiency I87.2    Primary osteoarthritis involving multiple joints M89.49    Gastroesophageal reflux disease without esophagitis K21.9    Aortic systolic murmur on examination - suspect aortic stenosis I35.8    Nonrheumatic aortic valve stenosis I35.0    Chronic kidney disease, stage III (moderate) N18.30    S/P partial resection of colon Z90.49    Intestinal adhesions with partial obstruction (HCC) A52.71    Diastolic congestive heart failure with preserved left ventricular function, NYHA class 2 (HCC) I50.30    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 choroidal neovascularization ([facility] H35.3221    Morbid obesity with BMI of 40.0-44.9, adult ([facility] E66.01, Z68.41    Bilateral carotid bruits R09.89    History of DVT (deep vein thrombosis) Z86.718    Dizziness R42    Moderate aortic stenosis I35.0    Aortic stenosis, severe I35.0       Past Medical History: Diagnosis Date    Cancer [facility])     skin    CHF (congestive heart failure) (HCC)     Cholelithiasis     CKD (chronic kidney disease) stage 3, GFR 30-59 ml/min 11/13/2018    Crohn's disease of large intestine with complication ([facility] 62/3/8939    Deep venous insufficiency 11/7/2017    Essential hypertension 11/7/2017    Gastroesophageal reflux disease without esophagitis 11/7/2017    Heart murmur     Hx of blood clots     Mixed hyperlipidemia 11/7/2017    Nonrheumatic aortic valve stenosis 5/19/2018    Primary osteoarthritis involving multiple joints 11/7/2017    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[facility] 2002       Past Surgical History:        Procedure Laterality Date    CATARACT REMOVAL WITH IMPLANT  2018    OTHER SURGICAL HISTORY      Fistula-in-ano repair    SKIN CANCER EXCISION      SUBTOTAL COLECTOMY      TONSILLECTOMY      TOTAL HIP ARTHROPLASTY         Social History:    Social History     Tobacco Use    Smoking status: Never Smoker    Smokeless tobacco: Never Used   Substance Use Topics    Alcohol use: No                                Counseling given: Not Answered      Vital Signs (Current): There were no vitals filed for this visit.                                            BP Readings from Last 3 Encounters:   01/05/21 124/88   12/21/20 (!) 144/74   11/24/20 136/80       NPO Status:                                                                                 BMI:   Wt Readings from Last 3 Encounters:   01/05/21 246 lb (111.6 kg)   12/21/20 244 lb (110.7 kg)   11/24/20 240 lb (108.9 kg)     There is no height or weight on file to calculate BMI.    CBC:   Lab Results   Component Value Date    WBC 7.5 12/21/2020    RBC 4.23 12/21/2020    RBC 2.90 06/21/2011    HGB 13.9 12/21/2020    HGB 15.1 05/24/2011    HCT 41.7 12/21/2020    HCT 31.4 07/04/2011    MCV 98.6 12/21/2020    RDW 13.8 12/21/2020     12/21/2020     07/04/2011       CMP:   Lab Results   Component Value Date  12/21/2020     07/04/2011    K 4.1 12/21/2020    K 4.2 07/04/2011     12/21/2020     07/04/2011    CO2 27 12/21/2020    BUN 18 12/21/2020    CREATININE 1.2 12/21/2020    CREATININE 0.9 07/04/2011    GFRAA >59 12/21/2020    LABGLOM 58 12/21/2020    GLUCOSE 108 12/21/2020    PROT 7.1 12/21/2020    PROT 4.8 06/28/2011    CALCIUM 9.7 12/21/2020    BILITOT 0.9 12/21/2020    ALKPHOS 113 12/21/2020    ALKPHOS 52 06/28/2011    AST 17 12/21/2020    ALT 13 12/21/2020       POC Tests: No results for input(s): POCGLU, POCNA, POCK, POCCL, POCBUN, POCHEMO, POCHCT in the last 72 hours. Coags:   Lab Results   Component Value Date    PROTIME 16.0 12/21/2020    PROTIME 25.32 07/05/2011    INR 2.0 01/05/2021    INR 1.28 12/21/2020       HCG (If Applicable): No results found for: PREGTESTUR, PREGSERUM, HCG, HCGQUANT     ABGs: No results found for: PHART, PO2ART, FZT3IWS, KHG7SXP, BEART, T5ICJIKZ     Type & Screen (If Applicable):  No results found for: LABABO, LABRH    Drug/Infectious Status (If Applicable):  No results found for: HIV, HEPCAB    COVID-19 Screening (If Applicable):   Lab Results   Component Value Date    COVID19 Not Detected 01/18/2021         Anesthesia Evaluation  Patient summary reviewed and Nursing notes reviewed no history of anesthetic complications:   Airway: Mallampati: I  TM distance: >3 FB   Neck ROM: full  Mouth opening: > = 3 FB Dental: normal exam         Pulmonary:       (-) shortness of breath, sleep apnea and not a current smoker                          ROS comment: CTA chest:  Impression  Atheromatous changes of the thoracic aorta with heavy  calcification of the aortic valve and mitral annulus. The lungs are poorly inflated but unremarkable. Cholelithiasis without evidence of cholecystitis. Moderate thickening of the wall of the visualized right colon may  partly be due to incomplete distention. Possibility of colitis is not  excluded. Other findings as above. Cardiovascular:  Exercise tolerance: good (>4 METS),   (+) hypertension:, valvular problems/murmurs: AS, CAD:, CHF:, ALONZO: after ambulating 1 flight of stairs, murmur, hyperlipidemia    (-) pacemaker, past MI, CABG/stent and dysrhythmias    ECG reviewed      Echocardiogram reviewed         Beta Blocker:  Not on Beta Blocker      ROS comment: Echo 2020:  Summary   Mitral valve leaflets are mildly thickened with preserved leaflet   mobility. Mitral annular calcification is present. Mild to moderate mitral regurgitation is present. Thickened and calcified aortic valve leaflets with restricted mobility c/w   calcific aortic stenosis severe with mean gradient 40 mm hg gradients have   increased since prior study   Tricuspid valve is structurally normal.   Mild tricuspid regurgitation with estimated RVSP of 25 mm Hg. Mildly dilated left atrium. Normal left ventricular size with preserved LV function and an estimated   ejection fraction of approximately 55-60%. Mild concentric left ventricular hypertrophy. Impaired relaxation compatible with diastolic dysfunction. ( reversed E/A   ratio)   No regional wall motion abnormalities. EK BPM  Sinus rhythm with 1st degree A-V block  Left axis deviation  RBBB with left anterior fascicular block  Comparison Summary: No serial comparison made  Summary: Abnormal ECG     Neuro/Psych:      (-) seizures and CVA           GI/Hepatic/Renal:   (+) GERD:,      (-) liver disease and no renal disease      ROS comment: No dysphagia. Endo/Other:    (+) blood dyscrasia (coumadin stopped 2 days ago): anticoagulation therapy:., no malignancy/cancer. (-) diabetes mellitus, no malignancy/cancer               Abdominal:           Vascular:   + PVD, aortic or cerebral, . ROS comment: CTA abdomen/ pelvis:  1. Vascular calcifications noted in the abdominal aorta and iliac  arteries as described above.  There is approximately 60% stenosis involving the origin of the inferior mesenteric artery. 2. Status post right hip arthroplasty    Carotid duplex US:  Risk Factors       - The patient's risk factor(s) include: arterial hypertension.      Impression      There is mixed plaque visualized in the bilateral internal carotid   arteries.  Naomy Avilla is less than 50% stenosis in the right internal carotid artery.  Naomy Nba is less than 50% stenosis of the left internal carotid artery.   There is normal antegrade flow in the bilateral vertebral arteries.      .                             Anesthesia Plan      general     ASA 4       Induction: intravenous. RAMAN and arterial line  MIPS: Postoperative opioids intended and Prophylactic antiemetics administered. Anesthetic plan and risks discussed with patient. Use of blood products discussed with patient whom consented to blood products.                    Ruby Posey DO   1/21/2021

## 2021-01-21 NOTE — PROGRESS NOTES
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     Priority: High    Aortic stenosis, severe      Priority: Low    Bilateral carotid bruits 10/28/2020     Priority: Low    History of DVT (deep vein thrombosis) 10/28/2020     Priority: Low    Dizziness 10/28/2020     Priority: Low    Moderate aortic stenosis 10/28/2020     Priority: Low    S/P partial resection of colon 05/01/2019     Priority: Low    Intestinal adhesions with partial obstruction ([address] 05/01/2019     Priority: Low    Diastolic congestive heart failure with preserved left ventricular function, NYHA class 2 ([address] 05/01/2019     Priority: Low    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 choroidal neovascularization ([address] 05/01/2019     Priority: Low    Morbid obesity with BMI of 40.0-44.9, adult ([address] 05/01/2019     Priority: Low    Chronic kidney disease, stage III (moderate) 11/13/2018     Priority: Low    Nonrheumatic aortic valve stenosis 05/19/2018     Priority: Low    Aortic systolic murmur on examination - suspect aortic stenosis 05/07/2018     Priority: Low    Essential hypertension 11/07/2017     Priority: Low    Crohn's disease of large intestine with complication ([address] 02/83/0807     Priority: Low    Mixed hyperlipidemia 11/07/2017     Priority: Low    Deep venous insufficiency 11/07/2017     Priority: Low    Primary osteoarthritis involving multiple joints 11/07/2017     Priority: Low    Gastroesophageal reflux disease without esophagitis 11/07/2017     Priority: Low    Chronic anticoagulation 06/21/2017     Priority: Low     Overview Note:     Updating Deprecated Diagnoses         Admit Date:  1/21/2021    Admission Problem List: Present on Admission:   S/P TAVR (transcatheter aortic valve replacement)      Cardiac Specific Data:  Specialty Problems Cardiology Problems    Deep venous insufficiency        Essential hypertension        Mixed hyperlipidemia        Nonrheumatic aortic valve stenosis        Diastolic congestive heart failure with preserved left ventricular function, NYHA class 2 (HCC)        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 choroidal neovascularization (HCC)        Moderate aortic stenosis        Aortic stenosis, severe               1.    Severe aortic stenosis, mean gradient 40 mmHg status post TAVR with S3 florentino valve 1/21/2021, normal LV ejection fraction, postoperative heart block requiring temporary pacemaker placement. 2.  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 3.  Crohn's disease. 4.  Hypertension with CKD stage III. Subjective:  Mr. Mike Rose underwent TAVR with S3 sapient valve today. Noted to be in heart block requiring temporary pacemaker placement. Currently remains paced and request for permanent pacemaker placement. Objective:   BP (!) 145/100   Pulse 63   Temp 98 °F (36.7 °C) (Temporal)   Resp 11   Ht 5' 7\" (1.702 m)   Wt 235 lb (106.6 kg)   SpO2 95%   BMI 36.81 kg/m²       Intake/Output Summary (Last 24 hours) at 1/21/2021 1526  Last data filed at 1/21/2021 1437  Gross per 24 hour   Intake 800 ml   Output 310 ml   Net 490 ml       Prior to Admission medications    Medication Sig Start Date End Date Taking? Authorizing Provider   Multiple Vitamins-Minerals (PRESERVISION AREDS 2+MULTI VIT) CAPS Take 1 tablet by mouth daily   Yes Historical Provider, MD   furosemide (LASIX) 20 MG tablet Take 1/2 tab daily in the morning daily.   If you swell more, increase to 20 mg daily 11/18/20  Yes Severiano Quezada MD   lansoprazole (PREVACID) 15 MG delayed release capsule Take 15 mg by mouth daily   Yes Historical Provider, MD   mesalamine (PENTASA) 250 MG extended release capsule TAKE THREE CAPSULES BY MOUTH FOUR TIMES DAILY 9/1/20  Yes Felton Caraballo MD   losartan (COZAAR) 50 MG tablet TAKE ONE TABLET BY MOUTH DAILY 9/1/20  Yes Felton Caraballo MD   B Complex-C-E-Zn (STRESS FORMULA/ZINC PO) Take by mouth   Yes Historical Provider, MD   VITAMIN E COMPLEX PO Take 5,000 Units by mouth daily    Historical Provider, MD   warfarin (COUMADIN) 7.5 MG tablet TAKE ONE TABLET BY MOUTH DAILY EXCEPT ON WEDNESDAY TAKE ONE-HALF TABLET 6/29/20   Felton Caraballo MD   cyanocobalamin 1000 MCG/ML injection monthly 11/26/19   Felton Caraballo MD   fluticasone [facility]) 50 MCG/ACT nasal spray 1 spray by Each Nostril route daily  Patient taking differently: 1 spray by Each Nostril route as needed  6/21/19   Felton Caraballo MD   Cholecalciferol (VITAMIN D3) 5000 units TABS Take by mouth    Historical Provider, MD        sodium chloride flush  10 mL Intravenous 2 times per day    [START ON 1/22/2021] furosemide  10 mg Oral Daily    [START ON 1/22/2021] pantoprazole  40 mg Oral QAM AC    [START ON 1/22/2021] losartan  50 mg Oral Daily    mesalamine  250 mg Oral 4x Daily       TELEMETRY: Paced    Physical Exam:      Physical Exam  Constitutional:       Appearance: He is well-developed. He is obese. Comments: Obese with short stature   HENT:      Mouth/Throat:      Pharynx: No oropharyngeal exudate. Eyes:      General: No scleral icterus. Right eye: No discharge. Left eye: No discharge. Neck:      Thyroid: No thyromegaly. Vascular: No JVD. Cardiovascular:      Rate and Rhythm: Normal rate and regular rhythm. Heart sounds: No murmur. No friction rub. No gallop. Comments: Systolic murmur  Pulmonary:      Effort: No respiratory distress. Breath sounds: No stridor. No wheezing or rales. Abdominal:      General: Bowel sounds are normal. There is no distension. Palpations: Abdomen is soft. There is no mass. Tenderness: There is no abdominal tenderness. There is no guarding or rebound.    Musculoskeletal:         General: No deformity. Skin:     General: Skin is warm. Coloration: Skin is not pale. Findings: No erythema or rash. Neurological:      Mental Status: He is alert and oriented to person, place, and time. Motor: No abnormal muscle tone. Coordination: Coordination normal.      Deep Tendon Reflexes: Reflexes normal.                 Lab Data:  CBC:   Recent Labs     01/21/21 0949   WBC 10.3   HGB 15.0   HCT 45.1   MCV 97.4*        BMP:   Recent Labs     01/21/21 0949      K 3.6      CO2 24   BUN 13   CREATININE 1.2     LIVER PROFILE:   Recent Labs     01/21/21 0949   AST 21   ALT 15   BILITOT 1.0   ALKPHOS 128     PT/INR:   Recent Labs     01/21/21 0949   PROTIME 17.6*   INR 1.44*     APTT: No results for input(s): APTT in the last 72 hours. BNP:  No results for input(s): BNP in the last 72 hours. CK, CKMB, Troponin: @LABRCNT (CKTOTAL:3, CKMB:3, TROPONINI:3)@    IMAGING:  Cta Chest W Wo Contrast    Result Date: 1/5/2021  Examination. CTA CHEST W WO CONTRAST 1/5/2021 12:10 PM History: Severe aortic stenosis. DLP: 2251 mGycm. The CT angiography of the chest is performed after intravenous contrast enhancement. The images are acquired in axial plane with subsequent 2-D reconstruction coronal and sagittal planes and 3-D maximum intensity projection reconstruction. The targeted high-resolution thin section images of the thoracic aorta including the aortic valve and the heart are obtained. There are atheromatous changes of the thoracic aorta. Heavy calcification of the aortic valve is seen which is not evaluated at this time. No aortic aneurysm or dissection. Severe atheromatous changes of coronary arteries are seen more severely involving the left coronary artery and branches. Good opacification of the pulmonary arteries and the branches. No filling defects. Normal origin course and caliber of the brachiocephalic arteries are noted. Heavy calcification of mitral annulus is noted.  There is no evidence of mediastinal or hilar mass or lymphadenopathy. Included part of the soft tissues of the neck are unremarkable. The visualized vascular structures are unremarkable. Moderate asymmetrical fullness of the right lobe of the thyroid gland is seen. No discrete nodule. The lungs are poorly inflated with atelectatic changes more pronounced in the lower lungs bilaterally. No areas of focal consolidation, infiltrate or pulmonary mass. There is a small oval air pocket seen. The distal trachea and the right mainstem bronchus. No communication to the trachea or the bronchus. This may represent a small diverticulum from the esophagus? Clovia Carls The etiology and clinical significance is not certain. There is bilateral gynecomastia. The incompletely visualized liver and spleen are unremarkable. There is evidence of cholelithiasis. Moderate fullness of the left adrenal gland is seen. The kidneys and pancreas are suboptimally visualized and evaluated. Moderate thickening of the wall of the distal ascending colon and hepatic flexure with a surrounding peritoneal fat infiltration. This is suboptimally an incompletely evaluated in this study. The images reviewed in bone window show chronic degenerative changes of the thoracic and visualized lumbar spine. No focal bony lesion. Atheromatous changes of the thoracic aorta with heavy calcification of the aortic valve and mitral annulus. The lungs are poorly inflated but unremarkable. Cholelithiasis without evidence of cholecystitis. Moderate thickening of the wall of the visualized right colon may partly be due to incomplete distention. Possibility of colitis is not excluded. Other findings as above.  Signed by Dr Amanda Rose on 1/5/2021 1:40 PM    Cta Abdomen Pelvis W Contrast    Result Date: 1/5/2021  EXAMINATION: CTA ABDOMEN PELVIS W CONTRAST 1/5/2021 1:44 PM HISTORY: CT angiogram abdomen and pelvis with contrast 1/5/2021 12:10 PM HISTORY: TAVR COMPARISON: None DLP: 1797 mGy centimeters. Automated exposure control was utilized to minimize patient radiation dose. TECHNIQUE: Helical tomographic images of the abdomen and pelvis utilizing angiographic protocol were obtained before and after the intravenous infusion of contrast. Multiplanar and 3 D reformatted images were provided for review. FINDINGS: Angiogram: The abdominal aorta is visualized. Scattered atherosclerotic calcifications are noted in the abdominal aorta. . There is no significant plaque or stenosis. . The right common iliac artery and its bifurcation are normal. Faint vascular calcifications present bifurcation right common femoral artery. Left common iliac artery is visualized bifurcation is unremarkable. Left common femoral artery is straight normal bifurcation with a few faint scattered calcifications. . . The celiac artery and its major branches are normal in appearance. The superior mesenteric artery and its proximal branches are normal in appearance. Inferior mesenteric artery is visualized. There may be 60% stenosis involving the origin of the inferior mesenteric artery. . Right renal artery is unremarkable. The left renal artery is mildly tortuous. . Other findings: Decreased CT attenuation liver is noted consistent with hepatic steatosis. Calculi are present in the gallbladder. Additional calculi are noted in the region of the cystic duct or neck of the gallbladder. Pancreas is unremarkable. Geographic enhancement of spleen unremarkable. Adrenal glands are normal. The renal contours demonstrate symmetric excretion of contrast. The bowel is unobstructed. The bladder is visualized. A right hip prosthesis is present. 1. Vascular calcifications noted in the abdominal aorta and iliac arteries as described above. There is approximately 60% stenosis involving the origin of the inferior mesenteric artery.  2. Status post right hip arthroplasty Signed by Dr Steffanie Norris on 1/5/2021 1:55 PM        Assessment and Plan:    82-year-old gentleman with past medical history of hypertension, Crohn's disease, right leg DVT with chronic leg swelling on anticoagulation, CKD stage III, severe aortic stenosis with recent worsening, mean gradient 40 mmHg status post TAVR with sapient S3 valve 1/21/2020 with postoperative heart block requiring temporary pacemaker placement referred for permanent pacemaker placement. Risks, benefits, alternatives of dual-chamber pacemaker placement discussed with the patient and full informed consent obtained.   Acceptable Mallampati score  Consent for moderate conscious sedation  ASA 3    Jameson Avilez MD 1/21/2021 3:26 PM

## 2021-01-21 NOTE — ANESTHESIA PROCEDURE NOTES
Arterial Line:    An arterial line was placed using surface landmarks, in the OR for the following indication(s): continuous blood pressure monitoring and blood sampling needed. A 20 gauge (size), 1 and 3/4 inch (length), Arrow (type) catheter was placed, Seldinger technique used, into the left radial artery, secured by Tegaderm.   Anesthesia type: Local  Local infiltration: None    Events:  patient tolerated procedure well with no complications and EBL 0mL.  1/21/2021 11:11 AM  Resident/CRNA: RUBEN Beth CRNA  Performed: Resident/CRNA   Preanesthetic Checklist  Completed: patient identified, IV checked, site marked, risks and benefits discussed, surgical consent, monitors and equipment checked, pre-op evaluation, timeout performed, anesthesia consent given, oxygen available and patient being monitored

## 2021-01-21 NOTE — OP NOTE
Operative Note                                                TRANSCATHETER AORTIC VALVE REPLACEMENT -TAVR      Patient Name: Scarlett Lopez   MRN: [id number]   Age: 82 y.o. : 1935   Admission Date: 2021   Room/Bed: [facility] CATH POOL/NONE   PRIMARY CARE PROVIDER   Sena Juarez MD       DATE OF PROCEDURE: 2021     :  Yaz Shipley MD     Co-/Surgeon: Shana Hawthorne MD    Pre-operative diagnosis: Severe symptomatic aortic stenosis. Post-operative diagnosis: Successful transcutaneous aortic valve replacement using a 29 mm Arreaga Doe S3 valve. MCFP (Major co morbidities and conditions):   Acute on Chronic diastolic congestive heart failure due to valvular heart disease     Patient Active Problem List   Diagnosis    Chronic anticoagulation    Essential hypertension    Crohn's disease of large intestine with complication ([address]    Mixed hyperlipidemia    Deep venous insufficiency    Primary osteoarthritis involving multiple joints    Gastroesophageal reflux disease without esophagitis    Aortic systolic murmur on examination - suspect aortic stenosis    Nonrheumatic aortic valve stenosis    Chronic kidney disease, stage III (moderate)    S/P partial resection of colon    Intestinal adhesions with partial obstruction (HCC)    Diastolic congestive heart failure with preserved left ventricular function, NYHA class 2 (HCC)    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 choroidal neovascularization ([address]    Morbid obesity with BMI of 40.0-44.9, adult (HCC)    Bilateral carotid bruits    History of DVT (deep vein thrombosis)    Dizziness    Moderate aortic stenosis    Aortic stenosis, severe    S/P TAVR (transcatheter aortic valve replacement)       Procedure:   Access of femoral artery bilaterally and femoral vein. Femoral angiography. Temporary transvenous pacer insertion in the RV   Aortic root angiography.   Transfemoral transcatheter aortic valve replacment(TAVR) using a 29 mm Arreaga Doe S3 valve. Perclose device x2 placement in the primary access. 6F Angio-seal device placement in the secondary femoral access. Anesthesia: General anesthesia anesthesia with RAMAN     Indication for procedure:   Severe symptomatic aortic stenosis deemed appropriate by heart team discussion. STS score 3.7% for mortality    Description of the procedure: Following informed consent, the patient was bought to the hybrid operating room and placed under general anesthesia. Anesthesia was conducted by Dr Charlene Eng DO and appropriate monitoring lines/IVs were placed. Using US micro-puncture techniques, fluoroscopy was used to access the left femoral Artery and the arteriotomy was confirmed using femoral angiography. A 6 Fr sheath was placed in the left femoral Artery. Using US guidance, the left femoral Vein was accessed and a 6 Fr sheath was placed in the femoral vein. Using US guidance and micro-puncture techniques was used to access the right Femoral Artery and the arteriotomy was confirmed using femoral angrography. A 6 Fr sheath was placed. 2 Perclose sutures were deployed at right angles using the \"pre-close\" technique. Weight based heparin bolus was then administered to ensure ACT was within therapeutic range. The Primary access sheath was up sized over stiff wire in the descending aorta using serial dilatation and finally  A 16 Fr Arreaga delivery system was advanced into the descending aorta without difficulty. The sheath was sutured in the groin to ensure stability. A balloon tipped temporary transvenous pacemaker was inserted through the femoral venous sheath and into the RV apex. Pacing threshold were obtained and the pacemaker was placed in standby mode. A 6 Fr pigtail catheter was then advanced into the aorta and placed at the level of the aortic cusps.  A baseline aortic root angiogram was performed in the optimal views. Next, an AL1 catheter with a straight tipped J wire was advanced through the large sheath and used to cross the aortic valve. AL1 was then advanced into the left ventricle. AL1 catheter was exchanged for a pigtail catheter over a J wire. The J wire was exchanged for a 0.035 pre shaped Lunderquist wire that was advanced into the LV cavity. The pigtail catheter was then removed. Over the wire A 29 mm Fuller Search valve delivery system was then advanced into the ascending aorta and the valve was then positioned across the stenotic native valve. With rapid pacing at 180 beats per minute, the valve was deployed in the standard technique with full inflation. Notably the patient had horizontal aorta and very tortuous aortic system    A post-procedure RAMAN revealed no central aortic regurgitation and trivial perivalvular regurgitation/leak (PVL). The valve delivery system and the [facility] MINNE wire were removed. Aortic angiogram done confirming excellent results  Mean gradient with RAMAN was 6 mmHg    The pigtail then pulled down to the iliac bifurcation. After administering protamine, the Arreaga sheath was removed and the previously placed Perclose sutures were deployed using the standard technique. Good hemostasis was achieved. Ilio-femoral angiography performed using the pigtail catheter revealed no evidence of bleeding, dissection or flow limitation. The 6 Fr arterial sheath was then removed from the left FA and a 6 Fr Angioseal device was then deployed using the standard technique to achieve excellent hemostasis. The venous sheath was then removed and hemostasis was achieved using manual compression. Complications: Transient heart block with heart rate of 20-30, requiring insertion of temporary venous pacemaker from the right IJ which was performed under ultrasound guidance and fluoroscopy  Blood loss: 50 cc  Contrast use: 260 cc  Total fluoroscopic time: 37 min      Plan:   1. ICU observation for further management per TAVR protocol. Patient will need assessment regarding need for permanent pacemaker given the fact that his native rhythm showed severe bradycardia, he is currently pacemaker dependent. Discussed with the patient and the family regarding timing for permanent pacemaker implantation  2. ASA indefinitely and Plavix for 4 weeks. 3. Echo in the morning prior to discharge   4.  The patient will follow UP with me in the valve clinic after 4 weeks with an Echo  per TVT registry requirement        Derek Horton MD, [facility]  Interventional Cardiologist, Endovascular Specialist   Medical Director, Structural Heart Program   [facility]        Electronically signed by Derek Horton MD on 1/21/2021 at 1:44 PM

## 2021-01-21 NOTE — PROGRESS NOTES
Received patient via bed post TAVR. Settled into room 145 with bedside monitor and Left radial Lary Zero and called.  Temp pacemaker to right jugular rate of 60 ma2

## 2021-01-22 LAB
ALBUMIN SERPL-MCNC: 3.4 G/DL (ref 3.5–5.2)
ALP BLD-CCNC: 97 U/L (ref 40–130)
ALT SERPL-CCNC: 13 U/L (ref 5–41)
ANION GAP SERPL CALCULATED.3IONS-SCNC: 15 MMOL/L (ref 7–19)
AST SERPL-CCNC: 47 U/L (ref 5–40)
BILIRUB SERPL-MCNC: 0.3 MG/DL (ref 0.2–1.2)
BILIRUBIN URINE: NEGATIVE
BLOOD, URINE: NEGATIVE
BUN BLDV-MCNC: 17 MG/DL (ref 8–23)
CALCIUM SERPL-MCNC: 8.5 MG/DL (ref 8.8–10.2)
CHLORIDE BLD-SCNC: 104 MMOL/L (ref 98–111)
CLARITY: CLEAR
CO2: 21 MMOL/L (ref 22–29)
COLOR: YELLOW
CREAT SERPL-MCNC: 1.3 MG/DL (ref 0.5–1.2)
GFR AFRICAN AMERICAN: >59
GFR NON-AFRICAN AMERICAN: 53
GLUCOSE BLD-MCNC: 115 MG/DL (ref 70–99)
GLUCOSE BLD-MCNC: 96 MG/DL (ref 74–109)
GLUCOSE URINE: NEGATIVE MG/DL
HCT VFR BLD CALC: 36.2 % (ref 42–52)
HEMOGLOBIN: 12.5 G/DL (ref 14–18)
KETONES, URINE: 40 MG/DL
LEUKOCYTE ESTERASE, URINE: NEGATIVE
LV EF: 58 %
LVEF MODALITY: NORMAL
MCH RBC QN AUTO: 32.4 PG (ref 27–31)
MCHC RBC AUTO-ENTMCNC: 34.5 G/DL (ref 33–37)
MCV RBC AUTO: 93.8 FL (ref 80–94)
NITRITE, URINE: NEGATIVE
PDW BLD-RTO: 13.5 % (ref 11.5–14.5)
PERFORMED ON: ABNORMAL
PH UA: 5 (ref 5–8)
PLATELET # BLD: 130 K/UL (ref 130–400)
PMV BLD AUTO: 10 FL (ref 9.4–12.4)
POTASSIUM SERPL-SCNC: 3.8 MMOL/L (ref 3.5–5)
PRO-BNP: 1974 PG/ML (ref 0–1800)
PROTEIN UA: NEGATIVE MG/DL
RBC # BLD: 3.86 M/UL (ref 4.7–6.1)
SODIUM BLD-SCNC: 140 MMOL/L (ref 136–145)
SPECIFIC GRAVITY UA: >=1.045 (ref 1–1.03)
TOTAL PROTEIN: 5.8 G/DL (ref 6.6–8.7)
UROBILINOGEN, URINE: 0.2 E.U./DL
WBC # BLD: 8.8 K/UL (ref 4.8–10.8)

## 2021-01-22 PROCEDURE — 93005 ELECTROCARDIOGRAM TRACING: CPT | Performed by: NURSE PRACTITIONER

## 2021-01-22 PROCEDURE — 36415 COLL VENOUS BLD VENIPUNCTURE: CPT

## 2021-01-22 PROCEDURE — 6370000000 HC RX 637 (ALT 250 FOR IP): Performed by: NURSE PRACTITIONER

## 2021-01-22 PROCEDURE — 33361 REPLACE AORTIC VALVE PERQ: CPT | Performed by: THORACIC SURGERY (CARDIOTHORACIC VASCULAR SURGERY)

## 2021-01-22 PROCEDURE — 80053 COMPREHEN METABOLIC PANEL: CPT

## 2021-01-22 PROCEDURE — 82947 ASSAY GLUCOSE BLOOD QUANT: CPT

## 2021-01-22 PROCEDURE — 85027 COMPLETE CBC AUTOMATED: CPT

## 2021-01-22 PROCEDURE — 37799 UNLISTED PX VASCULAR SURGERY: CPT

## 2021-01-22 PROCEDURE — 6370000000 HC RX 637 (ALT 250 FOR IP): Performed by: INTERNAL MEDICINE

## 2021-01-22 PROCEDURE — 93308 TTE F-UP OR LMTD: CPT

## 2021-01-22 PROCEDURE — 93005 ELECTROCARDIOGRAM TRACING: CPT | Performed by: INTERNAL MEDICINE

## 2021-01-22 PROCEDURE — 81003 URINALYSIS AUTO W/O SCOPE: CPT

## 2021-01-22 PROCEDURE — 83880 ASSAY OF NATRIURETIC PEPTIDE: CPT

## 2021-01-22 PROCEDURE — 99024 POSTOP FOLLOW-UP VISIT: CPT | Performed by: INTERNAL MEDICINE

## 2021-01-22 PROCEDURE — 6360000002 HC RX W HCPCS: Performed by: INTERNAL MEDICINE

## 2021-01-22 PROCEDURE — 2140000000 HC CCU INTERMEDIATE R&B

## 2021-01-22 RX ORDER — WARFARIN SODIUM 7.5 MG/1
3.75 TABLET ORAL
Status: DISCONTINUED | OUTPATIENT
Start: 2021-01-27 | End: 2021-01-24 | Stop reason: HOSPADM

## 2021-01-22 RX ORDER — FUROSEMIDE 20 MG/1
10 TABLET ORAL DAILY
Qty: 60 TABLET | Refills: 3 | Status: SHIPPED | OUTPATIENT
Start: 2021-01-23 | End: 2021-04-27

## 2021-01-22 RX ORDER — ASPIRIN 81 MG/1
81 TABLET, CHEWABLE ORAL DAILY
Qty: 30 TABLET | Refills: 3 | Status: SHIPPED | OUTPATIENT
Start: 2021-01-22 | End: 2021-04-27 | Stop reason: ALTCHOICE

## 2021-01-22 RX ORDER — ASPIRIN 81 MG/1
81 TABLET, CHEWABLE ORAL DAILY
Status: DISCONTINUED | OUTPATIENT
Start: 2021-01-22 | End: 2021-01-24 | Stop reason: HOSPADM

## 2021-01-22 RX ORDER — WARFARIN SODIUM 7.5 MG/1
7.5 TABLET ORAL
Status: DISCONTINUED | OUTPATIENT
Start: 2021-01-22 | End: 2021-01-24 | Stop reason: HOSPADM

## 2021-01-22 RX ADMIN — LOSARTAN POTASSIUM 50 MG: 50 TABLET, FILM COATED ORAL at 08:57

## 2021-01-22 RX ADMIN — PANTOPRAZOLE SODIUM 40 MG: 40 TABLET, DELAYED RELEASE ORAL at 06:23

## 2021-01-22 RX ADMIN — WARFARIN 7.5 MG: 7.5 TABLET ORAL at 17:16

## 2021-01-22 RX ADMIN — FUROSEMIDE 10 MG: 20 TABLET ORAL at 08:57

## 2021-01-22 RX ADMIN — ASPIRIN 81 MG: 81 TABLET, CHEWABLE ORAL at 17:16

## 2021-01-22 RX ADMIN — Medication 2000 MG: at 05:27

## 2021-01-22 ASSESSMENT — PAIN SCALES - GENERAL
PAINLEVEL_OUTOF10: 0
PAINLEVEL_OUTOF10: 0

## 2021-01-22 NOTE — PROGRESS NOTES
replacement)   Complete heart block [facility])      Cardiac Specific Data:  Specialty Problems        Cardiology Problems    Complete heart block (HCC)        Deep venous insufficiency        Essential hypertension        Mixed hyperlipidemia        Nonrheumatic aortic valve stenosis        Diastolic congestive heart failure with preserved left ventricular function, NYHA class 2 (HCC)        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 choroidal neovascularization (HCC)        Moderate aortic stenosis        Aortic stenosis, severe            1.    Severe aortic stenosis, mean gradient 40 mmHg status post TAVR with S3 florentino valve 1/21/2021, normal LV ejection fraction, postoperative heart block requiring temporary pacemaker placement status post dual-chamber pacemaker 1/21/2021.  2.  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 3.  Crohn's disease. 4.  Hypertension with CKD stage III. Subjective:  Mr. Jasmin Hamlin had a syncopal episode this morning after urinating. Had just got out of bed for the first time. Pacemaker interrogation shows no abnormality. 100% ventricular paced. Echocardiogram without significant effusion. Objective:   /65   Pulse 78   Temp 98.2 °F (36.8 °C) (Temporal)   Resp 18   Ht 5' 7\" (1.702 m)   Wt 231 lb 8 oz (105 kg)   SpO2 92%   BMI 36.26 kg/m²       Intake/Output Summary (Last 24 hours) at 1/22/2021 1434  Last data filed at 1/22/2021 1370  Gross per 24 hour   Intake 600 ml   Output 925 ml   Net -325 ml       Prior to Admission medications    Medication Sig Start Date End Date Taking? Authorizing Provider   Multiple Vitamins-Minerals (PRESERVISION AREDS 2+MULTI VIT) CAPS Take 1 tablet by mouth daily   Yes Historical Provider, MD   furosemide (LASIX) 20 MG tablet Take 1/2 tab daily in the morning daily.   If you swell more, increase to 20 mg daily 11/18/20  Yes Kristen Vogel MD   lansoprazole (PREVACID) 15 MG delayed release capsule Take 15 mg by mouth daily   Yes Historical Provider, MD   mesalamine (PENTASA) 250 MG extended release capsule TAKE THREE CAPSULES BY MOUTH FOUR TIMES DAILY 9/1/20  Yes Sabiha Cottrell MD   losartan (COZAAR) 50 MG tablet TAKE ONE TABLET BY MOUTH DAILY 9/1/20  Yes Sabiha Cottrell MD   B Complex-C-E-Zn (STRESS FORMULA/ZINC PO) Take by mouth   Yes Historical Provider, MD   VITAMIN E COMPLEX PO Take 5,000 Units by mouth daily    Historical Provider, MD   warfarin (COUMADIN) 7.5 MG tablet TAKE ONE TABLET BY MOUTH DAILY EXCEPT ON WEDNESDAY TAKE ONE-HALF TABLET 6/29/20   Sabiha Cottrell MD   cyanocobalamin 1000 MCG/ML injection monthly 11/26/19   Sabiha Cottrell MD   fluticasone [facility]) 50 MCG/ACT nasal spray 1 spray by Each Nostril route daily  Patient taking differently: 1 spray by Each Nostril route as needed  6/21/19   Sabiha Cottrell MD   Cholecalciferol (VITAMIN D3) 5000 units TABS Take by mouth    Historical Provider, MD        furosemide  10 mg Oral Daily    pantoprazole  40 mg Oral QAM AC    losartan  50 mg Oral Daily    mesalamine  250 mg Oral 4x Daily    sodium chloride flush  10 mL Intravenous 2 times per day       TELEMETRY: Sinus     Physical Exam:      Physical Exam  Constitutional:       Appearance: He is well-developed. HENT:      Mouth/Throat:      Pharynx: No oropharyngeal exudate. Eyes:      General: No scleral icterus. Right eye: No discharge. Left eye: No discharge. Neck:      Thyroid: No thyromegaly. Vascular: No JVD. Cardiovascular:      Rate and Rhythm: Normal rate and regular rhythm. Heart sounds: No murmur. No friction rub. No gallop. Comments: No JVD  No edema  Extremities are warm  Short systolic murmur  A2 is well heard  Pulmonary:      Effort: No respiratory distress. Breath sounds: No stridor. No wheezing or rales. Abdominal:      General: Bowel sounds are normal. There is no distension.       Palpations: Abdomen is soft. There is no mass. Tenderness: There is no abdominal tenderness. There is no guarding or rebound. Comments: No palpable organomegaly   Musculoskeletal:         General: No deformity. Skin:     General: Skin is warm. Coloration: Skin is not pale. Findings: No erythema or rash. Neurological:      Mental Status: He is alert and oriented to person, place, and time. Motor: No abnormal muscle tone. Coordination: Coordination normal.      Deep Tendon Reflexes: Reflexes normal.                 Lab Data:  CBC:   Recent Labs     01/21/21  0949 01/22/21  0340   WBC 10.3 8.8   HGB 15.0 12.5*   HCT 45.1 36.2*   MCV 97.4* 93.8    130     BMP:   Recent Labs     01/21/21  0949 01/22/21  0340    140   K 3.6 3.8    104   CO2 24 21*   BUN 13 17   CREATININE 1.2 1.3*     LIVER PROFILE:   Recent Labs     01/21/21  0949 01/22/21 0340   AST 21 47*   ALT 15 13   BILITOT 1.0 0.3   ALKPHOS 128 97     PT/INR:   Recent Labs     01/21/21 0949   PROTIME 17.6*   INR 1.44*     APTT: No results for input(s): APTT in the last 72 hours. BNP:  No results for input(s): BNP in the last 72 hours. CK, CKMB, Troponin: @LABRCNT (CKTOTAL:3, CKMB:3, TROPONINI:3)@    IMAGING:  Echo Complete    Result Date: 1/22/2021  Transthoracic Echocardiography Report (TTE)  Demographics   Patient Name  Megha Hunter  Date of Study         01/22/2021   MRN           [id number]       Gender                Male   Date of Birth 1935   Room Number           MHL-0714   Age           82 year(s)   Height:       67 inches    Referring Physician   Claudia Richardson MD   Weight:       235 pounds   Sonographer           Gena Gandhi [facility]   BSA:          2.17 m^2     Interpreting          Claudia Richardson MD                             Physician   BMI:          36.81 kg/m^2  Procedure Type of Study   TTE procedure:ECHO 2D W/DOPPLER/CONTRAST LIMITD.   Study Location: Portable Technical Quality: Adequate visualization Patient Status: Inpatient Indications:S/P TAVR. Conclusions   Summary  Limited study post TAVR day 1  Status post TAVR  Normally functioning bioprosthetic valve in aortic position. The valve seems to be well seated  No evidence of paravalvular leak or central AI  Mean gradient of 10 peak of 20 mmHg, aortic valve area of 1.72 cm^2  Normal left ventricular size with preserved LV function and an estimated  ejection fraction of approximately 55-60%. Moderate concentric left ventricular hypertrophy. Normal right ventricular size with preserved RV function. No evidence of significant pericardial effusion is noted. Signature   ----------------------------------------------------------------  Electronically signed by Prasanna Tejada MD(Interpreting  physician) on 01/22/2021 09:13 AM  ----------------------------------------------------------------   Findings   Mitral Valve  Mitral annular calcification is present. Aortic Valve  Status post TAVR  Normally functioning bioprosthetic valve in aortic position. The valve seems to be well seated  No evidence of paravalvular leak or central AI  Mean gradient of 10 peak of 20 mmHg, aortic valve area of 1.72 cm^2   Pulmonic Valve  The pulmonic valve was not well visualized . Left Atrium  Mildly dilated left atrium. Left Ventricle  Normal left ventricular size with preserved LV function and an estimated  ejection fraction of approximately 55-60%. Moderate concentric left ventricular hypertrophy. Right Ventricle  Normal right ventricular size with preserved RV function. Pericardial Effusion  No evidence of significant pericardial effusion is noted. Allergies   - No known allergies. M-Mode Measurements (cm)     LVOT: 2 cm  Doppler Measurements:   AV Velocity:1.72 cm/s  AV Peak Gradient: 20.25 mmHg  AV Mean Gradient: 10 mmHg  AV Area (Continuity):1.72 cm^2      Cta Chest W Wo Contrast    Result Date: 1/5/2021  Examination.  CTA CHEST W WO CONTRAST 1/5/2021 12:10 PM History: Severe aortic stenosis. DLP: 2251 mGycm. The CT angiography of the chest is performed after intravenous contrast enhancement. The images are acquired in axial plane with subsequent 2-D reconstruction coronal and sagittal planes and 3-D maximum intensity projection reconstruction. The targeted high-resolution thin section images of the thoracic aorta including the aortic valve and the heart are obtained. There are atheromatous changes of the thoracic aorta. Heavy calcification of the aortic valve is seen which is not evaluated at this time. No aortic aneurysm or dissection. Severe atheromatous changes of coronary arteries are seen more severely involving the left coronary artery and branches. Good opacification of the pulmonary arteries and the branches. No filling defects. Normal origin course and caliber of the brachiocephalic arteries are noted. Heavy calcification of mitral annulus is noted. There is no evidence of mediastinal or hilar mass or lymphadenopathy. Included part of the soft tissues of the neck are unremarkable. The visualized vascular structures are unremarkable. Moderate asymmetrical fullness of the right lobe of the thyroid gland is seen. No discrete nodule. The lungs are poorly inflated with atelectatic changes more pronounced in the lower lungs bilaterally. No areas of focal consolidation, infiltrate or pulmonary mass. There is a small oval air pocket seen. The distal trachea and the right mainstem bronchus. No communication to the trachea or the bronchus. This may represent a small diverticulum from the esophagus? Luis Adames The etiology and clinical significance is not certain. There is bilateral gynecomastia. The incompletely visualized liver and spleen are unremarkable. There is evidence of cholelithiasis. Moderate fullness of the left adrenal gland is seen. The kidneys and pancreas are suboptimally visualized and evaluated.  Moderate thickening of the wall of the distal ascending colon and hepatic flexure with a surrounding peritoneal fat infiltration. This is suboptimally an incompletely evaluated in this study. The images reviewed in bone window show chronic degenerative changes of the thoracic and visualized lumbar spine. No focal bony lesion. Atheromatous changes of the thoracic aorta with heavy calcification of the aortic valve and mitral annulus. The lungs are poorly inflated but unremarkable. Cholelithiasis without evidence of cholecystitis. Moderate thickening of the wall of the visualized right colon may partly be due to incomplete distention. Possibility of colitis is not excluded. Other findings as above. Signed by Dr Subhash Bishop on 1/5/2021 1:40 PM    Xr Chest Portable    Result Date: 1/21/2021  EXAM: XR CHEST PORTABLE -- 1/21/2021 5:59 PM HISTORY: 85 years, Male, pacemaker placement, rule out pneumothorax COMPARISON: 1/5/2021 TECHNIQUE:  1 images. Frontal view of the chest. FINDINGS:  Cardiac pulse generator at the left chest with 2 grossly intact leads. Right central venous catheter tip projects at upper SVC. No pneumothorax. No focal consolidation or pleural effusion. Borderline cardiac silhouette. Upper mediastinum appears within normal limits. No acute bony finding. 1. No acute cardiopulmonary finding. Signed by Dr Maryam Cruz on 1/21/2021 7:14 PM    Cta Abdomen Pelvis W Contrast    Result Date: 1/5/2021  EXAMINATION: CTA ABDOMEN PELVIS W CONTRAST 1/5/2021 1:44 PM HISTORY: CT angiogram abdomen and pelvis with contrast 1/5/2021 12:10 PM HISTORY: TAVR COMPARISON: None DLP: 1797 mGy centimeters. Automated exposure control was utilized to minimize patient radiation dose. TECHNIQUE: Helical tomographic images of the abdomen and pelvis utilizing angiographic protocol were obtained before and after the intravenous infusion of contrast. Multiplanar and 3 D reformatted images were provided for review. FINDINGS: Angiogram: The abdominal aorta is visualized.  Scattered atherosclerotic calcifications are noted in the abdominal aorta. . There is no significant plaque or stenosis. . The right common iliac artery and its bifurcation are normal. Faint vascular calcifications present bifurcation right common femoral artery. Left common iliac artery is visualized bifurcation is unremarkable. Left common femoral artery is straight normal bifurcation with a few faint scattered calcifications. . . The celiac artery and its major branches are normal in appearance. The superior mesenteric artery and its proximal branches are normal in appearance. Inferior mesenteric artery is visualized. There may be 60% stenosis involving the origin of the inferior mesenteric artery. . Right renal artery is unremarkable. The left renal artery is mildly tortuous. . Other findings: Decreased CT attenuation liver is noted consistent with hepatic steatosis. Calculi are present in the gallbladder. Additional calculi are noted in the region of the cystic duct or neck of the gallbladder. Pancreas is unremarkable. Geographic enhancement of spleen unremarkable. Adrenal glands are normal. The renal contours demonstrate symmetric excretion of contrast. The bowel is unobstructed. The bladder is visualized. A right hip prosthesis is present. 1. Vascular calcifications noted in the abdominal aorta and iliac arteries as described above. There is approximately 60% stenosis involving the origin of the inferior mesenteric artery.  2. Status post right hip arthroplasty Signed by Dr Nitish Cruz on 1/5/2021 1:55 PM        Assessment and Plan:    27-year-old gentleman with past medical history of hypertension, Crohn's disease, right leg DVT with chronic leg swelling on anticoagulation, CKD stage III, severe aortic stenosis with recent worsening, mean gradient 40 mmHg status post TAVR with sapient S3 valve 1/21/2020 with postoperative heart block requiring temporary pacemaker placement referred for permanent pacemaker placement status post dual-chamber pacemaker 1/21/2021.     1. Syncopal episode likely related to vasovagal syncope with prominent vasodepressor effect. Patient completely paced with no abnormality noted on pacemaker. No effusion noted on echo either. Patient has been reassured in this regard. Hydration. Possible discharge tomorrow if no significant issues. Follow-up with pacemaker clinic in 7 to 10 days.       Conrado Opitz, MD 1/22/2021 2:34 PM

## 2021-01-22 NOTE — PROGRESS NOTES
Pt called out to use restroom, upon arriving in room pt reported being dizzy. Pt sitting on side of bed. He then became diaphoretic and had a syncopal episode, falling backwards onto the bed without injury. Pulse palpable, rapid response called. Dr Canelo Suresh arrived to bedside. Pt quickly back to being aox4 after approx 2 min. Pt repositioned in bed, bp 150/70 manual, o2 sat 92 on RA and . EKG completed. Pt now resting comfortably in bed aox4, warm and dry.  Electronically signed by Jenise Corado RN on 1/22/2021 at 10:00 AM

## 2021-01-22 NOTE — PROGRESS NOTES
Azalia Canales received from ICU to room # 714 . Mental Status: Patient is oriented and alert. Vitals:    01/22/21 0752   BP: (!) 104/54   Pulse: 77   Resp: 18   Temp: 97.9 °F (36.6 °C)   SpO2: 92%     Placed on cardiac monitor: Yes. Box # . Belongings: clothing, bag with patient at bedside . Family at bedside Yes. Oriented Patient and Family to room. Call light within reach. Yes. Transfer was: Well tolerated by patient. .    Electronically signed by Charmayne Bridges, RN on 1/22/2021 at 7:53 AM

## 2021-01-22 NOTE — OP NOTE
Operative Note      Patient: Janina Libman  YOB: 1935  MRN: [id number]    Date of Procedure: 2021        TRANSCATHETER AORTIC VALVE REPLACEMENT -TAVR      Patient Name: Janina Libman   MRN: [id number]   Age: 82 y.o. : 1935   Admission Date: 2021   Room/Bed: [facility]/714-   Solomon Forde MD       DATE OF PROCEDURE: 2021     : Dr. Radha Thrasher    Co-/Surgeon: Dr. Ubaldo Saldaña      Pre-operative diagnosis: Severe symptomatic aortic stenosis. Post-operative diagnosis: Successful transcutaneous aortic valve replacement using a 29mm Arreaga Doe S3 valve. long-term (Major co morbidities and conditions):   Listed as per Dr. Lamine Esqueda admission note   Acute/Chronic systolic vs diastolic congestive heart failure due to valvular heart disease     Procedure:   Access of femoral artery bilaterally and femoral vein. Femoral angiography. Temporary transvenous pacer insertion in the RV   Aortic root angiography. Transfemoral transcatheter aortic valve replacment(TAVR) using a 29 mm Arreaga Doe S3 valve. Perclose device placement in the primary access. 6F Angio-seal device placement in the secondary femoral access. Insertion of temporary transvenous pacemaker via the right jugular approach  Anesthesia: General anesthesia     Indication for procedure:   Severe symptomatic aortic stenosis deemed appropriate by heart team discussion. Description of the procedure: Following informed consent, the patient was bought to the hybrid operating room and placed under general anesthesia. Anesthesia was conducted by : and appropriate monitoring lines/IVs were placed. Using US guidance and micro-puncture techniques, a standard needle was used to access the left femoral Artery and the arteriotomy was confirmed using femoral angiography. A 6 Fr sheath was placed in the left femoral Artery.  Using US guidance, the left femoral Vein was accessed and a 6 Fr sheath stenotic native valve. With rapid pacing at 180 beats per minute, the valve was deployed in the standard technique with full inflation. A post-procedure RAMAN revealed no central aortic regurgitation and trivial perivalvular regurgitation/leak (PVL). The valve delivery system and the [facility] MINNE wire were removed. Aortic angiogram revealed no aortic insufficiency the valve appeared to be well expanded and this was confirmed on echo. As the patient remained pacemaker dependent with underlying asystole the right jugular vein was cannulated with an introducer and a transvenous pacemaker was then placed through the introducer and directed into the apex of the right ventricle. After it was confirmed that the second pacemaker wire was functioning normally the femoral venous wire was removed    The pigtail then pulled down to the iliac bifurcation. After administering protamine, the Arreaga sheath was removed and the previously placed Perclose sutures were deployed using the standard technique. Good hemostasis was achieved. Ilio-femoral angiography performed using the pigtail catheter revealed no evidence of bleeding, dissection or flow limitation. The 6 Fr arterial sheath was then removed from the left FA and a 6 Fr Angioseal device was then deployed using the standard technique to achieve excellent hemostasis. The venous sheath was then removed and hemostasis was achieved using manual compression. Complications: None  Blood loss: 100 cc  Contrast use:  cc  Total fluoroscopic time:  min    Plan:   1. ICU observation overnight for further management per TAVR protocol. 2. ASA indefinitely and Plavix for 4 weeks. 3. Echo in the morning prior to discharge   4. The patient will follow with interventional cardiologist in 4 weeks with an Echo  per TVT registry requirement      5.   This patient will likely require a permanent pacemaker inserted therefore Dr. Bia Juarez will be consulted today for urgent insertion of a permanent transvenous pacemaker

## 2021-01-22 NOTE — PROGRESS NOTES
Received patient back to room 145 post pacemaker placement left chest. Dressing D/I sling on left arm. Right jugular sheath in place. Wife at bedside.

## 2021-01-22 NOTE — PLAN OF CARE
Problem: Falls - Risk of:  Goal: Will remain free from falls  Description: Will remain free from falls  Outcome: Ongoing  Goal: Absence of physical injury  Description: Absence of physical injury  Outcome: Ongoing     Problem: Pain:  Goal: Recognizes and communicates pain  Description: Recognizes and communicates pain  Outcome: Ongoing  Goal: Pain level will decrease  Description: Pain level will decrease  Outcome: Ongoing     Problem: Tissue Perfusion - Cardiopulmonary, Altered:  Goal: Absence of angina  Description: Absence of angina  Outcome: Ongoing  Goal: Hemodynamic stability will improve  Description: Hemodynamic stability will improve  Outcome: Ongoing     Problem: Tissue Perfusion - Peripheral, Altered:  Goal: Absence of hematoma at arterial access site  Description: Absence of hematoma at arterial access site  Outcome: Ongoing  Goal: Circulatory function of lower extremities is within specified parameters  Description: Circulatory function of lower extremities is within specified parameters  Outcome: Ongoing     Problem: Tobacco Use:  Goal: Will participate in inpatient tobacco-use cessation counseling  Description: Will participate in inpatient tobacco-use cessation counseling  Outcome: Ongoing

## 2021-01-22 NOTE — PROGRESS NOTES
Assisted pt to sit on side of bed, pt very weak and required mod assistance with sitting up. Pt bp 108/68 HR 60 paced while sitting on side of bed. Pt denies dizziness. Attempted to stand pt, but pt to weak to stand with assistance of 2. Pt assisted back to bed and now resting comfortably.   Electronically signed by Lia Hudson RN on 1/22/2021 at 3:27 PM

## 2021-01-23 LAB
INR BLD: 1.6 (ref 0.88–1.18)
PROTHROMBIN TIME: 19.2 SEC (ref 12–14.6)

## 2021-01-23 PROCEDURE — 6370000000 HC RX 637 (ALT 250 FOR IP): Performed by: INTERNAL MEDICINE

## 2021-01-23 PROCEDURE — 36415 COLL VENOUS BLD VENIPUNCTURE: CPT

## 2021-01-23 PROCEDURE — 6370000000 HC RX 637 (ALT 250 FOR IP): Performed by: NURSE PRACTITIONER

## 2021-01-23 PROCEDURE — 2140000000 HC CCU INTERMEDIATE R&B

## 2021-01-23 PROCEDURE — 97116 GAIT TRAINING THERAPY: CPT

## 2021-01-23 PROCEDURE — 2580000003 HC RX 258: Performed by: INTERNAL MEDICINE

## 2021-01-23 PROCEDURE — 97161 PT EVAL LOW COMPLEX 20 MIN: CPT

## 2021-01-23 PROCEDURE — 85610 PROTHROMBIN TIME: CPT

## 2021-01-23 RX ADMIN — WARFARIN 7.5 MG: 7.5 TABLET ORAL at 18:05

## 2021-01-23 RX ADMIN — LOSARTAN POTASSIUM 50 MG: 50 TABLET, FILM COATED ORAL at 08:28

## 2021-01-23 RX ADMIN — SODIUM CHLORIDE, PRESERVATIVE FREE 10 ML: 5 INJECTION INTRAVENOUS at 08:28

## 2021-01-23 RX ADMIN — FUROSEMIDE 10 MG: 20 TABLET ORAL at 08:28

## 2021-01-23 RX ADMIN — ASPIRIN 81 MG: 81 TABLET, CHEWABLE ORAL at 08:28

## 2021-01-23 RX ADMIN — PANTOPRAZOLE SODIUM 40 MG: 40 TABLET, DELAYED RELEASE ORAL at 06:35

## 2021-01-23 NOTE — PROGRESS NOTES
Physical Therapy  Name: Olinda Johnson  MRN:  [id number]  Date of service:  1/23/2021 01/23/21 1610   Position Activity Restriction   Other position/activity restrictions recent pacer. no L shoulder flex >90   Subjective   Subjective Pt in recliner, agrees to work with therapy. Pain Screening   Patient Currently in Pain Denies   Transfers   Sit to Stand Minimal Assistance; Moderate Assistance   Stand to sit Minimal Assistance  (\"plops\")   Ambulation   Ambulation? Yes   Ambulation 1   Surface level tile   Device Rolling Walker   Assistance Contact guard assistance   Quality of Gait FLEXED POSTURE   Gait Deviations Slow Donna;Decreased step length;Decreased step height   Distance 50'   Short term goals   Time Frame for Short term goals 2 WKS   Short term goal 1 SUP<>SIT, SBA   Short term goal 2 SIT<>STAND, CGA   Short term goal 3  FT WITH AD AS INDICATED, CGA   Conditions Requiring Skilled Therapeutic Intervention   Body structures, Functions, Activity limitations Decreased functional mobility ; Decreased endurance;Decreased posture   Assessment Pt cont to require assist with STS TF from recliner, able to amb with rwx with CGA but cont to be slightly unsteady and shows forward flexed posture. Pt returned to recliner, positioned for comfort with all needs in reach. No chair alarm, wife present in room and knows to ask for help before pt getting up.    Activity Tolerance   Activity Tolerance Patient Tolerated treatment well   Safety Devices   Type of devices Call light within reach;Gait belt;Left in chair  (wife states that she will be present, call light for mobilit)         Electronically signed by Artem Duran PTA on 1/23/2021 at 4:13 PM

## 2021-01-23 NOTE — PLAN OF CARE
Problem: Falls - Risk of:  Goal: Will remain free from falls  Description: Will remain free from falls  Outcome: Ongoing  Goal: Absence of physical injury  Description: Absence of physical injury  Outcome: Ongoing     Problem: Pain:  Goal: Recognizes and communicates pain  Description: Recognizes and communicates pain  Outcome: Ongoing  Goal: Pain level will decrease  Description: Pain level will decrease  Outcome: Ongoing     Problem: Tissue Perfusion - Cardiopulmonary, Altered:  Goal: Absence of angina  Description: Absence of angina  Outcome: Ongoing  Goal: Hemodynamic stability will improve  Description: Hemodynamic stability will improve  Outcome: Ongoing     Problem: Tissue Perfusion - Peripheral, Altered:  Goal: Absence of hematoma at arterial access site  Description: Absence of hematoma at arterial access site  Outcome: Ongoing  Goal: Circulatory function of lower extremities is within specified parameters  Description: Circulatory function of lower extremities is within specified parameters  Outcome: Ongoing     Problem: Tobacco Use:  Goal: Will participate in inpatient tobacco-use cessation counseling  Description: Will participate in inpatient tobacco-use cessation counseling  Outcome: Ongoing     Problem: Skin Integrity:  Goal: Will show no infection signs and symptoms  Description: Will show no infection signs and symptoms  Outcome: Ongoing  Goal: Absence of new skin breakdown  Description: Absence of new skin breakdown  Outcome: Ongoing

## 2021-01-23 NOTE — PROGRESS NOTES
Physical Therapy  Name: Gricelda Dumont  MRN:  [id number]  Date of service:  1/23/2021 01/23/21 1420   Subjective   Subjective Attempt: Pt's wife states that she just had him up to the restroom with the rwx. Pt states that he would like to rest for a while now.          Electronically signed by Denisse Garcia PTA on 1/23/2021 at 2:20 PM

## 2021-01-23 NOTE — PROGRESS NOTES
Physical Therapy    Facility/Department: [facility] PROGRESSIVE CARE  Initial Assessment    NAME: Winter Patten  : 1935  MRN: [id number]    Date of Service: 2021    Discharge Recommendations:  Continue to assess pending progress, Patient would benefit from continued therapy after discharge(ANTICIPATE pt TO BE ABLE TO D/C HOME WITH WIFE AND  SERVICES IF HE PROGRESSES TO REQUIRING LESS ASSIST WITH SUP<>SIT, SIT<>STAND.)   PT Equipment Recommendations  Other: ASSESSING FOR NEEDS. HAS A RW AND ADVISE FOR pt TO USE UP ON D/C HOME    Assessment   Body structures, Functions, Activity limitations: Decreased functional mobility ; Decreased endurance;Decreased posture  Assessment: pt WOULD BENEFIT FROM SKILLED PT TO ADDRESS HIS MOBILITY DEFICITS. Decision Making: Low Complexity  PT Education: General Safety;Gait Training;Functional Mobility Training;Transfer Training;Family Education;Precautions  Patient Education: POST PACER PREC  REQUIRES PT FOLLOW UP: Yes  Activity Tolerance  Activity Tolerance: Patient Tolerated treatment well       Patient Diagnosis(es): There were no encounter diagnoses. has a past medical history of Cancer ([address] CHF (congestive heart failure) ([address] Cholelithiasis, CKD (chronic kidney disease) stage 3, GFR 30-59 ml/min, Crohn's disease of large intestine with complication ([address] Deep venous insufficiency, Essential hypertension, Gastroesophageal reflux disease without esophagitis, Heart murmur, Hx of blood clots, Mixed hyperlipidemia, Nonrheumatic aortic valve stenosis, Primary osteoarthritis involving multiple joints, and 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[address] has a past surgical history that includes other surgical history; subtotal colectomy; Tonsillectomy; Total hip arthroplasty; Cataract removal with implant (2018); Skin cancer excision; and Cardiac surgery. Restrictions  Position Activity Restriction  Other position/activity restrictions: recent pacer.  no L shoulder flex >90  Vision/Hearing        Subjective  General  Diagnosis: TAVR, PACER  Subjective  Subjective: RN STATES Pt IS OK TO WORK WITH PT. SAT EOB TODAY WITHOUT DIZZINESS. DURING PT RX, Pt HAD NO D/C DIZZINESS OR EXCESSIVE WEAKNESS. WIFE IS HOPING Pt CAN BECOME STRONG ENOUGH TO RETURN HOME WITH  SERVICES. Pain Screening  Patient Currently in Pain: Denies  Vital Signs  Patient Currently in Pain: Denies       Orientation     Social/Functional History  Social/Functional History  Lives With: Spouse  Home Layout: Two level(STATES HE WILL STAY IN GROUND LEVEL. BEDROOM IS UPSTAIRS BUT PLANS TO SLEEP IN RECLINER)  Home Access: Level entry  Bathroom Shower/Tub: Walk-in shower  Bathroom Toilet: Handicap height  Home Equipment: Rolling walker  ADL Assistance: Independent  Ambulation Assistance: Independent  Transfer Assistance: Independent  Cognition        Objective          AROM RLE (degrees)  RLE AROM: WFL  AROM LLE (degrees)  LLE AROM : WFL  Strength Other  Other: ANTIGRAVITY        Bed mobility  Rolling to Right: Minimal assistance  Supine to Sit: Moderate assistance  Transfers  Sit to Stand: Minimal Assistance; Moderate Assistance  Stand to sit: Minimal Assistance  Ambulation  Ambulation?: Yes  Ambulation 1  Surface: level tile  Device: Rolling Walker  Assistance: Contact guard assistance  Quality of Gait: FLEXED POSTURE  Gait Deviations: Slow Donna;Decreased step length;Decreased step height  Distance: 50 FT  Comments: WIFE FOLLOWED WITH RECLINER FOR SAFETY, BUT pt DID NOT DEMONSTRATE S/S OF SYNCOPE              Plan   Plan  Times per week: 5-7  Plan weeks: 2  Current Treatment Recommendations: Strengthening, Functional Mobility Training, Transfer Training, Gait Training, Patient/Caregiver Education & Training, Safety Education & Training  Plan Comment: POST PACER PREC. WATCH FOR S/S OF SYNCOPE.  PROGRESS AS TOLERATED  Safety Devices  Type of devices: Call light within reach, Gait belt, Left in chair(WIFE REPORTS SHE WILL BE WITH Pt AND WILL USE CALL LIGHT FOR MOBILITY NEEDS)    G-Code       OutComes Score                                                  AM-PAC Score             Goals  Short term goals  Time Frame for Short term goals: 2 WKS  Short term goal 1: SUP<>SIT, SBA  Short term goal 2: SIT<>STAND, CGA  Short term goal 3:  FT WITH AD AS INDICATED, CGA       Therapy Time   Individual Concurrent Group Co-treatment   Time In           Time Out           Minutes                   Bertha Yun PT    Electronically signed by Bertha Yun PT on 1/23/2021 at 10:48 AM

## 2021-01-24 VITALS
OXYGEN SATURATION: 93 % | RESPIRATION RATE: 16 BRPM | SYSTOLIC BLOOD PRESSURE: 118 MMHG | TEMPERATURE: 97.4 F | BODY MASS INDEX: 36.34 KG/M2 | DIASTOLIC BLOOD PRESSURE: 58 MMHG | HEART RATE: 77 BPM | WEIGHT: 231.5 LBS | HEIGHT: 67 IN

## 2021-01-24 LAB
INR BLD: 1.48 (ref 0.88–1.18)
PROTHROMBIN TIME: 18 SEC (ref 12–14.6)

## 2021-01-24 PROCEDURE — 97116 GAIT TRAINING THERAPY: CPT

## 2021-01-24 PROCEDURE — 6370000000 HC RX 637 (ALT 250 FOR IP): Performed by: INTERNAL MEDICINE

## 2021-01-24 PROCEDURE — 85610 PROTHROMBIN TIME: CPT

## 2021-01-24 PROCEDURE — 36415 COLL VENOUS BLD VENIPUNCTURE: CPT

## 2021-01-24 PROCEDURE — 2580000003 HC RX 258: Performed by: INTERNAL MEDICINE

## 2021-01-24 PROCEDURE — 6370000000 HC RX 637 (ALT 250 FOR IP): Performed by: NURSE PRACTITIONER

## 2021-01-24 PROCEDURE — 99239 HOSP IP/OBS DSCHRG MGMT >30: CPT | Performed by: INTERNAL MEDICINE

## 2021-01-24 PROCEDURE — 97530 THERAPEUTIC ACTIVITIES: CPT

## 2021-01-24 RX ADMIN — FUROSEMIDE 10 MG: 20 TABLET ORAL at 08:35

## 2021-01-24 RX ADMIN — LOSARTAN POTASSIUM 50 MG: 50 TABLET, FILM COATED ORAL at 08:35

## 2021-01-24 RX ADMIN — PANTOPRAZOLE SODIUM 40 MG: 40 TABLET, DELAYED RELEASE ORAL at 07:23

## 2021-01-24 RX ADMIN — SODIUM CHLORIDE, PRESERVATIVE FREE 10 ML: 5 INJECTION INTRAVENOUS at 08:35

## 2021-01-24 RX ADMIN — ASPIRIN 81 MG: 81 TABLET, CHEWABLE ORAL at 08:35

## 2021-01-24 NOTE — PROGRESS NOTES
Physical Therapy  Name: Raisa Schuster  MRN:  [id number]  Date of service:  1/24/2021 01/24/21 1538   Subjective   Subjective Attempt: Pt getting ready to d/c, declines walking at this time.          Electronically signed by Nava Tompkins PTA on 1/24/2021 at 3:38 PM

## 2021-01-24 NOTE — PROGRESS NOTES
Physical Therapy  Name: Yasmany Yen  MRN:  [id number]  Date of service:  1/24/2021 01/24/21 0932   Position Activity Restriction   Other position/activity restrictions recent pacer. no L shoulder flex >90   Subjective   Subjective Pt in recliner, wife and son present in room. Agrees to therapy. Pain Screening   Patient Currently in Pain Denies   Transfers   Sit to Stand Minimal Assistance; Moderate Assistance   Stand to sit Minimal Assistance   Comment Requires some assist with toileting and cleanup, mod assist to stand from toilet d/t lower surface   Ambulation   Ambulation? Yes   Ambulation 1   Surface level tile   Device Rolling Walker   Assistance Contact guard assistance;Minimal assistance   Quality of Gait FLEXED POSTURE   Gait Deviations Slow Donna;Decreased step length;Decreased step height   Distance 100'   Comments One LOB at beginning of amb, amb in hallway then to BR and back to chair   Short term goals   Time Frame for Short term goals 2 WKS   Short term goal 1 SUP<>SIT, SBA   Short term goal 2 SIT<>STAND, CGA   Short term goal 3  FT WITH AD AS INDICATED, CGA   Conditions Requiring Skilled Therapeutic Intervention   Body structures, Functions, Activity limitations Decreased functional mobility ; Decreased endurance;Decreased posture   Assessment Instructed pt's family in safety measures for home care with using gait belt, proper STS TF, amb with rwx. Pt cont to require assist with all TF and gait for safety d/t unsteadiness. Pt returned to recliner and positioned for comfort with all needs in reach. Activity Tolerance   Activity Tolerance Patient Tolerated treatment well   Safety Devices   Type of devices Call light within reach; Chair alarm in place;Gait belt;Left in chair  (family present)         Electronically signed by Luis Huffman PTA on 1/24/2021 at 9:39 AM

## 2021-01-24 NOTE — CARE COORDINATION
Spoke with patient regarding MD orders for [facility] [place] services. Pt agreeable and chose         [facility] . Referral accepted and faxed. Please notify [facility] when patient discharges and Fax DC Summary,  DC med list and any new [place] orders. The Patient and/or patient representative PATIENTS SON was provided with a choice of provider and agrees   with the discharge plan. [x] Yes [] No    Freedom of choice list was provided with basic dialogue that supports the patient's individualized plan of care/goals, treatment preferences and shares the quality data associated with the providers.  [x] Yes [] No  Electronically signed by Vania Camara on 1/24/21 at 3:07 PM CST

## 2021-01-25 ENCOUNTER — TELEPHONE (OUTPATIENT)
Dept: PRIMARY CARE CLINIC | Age: 86
End: 2021-01-25

## 2021-01-25 ENCOUNTER — CARE COORDINATION (OUTPATIENT)
Dept: CASE MANAGEMENT | Age: 86
End: 2021-01-25

## 2021-01-25 DIAGNOSIS — Z90.49 S/P PARTIAL RESECTION OF COLON: Primary | ICD-10-CM

## 2021-01-25 DIAGNOSIS — Z95.2 S/P TAVR (TRANSCATHETER AORTIC VALVE REPLACEMENT): ICD-10-CM

## 2021-01-25 LAB
EKG P AXIS: 42 DEGREES
EKG P AXIS: NORMAL DEGREES
EKG P AXIS: NORMAL DEGREES
EKG P-R INTERVAL: 234 MS
EKG P-R INTERVAL: NORMAL MS
EKG P-R INTERVAL: NORMAL MS
EKG Q-T INTERVAL: 370 MS
EKG Q-T INTERVAL: 416 MS
EKG Q-T INTERVAL: 462 MS
EKG QRS DURATION: 146 MS
EKG QRS DURATION: 172 MS
EKG QRS DURATION: 64 MS
EKG QTC CALCULATION (BAZETT): 370 MS
EKG QTC CALCULATION (BAZETT): 442 MS
EKG QTC CALCULATION (BAZETT): 481 MS
EKG T AXIS: 26 DEGREES
EKG T AXIS: 53 DEGREES
EKG T AXIS: NORMAL DEGREES

## 2021-01-25 PROCEDURE — 93010 ELECTROCARDIOGRAM REPORT: CPT | Performed by: INTERNAL MEDICINE

## 2021-01-25 PROCEDURE — 1111F DSCHRG MED/CURRENT MED MERGE: CPT | Performed by: FAMILY MEDICINE

## 2021-01-25 NOTE — TELEPHONE ENCOUNTER
[facility] Trans[facility] Initial Follow Up Call    Outreach made within 2 business days of discharge: Yes    Patient: Efrain Cotto Patient : 1935   MRN: [id number]    Reason for Admission:  S/P TAVR (transcatheter aortic valve replacement  Discharge Date: 21       Spoke with: spouse    Discharge department/facility: [facility]    TCM Interactive Patient Contact:  Was patient able to fill all prescriptions: Yes  Was patient instructed to bring all medications to the follow-up visit: Yes  Is patient taking all medications as directed in the discharge summary? Yes  Does patient understand their discharge instructions: Yes  Does patient have questions or concerns that need addressed prior to 7-14 day follow up office visit: no      Spoke with Mrs. Margo Fitch. She states he is still very weak. Having trouble getting up out of bed. He does have a hospital bed at home. His appetite is good. Normal bowel movements. She does not think he has a lot of urine output but he also does not have the swelling in his lower extremities he had previously. She was advised of the new appointment date and time.   Scheduled appointment with PCP within 7-14 days    Follow Up  Future Appointments   Date Time Provider Roselyn Fleming   2021  [facility]   [facility] 11:30 AM [facility]EDU[facility], [facility] CARDIAC DEVICE [facility] [place]   2021  1:00 PM [facility] ECHO ROOM 1 [facility] ECHO [facility]   2021  2:00 PM Wong Miller MD [facility] [place]   2021 11:30 AM [facility]   2021 11:00 AM Daiana Marino MD [facility] [place]   2021 11:45 AM Mague Bush MD [address] & [place], [place]

## 2021-01-25 NOTE — DISCHARGE SUMMARY
Discharge Summary    Janina Libman  :  1935  MRN:  [id number]    Admit date:  2021  Discharge date:  2021    Admitting Physician:  Sumanth Valenzuela MD    Advance Directive: Prior    Consults: Physical therapy    Primary Care Physician:  Jacquelin Benavdiez MD    Discharge Diagnoses: Active Problems:    S/P TAVR (transcatheter aortic valve replacement)    Complete heart block ([facility]  Resolved Problems:    * No resolved hospital problems.  *      Problem List:   Patient Active Problem List    Diagnosis Date Noted    S/P TAVR (transcatheter aortic valve replacement) 2021     Priority: High    Complete heart block (HCC)      Priority: High    Aortic stenosis, severe      Priority: Low    Bilateral carotid bruits 10/28/2020     Priority: Low    History of DVT (deep vein thrombosis) 10/28/2020     Priority: Low    Dizziness 10/28/2020     Priority: Low    Moderate aortic stenosis 10/28/2020     Priority: Low    S/P partial resection of colon 2019     Priority: Low    Intestinal adhesions with partial obstruction ([address] 2019     Priority: Low    Diastolic congestive heart failure with preserved left ventricular function, NYHA class 2 ([address] 2019     Priority: Low    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 choroidal neovascularization ([address] 2019     Priority: Low    Morbid obesity with BMI of 40.0-44.9, adult ([address] 2019     Priority: Low    Chronic kidney disease, stage III (moderate) 2018     Priority: Low    Nonrheumatic aortic valve stenosis 2018     Priority: Low    Aortic systolic murmur on examination - suspect aortic stenosis 2018     Priority: Low    Essential hypertension 2017     Priority: Low    Crohn's disease of large intestine with complication ([address]      Priority: Low    Mixed hyperlipidemia 2017     Priority: Low    Deep venous insufficiency 2017     Priority: Low    Primary within normal limits. No acute bony finding. 1. No acute cardiopulmonary finding. Signed by Dr Rossy Moon on 1/21/2021 7:14 PM      Pertinent Labs:   CBC: No results for input(s): WBC, HGB, PLT in the last 72 hours. BMP:  No results for input(s): NA, K, CL, CO2, BUN, CREATININE, GLUCOSE in the last 72 hours. INR:   Recent Labs     01/23/21  0215 01/24/21  0223   INR 1.60* 1.48*     Lipids: No results for input(s): CHOL, HDL in the last 72 hours. Invalid input(s): LDLCALCU  ABGs:No results for input(s): PHART, TCJ8XMU, PO2ART, ABO9AZO, BEART, HGBAE, E9APGAKQ, CARBOXHGBART, 02THERAPY in the last 72 hours. HgBA1c:  No results for input(s): LABA1C in the last 72 hours. Procedures:   1/21/2021 Successful transcutaneous aortic valve replacement using a 29 mm Arreaga Doe S3 valve.  - Dr Aguirre -Dr. Patricia Richardson    1/21/2021 Dual-chamber pacemaker placement.  [facility] Course: Mr. Tom Sanches is an 82-year-old male with a history of progressive aortic stenosis, CAD, hypertension, congestive heart failure, mixed hyperlipidemia and chronic kidney disease stage III. Joan Sigala Patient was electively admitted to the hospital for TAVR procedure. Patient had postoperative heart block requiring a temporary pacemaker placement. Patient underwent permanent pacemaker placement later that day. Post chest x-ray showed: No pneumothorax. Postop day one 2D echo showed:   Limited study post TAVR day 1   Status post TAVR   Normally functioning bioprosthetic valve in aortic position. The valve seems to be well seated   No evidence of paravalvular leak or central AI   Mean gradient of 10 peak of 20 mmHg, aortic valve area of 1.72 cm^2   Normal left ventricular size with preserved LV function and an estimated   ejection fraction of approximately 55-60%. Moderate concentric left ventricular hypertrophy. Normal right ventricular size with preserved RV function.    No evidence of significant daily             losartan (COZAAR) 50 MG tablet  TAKE ONE TABLET BY MOUTH DAILY             mesalamine (PENTASA) 250 MG extended release capsule  TAKE THREE CAPSULES BY MOUTH FOUR TIMES DAILY             Multiple Vitamins-Minerals (PRESERVISION AREDS 2+MULTI VIT) CAPS  Take 1 tablet by mouth daily             VITAMIN E COMPLEX PO  Take 5,000 Units by mouth daily             warfarin (COUMADIN) 7.5 MG tablet  TAKE ONE TABLET BY MOUTH DAILY EXCEPT ON WEDNESDAY TAKE ONE-HALF TABLET                 Discharge Instructions:   [facility]  [address] [facility] [address] [phone]  On 1/29/2021  For wound re-check at 11:30 AM     Sumanth Valenzuela MD  [address] [id number] [facility]  [phone]    On 2/16/2021  2:00 pm. ECHO IS ON SAME DAY AT 1:00 PM.     Mirna Delgadillo MD  [address] [place] [address] [address] [phone]    Schedule an appointment as soon as possible for a visit in 1 week  Hospital follow-up         Take medications as directed. Resume activity as tolerated. Diet: No diet orders on file     Disposition: Patient is medically stable and will be discharged home. 1.  ASA indefinitely, Plavix for 4 weeks. 2.  Follow-up with Dr. Radha Thrasher in 4 weeks with lab work and 2D echo prior. 3.  Home health [facility].         Electronically signed by RUBEN Brown NP on 1/25/2021 at 9:24 AM      Sumanth Valenzuela MD, [facility]  Interventional Cardiologist, Endovascular Specialist   Medical Director, Saida Espinoza

## 2021-01-25 NOTE — CARE COORDINATION
[address] Transitions Initial Follow Up Call    Call within 2 business days of discharge: Yes    Patient: Azalia Canales Patient : 1935   MRN: [id number]  Reason for Admission:   Discharge Date: 21 RARS: Readmission Risk Score: 14      Last Discharge [facility]       Complaint Diagnosis Description Type Department Provider    21   Admission (Discharged) from [facility] Osmel Doyle MD           Spoke with: Azalia Canales son, Fabiana Galdamez and wife      Challenges to be reviewed by the provider   Additional needs identified to be addressed with provider No  none    Discussed COVID-19 related testing which was available at this time. Test results were negative. Patient informed of results, if available? Yes         Method of communication with provider : none    Advance Care Planning:   Does patient have an Advance Directive:  not on file. Was this a readmission? No  Patient stated reason for admission: TAVR procedure  Patients top risk factors for readmission: functional physical ability, medical condition and medication management    Care Transition Nurse (CTN) contacted the family by telephone to perform post hospital discharge assessment. Verified name and  with family as identifiers. Provided introduction to self, and explanation of the CTN role. CTN reviewed discharge instructions, medical action plan and red flags with family who verbalized understanding. Family given an opportunity to ask questions and does not have any further questions or concerns at this time. Were discharge instructions available to patient? Yes. Reviewed appropriate site of care based on symptoms and resources available to patient including: PCP and Specialist. The family agrees to contact the PCP office for questions related to their healthcare. Medication reconciliation was performed with family, who verbalizes understanding of administration of home medications.  Advised obtaining a 90-day supply of all daily and as-needed medications. Covid Risk Education    Patient has following risk factors of: heart failure and chronic kidney disease. Education provided regarding infection prevention, and signs and symptoms of COVID-19 and when to seek medical attention with family who verbalized understanding. Discussed exposure protocols and quarantine From CDC: Are you at higher risk for severe illness?   and given an opportunity for questions and concerns. The family agrees to contact the COVID-19 hotline [phone] or PCP office for questions related to COVID-19. For more information on steps you can take to protect yourself, see CDC's How to Protect Yourself     Patient/family/caregiver given information for GetWell Loop and agrees to enroll no    Discussed follow-up appointments. If no appointment was previously scheduled, appointment scheduling offered: Yes. Is follow up appointment scheduled within 7 days of discharge? Wife calling today to schedule  Non-[facility] follow up appointment(s):     Plan for follow-up call in 3-5 days based on severity of symptoms and risk factors. Plan for next call: mobility, weakness, appetite, incision status  CTN provided contact information for future needs. Care Transitions 24 Hour Call    Do you have any ongoing symptoms?: No  Do you have a copy of your discharge instructions?: Yes  Do you have all of your prescriptions and are they filled?: No  Have you been contacted by a [address]?: No  Have you scheduled your follow up appointment?: No (Comment: scheduling today)  Were you discharged with any Home Care or Post Acute Services: Yes  Post Acute Services: [address] [place] [address] you feel like you have everything you need to keep you well at home?: Yes  Care Transitions Interventions         Follow Up : Called patient for initial COVID call after discharge from [facility]. His son Keren Canales answered, says patient is doing well so far.  Says home care is coming out today, and hospital bed and BSC should be delivered today. He says that patient slept in recliner last night. Wife did get on phone and review medications, 1111F order added. No issues with groin site. She says he has not oxygen, is able to walk with assistance down the wells with walker and son accompanies him. Son is going to assist with a bath today she says. Wife says he is eating good, drinking good. No problems or issues. Wife calling this morning to get HFU with DR. Dianna Sun. Discussed CTN calls and follow up and she is accepting, and appreciative of follow up. Encouraged to call with prn issues. Will follow up with them at a later time.    Future Appointments   Date Time Provider Roselyn Fleming   1/29/2021 11:30 AM SCHEDULE, [facility] CARDIAC DEVICE N [facility]   2/16/2021  1:00 PM [facility] ECHO ROOM 1 [facility]   2/16/2021  2:00 PM Regina Riley MD N [facility]   5/25/2021 11:30 AM Patience Rubio MD [facility]   7/14/2021 11:00 AM Day Tyson MD N [facility]   11/22/2021 11:45 AM Patience Rubio MD [facility]       Monica Mauro RN

## 2021-01-27 ENCOUNTER — VIRTUAL VISIT (OUTPATIENT)
Dept: PRIMARY CARE CLINIC | Age: 86
End: 2021-01-27
Payer: MEDICARE

## 2021-01-27 VITALS — HEART RATE: 74 BPM | DIASTOLIC BLOOD PRESSURE: 66 MMHG | SYSTOLIC BLOOD PRESSURE: 120 MMHG

## 2021-01-27 DIAGNOSIS — H35.3221 EXUDATIVE AGE-RELATED MACULAR DEGENERATION OF LEFT EYE WITH ACTIVE CHOROIDAL NEOVASCULARIZATION (HCC): ICD-10-CM

## 2021-01-27 DIAGNOSIS — K50.119 CROHN'S DISEASE OF LARGE INTESTINE WITH COMPLICATION (HCC): ICD-10-CM

## 2021-01-27 DIAGNOSIS — N18.31 STAGE 3A CHRONIC KIDNEY DISEASE (HCC): ICD-10-CM

## 2021-01-27 DIAGNOSIS — I35.0 NONRHEUMATIC AORTIC VALVE STENOSIS: ICD-10-CM

## 2021-01-27 DIAGNOSIS — I50.30 DIASTOLIC CONGESTIVE HEART FAILURE WITH PRESERVED LEFT VENTRICULAR FUNCTION, NYHA CLASS 2 (HCC): Primary | ICD-10-CM

## 2021-01-27 DIAGNOSIS — I20.8 OTHER FORMS OF ANGINA PECTORIS (HCC): ICD-10-CM

## 2021-01-27 DIAGNOSIS — Z95.0 S/P CARDIAC PACEMAKER PROCEDURE: ICD-10-CM

## 2021-01-27 DIAGNOSIS — E66.01 MORBID OBESITY WITH BMI OF 40.0-44.9, ADULT (HCC): ICD-10-CM

## 2021-01-27 PROCEDURE — 1111F DSCHRG MED/CURRENT MED MERGE: CPT | Performed by: FAMILY MEDICINE

## 2021-01-27 PROCEDURE — 99496 TRANSJ CARE MGMT HIGH F2F 7D: CPT | Performed by: FAMILY MEDICINE

## 2021-01-27 NOTE — PROGRESS NOTES
2021    TELEHEALTH EVALUATION -- Audio/Visual (During [facility] public health emergency)    HPI:    Seema Kee (:  1935) has requested an audio/video evaluation for the following concern(s):    Patient presents today via video visit for f/u valve replacement and complete heart block and pacemaker b/l. No issues w/ bleeding or hematoma. Some light hyperpigmentatuion around the ankles but no petechiae otherwise. Swelling has improved since the procedure. No SOA or infection. Has improved in SOA. Follows up w/ cardiology. SOA improving daily at this time. He is not having severe pain. Review of Systems   Constitutional: Negative for chills and fever. Respiratory: Negative for cough, chest tightness, shortness of breath and wheezing. Cardiovascular: Negative for chest pain, palpitations and leg swelling. Gastrointestinal: Negative for abdominal pain, constipation, diarrhea, nausea and vomiting. Genitourinary: Negative for difficulty urinating, dysuria and frequency. Prior to Visit Medications    Medication Sig Taking?  Authorizing Provider   aspirin 81 MG chewable tablet Take 1 tablet by mouth daily  Spencer Piper MD   furosemide (LASIX) 20 MG tablet Take 0.5 tablets by mouth daily  Spencer Piper MD   Multiple Vitamins-Minerals (PRESERVISION AREDS 2+MULTI VIT) CAPS Take 1 tablet by mouth daily  Historical Provider, MD   VITAMIN E COMPLEX PO Take 5,000 Units by mouth daily  Historical Provider, MD   lansoprazole (PREVACID) 15 MG delayed release capsule Take 15 mg by mouth daily  Historical Provider, MD   mesalamine (PENTASA) 250 MG extended release capsule TAKE THREE CAPSULES BY MOUTH FOUR TIMES DAILY  Danae Thomson MD   losartan (COZAAR) 50 MG tablet TAKE ONE TABLET BY MOUTH DAILY  Danae Thomson MD   warfarin (COUMADIN) 7.5 MG tablet TAKE ONE TABLET BY Jermaine Martino MD cyanocobalamin 1000 MCG/ML injection monthly  Patience Rubio MD   fluticasone [facility]) 50 MCG/ACT nasal spray 1 spray by Each Nostril route daily  Patient taking differently: 1 spray by Each Nostril route as needed   Patience Rubio MD   Cholecalciferol (VITAMIN D3) 5000 units TABS Take by mouth  Historical Provider, MD OLSON Complex-C-E-Zn (STRESS FORMULA/ZINC PO) Take by mouth  Historical Provider, MD       Social History     Tobacco Use    Smoking status: Never Smoker    Smokeless tobacco: Never Used   Substance Use Topics    Alcohol use: No    Drug use: No        No Known Allergies,   Past Medical History:   Diagnosis Date    Cancer ([address]     skin    CHF (congestive heart failure) ([address]     Cholelithiasis     CKD (chronic kidney disease) stage 3, GFR 30-59 ml/min 11/13/2018    Crohn's disease of large intestine with complication ([address] 66/4/2691    Deep venous insufficiency 11/7/2017    Essential hypertension 11/7/2017    Gastroesophageal reflux disease without esophagitis 11/7/2017    Heart murmur     Hx of blood clots     Mixed hyperlipidemia 11/7/2017    Nonrheumatic aortic valve stenosis 5/19/2018    Primary osteoarthritis involving multiple joints 11/7/2017    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[address] 2002   ,   Past Surgical History:   Procedure Laterality Date    AORTIC VALVE REPLACEMENT  01/21/2021    Transfemoral transcatheter aortic valve replacment(TAVR) using a 29 mm Arreaga Doe S3 valve.     CARDIAC SURGERY      CARDIAC CATH    CATARACT REMOVAL WITH IMPLANT  2018    OTHER SURGICAL HISTORY      Fistula-in-ano repair    PACEMAKER INSERTION  01/22/2021    3rd degree AV block-[employer] Lydia  Implant MD Dr David Tyson   . [address]      TOTAL HIP ARTHROPLASTY     ,   Social History     Tobacco Use    Smoking status: Never Smoker    Smokeless tobacco: Never Used   Substance Use Topics    Alcohol use: No    Drug use: No   , Family History   Problem Relation Age of Onset    Stroke Other     Other Other         atherosclerosis of extremities   ,   Immunization History   Administered Date(s) Administered    Influenza, High Dose (Fluzone 65 yrs and older) 11/07/2017, 11/13/2018, 10/01/2020    Influenza, Quadv, IM, PF (6 mo and older Fluzone, Flulaval, Fluarix, and 3 yrs and older Afluria) 11/20/2019    Pneumococcal Conjugate 13-valent ([id number]) 09/30/2016    Pneumococcal Polysaccharide ([id number]) 11/13/2018   ,   Health Maintenance   Topic Date Due    COVID-19 Vaccine (1 of 2) 04/18/1953    DTaP/Tdap/Td vaccine (1 - Tdap) 04/18/1956    Shingles Vaccine (1 of 2) 04/18/1987    PSA counseling  11/01/2018    Colon cancer screen colonoscopy  10/30/2020    Annual Wellness Visit (AWV)  11/19/2021    Potassium monitoring  01/22/2022    Creatinine monitoring  01/22/2022    Flu vaccine  Completed    Pneumococcal 65+ years Vaccine  Completed    Hepatitis A vaccine  Aged Out    Hepatitis B vaccine  Aged Out    Hib vaccine  Aged Out    Meningococcal (ACWY) vaccine  Aged Out       PHYSICAL EXAMINATION:  [ INSTRUCTIONS:  \"[x]\" Indicates a positive item  \"[]\" Indicates a negative item  -- DELETE ALL ITEMS NOT EXAMINED]  Vital Signs: (As obtained by patient/caregiver or practitioner observation)    Blood pressure-  Heart rate-    Respiratory rate-    Temperature-  Pulse oximetry-     Constitutional: [x] Appears well-developed and well-nourished [] No apparent distress      [] Abnormal-   Mental status  [x] Alert and awake  [x] Oriented to person/place/time [x]Able to follow commands      Eyes:  EOM    [x]  Normal  [] Abnormal-  Sclera  [x]  Normal  [] Abnormal -         Discharge []  None visible  [] Abnormal -    HENT:   [x] Normocephalic, atraumatic.   [] Abnormal   [x] Mouth/Throat: Mucous membranes are moist.     External Ears [x] Normal  [] Abnormal-     Neck: [x] No visualized mass Pulmonary/Chest: [x] Respiratory effort normal.  [x] No visualized signs of difficulty breathing or respiratory distress        [] Abnormal-      Musculoskeletal:   [x] Normal gait with no signs of ataxia         [x] Normal range of motion of neck        [] Abnormal-       Neurological:        [x] No Facial Asymmetry (Cranial nerve 7 motor function) (limited exam to video visit)          [x] No gaze palsy        [] Abnormal-         Skin:        [x] No significant exanthematous lesions or discoloration noted on facial skin         [] Abnormal-            Psychiatric:       [x] Normal Affect [x] No Hallucinations        [] Abnormal-     Other pertinent observable physical exam findings-     ASSESSMENT/PLAN:    [id number]    1. Diastolic congestive heart failure with preserved left ventricular function, NYHA class 2 ([facility])  I50.30 CA DISCHARGE MEDS RECONCILED W/ CURRENT OUTPATIENT MED LIST   2. Nonrheumatic aortic valve stenosis  I35.0 CA DISCHARGE MEDS RECONCILED W/ CURRENT OUTPATIENT MED LIST   3. S/P cardiac pacemaker procedure  Z95.0 CA DISCHARGE MEDS RECONCILED W/ CURRENT OUTPATIENT MED LIST   4. Stage 3a chronic kidney disease  N18.31 CA DISCHARGE MEDS RECONCILED W/ CURRENT OUTPATIENT MED LIST   5. 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 choroidal neovascularization ([address]  H35.3221    6. Crohn's disease of large intestine with complication ([address]  G10.936    7. Morbid obesity with BMI of 40.0-44.9, adult ([address]  E66.01     Z68.41    8. Other forms of angina pectoris ([address]  I20.8        Improving overall, guarded due to age but procedure appeared to have gone technically well. Follow up w/ cardiology this wekk. Continue home health. Watch for wound complications. No orders of the defined types were placed in this encounter.       Orders Placed This Encounter   Procedures    CA DISCHARGE MEDS RECONCILED W/ CURRENT OUTPATIENT MED LIST Return if symptoms worsen or fail to improve. Thiago Miller is a 82 y.o. male being evaluated by a Virtual Visit (video visit) encounter to address concerns as mentioned above. A caregiver was present when appropriate. Due to this being a TeleHealth encounter (During [facility]-47 public health emergency), evaluation of the following organ systems was limited: Vitals/Constitutional/EENT/Resp/CV/GI//MS/Neuro/Skin/Heme-Lymph-Imm. Pursuant to the emergency declaration under the [address] [place] [address], [address] [place] [address] authority and the [employer] Act, this Virtual Visit was conducted with patient's (and/or legal guardian's) consent, to reduce the patient's risk of exposure to COVID-19 and provide necessary medical care. The patient (and/or legal guardian) has also been advised to contact this office for worsening conditions or problems, and seek emergency medical treatment and/or call 911 if deemed necessary. Services were provided through a video synchronous discussion virtually to substitute for in-person clinic visit. Patient and provider were located at their individual homes or provider at secure site for business. It is possible that material may be posted from a notepad that is used for a Dragon dictation device for dictating notes outside the EMR and I am the original author of all of this content. --Mylene Cameron MD on 2/8/2021 at 7:06 PM    An electronic signature was used to authenticate this note. Post-Discharge Transitional Care Management Services or Hospital Follow Up      Thiago Miller   YOB: 1935    Date of Office Visit:  1/27/2021  Date of Hospital Admission: 1/21/21  Date of Hospital Discharge: 1/24/21  Risk of hospital readmission (high >=14%.  Medium >=10%) :Readmission Risk Score: 14      Care management risk score Rising risk (score 2-5) and Complex Care (Scores >=6): 3 Non face to face  following discharge, date last encounter closed (first attempt may have been earlier): 1/25/2021  2:51 PM    Call initiated 2 business days of discharge: Yes    Patient Active Problem List   Diagnosis    Chronic anticoagulation    Essential hypertension    Crohn's disease of large intestine with complication ([address]    Mixed hyperlipidemia    Deep venous insufficiency    Primary osteoarthritis involving multiple joints    Gastroesophageal reflux disease without esophagitis    Aortic systolic murmur on examination - suspect aortic stenosis    Nonrheumatic aortic valve stenosis    Chronic kidney disease, stage III (moderate)    S/P partial resection of colon    Intestinal adhesions with partial obstruction (HCC)    Diastolic congestive heart failure with preserved left ventricular function, NYHA class 2 ([address]    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 choroidal neovascularization ([address]    Morbid obesity with BMI of 40.0-44.9, adult ([address]    Bilateral carotid bruits    History of DVT (deep vein thrombosis)    Dizziness    Moderate aortic stenosis    Aortic stenosis, severe    S/P TAVR (transcatheter aortic valve replacement)    Complete heart block (HCC)    Other forms of angina pectoris ([address]       No Known Allergies    Medications listed as ordered at the [facility] of [facility], [address] Medication Instructions [place]:    Printed on:02/08/21 8326   Medication Information                      aspirin 81 MG chewable tablet  Take 1 tablet by mouth daily             B Complex-C-E-Zn (STRESS FORMULA/ZINC PO)  Take by mouth             Cholecalciferol (VITAMIN D3) 5000 units TABS  Take by mouth             cyanocobalamin 1000 MCG/ML injection  monthly             fluticasone (FLONASE) 50 MCG/ACT nasal spray  1 spray by Each Nostril route daily             furosemide (LASIX) 20 MG tablet  Take 0.5 tablets by mouth daily lansoprazole (PREVACID) 15 MG delayed release capsule  Take 15 mg by mouth daily             losartan (COZAAR) 50 MG tablet  TAKE ONE TABLET BY MOUTH DAILY             mesalamine (PENTASA) 250 MG extended release capsule  TAKE THREE CAPSULES BY MOUTH FOUR TIMES DAILY             Multiple Vitamins-Minerals (PRESERVISION AREDS 2+MULTI VIT) CAPS  Take 1 tablet by mouth daily             VITAMIN E COMPLEX PO  Take 5,000 Units by mouth daily             warfarin (COUMADIN) 7.5 MG tablet  TAKE ONE TABLET BY MOUTH DAILY EXCEPT ON WEDNESDAY TAKE ONE-HALF TABLET                   Medications marked \"taking\" at this time  No outpatient medications have been marked as taking for the 1/27/21 encounter (Virtual Visit) with Ginger Vora MD.        Medications patient taking as of now reconciled against medications ordered at time of hospital discharge: Yes    Chief Complaint   Patient presents with    Other       History of Present illness - Follow up of Hospital diagnosis(es): see above    Inpatient course: Discharge summary reviewed- see chart. Interval history/Current status: stable, fair      Vitals:    01/27/21 1501   BP: 120/66   Pulse: 74     There is no height or weight on file to calculate BMI.    Wt Readings from Last 3 Encounters:   01/22/21 231 lb 8 oz (105 kg)   01/05/21 246 lb (111.6 kg)   12/21/20 244 lb (110.7 kg)     BP Readings from Last 3 Encounters:   01/27/21 120/66   01/24/21 (!) 118/58   01/21/21 (!) 147/73          Medical Decision Making: high complexity

## 2021-01-29 ENCOUNTER — TELEPHONE (OUTPATIENT)
Dept: PRIMARY CARE CLINIC | Age: 86
End: 2021-01-29

## 2021-01-29 ENCOUNTER — NURSE ONLY (OUTPATIENT)
Dept: CARDIOLOGY CLINIC | Age: 86
End: 2021-01-29
Payer: MEDICARE

## 2021-01-29 DIAGNOSIS — Z51.89 VISIT FOR WOUND CHECK: ICD-10-CM

## 2021-01-29 DIAGNOSIS — I87.2 DEEP VENOUS INSUFFICIENCY: ICD-10-CM

## 2021-01-29 DIAGNOSIS — Z79.01 CHRONIC ANTICOAGULATION: Primary | ICD-10-CM

## 2021-01-29 DIAGNOSIS — Z79.01 ANTICOAGULATED: Primary | ICD-10-CM

## 2021-01-29 LAB
INTERNATIONAL NORMALIZATION RATIO, POC: 1.6
PROTHROMBIN TIME, POC: NORMAL

## 2021-01-29 PROCEDURE — 85610 PROTHROMBIN TIME: CPT | Performed by: INTERNAL MEDICINE

## 2021-01-29 NOTE — TELEPHONE ENCOUNTER
Sun Lainez from [facility] called requesting an order for Terrell Nick to check his INR    That has been ordered and faxed to #[phone]

## 2021-02-01 ENCOUNTER — CARE COORDINATION (OUTPATIENT)
Dept: CASE MANAGEMENT | Age: 86
End: 2021-02-01

## 2021-02-01 NOTE — CARE COORDINATION
[facility] Transitions Follow Up Call    2021    Patient: Theodore Alva  Patient : 1935   MRN: [id number]  Reason for Admission:   Discharge Date: 21 RARS: Readmission Risk Score: 14         Spoke with: Theodore Alva and wife    Care Transitions Subsequent and Final Call    Subsequent and Final Calls  Do you have any ongoing symptoms?: No  Have your medications changed?: No  Do you have any questions related to your medications?: No  Do you currently have any active services?: Yes  Are you currently active with any services?: Home Health  Do you have any needs or concerns that I can assist you with?: No  Identified Barriers: None  Care Transitions Interventions  Other Interventions: Follow Up : Spoke with patient today for follow up CTC call. He says he is doing pretty good, he is somewhat tired as he had did exercise with physical therapy today. Says he walked quite a bit. He reports that appetite is good. He has followed up with Dr. Carmen Ramirez for virtual visit on . His swelling is some better in his lower extremities, and SOA is not quite as bad. He says he is not having any pain to speak of. Incision healing with no redness or drainage noted. Encouraged to call with prn issues. Will follow up at a later time.    Future Appointments   Date Time Provider Roselyn Fleming   2021  1:00 PM [facility] ECHO ROOM 1 [facility] ECHO [facility]   2021  2:00 PM MD DANK Miramontes [facility] Cardio [facility]   2021 11:30 AM MD ROBIN Rascon [facility]   2021 11:00 AM MD DANK Swift [facility] Cardio [facility]   2021 11:45 AM Pa Avalos MD [address] PC Donya Jackson, RN

## 2021-02-02 ENCOUNTER — TELEPHONE (OUTPATIENT)
Dept: PRIMARY CARE CLINIC | Age: 86
End: 2021-02-02

## 2021-02-02 ENCOUNTER — ANTI-COAG VISIT (OUTPATIENT)
Dept: PRIMARY CARE CLINIC | Age: 86
End: 2021-02-02
Payer: MEDICARE

## 2021-02-02 DIAGNOSIS — I87.2 DEEP VENOUS INSUFFICIENCY: ICD-10-CM

## 2021-02-02 DIAGNOSIS — Z79.01 CHRONIC ANTICOAGULATION: ICD-10-CM

## 2021-02-02 LAB — INR BLD: 1.3

## 2021-02-02 PROCEDURE — 93793 ANTICOAG MGMT PT WARFARIN: CPT | Performed by: FAMILY MEDICINE

## 2021-02-03 ENCOUNTER — TELEPHONE (OUTPATIENT)
Dept: CARDIOLOGY CLINIC | Age: 86
End: 2021-02-03

## 2021-02-03 NOTE — TELEPHONE ENCOUNTER
Spoke with patient wife and told her to increase lasix she gave verbal understanding and I told her to call Friday if it does not get any better and she has any questions

## 2021-02-05 ENCOUNTER — TELEPHONE (OUTPATIENT)
Dept: PRIMARY CARE CLINIC | Age: 86
End: 2021-02-05

## 2021-02-08 ENCOUNTER — CARE COORDINATION (OUTPATIENT)
Dept: CASE MANAGEMENT | Age: 86
End: 2021-02-08

## 2021-02-08 ENCOUNTER — TELEPHONE (OUTPATIENT)
Dept: ADMINISTRATIVE | Age: 86
End: 2021-02-08

## 2021-02-08 DIAGNOSIS — I48.0 PAF (PAROXYSMAL ATRIAL FIBRILLATION) (HCC): ICD-10-CM

## 2021-02-08 DIAGNOSIS — I44.2 THIRD DEGREE AV BLOCK (HCC): ICD-10-CM

## 2021-02-08 DIAGNOSIS — Z95.0 PACEMAKER: Primary | ICD-10-CM

## 2021-02-08 PROBLEM — I20.8 OTHER FORMS OF ANGINA PECTORIS (HCC): Status: ACTIVE | Noted: 2021-02-08

## 2021-02-08 PROBLEM — I20.89 OTHER FORMS OF ANGINA PECTORIS: Status: ACTIVE | Noted: 2021-02-08

## 2021-02-08 LAB — INR BLD: 1.6

## 2021-02-08 ASSESSMENT — ENCOUNTER SYMPTOMS
CHEST TIGHTNESS: 0
ABDOMINAL PAIN: 0
DIARRHEA: 0
WHEEZING: 0
COUGH: 0
CONSTIPATION: 0
NAUSEA: 0
VOMITING: 0
SHORTNESS OF BREATH: 0

## 2021-02-08 NOTE — CARE COORDINATION
Spoke with Idalia Chambers at Dr. Ocampo Cheryl office regarding patient not feeling well today, no fever, but cough, and complaints of weakness. He said he had a call into the office but had not heard back. Idalia Chambers said Dr. Sharma Babinski was gone for the day, but she would see about giving patient a call and getting scheduled for tomorrow with Dr. Sharma Babinski. CTN will follow up with patient at a later time.

## 2021-02-08 NOTE — TELEPHONE ENCOUNTER
I can adjust if needed otherwise will have [facility] adjust tomorrow if possible.   i'd just have to see the recent dose is all

## 2021-02-08 NOTE — TELEPHONE ENCOUNTER
Followed up with patient he noted he was not feeling well and very weak. Prior to speaking with patient, I received a call from Gilberto stated patient was weak, not eating, had a cough, SOB, and was only able to drink water. As I was on the phone his PTA was present and mentioned patient was not feeling well. She said he had signs of covid. I informed them I am not able to place and order without him being assessed.      Patient is scheduled for a telemed tomorrow with

## 2021-02-08 NOTE — TELEPHONE ENCOUNTER
Pt called to report that his pacemaker is out of rhythm and that they have a bad cough. Wants something called in for the cough. Just wanted to report about pacemaker.

## 2021-02-08 NOTE — CARE COORDINATION
[facility] Transitions Follow Up Call    2021    Patient: Cristóbal Schuster  Patient : 1935   MRN: [id number]  Reason for Admission:   Discharge Date: 21 RARS: Readmission Risk Score: 15         Spoke with: Cristóbal Schuster;    Care Transitions Subsequent and Final Call    Subsequent and Final Calls  Do you have any ongoing symptoms?: Yes  Onset of Patient-reported symptoms: Today  Patient-reported symptoms: Congestion, Cough, Weakness  Interventions for patient-reported symptoms: Notified PCP/Physician  Have your medications changed?: No  Do you have any questions related to your medications?: No  Do you currently have any active services?: Yes  Are you currently active with any services?: Home Health  Do you have any needs or concerns that I can assist you with?: No  Identified Barriers: None  Care Transitions Interventions  Other Interventions: Follow Up : Spoke with patient today for follow up Covid call. He says he is weak today, has a cough, and is not getting about much. He says he has a call into Dr. Gonzalo Castillo office but has not heard anything back yet. He is barely audible on the phone call today. CTN will place call to Dr. Elvis Hickman and make him aware. Patient says he does not have much of an appetite, is drinking water, but no oral supplements such as Ensure or Boost. He says no fever noted. Will follow up with DR. Fisher's office and with patient at a later time.    Future Appointments   Date Time Provider Roselyn Fleming   2021  1:00 PM [facility]   2021  2:00 PM MD DANK Del Castillo [facility]   2021 11:30 AM MD ROBIN Valadez [facility]   2021 11:00 AM MD DANK Sharma [facility]   2021 11:45 AM Campbell Huang MD [address] Olive Vernon RN

## 2021-02-09 ENCOUNTER — ANTI-COAG VISIT (OUTPATIENT)
Dept: PRIMARY CARE CLINIC | Age: 86
End: 2021-02-09

## 2021-02-09 ENCOUNTER — VIRTUAL VISIT (OUTPATIENT)
Dept: PRIMARY CARE CLINIC | Age: 86
End: 2021-02-09
Payer: MEDICARE

## 2021-02-09 DIAGNOSIS — I87.2 DEEP VENOUS INSUFFICIENCY: ICD-10-CM

## 2021-02-09 DIAGNOSIS — Z20.822 SUSPECTED COVID-19 VIRUS INFECTION: Primary | ICD-10-CM

## 2021-02-09 DIAGNOSIS — Z79.01 CHRONIC ANTICOAGULATION: ICD-10-CM

## 2021-02-09 DIAGNOSIS — J20.9 ACUTE BRONCHITIS, UNSPECIFIED ORGANISM: ICD-10-CM

## 2021-02-09 PROCEDURE — G8417 CALC BMI ABV UP PARAM F/U: HCPCS | Performed by: FAMILY MEDICINE

## 2021-02-09 PROCEDURE — 1123F ACP DISCUSS/DSCN MKR DOCD: CPT | Performed by: FAMILY MEDICINE

## 2021-02-09 PROCEDURE — 1111F DSCHRG MED/CURRENT MED MERGE: CPT | Performed by: FAMILY MEDICINE

## 2021-02-09 PROCEDURE — 99213 OFFICE O/P EST LOW 20 MIN: CPT | Performed by: FAMILY MEDICINE

## 2021-02-09 PROCEDURE — G8482 FLU IMMUNIZE ORDER/ADMIN: HCPCS | Performed by: FAMILY MEDICINE

## 2021-02-09 PROCEDURE — G8428 CUR MEDS NOT DOCUMENT: HCPCS | Performed by: FAMILY MEDICINE

## 2021-02-09 PROCEDURE — 1036F TOBACCO NON-USER: CPT | Performed by: FAMILY MEDICINE

## 2021-02-09 PROCEDURE — 4040F PNEUMOC VAC/ADMIN/RCVD: CPT | Performed by: FAMILY MEDICINE

## 2021-02-09 RX ORDER — DEXAMETHASONE 6 MG/1
6 TABLET ORAL
Qty: 7 TABLET | Refills: 0 | Status: SHIPPED | OUTPATIENT
Start: 2021-02-09 | End: 2021-02-16

## 2021-02-09 RX ORDER — DOXYCYCLINE 100 MG/1
100 CAPSULE ORAL 2 TIMES DAILY
Qty: 20 CAPSULE | Refills: 0 | Status: SHIPPED | OUTPATIENT
Start: 2021-02-09 | End: 2021-02-19

## 2021-02-09 RX ORDER — BENZONATATE 100 MG/1
100 CAPSULE ORAL 3 TIMES DAILY PRN
Qty: 20 CAPSULE | Refills: 0 | Status: SHIPPED | OUTPATIENT
Start: 2021-02-09 | End: 2021-03-08 | Stop reason: SDUPTHER

## 2021-02-09 NOTE — PROGRESS NOTES
2021    TELEHEALTH EVALUATION -- Audio/Visual (During IQHDE-93 public health emergency)    HPI:    Efrain Cotto (:  1935) has requested an audio/video evaluation for the following concern(s):    Patient presents today via video visit for cough. He is struggling w/ coughing. Recently he had pacemaker and valve replacement. Wife has similar Sx. He has had a low grade fever. Having some myalgias. Having Sx for the last 2 wks. He is unable to get out of the home easily. Has some family assistance    Review of Systems   Constitutional: Positive for fatigue. Negative for chills and fever. HENT: Positive for congestion, rhinorrhea, sneezing and sore throat. Respiratory: Positive for cough. Negative for chest tightness, shortness of breath and wheezing. Cardiovascular: Negative for chest pain, palpitations and leg swelling. Gastrointestinal: Negative for abdominal pain, constipation, diarrhea, nausea and vomiting. Genitourinary: Negative for difficulty urinating, dysuria and frequency. Prior to Visit Medications    Medication Sig Taking?  Authorizing Provider   phytonadione (VITAMIN K) 5 MG tablet Take 1 tablet by mouth once for 1 dose  Mague Bush MD   aspirin 81 MG chewable tablet Take 1 tablet by mouth daily  Wong Miller MD   furosemide (LASIX) 20 MG tablet Take 0.5 tablets by mouth daily  Wong Miller MD   Multiple Vitamins-Minerals (PRESERVISION AREDS 2+MULTI VIT) CAPS Take 1 tablet by mouth daily  Historical Provider, MD   VITAMIN E COMPLEX PO Take 5,000 Units by mouth daily  Historical Provider, MD   lansoprazole (PREVACID) 15 MG delayed release capsule Take 15 mg by mouth daily  Historical Provider, MD   mesalamine (PENTASA) 250 MG extended release capsule TAKE THREE CAPSULES BY MOUTH FOUR TIMES DAILY  Mague Bush MD   losartan (COZAAR) 50 MG tablet Ish Beck MD warfarin (COUMADIN) 7.5 MG tablet TAKE ONE TABLET BY MOUTH DAILY EXCEPT ON WEDNESDAY TAKE ONE-HALF TABLET  Saul Sebastian MD   cyanocobalamin 1000 MCG/ML injection monthly  Saul Sebastian MD   fluticasone [facility]) 50 MCG/ACT nasal spray 1 spray by Each Nostril route daily  Patient taking differently: 1 spray by Each Nostril route as needed   Saul Sebastian MD   Cholecalciferol (VITAMIN D3) 5000 units TABS Take by mouth  Historical Provider, MD   B Complex-C-E-Zn (STRESS FORMULA/ZINC PO) Take by mouth  Historical Provider, MD       Social History     Tobacco Use    Smoking status: Never Smoker    Smokeless tobacco: Never Used   Substance Use Topics    Alcohol use: No    Drug use: No        No Known Allergies,   Past Medical History:   Diagnosis Date    Cancer ([address]     skin    CHF (congestive heart failure) (HCC)     Cholelithiasis     CKD (chronic kidney disease) stage 3, GFR 30-59 ml/min 11/13/2018    Crohn's disease of large intestine with complication ([address] 42/1/8456    Deep venous insufficiency 11/7/2017    Essential hypertension 11/7/2017    Gastroesophageal reflux disease without esophagitis 11/7/2017    Heart murmur     Hx of blood clots     Mixed hyperlipidemia 11/7/2017    Nonrheumatic aortic valve stenosis 5/19/2018    Primary osteoarthritis involving multiple joints 11/7/2017    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[address] 2002   ,   Past Surgical History:   Procedure Laterality Date    AORTIC VALVE REPLACEMENT  01/21/2021    Transfemoral transcatheter aortic valve replacment(TAVR) using a 29 mm Arreaga Doe S3 valve.     CARDIAC SURGERY      CARDIAC CATH    CATARACT REMOVAL WITH IMPLANT  2018    OTHER SURGICAL HISTORY      Fistula-in-ano repair    PACEMAKER INSERTION  01/22/2021    3rd degree AV block-[employer] Bowdens  Implant MD Dr Constantino Generous    SKIN CANCER EXCISION      SUBTOTAL COLECTOMY      TONSILLECTOMY      TOTAL HIP ARTHROPLASTY     ,   Social History     Tobacco Use  Smoking status: Never Smoker    Smokeless tobacco: Never Used   Substance Use Topics    Alcohol use: No    Drug use: No   ,   Family History   Problem Relation Age of Onset    Stroke Other     Other Other         atherosclerosis of extremities   ,   Immunization History   Administered Date(s) Administered    Influenza, High Dose (Fluzone 65 yrs and older) 11/07/2017, 11/13/2018, 10/01/2020    Influenza, Quadv, IM, PF (6 mo and older Fluzone, Flulaval, Fluarix, and 3 yrs and older Afluria) 11/20/2019    Pneumococcal Conjugate 13-valent ([id number]) 09/30/2016    Pneumococcal Polysaccharide ([id number]) 11/13/2018   ,   Health Maintenance   Topic Date Due    COVID-19 Vaccine (1 of 2) 04/18/1951    DTaP/Tdap/Td vaccine (1 - Tdap) 04/18/1956    Shingles Vaccine (1 of 2) 04/18/1987    PSA counseling  11/01/2018    Colon cancer screen colonoscopy  10/30/2020    Annual Wellness Visit (AWV)  11/19/2021    Potassium monitoring  01/22/2022    Creatinine monitoring  01/22/2022    Flu vaccine  Completed    Pneumococcal 65+ years Vaccine  Completed    Hepatitis A vaccine  Aged Out    Hepatitis B vaccine  Aged Out    Hib vaccine  Aged Out    Meningococcal (ACWY) vaccine  Aged Out       PHYSICAL EXAMINATION:  [ INSTRUCTIONS:  \"[x]\" Indicates a positive item  \"[]\" Indicates a negative item  -- DELETE ALL ITEMS NOT EXAMINED]  Vital Signs: (As obtained by patient/caregiver or practitioner observation)    Blood pressure-  Heart rate-    Respiratory rate-    Temperature-  Pulse oximetry-     Constitutional: [x] Appears well-developed and well-nourished [] No apparent distress      [] Abnormal-   Mental status  [x] Alert and awake  [x] Oriented to person/place/time [x]Able to follow commands      Eyes:  EOM    [x]  Normal  [] Abnormal-  Sclera  [x]  Normal  [] Abnormal -         Discharge []  None visible  [] Abnormal -    HENT:   [x] Normocephalic, atraumatic.   [] Abnormal [x] Mouth/Throat: Mucous membranes are moist.     External Ears [x] Normal  [] Abnormal-     Neck: [x] No visualized mass     Pulmonary/Chest: [x] Respiratory effort normal.  [x] No visualized signs of difficulty breathing or respiratory distress        [] Abnormal-      Musculoskeletal:   [x] Normal gait with no signs of ataxia         [x] Normal range of motion of neck        [] Abnormal-       Neurological:        [x] No Facial Asymmetry (Cranial nerve 7 motor function) (limited exam to video visit)          [x] No gaze palsy        [] Abnormal-         Skin:        [x] No significant exanthematous lesions or discoloration noted on facial skin         [] Abnormal-            Psychiatric:       [x] Normal Affect [x] No Hallucinations        [] Abnormal-     Other pertinent observable physical exam findings-     ASSESSMENT/PLAN:    ICD-10-CM    1. Suspected COVID-19 virus infection  Z20.822    2. Acute bronchitis, unspecified organism  J20.9        Start presumptive tx for covid vs PNA vs bronchitis at this time. recommed ED/911 if worsening based on his age and RF. He is certainly frail and high risk      Orders Placed This Encounter   Medications    benzonatate (TESSALON PERLES) 100 MG capsule     Sig: Take 1 capsule by mouth 3 times daily as needed for Cough     Dispense:  20 capsule     Refill:  0    dexamethasone (DECADRON) 6 MG tablet     Sig: Take 1 tablet by mouth daily (with breakfast) for 7 days     Dispense:  7 tablet     Refill:  0    doxycycline monohydrate (MONODOX) 100 MG capsule     Sig: Take 1 capsule by mouth 2 times daily for 10 days     Dispense:  20 capsule     Refill:  0       No orders of the defined types were placed in this encounter. Return if symptoms worsen or fail to improve. Michel Dominguez is a 82 y.o. male being evaluated by a Virtual Visit (video visit) encounter to address concerns as mentioned above. A caregiver was present when appropriate. Due to this being a TeleHealth encounter (During IYMNA-38 public health emergency), evaluation of the following organ systems was limited: Vitals/Constitutional/EENT/Resp/CV/GI//MS/Neuro/Skin/Heme-Lymph-Imm. Pursuant to the emergency declaration under the [address] [place] [address] and the [employer] and [employer] Act, this Virtual Visit was conducted with patient's (and/or legal guardian's) consent, to reduce the patient's risk of exposure to COVID-19 and provide necessary medical care. The patient (and/or legal guardian) has also been advised to contact this office for worsening conditions or problems, and seek emergency medical treatment and/or call 911 if deemed necessary. Services were provided through a video synchronous discussion virtually to substitute for in-person clinic visit. Patient and provider were located at their individual homes or provider at secure site for business. It is possible that material may be posted from a notepad that is used for a Dragon dictation device for dictating notes outside the EMR and I am the original author of all of this content. --Nicolasa Caruso MD on 2/24/2021 at 4:12 PM    An electronic signature was used to authenticate this note.

## 2021-02-10 ENCOUNTER — CARE COORDINATION (OUTPATIENT)
Dept: CASE MANAGEMENT | Age: 86
End: 2021-02-10

## 2021-02-10 NOTE — CARE COORDINATION
[facility] Transitions Follow Up Call    2/10/2021    Patient: Lisa Crowder  Patient : 1935   MRN: [id number]  Reason for Admission:   Discharge Date: 21 RARS: Readmission Risk Score: 15         Spoke with: Lisa Crowder and wife    Care Transitions Subsequent and Final Call    Subsequent and Final Calls  Do you have any ongoing symptoms?: No  Have your medications changed?: Yes  Patient Reports: see cc note  Do you have any questions related to your medications?: No  Do you currently have any active services?: Yes  Are you currently active with any services?: Home Health  Do you have any needs or concerns that I can assist you with?: No  Identified Barriers: None  Care Transitions Interventions  Other Interventions: Follow Up : Spoke with patient and wife today for follow up Hayder call. He says he is feeling better than he was during our last call. He has had VV with DR. Antonio Verde and gotten some meds delivered yesterday afternoon about 4pm they say and got them started. He said his cough is better but they both are weak, she does not feel like cooking much. Today they had toast for breakfast and are preparing some soup for lunch. She says home care aide has been out this week and given patient a bath and picked up some bottled water for them. They says they do not have anyone local to do for them, Taoist family are also elderly, and  is on dialysis. She says their sons live away and one thinks he has Covid. She says they have food in the home, and are able to prepare it for meals. Did discuss small meals, scrambled eggs, rice, oatmeal, baking or roasting meats in the oven so not to be standing and using up her energy. They do say they are feeling a bit better. Advised to call with any needs or concerns and CTN will follow up later this week to see how things are going.   Future Appointments   Date Time Provider Roselyn Fleming   2021  1:00 PM [facility] ECHO ROOM 1 [facility] 2/16/2021  2:00 PM Shelton Stovall MD N LPS Cardio [place]   5/25/2021 11:30 AM [facility]   7/14/2021 11:00 AM Edna Tuttle MD N LPS Cardio [place]   11/22/2021 11:45 AM Ginger Vora MD [address] PC Tru Kemp, RN

## 2021-02-12 ENCOUNTER — CARE COORDINATION (OUTPATIENT)
Dept: CASE MANAGEMENT | Age: 86
End: 2021-02-12

## 2021-02-12 ENCOUNTER — CARE COORDINATION (OUTPATIENT)
Dept: CARE COORDINATION | Age: 86
End: 2021-02-12

## 2021-02-12 NOTE — CARE COORDINATION
Spoke with Army Bring at [facility] to make her aware of patient's situation, feeling very poor/weak. Says they are not eating, wife sleeping most of the time, he is unable to cook/prepare food. Says they have eaten very little. He has said sons live out of town, one is sick with possible COVID. He reports that all [facility] family are elderly and unable to assist with anything. Adore says that [facility] Nurse was to go out today  , but wife told her not to come as the driveway was a sheet of ice. Adore is going to reach out to patient's sons and follow up with them to make them aware of what all is going on, and see what needs to be done. Care Transitions will follow up at a later time with patient.

## 2021-02-12 NOTE — CARE COORDINATION
[facility] Transitions Follow Up Call    2021    Patient: Olinda Johnson  Patient : 1935   MRN: [id number]  Reason for Admission:   Discharge Date: 21 RARS: Readmission Risk Score: 15         Spoke with: Olinda Johnson and wife    Care Transitions Subsequent and Final Call    Subsequent and Final Calls  Do you have any ongoing symptoms?: Yes  Patient-reported symptoms: Weakness  Interventions for patient-reported symptoms: Notified Home Care  Have your medications changed?: No  Do you have any questions related to your medications?: No  Do you currently have any active services?: Yes  Are you currently active with any services?: Home Health  Do you have any needs or concerns that I can assist you with?: Yes  Patient-reported Needs or Concerns: see note  Identified Barriers: None  Care Transitions Interventions  Other Interventions: Follow Up : Spoke with patient today for follow up Covid call. He says they are still weak not able to fix much to eat, wife is sleeping a lot he says. He again says his sons live out of town, and one is sick. All of the [employer] family is elderly. He says they have not eaten much. Says that home care nurse was out yesterday (?), and did assessment. He says he is unable to prepare food, and his wife yelled from the background they would put a pot pie in the microwave. CTN will place call to [facility] to see where things stand with them and see if SW can intervene or get a nurse out to them today. Did let them know that CTN would be following up with them at a later time.     Future Appointments   Date Time Provider Roselyn Fleming   2021  9:00 AM [facility] ECHO ROOM 1 [facility] ECHO [facility]   2021 10:30 AM MD DANK Can [facility] Card[facility]   2021 11:30 AM MD ROBIN Dorsey [facility]   2021 11:00 AM MD DANK [facility]   2021 11:45 AM Mery Santiago MD [facility] Todd Cross RN

## 2021-02-12 NOTE — CARE COORDINATION
DUPLICATE DOCUMENTATION IN PATIENT'S SPOUSE'S MEDICAL RECORD:    Contacted by Evelyne Ambrose RN CTC. Oumar Pike concerned about the health of  and Mrs Samra Stratton. After discussion, it was determined that [place] Shahzad would contact [facility], as they are treating Mr Samra Stratton. She will ask for a  to visit with the Samra Stratton in their home. Offered my assistance if the  is unable to address 's concerns.     Submitted by Alesia/TARIQ

## 2021-02-16 ENCOUNTER — CARE COORDINATION (OUTPATIENT)
Dept: CASE MANAGEMENT | Age: 86
End: 2021-02-16

## 2021-02-16 NOTE — CARE COORDINATION
[facility] Transitions Follow Up Call    2021    Patient: Cristóbal Schuster  Patient : 1935   MRN: [id number]    Discharge Date: 21 RARS: Readmission Risk Score: 15         Spoke with: [id number] Transitions Subsequent and Final Call    Subsequent and Final Calls  Have your medications changed?: No  Do you have any questions related to your medications?: No  Do you currently have any active services?: Yes  Are you currently active with any services?: Home Health  Do you have any needs or concerns that I can assist you with?: No  Identified Barriers: None  Care Transitions Interventions  Other Interventions: Follow Up  Future Appointments   Date Time Provider Roselyn Fleming   2021  9:00 AM L ECHO ROOM 1 [facility] ECHO [facility]   2021 10:30 AM MD DANK [facility] Cardio [place]   2021 11:30 AM [facility]   2021 11:00 AM MD DANK [facility] Card[facility] [place]   2021 11:45 AM [facility]     Placed a call to the number listed for patient and spoke with him for a follow up call. Wife was also talking in the background. He reported that they are doing some better. He said he is still quite weak and is not able to do much for himself. He said she is preparing meals for them. He said that a neighbor came over and checked on them and went to the grocery store and bought food for them and brought it back. They have food that is easy to prepare and won't take too much time. Wife is weak also he said. I could hear her say in the background that they are fine, that they have everything they need. He assured me that they have everything that they need. He said they have all of their medications, they have plenty of food. He said they are warm and have good heat. He said that he can call on this neighbor anytime they need anything. He said they are doing ok. He said they have talked with their sons. Encouraged to call as needed if they have new needs arise. He voiced understanding. Will check on them again in a few days.         Elia Humphrey RN

## 2021-02-18 ENCOUNTER — CARE COORDINATION (OUTPATIENT)
Dept: CASE MANAGEMENT | Age: 86
End: 2021-02-18

## 2021-02-18 NOTE — CARE COORDINATION
[facility] Transitions Follow Up Call    2021    Patient: Cristóbal Schuster  Patient : 1935   MRN: [id number]    Discharge Date: 21 RARS: Readmission Risk Score: 15         Spoke with: Mrs. Villa [address] Transitions Subsequent and Final Call    Subsequent and Final Calls  Have your medications changed?: No  Do you have any questions related to your medications?: No  Do you currently have any active services?: No  Are you currently active with any services?: Home Health  Do you have any needs or concerns that I can assist you with?: No  Identified Barriers: None  Care Transitions Interventions  Other Interventions: Follow Up  Future Appointments   Date Time Provider Roselyn Fleming   2021  9:00 AM L ECHO ROOM 1 [facility] ECHO [facility]   2021 10:30 AM MD DANK Del Castillo [facility] [place]   2021 11:30 AM Campbell Huang MD [facility]   2021 11:00 AM MD DANK Sharma [facility] [place]   2021 11:45 AM [facility]     Placed a call to the home and spoke with Mrs. Annia Brittle. She reported that Mr. Annia Brittle was showering. She said he is doing much better and she is as well. She said that they both still have a little cough lingering, but overall, they are better. She said that he is stronger and feels like doing more than he once did. She said he is eating better as well. She said his pacemaker site looks good. She is keeping an eye on it. She is not aware of any other issues. She said they have plenty of medications, groceries, heat, water, due to weather issues. No new needs reported. Will follow up as indicated. To call prn issues.         Paxton Vidales RN

## 2021-02-22 ENCOUNTER — ANTI-COAG VISIT (OUTPATIENT)
Dept: PRIMARY CARE CLINIC | Age: 86
End: 2021-02-22
Payer: MEDICARE

## 2021-02-22 DIAGNOSIS — I87.2 DEEP VENOUS INSUFFICIENCY: ICD-10-CM

## 2021-02-22 DIAGNOSIS — Z79.01 CHRONIC ANTICOAGULATION: Primary | ICD-10-CM

## 2021-02-22 DIAGNOSIS — Z79.01 CHRONIC ANTICOAGULATION: ICD-10-CM

## 2021-02-22 LAB
INR BLD: 8
INR BLD: 9.9

## 2021-02-22 PROCEDURE — 93793 ANTICOAG MGMT PT WARFARIN: CPT | Performed by: FAMILY MEDICINE

## 2021-02-22 RX ORDER — PHYTONADIONE 5 MG/1
5 TABLET ORAL ONCE
Qty: 1 TABLET | Refills: 0 | Status: SHIPPED | OUTPATIENT
Start: 2021-02-22 | End: 2021-05-07

## 2021-02-23 ENCOUNTER — OFFICE VISIT (OUTPATIENT)
Dept: CARDIOLOGY CLINIC | Age: 86
End: 2021-02-23
Payer: MEDICARE

## 2021-02-23 ENCOUNTER — CARE COORDINATION (OUTPATIENT)
Dept: CASE MANAGEMENT | Age: 86
End: 2021-02-23

## 2021-02-23 ENCOUNTER — NURSE ONLY (OUTPATIENT)
Dept: CARDIOLOGY CLINIC | Age: 86
End: 2021-02-23

## 2021-02-23 ENCOUNTER — HOSPITAL ENCOUNTER (OUTPATIENT)
Dept: NON INVASIVE DIAGNOSTICS | Age: 86
Discharge: HOME | End: 2021-02-23
Payer: MEDICARE

## 2021-02-23 ENCOUNTER — TELEPHONE (OUTPATIENT)
Dept: PRIMARY CARE CLINIC | Age: 86
End: 2021-02-23

## 2021-02-23 VITALS
DIASTOLIC BLOOD PRESSURE: 74 MMHG | HEIGHT: 67 IN | BODY MASS INDEX: 34.84 KG/M2 | SYSTOLIC BLOOD PRESSURE: 138 MMHG | WEIGHT: 222 LBS

## 2021-02-23 DIAGNOSIS — I38 VALVULAR HEART DISEASE: ICD-10-CM

## 2021-02-23 DIAGNOSIS — Z95.2 S/P TAVR (TRANSCATHETER AORTIC VALVE REPLACEMENT): Primary | ICD-10-CM

## 2021-02-23 DIAGNOSIS — I44.2 COMPLETE HEART BLOCK (HCC): ICD-10-CM

## 2021-02-23 DIAGNOSIS — Z95.0 PACEMAKER: ICD-10-CM

## 2021-02-23 LAB
LV EF: 58 %
LVEF MODALITY: NORMAL

## 2021-02-23 PROCEDURE — 99024 POSTOP FOLLOW-UP VISIT: CPT | Performed by: INTERNAL MEDICINE

## 2021-02-23 PROCEDURE — 93288 INTERROG EVL PM/LDLS PM IP: CPT | Performed by: INTERNAL MEDICINE

## 2021-02-23 PROCEDURE — 93306 TTE W/DOPPLER COMPLETE: CPT

## 2021-02-23 NOTE — CARE COORDINATION
[facility] Transitions Follow Up Call    2021    Patient: Shari Cooper  Patient : 1935   MRN: [id number]  Reason for Admission:   Discharge Date: 21 RARS: Readmission Risk Score: 15         Spoke with: Shari Cooper and wife    Care Transitions Subsequent and Final Call    Subsequent and Final Calls  Do you have any ongoing symptoms?: No  Have your medications changed?: No  Do you have any questions related to your medications?: No  Do you currently have any active services?: Yes  Are you currently active with any services?: Home Health  Do you have any needs or concerns that I can assist you with?: No  Identified Barriers: None  Care Transitions Interventions  Other Interventions: Follow Up :  Spoke with patient today for Covid follow up call. From the chart he has INR of 9.9. Was to get Vitamin K injection at Cardiology f/u today, but says he did not get one. Says he saw Dr. Soraya Lobo today and had a good report from him. He says he is tired as he had appointment today. Wife is yelling from the background saying he did not get the Vitamin K, and she thinks he needs it. CTN will reach out to Dr. Delfino Perez office and see where things stand. Will follow up with patient at a later time.    Future Appointments   Date Time Provider Roselyn Fleming   2021 11:30 AM Guy Agarwal MD [facility]   2021 11:00 AM MD DANK Corcoran [facility] Cardio [facility]   2021 11:45 AM Guy Agarwal MD [address] PC Donya Triana RN

## 2021-02-23 NOTE — TELEPHONE ENCOUNTER
Patient was unable to afford Vitamin K pill was $89.98 and IM was $59.98. Per   \"if he can find an over the counter vitamin K have him take 1 cap for 2 days and then recheck in 2 days. if he can't can recheck in 2 days and have him to ED if any signs of bleeding at all or any injury\". .    I contacted Santosh Matthews at [facility] and informed her the plan going forward and she was going to reach out to the patient. I have attempted to follow up with Adore to see if she was able to get ahold of patient and left message on her office.

## 2021-02-23 NOTE — CARE COORDINATION
[facility] Transitions Follow Up Call    2021    Patient: Raymon Barfield  Patient : 1935   MRN: [id number]  Reason for Admission:   Discharge Date: 21 RARS: Readmission Risk Score: 14         Spoke with: Raymon Barfield and wife    Care Transitions Subsequent and Final Call    Subsequent and Final Calls  Are you currently active with any services?: Home Health  Care Transitions Interventions  Other Interventions: Follow Up : Spoke with patient today for follow up after speaking with Dr. Martha Doran office. Patient is to take a Vitamin K tablet for two days, and they should be calling it into his pharmacy to be delivered to him, and home care should be coming out in a couple of days to recheck his INR. Will transition to ACM at this time for further outreach and contact with patient.    Future Appointments   Date Time Provider Roselyn Fleming   2021 11:30 AM Efrain Mendez MD [facility]   2021 11:00 AM MD DANK Taveras [facility] Cardio [place]   2021 11:45 AM Efrain Mendez MD [address] PC Sherryle Naas, RN

## 2021-02-23 NOTE — TELEPHONE ENCOUNTER
Spoke with patient and Adore with [employer] . The nurse will be out in the morning to check his INR and from there we will figure out Vitamin K if its needed.

## 2021-02-23 NOTE — CARE COORDINATION
Spoke with Cara Mendez at Dr. Ashlee Hernandez office to make her aware that patient did not received the Vitamin K injection that was charted to be given at Cardiology today. Cara Mendez reports the injection of Vitamin K was too expensive for patient, and Dr. Abby Triplett said that he could take an oral tablet of Vitamin K for 2 days and recheck his INR. Per [facility] is taking care of this through home care. CTN will follow up with patient at a later time.

## 2021-02-23 NOTE — PROGRESS NOTES
post TAVR 5 meter Walk test      1st lap (16.5 feet): 15.6 sec  2nd lap (16.5 feet): 20.1 sec  3rd lap (16.5 feet): unable to do because of symptoms      Average time:       Symptoms: shortness of breath, fatigue, and dizziness

## 2021-02-23 NOTE — PROGRESS NOTES
Pacemaker interrogated  Presenting rhythm:  AS , AP 41.2%,  99.2%  Battey voltage 9.2 years  Lead status:  Lead impedance within range and stable  Sensing:  P waves 1.9 mV,  R waves paced  Thresholds:  Atrial 0.625V @ 0.4ms, ventricular 0.625@ 0.4ms  Observations:  6 monitored AT/AF since 1/29/21  Longest episode on 2/6/21 10 hours  Time in AT/AF 3.3%  Carealerts for AT/AF burden are on  Reprogramming for sensitivity and threshold testing  Next carelink appointment:  5/25/21

## 2021-02-24 ENCOUNTER — ANTI-COAG VISIT (OUTPATIENT)
Dept: PRIMARY CARE CLINIC | Age: 86
End: 2021-02-24

## 2021-02-24 ENCOUNTER — ANTI-COAG VISIT (OUTPATIENT)
Dept: PRIMARY CARE CLINIC | Age: 86
End: 2021-02-24
Payer: MEDICARE

## 2021-02-24 DIAGNOSIS — I87.2 DEEP VENOUS INSUFFICIENCY: ICD-10-CM

## 2021-02-24 DIAGNOSIS — Z79.01 CHRONIC ANTICOAGULATION: ICD-10-CM

## 2021-02-24 LAB — INR BLD: 3

## 2021-02-24 PROCEDURE — 93793 ANTICOAG MGMT PT WARFARIN: CPT | Performed by: FAMILY MEDICINE

## 2021-02-24 ASSESSMENT — ENCOUNTER SYMPTOMS
COUGH: 1
CHEST TIGHTNESS: 0
VOMITING: 0
ABDOMINAL PAIN: 0
SORE THROAT: 1
SHORTNESS OF BREATH: 0
CONSTIPATION: 0
DIARRHEA: 0
RHINORRHEA: 1
NAUSEA: 0
WHEEZING: 0

## 2021-02-24 NOTE — PROGRESS NOTES
Results phoned in by home health Nurse. HOME MONITORING REPORT    INR today:   Results for orders placed or performed in visit on 02/24/21   Protime-INR   Result Value Ref Range    INR 3.00        INR Goal: 2.0-3.0    Dosing Plan  As of 2/24/2021    TTR:  71.9 % (3.6 y)   Full warfarin instructions:  2/25: 3.75 mg; Otherwise 3.75 mg every Wed; 7.5 mg all other days              PLAN: Advised patient/caregiver to take 3.75 mg tonight and tomorrow night and recheck on 2/26.   Patient/Caregiver voiced understanding

## 2021-02-24 NOTE — PROGRESS NOTES
HOME MONITORING REPORT    INR today:   Results for orders placed or performed in visit on 02/24/21   Protime-INR   Result Value Ref Range    INR 3.00        INR Goal: 2.0-3.0    Dosing Plan  As of 2/24/2021    TTR:  71.9 % (3.6 y)   Full warfarin instructions:  2/25: 3.75 mg; Otherwise 3.75 mg every Wed; 7.5 mg all other days           Result phoned in by Home Health Nurse    PLAN: Advised patient/caregiver to take 3.75 mg tonight and tomorrow then re-check on Friday.   Patient/Caregiver voiced understanding

## 2021-02-26 ENCOUNTER — TELEPHONE (OUTPATIENT)
Dept: PRIMARY CARE CLINIC | Age: 86
End: 2021-02-26

## 2021-02-26 ENCOUNTER — CARE COORDINATION (OUTPATIENT)
Dept: CARE COORDINATION | Age: 86
End: 2021-02-26

## 2021-02-26 LAB — INR BLD: 1.6

## 2021-02-26 NOTE — CARE COORDINATION
Ambulatory Care Coordination Note  2/26/2021  CM Risk Score: 3  Charlson 10 Year Mortality Risk Score: 100%     ACC: Key Prado, HILTON    Summary Note: ACM spoke with patient via telephone to offer enrollment in Care Coordination services. Patient was referred by CTN. ACM spoke with patient and his wife and provided an overview and benefits of enrolling in [address] [place] [address]. Patient states that he and his wife feel that they would like to wait at this time. Patient states that he and his wife believe that they recently had COVID and are feeling weak. Patient has [address] [facility] at this time. Patient states that he appreciates all of the [facility] and will be calling me back in the near future. [facility] provided contact information to patient. Efra Reece RN,[facility]  Ambulatory Care Manager                    Prior to Admission medications    Medication Sig Start Date End Date Taking?  Authorizing Provider   phytonadione (VITAMIN K) 5 MG tablet Take 1 tablet by mouth once for 1 dose 2/22/21 2/22/21  Efrain Mendez MD   aspirin 81 MG chewable tablet Take 1 tablet by mouth daily 1/22/21   Nunu Mar MD   furosemide (LASIX) 20 MG tablet Take 0.5 tablets by mouth daily 1/23/21   Nunu Mar MD   Multiple Vitamins-Minerals (PRESERVISION AREDS 2+MULTI VIT) CAPS Take 1 tablet by mouth daily    Historical Provider, MD   VITAMIN E COMPLEX PO Take 5,000 Units by mouth daily    Historical Provider, MD   lansoprazole (PREVACID) 15 MG delayed release capsule Take 15 mg by mouth daily    Historical Provider, MD   mesalamine (PENTASA) 250 MG extended release capsule TAKE THREE CAPSULES BY MOUTH FOUR TIMES DAILY 9/1/20   Efrain Mendez MD   losartan (COZAAR) 50 MG tablet TAKE ONE TABLET BY MOUTH DAILY 9/1/20   Efrain Mendez MD   warfarin (COUMADIN) 7.5 MG tablet TAKE ONE TABLET BY MOUTH DAILY EXCEPT ON WEDNESDAY TAKE ONE-HALF TABLET 6/29/20   Efrain Mendez MD   cyanocobalamin 1000 MCG/ML injection monthly 11/26/19   Robbi Stanford MD   fluticasone Everlena Egna) 50 MCG/ACT nasal spray 1 spray by Each Nostril route daily  Patient taking differently: 1 spray by Each Nostril route as needed  6/21/19   Robbi Stanford MD   Cholecalciferol (VITAMIN D3) 5000 units TABS Take by mouth    Historical ProviderMD OLSON Complex-C-E-Zn (STRESS FORMULA/ZINC PO) Take by mouth    Historical Provider, MD       Future Appointments   Date Time Provider [facility]   5/25/2021 11:30 AM Robbi Stanford MD [email or web address]   7/14/2021 11:00 AM Adebayo Diaz MD [facility]io [place]   11/22/2021 11:45 AM Robbi Stanford MD [address]

## 2021-02-26 NOTE — TELEPHONE ENCOUNTER
Mehrdad Cao called stating patient INR 1.6 and pt 19.2. Per  7.5 and retest on Monday. Shaina Ravi called and left message with gemma ([phone])  nurse with new instructions.  She also called and spoke with patient who gave VU

## 2021-03-01 ENCOUNTER — ANTI-COAG VISIT (OUTPATIENT)
Dept: PRIMARY CARE CLINIC | Age: 86
End: 2021-03-01
Payer: MEDICARE

## 2021-03-01 DIAGNOSIS — I87.2 DEEP VENOUS INSUFFICIENCY: ICD-10-CM

## 2021-03-01 DIAGNOSIS — Z79.01 CHRONIC ANTICOAGULATION: ICD-10-CM

## 2021-03-01 PROCEDURE — 93793 ANTICOAG MGMT PT WARFARIN: CPT | Performed by: FAMILY MEDICINE

## 2021-03-02 ENCOUNTER — ANTI-COAG VISIT (OUTPATIENT)
Dept: PRIMARY CARE CLINIC | Age: 86
End: 2021-03-02
Payer: MEDICARE

## 2021-03-02 DIAGNOSIS — I87.2 DEEP VENOUS INSUFFICIENCY: ICD-10-CM

## 2021-03-02 DIAGNOSIS — Z79.01 CHRONIC ANTICOAGULATION: ICD-10-CM

## 2021-03-02 LAB — INR BLD: 2.4

## 2021-03-02 PROCEDURE — 93793 ANTICOAG MGMT PT WARFARIN: CPT | Performed by: FAMILY MEDICINE

## 2021-03-02 NOTE — PROGRESS NOTES
HOME MONITORING REPORT    INR today:   Results for orders placed or performed in visit on 03/02/21   Protime-INR   Result Value Ref Range    INR 2.40        INR Goal: 2.0-3.0    Dosing Plan  As of 3/2/2021    TTR:  71.9 % (3.6 y)   Full warfarin instructions:  3.75 mg every Wed, Sat; 7.5 mg all other days              PLAN: Advised patient/caregiver to take 3.75 mg on Wednesday and Saturday and 7.5 mg all other days. current dose and recheck on 3/8/21 per Dr. Dianna Sun. Patient/Caregiver voiced understanding. Instructions also called into Marlys Zaldivar @[phone].

## 2021-03-08 ENCOUNTER — TELEPHONE (OUTPATIENT)
Dept: PRIMARY CARE CLINIC | Age: 86
End: 2021-03-08

## 2021-03-08 LAB — INR BLD: 1.4

## 2021-03-08 RX ORDER — BENZONATATE 100 MG/1
100 CAPSULE ORAL 3 TIMES DAILY PRN
Qty: 20 CAPSULE | Refills: 0 | Status: SHIPPED | OUTPATIENT
Start: 2021-03-08 | End: 2021-03-15

## 2021-03-08 NOTE — TELEPHONE ENCOUNTER
Liberty Gonzales from [address] home health called stating patient still has a cough but lungs sounds good. He asked for a referral on the tessalon pearls, so I have filled it.

## 2021-03-09 ENCOUNTER — ANTI-COAG VISIT (OUTPATIENT)
Dept: PRIMARY CARE CLINIC | Age: 86
End: 2021-03-09
Payer: MEDICARE

## 2021-03-09 DIAGNOSIS — I87.2 DEEP VENOUS INSUFFICIENCY: ICD-10-CM

## 2021-03-09 DIAGNOSIS — Z79.01 CHRONIC ANTICOAGULATION: ICD-10-CM

## 2021-03-09 PROCEDURE — 93793 ANTICOAG MGMT PT WARFARIN: CPT | Performed by: FAMILY MEDICINE

## 2021-03-09 RX ORDER — LOSARTAN POTASSIUM 50 MG/1
TABLET ORAL
Qty: 90 TABLET | Refills: 0 | Status: SHIPPED | OUTPATIENT
Start: 2021-03-09 | End: 2021-06-01

## 2021-03-12 ENCOUNTER — ANTI-COAG VISIT (OUTPATIENT)
Dept: PRIMARY CARE CLINIC | Age: 86
End: 2021-03-12
Payer: MEDICARE

## 2021-03-12 DIAGNOSIS — I87.2 DEEP VENOUS INSUFFICIENCY: ICD-10-CM

## 2021-03-12 DIAGNOSIS — Z79.01 CHRONIC ANTICOAGULATION: ICD-10-CM

## 2021-03-12 LAB — INR BLD: 1.9

## 2021-03-12 PROCEDURE — 93793 ANTICOAG MGMT PT WARFARIN: CPT | Performed by: FAMILY MEDICINE

## 2021-03-15 NOTE — PROGRESS NOTES
HOME MONITORING REPORT    INR today:   Results for orders placed or performed in visit on 03/12/21   Protime-INR   Result Value Ref Range    INR 1.90        INR Goal: 2.0-3.0    Dosing Plan  As of 3/12/2021    TTR:  71.5 % (3.7 y)   Full warfarin instructions:  7.5 mg every day              PLAN: Advised patient/caregiver to continue current dose and recheck on Thursday March 18th.   Patient/Caregiver voiced understanding

## 2021-03-17 NOTE — PROGRESS NOTES
[facility] [place].   [address] [facility]  [phone] office  [phone] fax      OFFICE VISIT:  2021    Lisa Crowder - : 1935  Reason For Visit:  Arleen Espino is a 82 y.o. male who is here for 1 month follow-up visit after TAVR procedure      History:  He is known patient in our practice and he was following with our nurse practitioner during months, he is known to have history of hypertension hyperlipidemia aortic stenosis, GERD, history of DVT and chronic kidney disease    On follow-up echo regarding aortic stenosis, he underwent recent echo that showed his aortic stenosis has progressed to severe range with heavily calcified and restricted valve with mean gradient of 40 and peak gradient of 62 mmHg, based on that he was referred to me for evaluation regarding TAVR given his advanced age and frailty    He underwent successful transcatheter aortic valve replacement using 29 mm Arreaga RAMILA S3 valve from femoral approach on 2021, discharged home on , he developed complete heart block and required permanent pacemaker which was placed on the same day without any complications      He is here for follow-up visit and he is telling me he is doing well since the procedure, his improved his physical ability to exert and limited activity without major limitation compared to prior        Lisa Crowder has the following history as [facility] in [facility]:  Patient Active Problem List   Diagnosis Code    Chronic anticoagulation Z79.01    Essential hypertension I10    Crohn's disease of large intestine with complication ([facility] [address] I96.607    Mixed hyperlipidemia E78.2    Deep venous insufficiency I87.2    Primary osteoarthritis involving multiple joints M89.49    Gastroesophageal reflux disease without esophagitis K21.9    Aortic systolic murmur on examination - suspect aortic stenosis I35.8    Nonrheumatic aortic valve stenosis I35.0    Chronic kidney disease, stage III (moderate) N18.30    S/P partial resection of colon Z90.49    Intestinal adhesions with partial obstruction ([facility]) E76.01    Diastolic congestive heart failure with preserved left ventricular function, NYHA class 2 ([facility]) I50.30    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 choroidal neovascularization ([facility] [address] H35.3221    Morbid obesity with BMI of 40.0-44.9, adult ([facility]) E66.01, Z68.41    Bilateral carotid bruits R09.89    History of DVT (deep vein thrombosis) Z86.718    Dizziness R42    Moderate aortic stenosis I35.0    Aortic stenosis, severe I35.0    S/P TAVR (transcatheter aortic valve replacement) Z95.2    Complete heart block ([facility]) I44.2    Other forms of angina pectoris ([facility]) I20.8     Past Medical History:   Diagnosis Date    Cancer ([facility] [address]     skin    CHF (congestive heart failure) ([facility])     Cholelithiasis     CKD (chronic kidney disease) stage 3, GFR 30-59 ml/min 11/13/2018    Crohn's disease of large intestine with complication ([facility] [address] 89/4/1220    Deep venous insufficiency 11/7/2017    Essential hypertension 11/7/2017    Gastroesophageal reflux disease without esophagitis 11/7/2017    Heart murmur     Hx of blood clots     Mixed hyperlipidemia 11/7/2017    Nonrheumatic aortic valve stenosis 5/19/2018    Primary osteoarthritis involving multiple joints 11/7/2017    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[facility] [address] 2002     Past Surgical History:   Procedure Laterality Date    AORTIC VALVE REPLACEMENT  01/21/2021    Transfemoral transcatheter aortic valve replacment(TAVR) using a 29 mm Arreaga Doe S3 valve.     CARDIAC SURGERY      CARDIAC CATH    CATARACT REMOVAL WITH IMPLANT  2018    OTHER SURGICAL HISTORY      Fistula-in-ano repair    PACEMAKER INSERTION  01/22/2021    3rd degree AV block-[employer] Lydia  Implant MD Dr Nurys Charles. [address]  TOTAL HIP ARTHROPLASTY       Family History   Problem Relation Age of Onset    Stroke Other     Other Other         atherosclerosis of extremities     Social History     Tobacco Use    Smoking status: Never Smoker    Smokeless tobacco: Never Used   Substance Use Topics    Alcohol use: No      Current Outpatient Medications   Medication Sig Dispense Refill    aspirin 81 MG chewable tablet Take 1 tablet by mouth daily 30 tablet 3    furosemide (LASIX) 20 MG tablet Take 0.5 tablets by mouth daily 60 tablet 3    Multiple Vitamins-Minerals (PRESERVISION AREDS 2+MULTI VIT) CAPS Take 1 tablet by mouth daily      VITAMIN E COMPLEX PO Take 5,000 Units by mouth daily      lansoprazole (PREVACID) 15 MG delayed release capsule Take 15 mg by mouth daily      warfarin (COUMADIN) 7.5 MG tablet TAKE ONE TABLET BY MOUTH DAILY EXCEPT ON WEDNESDAY TAKE ONE-HALF TABLET 90 tablet 3    cyanocobalamin 1000 MCG/ML injection monthly 10 mL 0    fluticasone (FLONASE) 50 MCG/ACT nasal spray 1 spray by Each Nostril route daily (Patient taking differently: 1 spray by Each Nostril route as needed ) 2 Bottle 1    Cholecalciferol (VITAMIN D3) 5000 units TABS Take by mouth      B Complex-C-E-Zn (STRESS FORMULA/ZINC PO) Take by mouth      losartan (COZAAR) 50 MG tablet TAKE ONE TABLET BY MOUTH EVERY DAY 90 tablet 0    mesalamine (PENTASA) 250 MG extended release capsule TAKE THREE CAPSULES BY MOUTH FOUR TIMES DAILY 1080 capsule 0    phytonadione (VITAMIN K) 5 MG tablet Take 1 tablet by mouth once for 1 dose 1 tablet 0     No current facility-administered medications for this visit. Allergies: Patient has no known allergies. Review of Systems  Constitutional  no appetite change, or unexpected weight change. No fever, chills or diaphoresis. History of fatigue and leg swelling  HEENT  no significant rhinorrhea or epistaxis. No tinnitus or significant hearing loss. Eyes  no sudden vision change or amaurosis.  No corneal varicosities. Abdominal -  No visible distention, mass or pulsations. Extremities - No clubbing or cyanosis. No statis dermatitis or ulcers. 1+ pitting bilateral lower extremity edema R>L. Hx of DVT right leg. Musculoskeletal -   No Osler's nodes. No kyphosis or scoliosis. Gait is even and regular without limp or shuffle. Ambulates without assistance. Skin -  Warm and dry; no rash or pallor. No new surgical wound. Neurological - No focal neurological deficits. Thought processes coherent. No apparent tremor. Oriented to person, place and time. Psychiatric -  Appropriate affect and mood.        Lab Results   Component Value Date    WBC 8.8 01/22/2021    HGB 12.5 (L) 01/22/2021    HCT 36.2 (L) 01/22/2021    MCV 93.8 01/22/2021     01/22/2021     Lab Results   Component Value Date     01/22/2021     07/04/2011    K 3.8 01/22/2021    K 4.2 07/04/2011     01/22/2021     07/04/2011    CO2 21 01/22/2021    BUN 17 01/22/2021    CREATININE 1.3 01/22/2021    CREATININE 0.9 07/04/2011    GLUCOSE 96 01/22/2021    CALCIUM 8.5 01/22/2021      Lab Results   Component Value Date    CHOL 168 11/11/2020    CHOL 191 11/13/2019    CHOL 190 04/24/2019     Lab Results   Component Value Date    TRIG 140 11/11/2020    TRIG 177 (H) 11/13/2019    TRIG 195 (H) 04/24/2019     Lab Results   Component Value Date    HDL 56 11/11/2020    HDL 56 11/13/2019    HDL 54 (L) 04/24/2019     Lab Results   Component Value Date    LDLCALC 84 11/11/2020    LDLCALC 100 11/13/2019    LDLCALC 97 04/24/2019       BP Readings from Last 3 Encounters:   02/23/21 138/74   01/27/21 120/66   01/24/21 (!) 118/58    Pulse Readings from Last 3 Encounters:   01/27/21 74   01/24/21 77   01/18/21 74        Wt Readings from Last 3 Encounters:   02/23/21 222 lb (100.7 kg)   01/22/21 231 lb 8 oz (105 kg)   01/05/21 246 lb (111.6 kg)       Recent Results (from the past 1008 hour(s))   Protime-INR    Collection Time: 02/08/21 ejection fraction of approximately 55-60%. Normal left ventricular wall thickness. No regional wall motion abnormalities. Impaired relaxation compatible with diastolic dysfunction.  ( reversed E/A   ratio)      Doppler Measurements:      AV Velocity:2 cm/s                     AV Peak Gradient: 14.75 mmHg          AV Mean Gradient: 8 mmHg              AV Area (Continuity):2 cm^2       Signature      ----------------------------------------------------------------   Electronically signed by Tino Araya MD(Interpreting   physician) on 02/23/2021 10:40 AM   ----------------------------------------------------------------      Assessment and plan    Severe aortic stenosis-    Status post successful transcatheter aortic valve replacement using 29 mm Arreaga sapient valve, TAVR was complicated by complete heart block requiring permanent pacemaker placement which was done successfully during same admission, he is here for follow-up visit for 1 month follow-up, he tells me he is doing much better his symptom has improved, he is anuric oxygen class I, he denies any dizziness shortness of breath or chest pain or palpitation    We will continue same medication, blood pressure is well controlled, continue with aspirin and warfarin  Decortication was done today  1 month echo post procedure was reviewed and showed excellent results with no leak and normal gradients    I will see him back in the office in 6 months in the valve clinic        Daniel Benoit MD, [place] [address] Cardiologist, Endovascular Specialist   Medical Director, [place] [id number]

## 2021-03-18 ENCOUNTER — ANTI-COAG VISIT (OUTPATIENT)
Dept: PRIMARY CARE CLINIC | Age: 86
End: 2021-03-18
Payer: MEDICARE

## 2021-03-18 DIAGNOSIS — Z79.01 CHRONIC ANTICOAGULATION: ICD-10-CM

## 2021-03-18 DIAGNOSIS — I87.2 DEEP VENOUS INSUFFICIENCY: ICD-10-CM

## 2021-03-18 LAB — INR BLD: 1.7

## 2021-03-18 PROCEDURE — 93793 ANTICOAG MGMT PT WARFARIN: CPT | Performed by: FAMILY MEDICINE

## 2021-03-18 NOTE — PROGRESS NOTES
HOME MONITORING REPORT    INR today:   Results for orders placed or performed in visit on 03/18/21   Protime-INR   Result Value Ref Range    INR 1.70        INR Goal: 2.0-3.0    Dosing Plan  As of 3/18/2021    TTR:  71.2 % (3.7 y)   Full warfarin instructions:  3/18: 11.25 mg; 3/21: 11.25 mg; Otherwise 7.5 mg every day          Result called in by Moe Vazquez: Advised patient/caregiver to increase his dose tonight and Sunday night to 11.25 mg, then continue current dose and recheck on Monday 3/22/21  Patient/Caregiver voiced understanding

## 2021-03-19 DIAGNOSIS — Z95.0 PACEMAKER: Primary | ICD-10-CM

## 2021-03-19 DIAGNOSIS — I47.29 NSVT (NONSUSTAINED VENTRICULAR TACHYCARDIA): ICD-10-CM

## 2021-03-19 DIAGNOSIS — I44.2 COMPLETE HEART BLOCK (HCC): ICD-10-CM

## 2021-03-23 ENCOUNTER — TELEPHONE (OUTPATIENT)
Dept: CARDIAC REHAB | Age: 86
End: 2021-03-23

## 2021-03-23 ENCOUNTER — TELEPHONE (OUTPATIENT)
Dept: PRIMARY CARE CLINIC | Age: 86
End: 2021-03-23

## 2021-03-23 ENCOUNTER — ANTI-COAG VISIT (OUTPATIENT)
Dept: PRIMARY CARE CLINIC | Age: 86
End: 2021-03-23
Payer: MEDICARE

## 2021-03-23 DIAGNOSIS — Z79.01 CHRONIC ANTICOAGULATION: ICD-10-CM

## 2021-03-23 DIAGNOSIS — I87.2 DEEP VENOUS INSUFFICIENCY: ICD-10-CM

## 2021-03-23 LAB — INR BLD: 2.2

## 2021-03-23 PROCEDURE — 93793 ANTICOAG MGMT PT WARFARIN: CPT | Performed by: FAMILY MEDICINE

## 2021-03-23 NOTE — TELEPHONE ENCOUNTER
Spoke with patient regarding starting Phase 2 Cardiac Rehab. He still has home health for now expects that to end this week. To call Cardiac Rehab when released.

## 2021-03-23 NOTE — PROGRESS NOTES
HOME HEALTH MONITORING REPORT    INR today:   Results for orders placed or performed in visit on 03/23/21   Protime-INR   Result Value Ref Range    INR 2.20        INR Goal: 2.0-3.0    Dosing Plan  As of 3/23/2021    TTR:  71.1 % (3.7 y)   Full warfarin instructions:  7.5 mg every day              PLAN: Advised patient/caregiver to continue current dose and recheck in one week.   Patient/Caregiver voiced understanding

## 2021-03-24 ENCOUNTER — OFFICE VISIT (OUTPATIENT)
Dept: CARDIOLOGY CLINIC | Age: 86
End: 2021-03-24
Payer: MEDICARE

## 2021-03-24 VITALS
HEIGHT: 67 IN | OXYGEN SATURATION: 98 % | WEIGHT: 238 LBS | BODY MASS INDEX: 37.35 KG/M2 | SYSTOLIC BLOOD PRESSURE: 120 MMHG | HEART RATE: 96 BPM | DIASTOLIC BLOOD PRESSURE: 68 MMHG

## 2021-03-24 DIAGNOSIS — Z95.0 PACEMAKER: ICD-10-CM

## 2021-03-24 DIAGNOSIS — Z95.2 S/P TAVR (TRANSCATHETER AORTIC VALVE REPLACEMENT): ICD-10-CM

## 2021-03-24 DIAGNOSIS — I47.20 VENTRICULAR TACHYCARDIA: ICD-10-CM

## 2021-03-24 DIAGNOSIS — I44.2 COMPLETE HEART BLOCK (HCC): ICD-10-CM

## 2021-03-24 DIAGNOSIS — I10 ESSENTIAL HYPERTENSION: ICD-10-CM

## 2021-03-24 DIAGNOSIS — I35.0 AORTIC STENOSIS, SEVERE: Primary | ICD-10-CM

## 2021-03-24 PROCEDURE — G8482 FLU IMMUNIZE ORDER/ADMIN: HCPCS | Performed by: CLINICAL NURSE SPECIALIST

## 2021-03-24 PROCEDURE — 4040F PNEUMOC VAC/ADMIN/RCVD: CPT | Performed by: CLINICAL NURSE SPECIALIST

## 2021-03-24 PROCEDURE — 93280 PM DEVICE PROGR EVAL DUAL: CPT | Performed by: CLINICAL NURSE SPECIALIST

## 2021-03-24 PROCEDURE — 1036F TOBACCO NON-USER: CPT | Performed by: CLINICAL NURSE SPECIALIST

## 2021-03-24 PROCEDURE — 1123F ACP DISCUSS/DSCN MKR DOCD: CPT | Performed by: CLINICAL NURSE SPECIALIST

## 2021-03-24 PROCEDURE — 99214 OFFICE O/P EST MOD 30 MIN: CPT | Performed by: CLINICAL NURSE SPECIALIST

## 2021-03-24 PROCEDURE — G8427 DOCREV CUR MEDS BY ELIG CLIN: HCPCS | Performed by: CLINICAL NURSE SPECIALIST

## 2021-03-24 PROCEDURE — G8417 CALC BMI ABV UP PARAM F/U: HCPCS | Performed by: CLINICAL NURSE SPECIALIST

## 2021-03-24 RX ORDER — METOPROLOL SUCCINATE 25 MG/1
25 TABLET, EXTENDED RELEASE ORAL DAILY
Qty: 30 TABLET | Refills: 3 | Status: SHIPPED | OUTPATIENT
Start: 2021-03-24 | End: 2021-06-16

## 2021-03-24 RX ORDER — BENZONATATE 100 MG/1
100 CAPSULE ORAL 3 TIMES DAILY PRN
COMMUNITY
End: 2021-04-27 | Stop reason: ALTCHOICE

## 2021-03-24 NOTE — PROGRESS NOTES
Rosina Honeycutt denies resting shortness of breath, orthopnea, paroxysmal nocturnal dyspnea, syncope, presyncope, arrhythmia, . The patient denies numbness or weakness to suggest cerebrovascular accident or transient ischemic attack. Tamra Michele MD is PCP and follows labs including INR is.   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     Priority: High    Complete heart block (HCC)      Priority: High    Other forms of angina pectoris ([address] 02/08/2021    Aortic stenosis, severe     Bilateral carotid bruits 10/28/2020    History of DVT (deep vein thrombosis) 10/28/2020    Dizziness 10/28/2020    Moderate aortic stenosis 10/28/2020    S/P partial resection of colon 05/01/2019    Intestinal adhesions with partial obstruction ([address] 27/31/7100    Diastolic congestive heart failure with preserved left ventricular function, NYHA class 2 ([address] 05/01/2019    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 choroidal neovascularization ([address] 05/01/2019    Morbid obesity with BMI of 40.0-44.9, adult ([address] 05/01/2019    Chronic kidney disease, stage III (moderate) 11/13/2018    Nonrheumatic aortic valve stenosis 05/19/2018    Aortic systolic murmur on examination - suspect aortic stenosis 05/07/2018    Essential hypertension 11/07/2017    Crohn's disease of large intestine with complication ([address] 33/59/5058    Mixed hyperlipidemia 11/07/2017    Deep venous insufficiency 11/07/2017    Primary osteoarthritis involving multiple joints 11/07/2017    Gastroesophageal reflux disease without esophagitis 11/07/2017    Chronic anticoagulation 06/21/2017     Past Medical History:   Diagnosis Date    Cancer ([address]     skin    CHF (congestive heart failure) (HCC)     Cholelithiasis     CKD (chronic kidney disease) stage 3, GFR 30-59 ml/min 11/13/2018  Crohn's disease of large intestine with complication ([facility] 66/8/1647    Deep venous insufficiency 11/7/2017    Essential hypertension 11/7/2017    Gastroesophageal reflux disease without esophagitis 11/7/2017    Heart murmur     Hx of blood clots     Mixed hyperlipidemia 11/7/2017    Nonrheumatic aortic valve stenosis 5/19/2018    Primary osteoarthritis involving multiple joints 11/7/2017    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[facility] [address] 2002     Past Surgical History:   Procedure Laterality Date    AORTIC VALVE REPLACEMENT  01/21/2021    Transfemoral transcatheter aortic valve replacment(TAVR) using a 29 mm Arreaga Doe S3 valve.    [facility]    CATARACT REMOVAL WITH IMPLANT  2018    OTHER SURGICAL HISTORY      Fistula-in-ano repair    PACEMAKER INSERTION  01/22/2021    3rd degree AV block-[employer] [place]  Implant MD Dr El Avilez    SKIN CANCER EXCISION      SUBTOTAL COLECTOMY      TONSILLECTOMY      TOTAL HIP ARTHROPLASTY       Family History   Problem Relation Age of Onset    Stroke Other     Other Other         atherosclerosis of extremities     Social History     Tobacco Use    Smoking status: Never Smoker    Smokeless tobacco: Never Used   Substance Use Topics    Alcohol use: No      Current Outpatient Medications   Medication Sig Dispense Refill    benzonatate (TESSALON) 100 MG capsule Take 100 mg by mouth 3 times daily as needed for Cough      metoprolol succinate (TOPROL XL) 25 MG extended release tablet Take 1 tablet by mouth daily 30 tablet 3    losartan (COZAAR) 50 MG tablet TAKE ONE TABLET BY MOUTH EVERY DAY 90 tablet 0    mesalamine (PENTASA) 250 MG extended release capsule TAKE THREE CAPSULES BY MOUTH FOUR TIMES DAILY 1080 capsule 0    aspirin 81 MG chewable tablet Take 1 tablet by mouth daily 30 tablet 3    furosemide (LASIX) 20 MG tablet Take 0.5 tablets by mouth daily 60 tablet 3    Multiple Vitamins-Minerals (PRESERVISION AREDS 2+MULTI VIT) CAPS Take 1 tablet by mouth daily      VITAMIN E COMPLEX PO Take 5,000 Units by mouth daily      lansoprazole (PREVACID) 15 MG delayed release capsule Take 15 mg by mouth daily      warfarin (COUMADIN) 7.5 MG tablet TAKE ONE TABLET BY MOUTH DAILY EXCEPT ON WEDNESDAY TAKE ONE-HALF TABLET (Patient taking differently: Take 7.5 mg by mouth daily TAKE ONE TABLET BY MOUTH DAILY) 90 tablet 3    cyanocobalamin 1000 MCG/ML injection monthly 10 mL 0    fluticasone (FLONASE) 50 MCG/ACT nasal spray 1 spray by Each Nostril route daily (Patient taking differently: 1 spray by Each Nostril route as needed ) 2 Bottle 1    Cholecalciferol (VITAMIN D3) 5000 units TABS Take by mouth      B Complex-C-E-Zn (STRESS FORMULA/ZINC PO) Take by mouth      phytonadione (VITAMIN K) 5 MG tablet Take 1 tablet by mouth once for 1 dose 1 tablet 0     No current facility-administered medications for this visit. Allergies: Patient has no known allergies. Review of Systems  Constitutional  no significant activity change, appetite change, or unexpected weight change. No fever, chills or diaphoresis. + fatigue. HEENT  no significant rhinorrhea or epistaxis. No tinnitus or significant hearing loss. Eyes  no sudden vision change or amaurosis. Respiratory  no significant wheezing, stridor, apnea or cough. + dyspnea on exertion  No resting shortness of breath. Cardiovascular  no exertional chest pain, orthopnea or PND. No sensation of arrhythmia or slow heart rate. No claudication + leg edema. Gastrointestinal  no abdominal swelling or pain. No blood in stool. No severe constipation, diarrhea, nausea, or vomiting. Genitourinary  no difficulty urinating, dysuria, frequency, or urgency. No flank pain or hematuria. Musculoskeletal  no back pain, gait-using walker. Skin  no color change or rash. No pallor. No new surgical incision. Neurologic  no speech difficulty, facial asymmetry or lateralizing weakness.   No seizures, presyncope, syncope, or significant dizziness. Hematologic  no easy bruising or excessive bleeding. Psychiatric  no severe anxiety or insomnia. No confusion. All other review of systems are negative. Objective  Vital Signs - /68   Pulse 96   Ht 5' 7\" (1.702 m)   Wt 238 lb (108 kg)   SpO2 98%   BMI 37.28 kg/m²   [facility] is alert, cooperative, and pleasant. Well groomed. No acute distress. Body habitus is overweight. HEENT  The head is normocephalic. No circumoral cyanosis. Dentition is normal.   EYES -  No Xanthelasma, no arcus senilis, no conjunctival hemorrhages or discharge. Neck - Supple, without increased jugular venous pressures. No carotid bruits. No mass. Respiratory - Lungs are clear bilaterally. No wheezes or rales. Normal effort without use of accessory muscles. Cardiovascular  Heart has regular rhythm and rate. HEQUY1/9 systolic  Murmur  No  rubs or gallops. + pedal pulses and no varicosities. Abdominal -  Soft, nontender, nondistended. Bowel sounds are intact. Extremities - No clubbing, cyanosis,  2 + BLE  edema. Musculoskeletal -  No clubbing . No Osler's nodes. Gait - using walker . No kyphosis or scoliosis. Skin -  no statis ulcers or dermatitis. Neurological - No focal signs are identified. Oriented to person, place and time. Psychiatric -  Appropriate affect and mood. Assessment:     Diagnosis Orders   1. Aortic stenosis, severe     2. S/P TAVR (transcatheter aortic valve replacement)     3. Essential hypertension     4. Complete heart block ([address]     5. Pacemaker     6.  Ventricular tachycardia ([address]       Data:  BP Readings from Last 3 Encounters:   03/24/21 120/68   02/23/21 138/74   01/27/21 120/66    Pulse Readings from Last 3 Encounters:   03/24/21 96   01/27/21 74   01/24/21 77        Wt Readings from Last 3 Encounters:   03/24/21 238 lb (108 kg)   02/23/21 222 lb (100.7 kg)   01/22/21 231 lb 8 oz (105 kg)     Lab Results   Component Value Date    INR 2.20 03/23/2021    INR 1.70 03/18/2021    INR 1.90 03/12/2021    PROTIME 18.0 (H) 01/24/2021    PROTIME 19.2 (H) 01/23/2021    PROTIME 17.6 (H) 01/21/2021       Pacemaker check showed adequate battery status- approximately 12.1 yrs   and  appropriate diagnostics  Had 3 episodes of VT longest of which lasted for seconds  Mode DDD at 60  AP 2.6%, VO 99.6%  Next check in 3 months via [employer] home monitoring system      Catheterization prior to his TAVR moderate nonocclusive disease   Specifically a 70% stenosis at distal LAD and a 60 to 70% stenosis at distal RCA. Patient is not on any beta-blocker  Now that he has pacemaker we will start him on beta-blocker to see if this helps in any of his symptoms could be angina related and monitor his heart rates    Progressing  slowly his surgery. Probable Covid positive as his wife (who did test positive) was ill he had all the same symptoms after his surgery. Still has home health and is gaining some strength. He mentioned that he is going to try to do cardiac rehab once discharged from home health. Encouraged him to do this    We discussed typical anginal-like symptoms that she is related to activity. Some of his shoulder discomfort may be positional as he is using a walker    Peripheral edema but appears to be stable. Plan  Start toprol 25mg nightly to help with fast heart rates   Let us know if you have any more chest pressure or tightness with activity   Pacemaker will send reading in 1 mos to check heart rates  Follow up in July With Dr. Inna Wilson   Call with any questions or concerns  Follow up with Arnold Fitzpatrick MD for non cardiac problems  Report any new problems  Cardiovascular Fitness-Exercise as tolerated. Cardiac / Healthy Diet  Continue current medications as directed  Continue plan of treatment  It is always recommended that you bring your medications bottles with you to each visit - this is for your safety! RUBEN Garner/transcription disclaimer: Much of this encounter note is electronic transcription/translation of spoken language to printed tach. Electronic translation of spoken language may be erroneous, or at times, nonsensical words or phrases may be inadvertently transcribed.  Although, I have reviewed the note for such errors, some may still exist.

## 2021-03-24 NOTE — PATIENT INSTRUCTIONS
Plan  Start toprol 25mg nightly to help with fast heart rates   Let us know if you have any more chest pressure or tightness with activity   Pacemaker will send reading in 1 mos to check heart rates  Follow up in July With Dr. Oriana Truong   Call with any questions or concerns  Follow up with Kajal Massey MD for non cardiac problems  Report any new problems  Cardiovascular Fitness-Exercise as tolerated. Cardiac / Healthy Diet  Continue current medications as directed  Continue plan of treatment  It is always recommended that you bring your medications bottles with you to each visit - this is for your safety!

## 2021-03-30 ENCOUNTER — ANTI-COAG VISIT (OUTPATIENT)
Dept: PRIMARY CARE CLINIC | Age: 86
End: 2021-03-30
Payer: MEDICARE

## 2021-03-30 ENCOUNTER — TELEPHONE (OUTPATIENT)
Dept: PRIMARY CARE CLINIC | Age: 86
End: 2021-03-30

## 2021-03-30 ENCOUNTER — TELEPHONE (OUTPATIENT)
Dept: CARDIAC REHAB | Age: 86
End: 2021-03-30

## 2021-03-30 DIAGNOSIS — Z79.01 CHRONIC ANTICOAGULATION: ICD-10-CM

## 2021-03-30 DIAGNOSIS — I87.2 DEEP VENOUS INSUFFICIENCY: ICD-10-CM

## 2021-03-30 LAB — INR BLD: 1.5

## 2021-03-30 PROCEDURE — 93793 ANTICOAG MGMT PT WARFARIN: CPT | Performed by: FAMILY MEDICINE

## 2021-03-30 NOTE — TELEPHONE ENCOUNTER
Britney Murry from [facility] called informing us patient has been discharged at this time as he is going to [facility] rehab.  He can be re-admitted for [facility] once rehab is completed     They did check his INR before d/c is was 1.5, the patient will need to be notified if there are any changes

## 2021-03-30 NOTE — TELEPHONE ENCOUNTER
Patient returning call [facility] home health has ended and wants to start Cardiac Rehab. Assessment and Orientation scheduled for 04/06/2021.

## 2021-03-30 NOTE — PROGRESS NOTES
HOME MONITORING REPORT    INR today:   Results for orders placed or performed in visit on 03/30/21   Protime-INR   Result Value Ref Range    INR 1.50        INR Goal: 2.0-3.0    Dosing Plan  As of 3/30/2021    TTR:  70.8 % (3.7 y)   Full warfarin instructions:  3/30: 11.25 mg; 4/2: 11.25 mg; Otherwise 7.5 mg every day          result called in by home health    PLAN: Advised patient/caregiver to increase his dose on Tuesday and Friday to 11.25 mg, 7.5 mg all other days. . he will be setting up cardiac rehab and once he finds out when and where that is he will let me know so we can arrange for his INR to be drawn.   Patient/Caregiver voiced understanding

## 2021-04-06 ENCOUNTER — HOSPITAL ENCOUNTER (OUTPATIENT)
Dept: CARDIAC REHAB | Age: 86
Setting detail: THERAPIES SERIES
Discharge: HOME | End: 2021-04-06
Payer: MEDICARE

## 2021-04-09 ENCOUNTER — HOSPITAL ENCOUNTER (OUTPATIENT)
Dept: CARDIAC REHAB | Age: 86
Setting detail: THERAPIES SERIES
Discharge: HOME | End: 2021-04-09
Payer: MEDICARE

## 2021-04-09 PROCEDURE — 93798 PHYS/QHP OP CAR RHAB W/ECG: CPT

## 2021-04-12 ENCOUNTER — HOSPITAL ENCOUNTER (OUTPATIENT)
Dept: CARDIAC REHAB | Age: 86
Setting detail: THERAPIES SERIES
Discharge: HOME | End: 2021-04-12
Payer: MEDICARE

## 2021-04-12 PROCEDURE — 93798 PHYS/QHP OP CAR RHAB W/ECG: CPT

## 2021-04-14 ENCOUNTER — HOSPITAL ENCOUNTER (OUTPATIENT)
Dept: CARDIAC REHAB | Age: 86
Setting detail: THERAPIES SERIES
Discharge: HOME | End: 2021-04-14
Payer: MEDICARE

## 2021-04-14 PROCEDURE — 93798 PHYS/QHP OP CAR RHAB W/ECG: CPT

## 2021-04-15 ENCOUNTER — TELEPHONE (OUTPATIENT)
Dept: ADMINISTRATIVE | Age: 86
End: 2021-04-15

## 2021-04-15 NOTE — TELEPHONE ENCOUNTER
Terence Chaudhry requests that nurse return their call. He would like to know when next  labs need to be done. The best time to reach him is Anytime @[phone]. Thank you.

## 2021-04-15 NOTE — TELEPHONE ENCOUNTER
Josesusanagalo Sandoval is going to Cardiac Rehab for the next ten weeks. I have called cardiology and left a message for Alverto Owens who manages their patient's INRs to ask if they could start doing his each week when he is in for rehab. I spoke with Josesusanagalo Sandoval. He will inquire when he is there tomorrow and he and I will speak next Monday .

## 2021-04-16 ENCOUNTER — HOSPITAL ENCOUNTER (OUTPATIENT)
Dept: CARDIAC REHAB | Age: 86
Setting detail: THERAPIES SERIES
Discharge: HOME | End: 2021-04-16
Payer: MEDICARE

## 2021-04-16 PROCEDURE — 93798 PHYS/QHP OP CAR RHAB W/ECG: CPT

## 2021-04-19 ENCOUNTER — HOSPITAL ENCOUNTER (OUTPATIENT)
Dept: CARDIAC REHAB | Age: 86
Setting detail: THERAPIES SERIES
Discharge: HOME | End: 2021-04-19
Payer: MEDICARE

## 2021-04-19 PROCEDURE — 93798 PHYS/QHP OP CAR RHAB W/ECG: CPT

## 2021-04-21 ENCOUNTER — HOSPITAL ENCOUNTER (OUTPATIENT)
Dept: CARDIAC REHAB | Age: 86
Setting detail: THERAPIES SERIES
Discharge: HOME | End: 2021-04-21
Payer: MEDICARE

## 2021-04-21 DIAGNOSIS — E53.8 VITAMIN B 12 DEFICIENCY: ICD-10-CM

## 2021-04-21 PROCEDURE — 93798 PHYS/QHP OP CAR RHAB W/ECG: CPT

## 2021-04-21 RX ORDER — WARFARIN SODIUM 7.5 MG/1
TABLET ORAL
Qty: 90 TABLET | Refills: 2 | Status: SHIPPED | OUTPATIENT
Start: 2021-04-21 | End: 2021-12-01

## 2021-04-21 RX ORDER — CYANOCOBALAMIN 1000 UG/ML
INJECTION INTRAMUSCULAR; SUBCUTANEOUS
Qty: 6 ML | Refills: 0 | Status: SHIPPED | OUTPATIENT
Start: 2021-04-21 | End: 2021-12-01

## 2021-04-23 ENCOUNTER — TELEPHONE (OUTPATIENT)
Dept: PRIMARY CARE CLINIC | Age: 86
End: 2021-04-23

## 2021-04-23 ENCOUNTER — HOSPITAL ENCOUNTER (OUTPATIENT)
Dept: CARDIAC REHAB | Age: 86
Setting detail: THERAPIES SERIES
Discharge: HOME | End: 2021-04-23
Payer: MEDICARE

## 2021-04-23 DIAGNOSIS — K50.119 CROHN'S DISEASE OF LARGE INTESTINE WITH COMPLICATION (HCC): Chronic | ICD-10-CM

## 2021-04-23 DIAGNOSIS — E78.2 MIXED HYPERLIPIDEMIA: Primary | Chronic | ICD-10-CM

## 2021-04-23 PROCEDURE — 93798 PHYS/QHP OP CAR RHAB W/ECG: CPT

## 2021-04-26 ENCOUNTER — HOSPITAL ENCOUNTER (OUTPATIENT)
Dept: CARDIAC REHAB | Age: 86
Setting detail: THERAPIES SERIES
Discharge: HOME | End: 2021-04-26
Payer: MEDICARE

## 2021-04-26 PROCEDURE — 93798 PHYS/QHP OP CAR RHAB W/ECG: CPT

## 2021-04-27 ENCOUNTER — OFFICE VISIT (OUTPATIENT)
Dept: PRIMARY CARE CLINIC | Age: 86
End: 2021-04-27
Payer: MEDICARE

## 2021-04-27 ENCOUNTER — NURSE TRIAGE (OUTPATIENT)
Dept: OTHER | Facility: CLINIC | Age: 86
End: 2021-04-27

## 2021-04-27 ENCOUNTER — TELEPHONE (OUTPATIENT)
Dept: CARDIAC REHAB | Age: 86
End: 2021-04-27

## 2021-04-27 ENCOUNTER — TELEPHONE (OUTPATIENT)
Dept: CARDIOLOGY CLINIC | Age: 86
End: 2021-04-27

## 2021-04-27 VITALS
SYSTOLIC BLOOD PRESSURE: 138 MMHG | OXYGEN SATURATION: 96 % | BODY MASS INDEX: 38.96 KG/M2 | TEMPERATURE: 98.5 F | HEIGHT: 67 IN | HEART RATE: 70 BPM | WEIGHT: 248.2 LBS | DIASTOLIC BLOOD PRESSURE: 84 MMHG

## 2021-04-27 DIAGNOSIS — Z79.01 CHRONIC ANTICOAGULATION: Primary | ICD-10-CM

## 2021-04-27 DIAGNOSIS — I50.9 ACUTE ON CHRONIC CONGESTIVE HEART FAILURE, UNSPECIFIED HEART FAILURE TYPE (HCC): ICD-10-CM

## 2021-04-27 DIAGNOSIS — I50.30 DIASTOLIC CONGESTIVE HEART FAILURE WITH PRESERVED LEFT VENTRICULAR FUNCTION, NYHA CLASS 2 (HCC): Primary | ICD-10-CM

## 2021-04-27 LAB — INTERNATIONAL NORMALIZATION RATIO, POC: 1.3

## 2021-04-27 PROCEDURE — 96372 THER/PROPH/DIAG INJ SC/IM: CPT | Performed by: FAMILY MEDICINE

## 2021-04-27 PROCEDURE — G8417 CALC BMI ABV UP PARAM F/U: HCPCS | Performed by: FAMILY MEDICINE

## 2021-04-27 PROCEDURE — 99214 OFFICE O/P EST MOD 30 MIN: CPT | Performed by: FAMILY MEDICINE

## 2021-04-27 PROCEDURE — 85610 PROTHROMBIN TIME: CPT | Performed by: FAMILY MEDICINE

## 2021-04-27 PROCEDURE — 4040F PNEUMOC VAC/ADMIN/RCVD: CPT | Performed by: FAMILY MEDICINE

## 2021-04-27 PROCEDURE — 1036F TOBACCO NON-USER: CPT | Performed by: FAMILY MEDICINE

## 2021-04-27 PROCEDURE — G8427 DOCREV CUR MEDS BY ELIG CLIN: HCPCS | Performed by: FAMILY MEDICINE

## 2021-04-27 PROCEDURE — 1123F ACP DISCUSS/DSCN MKR DOCD: CPT | Performed by: FAMILY MEDICINE

## 2021-04-27 RX ORDER — FUROSEMIDE 10 MG/ML
80 INJECTION INTRAMUSCULAR; INTRAVENOUS ONCE
Status: COMPLETED | OUTPATIENT
Start: 2021-04-27 | End: 2021-04-27

## 2021-04-27 RX ORDER — OMEPRAZOLE 20 MG/1
20 CAPSULE, DELAYED RELEASE ORAL DAILY
Status: ON HOLD | COMMUNITY
End: 2022-06-30 | Stop reason: HOSPADM

## 2021-04-27 RX ORDER — FUROSEMIDE 40 MG/1
TABLET ORAL
Qty: 180 TABLET | Refills: 1 | Status: SHIPPED | OUTPATIENT
Start: 2021-04-27 | End: 2021-06-21 | Stop reason: ALTCHOICE

## 2021-04-27 RX ADMIN — FUROSEMIDE 80 MG: 10 INJECTION INTRAMUSCULAR; INTRAVENOUS at 15:40

## 2021-04-27 ASSESSMENT — PATIENT HEALTH QUESTIONNAIRE - PHQ9
1. LITTLE INTEREST OR PLEASURE IN DOING THINGS: 0
2. FEELING DOWN, DEPRESSED OR HOPELESS: 0
SUM OF ALL RESPONSES TO PHQ9 QUESTIONS 1 & 2: 0

## 2021-04-27 NOTE — TELEPHONE ENCOUNTER
Cardiology office notified of 3+-4+ edema bilateral lower legs and feet. Had 4 lb. weight gain over weekend.

## 2021-04-27 NOTE — PROGRESS NOTES
Puja Muhammad is a 82 y.o. male who presents today for   Chief Complaint   Patient presents with    Leg Swelling     swelling from above knee down to toes. HPI  Patient is here for complaints of bilateral leg swelling from knees to toes  Patient reports a little more shortness of breath mostly when walkiing and he denies any chest pain though. For INR he notes very much feeling fatigued. He notes as well that he is worried that he could end up hospitalized. He did have pacemaker placement recently and is wondering if it is because his heart function has declined. No change in PMH, family, social, or surgical history unless mentioned above. Review of Systems   Constitutional: Positive for fatigue and unexpected weight change (weight gain). Negative for chills and fever. Respiratory: Positive for shortness of breath. Negative for cough, chest tightness and wheezing. Cardiovascular: Positive for leg swelling. Negative for chest pain and palpitations. Gastrointestinal: Negative for abdominal pain, constipation, diarrhea, nausea and vomiting. Genitourinary: Negative for difficulty urinating, dysuria and frequency. Musculoskeletal: Positive for arthralgias and gait problem. Psychiatric/Behavioral: Positive for sleep disturbance.        Past Medical History:   Diagnosis Date    Cancer [facility])     skin    CHF (congestive heart failure) (HCC)     Cholelithiasis     CKD (chronic kidney disease) stage 3, GFR 30-59 ml/min 11/13/2018    Crohn's disease of large intestine with complication ([facility] 79/6/4898    Deep venous insufficiency 11/7/2017    Essential hypertension 11/7/2017    Gastroesophageal reflux disease without esophagitis 11/7/2017    Heart murmur     Hx of blood clots     Mixed hyperlipidemia 11/7/2017    Nonrheumatic aortic valve stenosis 5/19/2018    Primary osteoarthritis involving multiple joints 11/7/2017    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[address] 2002       Current Outpatient Medications   Medication Sig Dispense Refill    omeprazole (PRILOSEC) 20 MG delayed release capsule Take 20 mg by mouth daily      furosemide (LASIX) 40 MG tablet Take 1 tab daily in the morning daily and if needed an extra dose at lunch daily. 180 tablet 1    warfarin (COUMADIN) 7.5 MG tablet TAKE ONE TABLET BY MOUTH DAILY EXCEPT ON WEDNESDAY TAKE ONE-HALF TABLET 90 tablet 2    cyanocobalamin 1000 MCG/ML injection inject ONE ML IN THE MUSCLE ONCE A MONTH 6 mL 0    metoprolol succinate (TOPROL XL) 25 MG extended release tablet Take 1 tablet by mouth daily 30 tablet 3    losartan (COZAAR) 50 MG tablet TAKE ONE TABLET BY MOUTH EVERY DAY 90 tablet 0    mesalamine (PENTASA) 250 MG extended release capsule TAKE THREE CAPSULES BY MOUTH FOUR TIMES DAILY 1080 capsule 0    Multiple Vitamins-Minerals (PRESERVISION AREDS 2+MULTI VIT) CAPS Take 1 tablet by mouth daily      Cholecalciferol (VITAMIN D3) 5000 units TABS Take by mouth      B Complex-C-E-Zn (STRESS FORMULA/ZINC PO) Take by mouth      potassium chloride (KLOR-CON M) 10 MEQ extended release tablet Take 1 tablet by mouth daily But take twice a day if taking the lasix twice a day 60 tablet 5     No current facility-administered medications for this visit. No Known Allergies    Past Surgical History:   Procedure Laterality Date    AORTIC VALVE REPLACEMENT  01/21/2021    Transfemoral transcatheter aortic valve replacment(TAVR) using a 29 mm Arreaga Doe S3 valve.    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-[employer] [place]  Implant MD Dr Becca Murdock TONSILLECTOMY      TOTAL HIP ARTHROPLASTY         Social History     Tobacco Use    Smoking status: Never Smoker    Smokeless tobacco: Never Used   Substance Use Topics  Alcohol use: No    Drug use: No       Family History   Problem Relation Age of Onset    Stroke Other     Other Other         atherosclerosis of extremities       /84   Pulse 70   Temp 98.5 °F (36.9 °C) (Temporal)   Ht 5' 7\" (1.702 m)   Wt 248 lb 3.2 oz (112.6 kg)   SpO2 96%   BMI 38.87 kg/m²     Physical Exam  Pulmonary:      Breath sounds: Examination of the right-lower field reveals rales. Examination of the left-lower field reveals rales. Decreased breath sounds and rales present. Assessment:    ICD-10-CM    1. Chronic anticoagulation  Z79.01 POCT INR     POCT INR   2. Acute on chronic congestive heart failure, unspecified heart failure type (HCC)  I50.9        Plan:   Patient is in a moderate to severe acute CHF exacerbation and needs to have intramuscular treatment to avoid hospitalization and potentially death and mortality. This is a [address] level treatment based on the medication changes and the risk for death. Patient's INR check today and also reviewed and changed as needed with my supervision  Orders Placed This Encounter   Procedures    POCT INR    POCT INR     This external order was created through the results console. Orders Placed This Encounter   Medications    furosemide (LASIX) injection 80 mg    furosemide (LASIX) 40 MG tablet     Sig: Take 1 tab daily in the morning daily and if needed an extra dose at lunch daily.      Dispense:  180 tablet     Refill:  1     Medications Discontinued During This Encounter   Medication Reason    aspirin 81 MG chewable tablet Therapy completed    benzonatate (TESSALON) 100 MG capsule Therapy completed    fluticasone (FLONASE) 50 MCG/ACT nasal spray Therapy completed    lansoprazole (PREVACID) 15 MG delayed release capsule Therapy completed    VITAMIN E COMPLEX PO Therapy completed    furosemide (LASIX) 20 MG tablet LIST CLEANUP     Patient Instructions   Tomorrow, Thursday, and Friday increase your furosemide to 40 mg in the morning and lunch. Take 40 mg furosemide daily after that. Patient given educational handouts and has had all questions answered. Patient voices understanding and agrees to plans along with risks and benefits of plan. Patient isinstructed to continue prior meds, diet, and exercise plans unless instructed otherwise. Patient agrees to follow up as instructed and sooner if needed. Patient agrees to go to ER if condition becomes emergent. Notesmay be completed with dictation device and spelling errors may occur. Materials may be copied and pasted from a notepad outside of EMR, all of which, I, Dr. Dipika Atwood MD, take sole intellectual ownership of and have approved adding to my note. Return in about 6 days (around 5/3/2021) for VV f/u increase.

## 2021-04-27 NOTE — PATIENT INSTRUCTIONS
Tomorrow, Thursday, and Friday increase your furosemide to 40 mg in the morning and lunch. Take 40 mg furosemide daily after that.

## 2021-04-27 NOTE — TELEPHONE ENCOUNTER
Woodrow Carballo with cardiac rehab called to report that patient gained 4 lbs over the weekend and has 3-4+ pitting edema knees down to feet. Per chart review patient has spoken with [facility] nurse triage and they have gotten patient an appt today to see PCP for same issue.

## 2021-04-27 NOTE — TELEPHONE ENCOUNTER
Tried to reach patient to give advisement. Left message on home number. Tried both cell numbers listed but they were not accepting phone calls. Patient has appt in [place] at 1230 with PCP and I was trying to get in touch him before he leaves [place] so he can get labs done. I have placed orders in hopes that if PCP orders labs as well the lab will draw all labs.

## 2021-04-28 ENCOUNTER — HOSPITAL ENCOUNTER (OUTPATIENT)
Dept: CARDIAC REHAB | Age: 86
Setting detail: THERAPIES SERIES
Discharge: HOME | End: 2021-04-28
Payer: MEDICARE

## 2021-04-28 ENCOUNTER — OFFICE VISIT (OUTPATIENT)
Dept: CARDIOLOGY CLINIC | Age: 86
End: 2021-04-28
Payer: MEDICARE

## 2021-04-28 VITALS
SYSTOLIC BLOOD PRESSURE: 130 MMHG | WEIGHT: 243 LBS | DIASTOLIC BLOOD PRESSURE: 78 MMHG | HEART RATE: 109 BPM | BODY MASS INDEX: 38.14 KG/M2 | HEIGHT: 67 IN

## 2021-04-28 DIAGNOSIS — I44.2 COMPLETE HEART BLOCK (HCC): ICD-10-CM

## 2021-04-28 DIAGNOSIS — Z95.0 PACEMAKER: ICD-10-CM

## 2021-04-28 DIAGNOSIS — I50.30 DIASTOLIC CONGESTIVE HEART FAILURE WITH PRESERVED LEFT VENTRICULAR FUNCTION, NYHA CLASS 2 (HCC): ICD-10-CM

## 2021-04-28 DIAGNOSIS — I10 ESSENTIAL HYPERTENSION: ICD-10-CM

## 2021-04-28 DIAGNOSIS — Z79.01 CHRONIC ANTICOAGULATION: ICD-10-CM

## 2021-04-28 DIAGNOSIS — I35.0 AORTIC STENOSIS, SEVERE: ICD-10-CM

## 2021-04-28 DIAGNOSIS — I50.30 DIASTOLIC CONGESTIVE HEART FAILURE WITH PRESERVED LEFT VENTRICULAR FUNCTION, NYHA CLASS 2 (HCC): Primary | ICD-10-CM

## 2021-04-28 DIAGNOSIS — Z95.2 S/P TAVR (TRANSCATHETER AORTIC VALVE REPLACEMENT): ICD-10-CM

## 2021-04-28 LAB
ANION GAP SERPL CALCULATED.3IONS-SCNC: 11 MMOL/L (ref 7–19)
BUN BLDV-MCNC: 11 MG/DL (ref 8–23)
CALCIUM SERPL-MCNC: 9.4 MG/DL (ref 8.8–10.2)
CHLORIDE BLD-SCNC: 105 MMOL/L (ref 98–111)
CO2: 25 MMOL/L (ref 22–29)
CREAT SERPL-MCNC: 1.3 MG/DL (ref 0.5–1.2)
GFR AFRICAN AMERICAN: >59
GFR NON-AFRICAN AMERICAN: 53
GLUCOSE BLD-MCNC: 108 MG/DL (ref 74–109)
POTASSIUM SERPL-SCNC: 3.2 MMOL/L (ref 3.5–5)
PRO-BNP: 1353 PG/ML (ref 0–1800)
SODIUM BLD-SCNC: 141 MMOL/L (ref 136–145)

## 2021-04-28 PROCEDURE — 1036F TOBACCO NON-USER: CPT | Performed by: CLINICAL NURSE SPECIALIST

## 2021-04-28 PROCEDURE — G8417 CALC BMI ABV UP PARAM F/U: HCPCS | Performed by: CLINICAL NURSE SPECIALIST

## 2021-04-28 PROCEDURE — 99214 OFFICE O/P EST MOD 30 MIN: CPT | Performed by: CLINICAL NURSE SPECIALIST

## 2021-04-28 PROCEDURE — 4040F PNEUMOC VAC/ADMIN/RCVD: CPT | Performed by: CLINICAL NURSE SPECIALIST

## 2021-04-28 PROCEDURE — G8427 DOCREV CUR MEDS BY ELIG CLIN: HCPCS | Performed by: CLINICAL NURSE SPECIALIST

## 2021-04-28 PROCEDURE — 93798 PHYS/QHP OP CAR RHAB W/ECG: CPT

## 2021-04-28 PROCEDURE — 1123F ACP DISCUSS/DSCN MKR DOCD: CPT | Performed by: CLINICAL NURSE SPECIALIST

## 2021-04-28 PROCEDURE — 93288 INTERROG EVL PM/LDLS PM IP: CPT | Performed by: CLINICAL NURSE SPECIALIST

## 2021-04-28 RX ORDER — POTASSIUM CHLORIDE 750 MG/1
10 TABLET, EXTENDED RELEASE ORAL DAILY
Qty: 60 TABLET | Refills: 5 | Status: SHIPPED | OUTPATIENT
Start: 2021-04-28 | End: 2021-11-22

## 2021-04-28 NOTE — PATIENT INSTRUCTIONS
Stop at lab and get BMP and BNP today   Daily weights - in the future if you gain 2-3 lbs overnight or 5-6 lbs in a week then take the lasix twice that day- total of 80 fo the day for one day  Follow up in 1 week With Dr. Sol Hernandez  Call with any questions or concerns  Follow up with Donna Redmond MD for non cardiac problems  Report any new problems  Cardiovascular Fitness-Exercise as tolerated. Continue rehab   Cardiac / Healthy Diet  Continue current medications as directed  Continue plan of treatment  It is always recommended that you bring your medications bottles with you to each visit - this is for your safety!

## 2021-04-28 NOTE — PROGRESS NOTES
[address] [facility]  [address] [place] [address] [place] [facility], [address] [id number]  Phone: [phone]  Fax: [phone]    OFFICE VISIT:  2021    Evelia Gipson - : 1935    Reason For Visit:  Maegan Phan is a 82 y.o. male who is here for Follow-up (patient has edema), Edema, Cardiac Valve Problem (aortic), and Hypertension  History coronary disease and aortic stenosis that was found to be severe last year.     Work-up including catheterization 2020  [facility] management for coronary artery disease   No indication for PCI prior to TAVR because patient has no active chest   pain and the lesions are not involving proximal large epicardial coronary   arteries   Aggressive risk factor management.     Had TAVR 2021-postoperatively developing heart block and had permanent pacemaker placed  And seen in follow-up by Dr. Chantelle Davidson and valve doing well. Pacemaker function appropriate. Received [facility] on 3/19/2021 that showed 3 episodes of VT    Shortly after discharge wife had Covid but he was never tested but probably positive. Was seen last month with no significant improvement after his valve replacement  Found to have some tachycardia on pacemaker interrogation and Toprol started  He started cardiac rehab 21  Received phone call from cardiac rehab nurse that he had 4 pound weight gain with increasing peripheral edema. Recommended increasing diuretic. Saw primary care yesterday and agreed with increasing Lasix to 40 mg twice a day for 3 days    Here today for evaluation. States he was also given IV Lasix yesterday in the primary care office. He is lost several pounds and swelling is better. Wife wanted to clarify directions for increase in Lasix       Subjective  Maegan Phan denies exertional chest pain. Continues to have ALONZO. No resting shortness of breath, orthopnea, paroxysmal nocturnal dyspnea, syncope, presyncope, arrhythmia.   The patient denies numbness or weakness to suggest cerebrovascular accident or 11/7/2017    Heart murmur     Hx of blood clots     Mixed hyperlipidemia 11/7/2017    Nonrheumatic aortic valve stenosis 5/19/2018    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-[employer] [place]  Implant MD Dr Kirill Sullivan      TOTAL HIP ARTHROPLASTY       Family History   Problem Relation Age of Onset    Stroke Other     Other Other         atherosclerosis of extremities     Social History     Tobacco Use    Smoking status: Never Smoker    Smokeless tobacco: Never Used   Substance Use Topics    Alcohol use: No      Current Outpatient Medications   Medication Sig Dispense Refill    potassium chloride (KLOR-CON M) 10 MEQ extended release tablet Take 1 tablet by mouth daily But take twice a day if taking the lasix twice a day 60 tablet 5    omeprazole (PRILOSEC) 20 MG delayed release capsule Take 20 mg by mouth daily      furosemide (LASIX) 40 MG tablet Take 1 tab daily in the morning daily and if needed an extra dose at lunch daily.  180 tablet 1    warfarin (COUMADIN) 7.5 MG tablet TAKE ONE TABLET BY MOUTH DAILY EXCEPT ON WEDNESDAY TAKE ONE-HALF TABLET 90 tablet 2    cyanocobalamin 1000 MCG/ML injection inject ONE ML IN THE MUSCLE ONCE A MONTH 6 mL 0    metoprolol succinate (TOPROL XL) 25 MG extended release tablet Take 1 tablet by mouth daily 30 tablet 3    losartan (COZAAR) 50 MG tablet TAKE ONE TABLET BY MOUTH EVERY DAY 90 tablet 0    mesalamine (PENTASA) 250 MG extended release capsule TAKE THREE CAPSULES BY MOUTH FOUR TIMES DAILY 1080 capsule 0    Multiple Vitamins-Minerals (PRESERVISION AREDS 2+MULTI VIT) CAPS Take 1 tablet by mouth daily      Cholecalciferol (VITAMIN D3) 5000 units TABS Take by mouth      B Complex-C-E-Zn (STRESS FORMULA/ZINC PO) Take by mouth      phytonadione (VITAMIN K) 5 MG tablet Take 1 tablet by mouth once for 1 dose (Patient not taking: Reported on 4/28/2021) 1 tablet 0     No current facility-administered medications for this visit. Allergies: Patient has no known allergies. Review of Systems  Constitutional  no significant activity change, appetite change, or unexpected weight change. No fever, chills or diaphoresis. + fatigue. HEENT  no significant rhinorrhea or epistaxis. No tinnitus or significant hearing loss. Eyes  no sudden vision change or amaurosis. Respiratory  no significant wheezing, stridor, apnea or cough. + dyspnea on exertion no resrting  shortness of breath. Cardiovascular  no exertional chest pain, orthopnea or PND. No sensation of arrhythmia or slow heart rate. No claudication+ leg edema. Gastrointestinal  no abdominal swelling or pain. No blood in stool. No severe constipation, diarrhea, nausea, or vomiting. Genitourinary  no difficulty urinating, dysuria, frequency, or urgency. No flank pain or hematuria. Musculoskeletal  no back pain, gait - uses cane    Skin  no color change or rash. No pallor. No new surgical incision. Neurologic  no speech difficulty, facial asymmetry or lateralizing weakness. No seizures, presyncope, syncope, or significant dizziness. Hematologic  no easy bruising or excessive bleeding. Psychiatric  no severe anxiety or insomnia. No confusion. All other review of systems are negative.       Objective  Vital Signs - /78   Pulse 109   Ht 5' 7\" (1.702 m)   Wt 243 lb (110.2 kg)   BMI 38.06 kg/m²   General - Rodolfo Hall is alert, cooperative, and pleasant. Well groomed. No acute distress. Body habitus is obese. HEENT  The head is normocephalic. No circumoral cyanosis. Dentition is normal.   EYES -  No Xanthelasma, no arcus senilis, no conjunctival hemorrhages or discharge. Neck - Supple, without increased jugular venous pressures. No carotid bruits. No mass. Respiratory - Lungs are clear bilaterally. No wheezes or rales. Normal effort without use of accessory muscles. Cardiovascular  Heart has regular rhythm and rate. No murmurs, rubs or gallops. + pedal pulses and no varicosities. Abdominal -  Soft, nontender, nondistended. Bowel sounds are intact. Extremities - No clubbing, cyanosis, 2+ pitting BLE edema. Musculoskeletal -  No clubbing . No Osler's nodes. Gait -slow and cautious using cane. No kyphosis or scoliosis. Skin -  no statis ulcers or dermatitis. Neurological - No focal signs are identified. Oriented to person, place and time. Psychiatric -  Appropriate affect and mood. Assessment:     Diagnosis Orders   1. Diastolic congestive heart failure with preserved left ventricular function, NYHA class 2 ([address]     2. Chronic anticoagulation     3. Aortic stenosis, severe     4. S/P TAVR (transcatheter aortic valve replacement)     5. Essential hypertension     6. Complete heart block ([address]     7. Pacemaker       Data:  BP Readings from Last 3 Encounters:   04/28/21 130/78   04/27/21 138/84   03/24/21 120/68    Pulse Readings from Last 3 Encounters:   04/28/21 109   04/27/21 70   03/24/21 96        Wt Readings from Last 3 Encounters:   04/28/21 243 lb (110.2 kg)   04/27/21 248 lb 3.2 oz (112.6 kg)   03/24/21 238 lb (108 kg)      Reviewed PCP recent notes   Reviewed recent labs- needs rechecked     Patient has been slow to recover from TAVR  Noted significant worsening peripheral edema at rehab yesterday. Ended up seeing primary care and given IV Lasix. Clarified orders for patient and wife.   He is to take Lasix 40 mg twice daily for 3 days. Continue daily weights. We will have him go down today for BMP and BNP-     We discussed daily weights at home in the future how to take extra diuretic  Pacemaker check showed adequate battery status- approximately 12 years   and  appropriate diagnostics without any sustained arrhythmias. Mode DDD at 60  A P 33.4%,  97.6%  Next check in 3 months via [employer] home monitoring system      Improved overall heart rate with adding beta-blocker last time. Also on ARB  Remains anticoagulated on Coumadin. PCP is managing. Was subtherapeutic last time   States taking medications as prescribed    Lab Results   Component Value Date     04/28/2021     07/04/2011    K 3.2 04/28/2021    K 4.2 07/04/2011     04/28/2021     07/04/2011    CO2 25 04/28/2021    BUN 11 04/28/2021    CREATININE 1.3 04/28/2021    CREATININE 0.9 07/04/2011    GLUCOSE 108 04/28/2021    CALCIUM 9.4 04/28/2021      BNP 1353 today    Hypokalemic with increase in Lasix. We will get him sent in some potassium replacement to take daily. He can take extra when taking extra Lasix    30minutes were spent preparing, reviewing and seeing patient. All questions answered    Plan  Stop at lab and get BMP and BNP today   Daily weights - in the future if you gain 2-3 lbs overnight or 5-6 lbs in a week then take the lasix twice that day- total of 80 fo the day for one day  Follow up in 1 week With Dr. Gissel Osborne  Call with any questions or concerns  Follow up with Soniya Bellamy MD for non cardiac problems  Report any new problems  Cardiovascular Fitness-Exercise as tolerated. Continue rehab   Cardiac / Healthy Diet  Continue current medications as directed  Continue plan of treatment  It is always recommended that you bring your medications bottles with you to each visit - this is for your safety!        RUBEN Atkinson    EMR dragon/transcription disclaimer: Much of this encounter note is electronic transcription/translation of spoken language to printed tach. Electronic translation of spoken language may be erroneous, or at times, nonsensical words or phrases may be inadvertently transcribed.  Although, I have reviewed the note for such errors, some may still exist.

## 2021-04-30 ENCOUNTER — HOSPITAL ENCOUNTER (OUTPATIENT)
Dept: CARDIAC REHAB | Age: 86
Setting detail: THERAPIES SERIES
Discharge: HOME | End: 2021-04-30
Payer: MEDICARE

## 2021-04-30 PROCEDURE — 93798 PHYS/QHP OP CAR RHAB W/ECG: CPT

## 2021-05-03 ENCOUNTER — HOSPITAL ENCOUNTER (OUTPATIENT)
Dept: CARDIAC REHAB | Age: 86
Setting detail: THERAPIES SERIES
Discharge: HOME | End: 2021-05-03
Payer: MEDICARE

## 2021-05-03 PROCEDURE — 93798 PHYS/QHP OP CAR RHAB W/ECG: CPT

## 2021-05-04 ENCOUNTER — VIRTUAL VISIT (OUTPATIENT)
Dept: PRIMARY CARE CLINIC | Age: 86
End: 2021-05-04
Payer: MEDICARE

## 2021-05-04 DIAGNOSIS — Z79.01 CHRONIC ANTICOAGULATION: ICD-10-CM

## 2021-05-04 DIAGNOSIS — I50.30 DIASTOLIC CONGESTIVE HEART FAILURE WITH PRESERVED LEFT VENTRICULAR FUNCTION, NYHA CLASS 2 (HCC): Primary | ICD-10-CM

## 2021-05-04 DIAGNOSIS — I35.0 NONRHEUMATIC AORTIC VALVE STENOSIS: ICD-10-CM

## 2021-05-04 PROCEDURE — 1123F ACP DISCUSS/DSCN MKR DOCD: CPT | Performed by: FAMILY MEDICINE

## 2021-05-04 PROCEDURE — G8428 CUR MEDS NOT DOCUMENT: HCPCS | Performed by: FAMILY MEDICINE

## 2021-05-04 PROCEDURE — 1036F TOBACCO NON-USER: CPT | Performed by: FAMILY MEDICINE

## 2021-05-04 PROCEDURE — 99213 OFFICE O/P EST LOW 20 MIN: CPT | Performed by: FAMILY MEDICINE

## 2021-05-04 PROCEDURE — G8417 CALC BMI ABV UP PARAM F/U: HCPCS | Performed by: FAMILY MEDICINE

## 2021-05-04 PROCEDURE — 4040F PNEUMOC VAC/ADMIN/RCVD: CPT | Performed by: FAMILY MEDICINE

## 2021-05-04 ASSESSMENT — ENCOUNTER SYMPTOMS
SHORTNESS OF BREATH: 0
VOMITING: 0
COUGH: 0
WHEEZING: 0
CHEST TIGHTNESS: 0
DIARRHEA: 0
CONSTIPATION: 0
NAUSEA: 0
ABDOMINAL PAIN: 0

## 2021-05-04 NOTE — PROGRESS NOTES
2021    TELEHEALTH EVALUATION -- Audio/Visual (During  public health emergency)    HPI:    Dayan Ovalle (:  1935) has requested an audio/video evaluation for the following concern(s):    Patient presents today via video visit for chf exacerbation. He has improved since 40 bid x 3 days w/ lasix shot and then back to inc level from baseline of 40 daily. Feeling much improved he states. INR was low recently and he is worried about it, no clot Sx    Review of Systems   Constitutional: Negative for chills and fever. Respiratory: Negative for cough, chest tightness, shortness of breath and wheezing. Cardiovascular: Negative for chest pain, palpitations and leg swelling. Gastrointestinal: Negative for abdominal pain, constipation, diarrhea, nausea and vomiting. Genitourinary: Negative for difficulty urinating, dysuria and frequency. Prior to Visit Medications    Medication Sig Taking? Authorizing Provider   potassium chloride (KLOR-CON M) 10 MEQ extended release tablet Take 1 tablet by mouth daily But take twice a day if taking the lasix twice a day  RUBEN Hawley   omeprazole (PRILOSEC) 20 MG delayed release capsule Take 20 mg by mouth daily  Historical Provider, MD   furosemide (LASIX) 40 MG tablet Take 1 tab daily in the morning daily and if needed an extra dose at lunch daily.   Rhoda Alfaro MD   warfarin (COUMADIN) 7.5 MG tablet TAKE ONE TABLET BY MOUTH DAILY EXCEPT ON WEDNESDAY TAKE ONE-HALF TABLET  Rhoda Alfaro MD   cyanocobalamin 1000 MCG/ML injection inject ONE ML IN THE MUSCLE ONCE A MONTH  Rhoda Alfaro MD   metoprolol succinate (TOPROL XL) 25 MG extended release tablet Take 1 tablet by mouth daily  RUEBN Hawley   losartan (COZAAR) 50 MG tablet TAKE ONE TABLET BY MOUTH EVERY DAY  Rhoda Alfaro MD   mesalamine (PENTASA) 250 MG extended release capsule TAKE THREE CAPSULES BY MOUTH FOUR TIMES DAILY  Rhoda Alfaro MD   phytonadione (VITAMIN K) 5 MG tablet Take 1 tablet by mouth once for 1 dose  Patient not taking: Reported on 4/28/2021  Phylicia Aceves MD   Multiple Vitamins-Minerals (PRESERVISION AREDS 2+MULTI VIT) CAPS Take 1 tablet by mouth daily  Historical Provider, MD   Cholecalciferol (VITAMIN D3) 5000 units TABS Take by mouth  Historical Provider, MD OLSON Complex-C-E-Zn (STRESS FORMULA/ZINC PO) Take by mouth  Historical Provider, MD       Social History     Tobacco Use    Smoking status: Never Smoker    Smokeless tobacco: Never Used   Substance Use Topics    Alcohol use: No    Drug use: No        No Known Allergies,   Past Medical History:   Diagnosis Date    Cancer ([address]     skin    CHF (congestive heart failure) ([address]     Cholelithiasis     CKD (chronic kidney disease) stage 3, GFR 30-59 ml/min 11/13/2018    Crohn's disease of large intestine with complication ([address] 10/9/2148    Deep venous insufficiency 11/7/2017    Essential hypertension 11/7/2017    Gastroesophageal reflux disease without esophagitis 11/7/2017    Heart murmur     Hx of blood clots     Mixed hyperlipidemia 11/7/2017    Nonrheumatic aortic valve stenosis 5/19/2018    Primary osteoarthritis involving multiple joints 11/7/2017    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[address] 2002   ,   Past Surgical History:   Procedure Laterality Date    AORTIC VALVE REPLACEMENT  01/21/2021    Transfemoral transcatheter aortic valve replacment(TAVR) using a 29 mm Arreaga Doe S3 valve.    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-[employer] [place]  Implant MD Dr Deborah Lee   . [address]      TOTAL HIP ARTHROPLASTY     ,   Social History     Tobacco Use    Smoking status: Never Smoker    Smokeless tobacco: Never Used   Substance Use Topics    Alcohol use: No    Drug use: No   ,   Family History   Problem Relation Age of Onset    Stroke Other     Other Other         atherosclerosis of extremities   ,   Immunization History   Administered Date(s) Administered    Influenza, High Dose (Fluzone 65 yrs and older) 11/07/2017, 11/13/2018, 10/01/2020    Influenza, Quadv, IM, PF (6 mo and older Fluzone, Flulaval, Fluarix, and 3 yrs and older Afluria) 11/20/2019    Pneumococcal Conjugate 13-valent ([id number]) 09/30/2016    Pneumococcal Polysaccharide ([id number]) 11/13/2018   ,   Health Maintenance   Topic Date Due    COVID-19 Vaccine (1) Never done    DTaP/Tdap/Td vaccine (1 - Tdap) Never done    Shingles Vaccine (1 of 2) Never done    PSA counseling  11/01/2018    Colon cancer screen colonoscopy  10/30/2020    Annual Wellness Visit (AWV)  11/19/2021    Potassium monitoring  04/28/2022    Creatinine monitoring  04/28/2022    Flu vaccine  Completed    Pneumococcal 65+ years Vaccine  Completed    Hepatitis A vaccine  Aged Out    Hepatitis B vaccine  Aged Out    Hib vaccine  Aged Out    Meningococcal (ACWY) vaccine  Aged Out       PHYSICAL EXAMINATION:  [ INSTRUCTIONS:  \"[x]\" Indicates a positive item  \"[]\" Indicates a negative item  -- DELETE ALL ITEMS NOT EXAMINED]  Vital Signs: (As obtained by patient/caregiver or practitioner observation)    Blood pressure-  Heart rate-    Respiratory rate-    Temperature-  Pulse oximetry-     Constitutional: [x] Appears well-developed and well-nourished [] No apparent distress      [] Abnormal-   Mental status  [x] Alert and awake  [x] Oriented to person/place/time [x]Able to follow commands      Eyes:  EOM    [x]  Normal  [] Abnormal-  Sclera  [x]  Normal  [] Abnormal -         Discharge []  None visible  [] Abnormal -    HENT:   [x] Normocephalic, atraumatic.   [] Abnormal   [x] Mouth/Throat: Mucous membranes are moist.     External Ears [x] Normal  [] Abnormal-     Neck: [x] No visualized mass     Pulmonary/Chest: [x] Respiratory effort normal.  [x] No visualized signs of difficulty breathing or respiratory distress        [] Abnormal-      Musculoskeletal:   [x] Normal gait with no signs of ataxia         [x] Normal range of motion of neck        [] Abnormal-       Neurological:        [x] No Facial Asymmetry (Cranial nerve 7 motor function) (limited exam to video visit)          [x] No gaze palsy        [] Abnormal-         Skin:        [x] No significant exanthematous lesions or discoloration noted on facial skin         [] Abnormal-            Psychiatric:       [x] Normal Affect [x] No Hallucinations        [] Abnormal-     Other pertinent observable physical exam findings-     ASSESSMENT/PLAN:    [id number]    1. Diastolic congestive heart failure with preserved left ventricular function, NYHA class 2 ([facility])  I50.30    2. Chronic anticoagulation  Z79.01    3. Nonrheumatic aortic valve stenosis  I35.0        Recheck INR, cont lasix 40, f/u anytime as he is high risk based on age and CFH condition    No orders of the defined types were placed in this encounter. No orders of the defined types were placed in this encounter. No follow-ups on file. Rahul Mesa is a 82 y.o. male being evaluated by a Virtual Visit (video visit) encounter to address concerns as mentioned above. A caregiver was present when appropriate. Due to this being a TeleHealth encounter (During TXXFK-01 public health emergency), evaluation of the following organ systems was limited: Vitals/Constitutional/EENT/Resp/CV/GI//MS/Neuro/Skin/Heme-Lymph-Imm. Pursuant to the emergency declaration under the [address] [place] [address] authority and the [employer] and [employer] Act, this Virtual Visit was conducted with patient's (and/or legal guardian's) consent, to reduce the patient's risk of exposure to COVID-19 and provide necessary medical care.   The patient (and/or legal guardian) has also been advised to contact this office for worsening conditions or problems, and seek emergency medical treatment and/or call 911 if deemed necessary. Services were provided through a video synchronous discussion virtually to substitute for in-person clinic visit. Patient and provider were located at their individual homes or provider at secure site for business. It is possible that material may be posted from a notepad that is used for a Dragon dictation device for dictating notes outside the EMR and I am the original author of all of this content. --Venus Villagran MD on 5/4/2021 at 1:54 PM    An electronic signature was used to authenticate this note.

## 2021-05-05 ENCOUNTER — HOSPITAL ENCOUNTER (OUTPATIENT)
Dept: CARDIAC REHAB | Age: 86
Setting detail: THERAPIES SERIES
Discharge: HOME | End: 2021-05-05
Payer: MEDICARE

## 2021-05-05 ENCOUNTER — ANTI-COAG VISIT (OUTPATIENT)
Dept: CARDIOLOGY CLINIC | Age: 86
End: 2021-05-05
Payer: MEDICARE

## 2021-05-05 DIAGNOSIS — Z79.01 CHRONIC ANTICOAGULATION: ICD-10-CM

## 2021-05-05 DIAGNOSIS — I87.2 DEEP VENOUS INSUFFICIENCY: Primary | ICD-10-CM

## 2021-05-05 LAB
INTERNATIONAL NORMALIZATION RATIO, POC: 1.8
PROTHROMBIN TIME, POC: NORMAL

## 2021-05-05 PROCEDURE — 85610 PROTHROMBIN TIME: CPT | Performed by: NURSE PRACTITIONER

## 2021-05-05 PROCEDURE — 93798 PHYS/QHP OP CAR RHAB W/ECG: CPT

## 2021-05-07 ENCOUNTER — OFFICE VISIT (OUTPATIENT)
Dept: CARDIOLOGY CLINIC | Age: 86
End: 2021-05-07
Payer: MEDICARE

## 2021-05-07 ENCOUNTER — HOSPITAL ENCOUNTER (OUTPATIENT)
Dept: CARDIAC REHAB | Age: 86
Setting detail: THERAPIES SERIES
Discharge: HOME | End: 2021-05-07
Payer: MEDICARE

## 2021-05-07 VITALS
WEIGHT: 243 LBS | HEART RATE: 63 BPM | DIASTOLIC BLOOD PRESSURE: 75 MMHG | BODY MASS INDEX: 38.14 KG/M2 | SYSTOLIC BLOOD PRESSURE: 134 MMHG | HEIGHT: 67 IN

## 2021-05-07 DIAGNOSIS — I48.0 PAF (PAROXYSMAL ATRIAL FIBRILLATION) (HCC): Primary | ICD-10-CM

## 2021-05-07 DIAGNOSIS — I44.2 COMPLETE HEART BLOCK (HCC): ICD-10-CM

## 2021-05-07 LAB
INTERNATIONAL NORMALIZATION RATIO, POC: 2
PROTHROMBIN TIME, POC: NORMAL

## 2021-05-07 PROCEDURE — 99214 OFFICE O/P EST MOD 30 MIN: CPT | Performed by: INTERNAL MEDICINE

## 2021-05-07 PROCEDURE — 1123F ACP DISCUSS/DSCN MKR DOCD: CPT | Performed by: INTERNAL MEDICINE

## 2021-05-07 PROCEDURE — 1036F TOBACCO NON-USER: CPT | Performed by: INTERNAL MEDICINE

## 2021-05-07 PROCEDURE — 93280 PM DEVICE PROGR EVAL DUAL: CPT | Performed by: INTERNAL MEDICINE

## 2021-05-07 PROCEDURE — 93798 PHYS/QHP OP CAR RHAB W/ECG: CPT

## 2021-05-07 PROCEDURE — G8417 CALC BMI ABV UP PARAM F/U: HCPCS | Performed by: INTERNAL MEDICINE

## 2021-05-07 PROCEDURE — 4040F PNEUMOC VAC/ADMIN/RCVD: CPT | Performed by: INTERNAL MEDICINE

## 2021-05-07 PROCEDURE — 85610 PROTHROMBIN TIME: CPT | Performed by: INTERNAL MEDICINE

## 2021-05-07 PROCEDURE — G8427 DOCREV CUR MEDS BY ELIG CLIN: HCPCS | Performed by: INTERNAL MEDICINE

## 2021-05-07 NOTE — PROGRESS NOTES
Pacemaker interrogated  Presenting rhythm:  AP/, AP 41.6%,  97.7%  Battey voltage 12.0 years  Lead status:  Lead impedance within range and stable  Sensing:  P waves 3.5 mV,  R waves -- >=80% Paced  Thresholds:  Atrial 0.500V @ 0.4ms, ventricular 0.500 V @ 0.4ms  Observations:  None  Reprogramming for sensitivity and threshold testing  Next [facility] appointment:  08/09/21

## 2021-05-07 NOTE — PROGRESS NOTES
[address] [place], [address] [address]  Phone: [phone]  Fax: [phone]    OFFICE VISIT:  2021    Rahul Mesa - : 1935    Reason For Visit:  Nba Sandoval is a 82 y.o. male who is here for Follow-up (feeling better)  History coronary artery disease and aortic stenosis that was found to be severe last year.     Work-up including catheterization 2020  [facility] management for coronary artery disease   No indication for PCI prior to TAVR because patient has no active chest   pain and the lesions are not involving proximal large epicardial coronary   arteries   Aggressive risk factor management was recommended     He underwent successful TAVR 2021-postoperatively developing heart block and had permanent pacemaker placed      He started cardiac rehab 21, during cardiac rehab program he was found to have 4 pound weight gain with increasing peripheral edema. He reports feeling better after he took short course of Lasix to manage his fluid overload    He is feeling better since as his symptom has improved       Subjective  Nba Sandoval denies exertional chest pain. Continues to have ALONZO at baseline. No resting shortness of breath, orthopnea, paroxysmal nocturnal dyspnea, syncope, presyncope, arrhythmia. The patient denies numbness or weakness to suggest cerebrovascular accident or transient ischemic attack. Venus Villagran MD is PCP and follows labs including INRs.   Rahul Mesa has the following history as recorded in [facility]:    Patient Active Problem List    Diagnosis Date Noted    S/P TAVR (transcatheter aortic valve replacement) 2021     Priority: High    Complete heart block (HCC)      Priority: High    Other forms of angina pectoris ([address] 2021     Priority: Low    Bilateral carotid bruits 10/28/2020     Priority: Low    History of DVT (deep vein thrombosis) 10/28/2020     Priority: Low    Dizziness 10/28/2020     Priority: Low    Moderate aortic stenosis 10/28/2020     Priority: Low    S/P partial resection of colon 05/01/2019     Priority: Low    Intestinal adhesions with partial obstruction ([facility] 05/01/2019     Priority: Low    Diastolic congestive heart failure with preserved left ventricular function, NYHA class 2 ([address] 05/01/2019     Priority: Low    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 choroidal neovascularization ([address] 05/01/2019     Priority: Low    Morbid obesity with BMI of 40.0-44.9, adult ([address] 05/01/2019     Priority: Low    Chronic kidney disease, stage III (moderate) 11/13/2018     Priority: Low    Nonrheumatic aortic valve stenosis 05/19/2018     Priority: Low    Aortic systolic murmur on examination - suspect aortic stenosis 05/07/2018     Priority: Low    Essential hypertension 11/07/2017     Priority: Low    Crohn's disease of large intestine with complication ([facility] 39/81/4283     Priority: Low    Mixed hyperlipidemia 11/07/2017     Priority: Low    Deep venous insufficiency 11/07/2017     Priority: Low    Primary osteoarthritis involving multiple joints 11/07/2017     Priority: Low    Gastroesophageal reflux disease without esophagitis 11/07/2017     Priority: Low    Chronic anticoagulation 06/21/2017     Priority: Low    Aortic stenosis, severe      Past Medical History:   Diagnosis Date    Cancer ([address]     skin    CHF (congestive heart failure) ([facility]     Cholelithiasis     CKD (chronic kidney disease) stage 3, GFR 30-59 ml/min 11/13/2018    Crohn's disease of large intestine with complication ([address] 39/2/9561    Deep venous insufficiency 11/7/2017    Essential hypertension 11/7/2017    Gastroesophageal reflux disease without esophagitis 11/7/2017    Heart murmur     Hx of blood clots     Mixed hyperlipidemia 11/7/2017    Nonrheumatic aortic valve stenosis 5/19/2018    Primary osteoarthritis involving multiple joints 11/7/2017    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[address] 2002     Past  B Complex-C-E-Zn (STRESS FORMULA/ZINC PO) Take by mouth       No current facility-administered medications for this visit. Allergies: Patient has no known allergies. Review of Systems  Constitutional  no significant activity change, appetite change, or unexpected weight change. No fever, chills or diaphoresis. + fatigue. HEENT  no significant rhinorrhea or epistaxis. No tinnitus or significant hearing loss. Eyes  no sudden vision change or amaurosis. Respiratory  no significant wheezing, stridor, apnea or cough. + dyspnea on exertion no resrting  shortness of breath. Cardiovascular  no exertional chest pain, orthopnea or PND. No sensation of arrhythmia or slow heart rate. No claudication+ leg edema. Gastrointestinal  no abdominal swelling or pain. No blood in stool. No severe constipation, diarrhea, nausea, or vomiting. Genitourinary  no difficulty urinating, dysuria, frequency, or urgency. No flank pain or hematuria. Musculoskeletal  no back pain, gait - uses cane    Skin  no color change or rash. No pallor. No new surgical incision. Neurologic  no speech difficulty, facial asymmetry or lateralizing weakness. No seizures, presyncope, syncope, or significant dizziness. Hematologic  no easy bruising or excessive bleeding. Psychiatric  no severe anxiety or insomnia. No confusion. All other review of systems are negative. Objective  Vital Signs - /75   Pulse 63   Ht 5' 7\" (1.702 m)   Wt 243 lb (110.2 kg)   BMI 38.06 kg/m²   General - [facility] is alert, cooperative, and pleasant. Well groomed. No acute distress. Body habitus is obese. HEENT  The head is normocephalic. No circumoral cyanosis. Dentition is normal.   EYES -  No Xanthelasma, no arcus senilis, no conjunctival hemorrhages or discharge. Neck - Supple, without increased jugular venous pressures. No carotid bruits. No mass. Respiratory - Lungs are clear bilaterally.   No wheezes or rales.  Normal effort without use of accessory muscles. Cardiovascular  Heart has regular rhythm and rate. No murmurs, rubs or gallops. + pedal pulses and no varicosities. Abdominal -  Soft, nontender, nondistended. Bowel sounds are intact. Extremities - No clubbing, cyanosis, 1+ pitting BLE edema. Musculoskeletal -  No clubbing . No Osler's nodes. Gait -slow and cautious using cane. No kyphosis or scoliosis. Skin -  no statis ulcers or dermatitis. Neurological - No focal signs are identified. Oriented to person, place and time. Psychiatric -  Appropriate affect and mood. Diagnosis Orders   1.  Complete heart block (HCC)       Data:  BP Readings from Last 3 Encounters:   05/07/21 134/75   04/28/21 130/78   04/27/21 138/84    Pulse Readings from Last 3 Encounters:   05/07/21 63   04/28/21 109   04/27/21 70        Wt Readings from Last 3 Encounters:   05/07/21 243 lb (110.2 kg)   04/28/21 243 lb (110.2 kg)   04/27/21 248 lb 3.2 oz (112.6 kg)      Reviewed PCP recent notes   Reviewed recent labs- needs rechecked     Pacemaker interrogated  Presenting rhythm:  AP/, AP 41.6%,  97.7%  Battey voltage 12.0 years  Lead status:  Lead impedance within range and stable  Sensing:  P waves 3.5 mV,  R waves -- >=80% Paced  Thresholds:  Atrial 0.500V @ 0.4ms, ventricular 0.500 V @ 0.4ms  Observations:  None  Reprogramming for sensitivity and threshold testing  [facility]  [facility]        Lab Results   Component Value Date     04/28/2021     07/04/2011    K 3.2 04/28/2021    K 4.2 07/04/2011     04/28/2021     07/04/2011    CO2 25 04/28/2021    BUN 11 04/28/2021    CREATININE 1.3 04/28/2021    CREATININE 0.9 07/04/2011    GLUCOSE 108 04/28/2021    CALCIUM 9.4 04/28/2021          Assessment and plan  -----------------------------    Doing much better since the Lasix was started, shortness of breath has improved and his leg swelling also has improved  Blood pressure is well controlled  Pacemaker toleration was done today no evidence of V. tach or A. fib    We will continue with current dose Lasix and potassium supplement  Call with any questions or concerns  Follow up with Joce Almanza MD for non cardiac problems  Report any new problems  Cardiovascular Fitness-Exercise as tolerated. Continue rehab   Cardiac / Healthy Diet  Continue current medications as directed  Continue plan of treatment  It is always recommended that you bring your medications bottles with you to each visit - this is for your safety! Keke Moon MD, [facility]  Interventional Cardiologist, Endovascular Specialist   Medical Director, Structural Heart Program   [facility]    EMR dragon/transcription disclaimer: Much of this encounter note is electronic transcription/translation of spoken language to printed tach. Electronic translation of spoken language may be erroneous, or at times, nonsensical words or phrases may be inadvertently transcribed.  Although, I have reviewed the note for such errors, some may still exist.

## 2021-05-10 ENCOUNTER — HOSPITAL ENCOUNTER (OUTPATIENT)
Dept: CARDIAC REHAB | Age: 86
Setting detail: THERAPIES SERIES
Discharge: HOME | End: 2021-05-10
Payer: MEDICARE

## 2021-05-10 PROCEDURE — 93798 PHYS/QHP OP CAR RHAB W/ECG: CPT

## 2021-05-12 ENCOUNTER — HOSPITAL ENCOUNTER (OUTPATIENT)
Dept: CARDIAC REHAB | Age: 86
Setting detail: THERAPIES SERIES
Discharge: HOME | End: 2021-05-12
Payer: MEDICARE

## 2021-05-12 PROCEDURE — 93798 PHYS/QHP OP CAR RHAB W/ECG: CPT

## 2021-05-14 ENCOUNTER — ANTI-COAG VISIT (OUTPATIENT)
Dept: CARDIOLOGY CLINIC | Age: 86
End: 2021-05-14
Payer: MEDICARE

## 2021-05-14 ENCOUNTER — HOSPITAL ENCOUNTER (OUTPATIENT)
Dept: CARDIAC REHAB | Age: 86
Setting detail: THERAPIES SERIES
Discharge: HOME | End: 2021-05-14
Payer: MEDICARE

## 2021-05-14 DIAGNOSIS — Z79.01 CHRONIC ANTICOAGULATION: Primary | ICD-10-CM

## 2021-05-14 DIAGNOSIS — I87.2 DEEP VENOUS INSUFFICIENCY: ICD-10-CM

## 2021-05-14 LAB
INTERNATIONAL NORMALIZATION RATIO, POC: 2
PROTHROMBIN TIME, POC: 22.7

## 2021-05-14 PROCEDURE — 85610 PROTHROMBIN TIME: CPT | Performed by: CLINICAL NURSE SPECIALIST

## 2021-05-14 PROCEDURE — 93798 PHYS/QHP OP CAR RHAB W/ECG: CPT

## 2021-05-14 ASSESSMENT — ENCOUNTER SYMPTOMS
VOMITING: 0
SHORTNESS OF BREATH: 1
DIARRHEA: 0
CONSTIPATION: 0
NAUSEA: 0
CHEST TIGHTNESS: 0
COUGH: 0
WHEEZING: 0
ABDOMINAL PAIN: 0

## 2021-05-17 ENCOUNTER — HOSPITAL ENCOUNTER (OUTPATIENT)
Dept: CARDIAC REHAB | Age: 86
Setting detail: THERAPIES SERIES
Discharge: HOME | End: 2021-05-17
Payer: MEDICARE

## 2021-05-17 PROCEDURE — 93798 PHYS/QHP OP CAR RHAB W/ECG: CPT

## 2021-05-18 ENCOUNTER — ANTI-COAG VISIT (OUTPATIENT)
Dept: PRIMARY CARE CLINIC | Age: 86
End: 2021-05-18
Payer: MEDICARE

## 2021-05-18 DIAGNOSIS — E78.2 MIXED HYPERLIPIDEMIA: Chronic | ICD-10-CM

## 2021-05-18 DIAGNOSIS — I87.2 DEEP VENOUS INSUFFICIENCY: ICD-10-CM

## 2021-05-18 DIAGNOSIS — K50.119 CROHN'S DISEASE OF LARGE INTESTINE WITH COMPLICATION (HCC): Chronic | ICD-10-CM

## 2021-05-18 DIAGNOSIS — I10 ESSENTIAL HYPERTENSION: ICD-10-CM

## 2021-05-18 DIAGNOSIS — Z79.01 CHRONIC ANTICOAGULATION: ICD-10-CM

## 2021-05-18 DIAGNOSIS — Z79.01 CHRONIC ANTICOAGULATION: Primary | ICD-10-CM

## 2021-05-18 LAB
ALBUMIN SERPL-MCNC: 4 G/DL (ref 3.5–5.2)
ALP BLD-CCNC: 125 U/L (ref 40–130)
ALT SERPL-CCNC: 13 U/L (ref 5–41)
ANION GAP SERPL CALCULATED.3IONS-SCNC: 8 MMOL/L (ref 7–19)
AST SERPL-CCNC: 20 U/L (ref 5–40)
BACTERIA: NEGATIVE /HPF
BASOPHILS ABSOLUTE: 0.1 K/UL (ref 0–0.2)
BASOPHILS RELATIVE PERCENT: 0.9 % (ref 0–1)
BILIRUB SERPL-MCNC: 0.5 MG/DL (ref 0.2–1.2)
BILIRUBIN URINE: NEGATIVE
BLOOD, URINE: NEGATIVE
BUN BLDV-MCNC: 26 MG/DL (ref 8–23)
CALCIUM SERPL-MCNC: 9.6 MG/DL (ref 8.8–10.2)
CHLORIDE BLD-SCNC: 107 MMOL/L (ref 98–111)
CHOLESTEROL, TOTAL: 171 MG/DL (ref 160–199)
CLARITY: ABNORMAL
CO2: 27 MMOL/L (ref 22–29)
COLOR: YELLOW
CREAT SERPL-MCNC: 1.5 MG/DL (ref 0.5–1.2)
CRYSTALS, UA: ABNORMAL /HPF
EOSINOPHILS ABSOLUTE: 0.2 K/UL (ref 0–0.6)
EOSINOPHILS RELATIVE PERCENT: 2.6 % (ref 0–5)
EPITHELIAL CELLS, UA: 2 /HPF (ref 0–5)
GFR AFRICAN AMERICAN: 54
GFR NON-AFRICAN AMERICAN: 45
GLUCOSE BLD-MCNC: 99 MG/DL (ref 74–109)
GLUCOSE URINE: NEGATIVE MG/DL
HCT VFR BLD CALC: 39.7 % (ref 42–52)
HDLC SERPL-MCNC: 52 MG/DL (ref 55–121)
HEMOGLOBIN: 12.9 G/DL (ref 14–18)
HYALINE CASTS: 2 /HPF (ref 0–8)
IMMATURE GRANULOCYTES #: 0 K/UL
INR BLD: 1.56 (ref 0.88–1.18)
KETONES, URINE: ABNORMAL MG/DL
LDL CHOLESTEROL CALCULATED: 86 MG/DL
LEUKOCYTE ESTERASE, URINE: ABNORMAL
LYMPHOCYTES ABSOLUTE: 4 K/UL (ref 1.1–4.5)
LYMPHOCYTES RELATIVE PERCENT: 49.6 % (ref 20–40)
MCH RBC QN AUTO: 32.6 PG (ref 27–31)
MCHC RBC AUTO-ENTMCNC: 32.5 G/DL (ref 33–37)
MCV RBC AUTO: 100.3 FL (ref 80–94)
MONOCYTES ABSOLUTE: 0.5 K/UL (ref 0–0.9)
MONOCYTES RELATIVE PERCENT: 6.3 % (ref 0–10)
NEUTROPHILS ABSOLUTE: 3.2 K/UL (ref 1.5–7.5)
NEUTROPHILS RELATIVE PERCENT: 40.3 % (ref 50–65)
NITRITE, URINE: NEGATIVE
PDW BLD-RTO: 13.7 % (ref 11.5–14.5)
PH UA: 5 (ref 5–8)
PLATELET # BLD: 86 K/UL (ref 130–400)
PMV BLD AUTO: 10.4 FL (ref 9.4–12.4)
POTASSIUM SERPL-SCNC: 4.6 MMOL/L (ref 3.5–5)
PROTEIN UA: NEGATIVE MG/DL
PROTHROMBIN TIME: 18.8 SEC (ref 12–14.6)
RBC # BLD: 3.96 M/UL (ref 4.7–6.1)
RBC UA: 11 /HPF (ref 0–4)
SODIUM BLD-SCNC: 142 MMOL/L (ref 136–145)
SPECIFIC GRAVITY UA: 1.02 (ref 1–1.03)
TOTAL PROTEIN: 7 G/DL (ref 6.6–8.7)
TRIGL SERPL-MCNC: 165 MG/DL (ref 0–149)
UROBILINOGEN, URINE: 1 E.U./DL
WBC # BLD: 8 K/UL (ref 4.8–10.8)
WBC UA: 5 /HPF (ref 0–5)

## 2021-05-18 PROCEDURE — 93793 ANTICOAG MGMT PT WARFARIN: CPT | Performed by: STUDENT IN AN ORGANIZED HEALTH CARE EDUCATION/TRAINING PROGRAM

## 2021-05-19 ENCOUNTER — HOSPITAL ENCOUNTER (OUTPATIENT)
Dept: CARDIAC REHAB | Age: 86
Setting detail: THERAPIES SERIES
Discharge: HOME | End: 2021-05-19
Payer: MEDICARE

## 2021-05-19 PROCEDURE — 93798 PHYS/QHP OP CAR RHAB W/ECG: CPT

## 2021-05-21 ENCOUNTER — HOSPITAL ENCOUNTER (OUTPATIENT)
Dept: CARDIAC REHAB | Age: 86
Setting detail: THERAPIES SERIES
Discharge: HOME | End: 2021-05-21
Payer: MEDICARE

## 2021-05-21 PROCEDURE — 93798 PHYS/QHP OP CAR RHAB W/ECG: CPT

## 2021-05-24 ENCOUNTER — HOSPITAL ENCOUNTER (OUTPATIENT)
Dept: CARDIAC REHAB | Age: 86
Setting detail: THERAPIES SERIES
Discharge: HOME | End: 2021-05-24
Payer: MEDICARE

## 2021-05-24 PROCEDURE — 93798 PHYS/QHP OP CAR RHAB W/ECG: CPT

## 2021-05-25 ENCOUNTER — OFFICE VISIT (OUTPATIENT)
Dept: PRIMARY CARE CLINIC | Age: 86
End: 2021-05-25
Payer: MEDICARE

## 2021-05-25 VITALS
BODY MASS INDEX: 36.1 KG/M2 | HEART RATE: 64 BPM | WEIGHT: 230 LBS | SYSTOLIC BLOOD PRESSURE: 108 MMHG | TEMPERATURE: 97.5 F | RESPIRATION RATE: 18 BRPM | HEIGHT: 67 IN | DIASTOLIC BLOOD PRESSURE: 62 MMHG | OXYGEN SATURATION: 97 %

## 2021-05-25 DIAGNOSIS — I87.2 DEEP VENOUS INSUFFICIENCY: ICD-10-CM

## 2021-05-25 DIAGNOSIS — E78.2 MIXED HYPERLIPIDEMIA: ICD-10-CM

## 2021-05-25 DIAGNOSIS — D68.9 COAGULATION DEFECT (HCC): ICD-10-CM

## 2021-05-25 DIAGNOSIS — G62.89 OTHER POLYNEUROPATHY: ICD-10-CM

## 2021-05-25 DIAGNOSIS — I50.30 DIASTOLIC CONGESTIVE HEART FAILURE WITH PRESERVED LEFT VENTRICULAR FUNCTION, NYHA CLASS 2 (HCC): Chronic | ICD-10-CM

## 2021-05-25 DIAGNOSIS — N18.31 STAGE 3A CHRONIC KIDNEY DISEASE (HCC): ICD-10-CM

## 2021-05-25 DIAGNOSIS — Z79.01 CHRONIC ANTICOAGULATION: Primary | ICD-10-CM

## 2021-05-25 DIAGNOSIS — G20 PARKINSON DISEASE (HCC): ICD-10-CM

## 2021-05-25 LAB
INTERNATIONAL NORMALIZATION RATIO, POC: 1.5
PROTHROMBIN TIME, POC: NORMAL

## 2021-05-25 PROCEDURE — 1036F TOBACCO NON-USER: CPT | Performed by: FAMILY MEDICINE

## 2021-05-25 PROCEDURE — G8417 CALC BMI ABV UP PARAM F/U: HCPCS | Performed by: FAMILY MEDICINE

## 2021-05-25 PROCEDURE — 85610 PROTHROMBIN TIME: CPT | Performed by: FAMILY MEDICINE

## 2021-05-25 PROCEDURE — 1123F ACP DISCUSS/DSCN MKR DOCD: CPT | Performed by: FAMILY MEDICINE

## 2021-05-25 PROCEDURE — G8427 DOCREV CUR MEDS BY ELIG CLIN: HCPCS | Performed by: FAMILY MEDICINE

## 2021-05-25 PROCEDURE — 4040F PNEUMOC VAC/ADMIN/RCVD: CPT | Performed by: FAMILY MEDICINE

## 2021-05-25 PROCEDURE — 99214 OFFICE O/P EST MOD 30 MIN: CPT | Performed by: FAMILY MEDICINE

## 2021-05-25 SDOH — ECONOMIC STABILITY: FOOD INSECURITY: WITHIN THE PAST 12 MONTHS, YOU WORRIED THAT YOUR FOOD WOULD RUN OUT BEFORE YOU GOT MONEY TO BUY MORE.: NEVER TRUE

## 2021-05-25 SDOH — ECONOMIC STABILITY: TRANSPORTATION INSECURITY
IN THE PAST 12 MONTHS, HAS LACK OF TRANSPORTATION KEPT YOU FROM MEETINGS, WORK, OR FROM GETTING THINGS NEEDED FOR DAILY LIVING?: NO

## 2021-05-25 SDOH — ECONOMIC STABILITY: FOOD INSECURITY: WITHIN THE PAST 12 MONTHS, THE FOOD YOU BOUGHT JUST DIDN'T LAST AND YOU DIDN'T HAVE MONEY TO GET MORE.: NEVER TRUE

## 2021-05-25 ASSESSMENT — PATIENT HEALTH QUESTIONNAIRE - PHQ9
2. FEELING DOWN, DEPRESSED OR HOPELESS: NOT AT ALL
SUM OF ALL RESPONSES TO PHQ9 QUESTIONS 1 & 2: 0
1. LITTLE INTEREST OR PLEASURE IN DOING THINGS: NOT AT ALL
DEPRESSION UNABLE TO ASSESS: YES

## 2021-05-25 ASSESSMENT — SOCIAL DETERMINANTS OF HEALTH (SDOH): HOW HARD IS IT FOR YOU TO PAY FOR THE VERY BASICS LIKE FOOD, HOUSING, MEDICAL CARE, AND HEATING?: NOT HARD AT ALL

## 2021-05-25 NOTE — PATIENT INSTRUCTIONS
Start taking 40 mg lasix one day, then alternate 20 mg the next and back to 40 the following day. Alternate 20mg one and 40 mg the next.

## 2021-05-26 ENCOUNTER — HOSPITAL ENCOUNTER (OUTPATIENT)
Dept: CARDIAC REHAB | Age: 86
Setting detail: THERAPIES SERIES
Discharge: HOME | End: 2021-05-26
Payer: MEDICARE

## 2021-05-26 PROCEDURE — 93798 PHYS/QHP OP CAR RHAB W/ECG: CPT

## 2021-05-28 ENCOUNTER — HOSPITAL ENCOUNTER (OUTPATIENT)
Dept: CARDIAC REHAB | Age: 86
Setting detail: THERAPIES SERIES
Discharge: HOME | End: 2021-05-28
Payer: MEDICARE

## 2021-05-28 PROCEDURE — 93798 PHYS/QHP OP CAR RHAB W/ECG: CPT

## 2021-06-01 DIAGNOSIS — I10 ESSENTIAL HYPERTENSION: Primary | ICD-10-CM

## 2021-06-01 DIAGNOSIS — Z95.0 PACEMAKER: Primary | ICD-10-CM

## 2021-06-01 DIAGNOSIS — I44.2 COMPLETE HEART BLOCK (HCC): ICD-10-CM

## 2021-06-01 DIAGNOSIS — K50.119 CROHN'S DISEASE OF LARGE INTESTINE WITH COMPLICATION (HCC): ICD-10-CM

## 2021-06-01 PROCEDURE — 93296 REM INTERROG EVL PM/IDS: CPT | Performed by: CLINICAL NURSE SPECIALIST

## 2021-06-01 PROCEDURE — 93294 REM INTERROG EVL PM/LDLS PM: CPT | Performed by: CLINICAL NURSE SPECIALIST

## 2021-06-01 RX ORDER — LOSARTAN POTASSIUM 50 MG/1
TABLET ORAL
Qty: 90 TABLET | Refills: 0 | Status: SHIPPED | OUTPATIENT
Start: 2021-06-01 | End: 2021-09-07

## 2021-06-01 NOTE — TELEPHONE ENCOUNTER
Phong Nickerson called to request a refill on his medication.       Last office visit : 5/25/2021   Next office visit : 6/2/2021     Requested Prescriptions     Pending Prescriptions Disp Refills    mesalamine (PENTASA) 250 MG extended release capsule 90 capsule 3     Sig: Take 3 capsules by mouth 4 times daily TAKE THREE CAPSULES BY MOUTH FOUR TIMES DAILY    losartan (COZAAR) 50 MG tablet [Pharmacy Med Name: losartan 50 mg tablet] 90 tablet 0     Sig: TAKE ONE TABLET BY MOUTH DAILY            Saralyn Angelucci

## 2021-06-02 ENCOUNTER — ANTI-COAG VISIT (OUTPATIENT)
Dept: PRIMARY CARE CLINIC | Age: 86
End: 2021-06-02
Payer: MEDICARE

## 2021-06-02 ENCOUNTER — HOSPITAL ENCOUNTER (OUTPATIENT)
Dept: CARDIAC REHAB | Age: 86
Setting detail: THERAPIES SERIES
Discharge: HOME | End: 2021-06-02
Payer: MEDICARE

## 2021-06-02 DIAGNOSIS — I87.2 DEEP VENOUS INSUFFICIENCY: ICD-10-CM

## 2021-06-02 DIAGNOSIS — Z79.01 CHRONIC ANTICOAGULATION: Primary | ICD-10-CM

## 2021-06-02 LAB — INTERNATIONAL NORMALIZATION RATIO, POC: 2.6

## 2021-06-02 PROCEDURE — 93798 PHYS/QHP OP CAR RHAB W/ECG: CPT

## 2021-06-02 PROCEDURE — 93793 ANTICOAG MGMT PT WARFARIN: CPT | Performed by: FAMILY MEDICINE

## 2021-06-02 PROCEDURE — 85610 PROTHROMBIN TIME: CPT | Performed by: FAMILY MEDICINE

## 2021-06-02 NOTE — PROGRESS NOTES
Mr. Shree Garcia was here today. INR today:   Results for orders placed or performed in visit on 06/02/21   POCT INR   Result Value Ref Range    INR 2.6      INR Goal: 2.0-3.0    Dosing Plan  As of 6/2/2021    TTR:  68.5 % (3.9 y)   Full warfarin instructions:  11.25 mg every Sun, Fri; 7.5 mg all other days            PLAN: 11.25 MG Sunday AND Friday, 7.5 MG ALL OTHER DAYS  NEXT COUMADIN CLINIC APT IS: [email or web address]    Coumadin Clinic Hours  Tuesday 7:30am - 4:00pm  Wednesday 7:30am - 4:00pm  Thursday 7:30am - 4:00pm    IF IT'S AN EMERGENCY, PLEASE CALL 911 OR GO TO YOUR NEAREST EMERGENCY ROOM. [facility] INTERNAL MEDICINE COUMADIN CLINIC [phone]  IF UNABLE TO REACH COUMADIN CLINIC, [address] [place] [address], [phone].

## 2021-06-04 ENCOUNTER — HOSPITAL ENCOUNTER (OUTPATIENT)
Dept: CARDIAC REHAB | Age: 86
Setting detail: THERAPIES SERIES
Discharge: HOME | End: 2021-06-04
Payer: MEDICARE

## 2021-06-04 PROCEDURE — 93798 PHYS/QHP OP CAR RHAB W/ECG: CPT

## 2021-06-07 ENCOUNTER — HOSPITAL ENCOUNTER (OUTPATIENT)
Dept: CARDIAC REHAB | Age: 86
Setting detail: THERAPIES SERIES
Discharge: HOME | End: 2021-06-07
Payer: MEDICARE

## 2021-06-07 DIAGNOSIS — K50.119 CROHN'S DISEASE OF LARGE INTESTINE WITH COMPLICATION (HCC): ICD-10-CM

## 2021-06-07 PROCEDURE — 93798 PHYS/QHP OP CAR RHAB W/ECG: CPT

## 2021-06-09 ENCOUNTER — HOSPITAL ENCOUNTER (OUTPATIENT)
Dept: CARDIAC REHAB | Age: 86
Setting detail: THERAPIES SERIES
Discharge: HOME | End: 2021-06-09
Payer: MEDICARE

## 2021-06-09 PROBLEM — D68.9 COAGULATION DEFECT (HCC): Status: ACTIVE | Noted: 2021-06-09

## 2021-06-09 PROBLEM — G20.A1 PARKINSON DISEASE: Status: ACTIVE | Noted: 2021-06-09

## 2021-06-09 PROBLEM — G20 PARKINSON DISEASE (HCC): Status: ACTIVE | Noted: 2021-06-09

## 2021-06-09 PROBLEM — I50.30 DIASTOLIC CONGESTIVE HEART FAILURE WITH PRESERVED LEFT VENTRICULAR FUNCTION, NYHA CLASS 2 (HCC): Chronic | Status: ACTIVE | Noted: 2019-05-01

## 2021-06-09 PROCEDURE — 93798 PHYS/QHP OP CAR RHAB W/ECG: CPT

## 2021-06-09 ASSESSMENT — ENCOUNTER SYMPTOMS
SHORTNESS OF BREATH: 1
NAUSEA: 0
ABDOMINAL PAIN: 0
CONSTIPATION: 0
COUGH: 0
WHEEZING: 0
CHEST TIGHTNESS: 0
VOMITING: 0
DIARRHEA: 0

## 2021-06-09 NOTE — PROGRESS NOTES
Natalie Tellez is a 82 y.o. male who presents today for   Chief Complaint   Patient presents with    6 Month Follow-Up    Shoulder Pain     Right     Other     restless leg on both       HPI  Patient is here for follow-up of recent congestive heart failure exacerbation. He is doing better. He notes though that he is having some shoulder pain on the right side. He denies any trauma. Denies any falls. Denies any pleuritic pain as well. Patient also notes to that he is having some improvement in his lower extremity edema. Previously they were very taut and tense and tender. Continues to be on his anticoagulation without any issue. New condition: Tremor. Have noted it for a little while but getting slightly worse. Has not had any falls or difficulty eating or going to the bathroom. Mainly notes it is at rest.  Some shuffling of the feet noted. No change in PMH, family, social, or surgical history unless mentioned above. I have reviewed the above chief complaint and HPI details charted by staff and claim ownership of the documentation. Review of Systems   Constitutional: Negative for chills and fever. Respiratory: Positive for shortness of breath (baseline). Negative for cough, chest tightness and wheezing. Cardiovascular: Negative for chest pain, palpitations and leg swelling. Gastrointestinal: Negative for abdominal pain, constipation, diarrhea, nausea and vomiting. Genitourinary: Negative for difficulty urinating, dysuria and frequency. Musculoskeletal: Positive for arthralgias and gait problem. Neurological: Positive for tremors. Negative for syncope and speech difficulty.        Past Medical History:   Diagnosis Date    Cancer ([facility]     skin    CHF (congestive heart failure) (HCC)     Cholelithiasis     CKD (chronic kidney disease) stage 3, GFR 30-59 ml/min ([address] 11/13/2018    Crohn's disease of large intestine with complication ([address] 47/5/2306    Deep venous insufficiency Fistula-in-ano repair    PACEMAKER INSERTION  01/22/2021    3rd degree AV block-[employer]  Implant MD Dr Paulina Cage TOTAL HIP ARTHROPLASTY         Social History     Tobacco Use    Smoking status: Never Smoker    Smokeless tobacco: Never Used   Vaping Use    Vaping Use: Never used   Substance Use Topics    Alcohol use: No    Drug use: No       Family History   Problem Relation Age of Onset    Stroke Other     Other Other         atherosclerosis of extremities       /62   Pulse 64   Temp 97.5 °F (36.4 °C) (Temporal)   Resp 18   Ht 5' 7\" (1.702 m)   Wt 230 lb (104.3 kg)   SpO2 97%   BMI 36.02 kg/m²     Physical Exam  Vitals and nursing note reviewed. Constitutional:       General: He is not in acute distress. Appearance: He is well-developed. He is not toxic-appearing or diaphoretic. HENT:      Head: Normocephalic and atraumatic. Cardiovascular:      Rate and Rhythm: Normal rate and regular rhythm. Heart sounds: Normal heart sounds. No murmur heard. No friction rub. No gallop. Pulmonary:      Effort: Pulmonary effort is normal. No respiratory distress. Breath sounds: Normal breath sounds. No wheezing or rales. Chest:      Chest wall: No tenderness. Abdominal:      General: Bowel sounds are normal. There is no distension. Palpations: Abdomen is soft. There is no mass. Tenderness: There is no abdominal tenderness. There is no guarding or rebound. Musculoskeletal:      Right lower leg: No edema. Left lower leg: No edema. Skin:     General: Skin is warm and dry. Nails: There is no clubbing. Neurological:      Mental Status: He is alert and oriented to person, place, and time. Motor: Tremor (pill rolling hands b/l) present. No seizure activity. Coordination: Coordination normal.      Gait: Gait abnormal (shuffling w/ arm and legs not coordinated). Psychiatric:         Mood and Affect: Mood is not anxious. Affect is blunt (masked). Assessment:    ICD-10-CM    1. Chronic anticoagulation  Z79.01 POCT INR   2. Deep venous insufficiency  I87.2    3. Parkinson disease ([address]  G20    4. Other polyneuropathy  G62.89    5. Mixed hyperlipidemia  E78.2    6. Stage 3a chronic kidney disease (HCC)  N18.31 Comprehensive Metabolic Panel   7. Coagulation defect ([address]  D68.9    8. Diastolic congestive heart failure with preserved left ventricular function, NYHA class 2 (HCC)  I50.30        Plan:   Patient needs a follow-up CMP for his recent titration and treatment. See directions for diuretic change. New diagnosis of Parkinson's disease today. Explanation of disease as well as questions answered for the patient  Orders Placed This Encounter   Procedures    Comprehensive Metabolic Panel     Standing Status:   Standing     Number of Occurrences:   40     Standing Expiration Date:   5/7/2032    POCT INR     No orders of the defined types were placed in this encounter. There are no discontinued medications. Patient Instructions   Start taking 40 mg lasix one day, then alternate 20 mg the next and back to 40 the following day. Alternate 20mg one and 40 mg the next. Patient given educational handouts and has had all questions answered. Patient voices understanding and agrees to plans along with risks and benefits of plan. Patient isinstructed to continue prior meds, diet, and exercise plans unless instructed otherwise. Patient agrees to follow up as instructed and sooner if needed. Patient agrees to go to ER if condition becomes emergent. Notesmay be completed with dictation device and spelling errors may occur. Materials may be copied and pasted from a notepad outside of EMR, all of which, I, Dr. Priyanka Christine MD, take sole intellectual ownership of and have approved adding to my note.         Return in about 3 months (around 8/25/2021) for OV (M-Wam), CHF, ckd.

## 2021-06-11 ENCOUNTER — HOSPITAL ENCOUNTER (OUTPATIENT)
Dept: CARDIAC REHAB | Age: 86
Setting detail: THERAPIES SERIES
Discharge: HOME | End: 2021-06-11
Payer: MEDICARE

## 2021-06-11 PROCEDURE — 93798 PHYS/QHP OP CAR RHAB W/ECG: CPT

## 2021-06-14 ENCOUNTER — HOSPITAL ENCOUNTER (OUTPATIENT)
Dept: CARDIAC REHAB | Age: 86
Setting detail: THERAPIES SERIES
Discharge: HOME | End: 2021-06-14
Payer: MEDICARE

## 2021-06-14 ENCOUNTER — ANTI-COAG VISIT (OUTPATIENT)
Dept: PRIMARY CARE CLINIC | Age: 86
End: 2021-06-14
Payer: MEDICARE

## 2021-06-14 ENCOUNTER — OFFICE VISIT (OUTPATIENT)
Dept: PRIMARY CARE CLINIC | Age: 86
End: 2021-06-14
Payer: MEDICARE

## 2021-06-14 ENCOUNTER — NURSE TRIAGE (OUTPATIENT)
Dept: OTHER | Facility: CLINIC | Age: 86
End: 2021-06-14

## 2021-06-14 VITALS
TEMPERATURE: 98.5 F | HEIGHT: 67 IN | WEIGHT: 228 LBS | SYSTOLIC BLOOD PRESSURE: 110 MMHG | DIASTOLIC BLOOD PRESSURE: 62 MMHG | OXYGEN SATURATION: 96 % | BODY MASS INDEX: 35.79 KG/M2 | HEART RATE: 64 BPM

## 2021-06-14 DIAGNOSIS — I87.2 DEEP VENOUS INSUFFICIENCY: ICD-10-CM

## 2021-06-14 DIAGNOSIS — N18.31 STAGE 3A CHRONIC KIDNEY DISEASE (HCC): Chronic | ICD-10-CM

## 2021-06-14 DIAGNOSIS — I10 ESSENTIAL HYPERTENSION: Chronic | ICD-10-CM

## 2021-06-14 DIAGNOSIS — Z79.01 CHRONIC ANTICOAGULATION: Primary | ICD-10-CM

## 2021-06-14 DIAGNOSIS — R10.9 FLANK PAIN: Primary | ICD-10-CM

## 2021-06-14 DIAGNOSIS — I50.30 DIASTOLIC CONGESTIVE HEART FAILURE WITH PRESERVED LEFT VENTRICULAR FUNCTION, NYHA CLASS 2 (HCC): Chronic | ICD-10-CM

## 2021-06-14 DIAGNOSIS — R10.9 FLANK PAIN: ICD-10-CM

## 2021-06-14 LAB
ALBUMIN SERPL-MCNC: 4 G/DL (ref 3.5–5.2)
ALP BLD-CCNC: 135 U/L (ref 40–130)
ALT SERPL-CCNC: 13 U/L (ref 5–41)
ANION GAP SERPL CALCULATED.3IONS-SCNC: 10 MMOL/L (ref 7–19)
AST SERPL-CCNC: 22 U/L (ref 5–40)
BILIRUB SERPL-MCNC: 0.4 MG/DL (ref 0.2–1.2)
BILIRUBIN, POC: NORMAL
BLOOD URINE, POC: NORMAL
BUN BLDV-MCNC: 18 MG/DL (ref 8–23)
CALCIUM SERPL-MCNC: 9.3 MG/DL (ref 8.8–10.2)
CHLORIDE BLD-SCNC: 105 MMOL/L (ref 98–111)
CLARITY, POC: CLEAR
CO2: 26 MMOL/L (ref 22–29)
COLOR, POC: YELLOW
CREAT SERPL-MCNC: 1.3 MG/DL (ref 0.5–1.2)
GFR AFRICAN AMERICAN: >59
GFR NON-AFRICAN AMERICAN: 53
GLUCOSE BLD-MCNC: 106 MG/DL (ref 74–109)
GLUCOSE URINE, POC: NORMAL
INTERNATIONAL NORMALIZATION RATIO, POC: 2
KETONES, POC: NORMAL
LEUKOCYTE EST, POC: NORMAL
NITRITE, POC: NORMAL
PH, POC: 5.5
POTASSIUM SERPL-SCNC: 4.1 MMOL/L (ref 3.5–5)
PROTEIN, POC: NORMAL
SODIUM BLD-SCNC: 141 MMOL/L (ref 136–145)
SPECIFIC GRAVITY, POC: 1.03
TOTAL PROTEIN: 6.8 G/DL (ref 6.6–8.7)
UROBILINOGEN, POC: 0.2

## 2021-06-14 PROCEDURE — G8427 DOCREV CUR MEDS BY ELIG CLIN: HCPCS | Performed by: NURSE PRACTITIONER

## 2021-06-14 PROCEDURE — 1036F TOBACCO NON-USER: CPT | Performed by: NURSE PRACTITIONER

## 2021-06-14 PROCEDURE — 1123F ACP DISCUSS/DSCN MKR DOCD: CPT | Performed by: NURSE PRACTITIONER

## 2021-06-14 PROCEDURE — G8417 CALC BMI ABV UP PARAM F/U: HCPCS | Performed by: NURSE PRACTITIONER

## 2021-06-14 PROCEDURE — 93793 ANTICOAG MGMT PT WARFARIN: CPT | Performed by: FAMILY MEDICINE

## 2021-06-14 PROCEDURE — 99213 OFFICE O/P EST LOW 20 MIN: CPT | Performed by: NURSE PRACTITIONER

## 2021-06-14 PROCEDURE — 85610 PROTHROMBIN TIME: CPT | Performed by: FAMILY MEDICINE

## 2021-06-14 PROCEDURE — 93798 PHYS/QHP OP CAR RHAB W/ECG: CPT

## 2021-06-14 PROCEDURE — 81002 URINALYSIS NONAUTO W/O SCOPE: CPT | Performed by: NURSE PRACTITIONER

## 2021-06-14 PROCEDURE — 4040F PNEUMOC VAC/ADMIN/RCVD: CPT | Performed by: NURSE PRACTITIONER

## 2021-06-14 RX ORDER — BUMETANIDE 0.5 MG/1
0.5 TABLET ORAL DAILY
Qty: 30 TABLET | Refills: 1 | Status: SHIPPED | OUTPATIENT
Start: 2021-06-14 | End: 2021-07-14

## 2021-06-14 NOTE — PROGRESS NOTES
Mr. Aidee Hubbard was here today. INR today:   Results for orders placed or performed in visit on 06/14/21   POCT INR   Result Value Ref Range    INR 2.0      INR Goal: 2.0-3.0    Dosing Plan  As of 6/14/2021    TTR:  68.8 % (3.9 y)   Full warfarin instructions:  11.25 mg every Sun, Fri; 7.5 mg all other days            PLAN: CONTINUE CURRENT DOSE  NEXT COUMADIN CLINIC APT IS: [email or web address]    Coumadin Clinic Hours  Tuesday 7:30am - 4:00pm  Wednesday 7:30am - 4:00pm  Thursday 7:30am - 4:00pm    IF IT'S AN EMERGENCY, PLEASE CALL 911 OR GO TO YOUR NEAREST EMERGENCY ROOM. [facility] INTERNAL MEDICINE COUMADIN CLINIC [phone]  IF UNABLE TO REACH COUMADIN CLINIC, [address] [place] [address], [phone].

## 2021-06-14 NOTE — TELEPHONE ENCOUNTER
Received call from Xiao at [facility] with [employer] Complaint. Brief description of triage:flank     Triage indicates for patient to see PCP today or tomorrow. Pt informed able to go to [facility] or walk in clinic is s/s worse and unable to get into PCP. Pt stated he wouldn't go to [facility] or walk in clinic but voiced understanding about going to see PCP today or tomorrow. Care advice provided, patient verbalizes understanding; denies any other questions or concerns; instructed to call back for any new or worsening symptoms. Writer provided warm transfer to Anne Sánchez at [facility] for appointment scheduling. Attention Provider: Thank you for allowing me to participate in the care of your patient. The patient was connected to triage in response to information provided to the ECC. Please do not respond through this encounter as the response is not directed to a shared pool. Reason for Disposition   Patient wants to be seen    Answer Assessment - Initial Assessment Questions  1. LOCATION: \"Where does it hurt? \" (e.g., left, right)      Bilateral flank pain, left worse than right. 2. ONSET: \"When did the pain start? \"      Thursday. Stopped Lasix Friday and pain improved since. 3. SEVERITY: \"How bad is the pain? \" (e.g., Scale 1-10; mild, moderate, or severe)    - MILD (1-3): doesn't interfere with normal activities     - MODERATE (4-7): interferes with normal activities or awakens from sleep     - SEVERE (8-10): excruciating pain and patient unable to do normal activities (stays in bed)        \"it's minimal\"    4. PATTERN: \"Does the pain come and go, or is it constant? \"       Worse when walk. Denies dysuria    5. CAUSE: \"What do you think is causing the pain? \"      Lasix    6. OTHER SYMPTOMS:  \"Do you have any other symptoms? \" (e.g., fever, abdominal pain, vomiting, leg weakness, burning with urination, blood in urine)      Denies chest pain, SOB.  States a little swelling last night but has been on his feet a lot. States swelling is good today. Pt and wife requesting change in water pill    Protocols used:  FLANK PAIN-ADULT-[place]

## 2021-06-14 NOTE — PROGRESS NOTES
ChiefComplaint:   Chief Complaint   Patient presents with    Flank Pain       History of Present Illness:  Shree Garcia is a 82 y.o. male who presents for evaluation of bilateral flank pain, left side worse then right. He reports his pain has been increase over the last week and he thinks \"it is related to that lasix. I stopped taking it 3 days (Thursday) ago and it is better but still hurts. \" Denies any fevers, dysuria, odor from urine, rates pain 2-3/10 just when he walks. No dietary changes, no shortness of breath- no more than usual, no headaches or dizziness, no falls, and reports he drinks 3-4 bottles of water per day and a cup of coffee. Patient reports legs are a little bit puffy today. Reports he has worn compression stocking in the past and they got to where they did not do any good anymore with swelling around top of the stockings. Patient has not been elevating his legs.      History:  Past Medical History:   Diagnosis Date    Cancer ([address]     skin    CHF (congestive heart failure) (HCC)     Cholelithiasis     CKD (chronic kidney disease) stage 3, GFR 30-59 ml/min ([address] 11/13/2018    Crohn's disease of large intestine with complication ([address] 55/1/9654    Deep venous insufficiency 11/7/2017    Essential hypertension 11/7/2017    Gastroesophageal reflux disease without esophagitis 11/7/2017    Heart murmur     Hx of blood clots     Mixed hyperlipidemia 11/7/2017    Nonrheumatic aortic valve stenosis 5/19/2018    Primary osteoarthritis involving multiple joints 11/7/2017    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[address] 2002       Family History   Problem Relation Age of Onset    Stroke Other     Other Other         atherosclerosis of extremities     Social History     Socioeconomic History    Marital status:      Spouse name: Carmenza Rothman    Number of children: 2    Years of education: 23    Highest education level: Not on file   Occupational History     Employer: RETIRED   Tobacco Use    Smoking status: Never Smoker    Smokeless tobacco: Never Used   Vaping Use    Vaping Use: Never used   Substance and Sexual Activity    Alcohol use: No    Drug use: No    Sexual activity: Not Currently     Partners: Female   Other Topics Concern    Not on file   Social History Narrative    Not on file     Social Determinants of Health     Financial Resource Strain: Low Risk     Difficulty of Paying Living Expenses: Not hard at all   Food Insecurity: No Food Insecurity    Worried About Running Out of Food in the Last Year: Never true    [address] in the Last Year: Never true   Transportation Needs: Unknown    Lack of Transportation (Medical): Not on file    Lack of Transportation (Non-Medical): No   Physical Activity:     Days of Exercise per Week:     Minutes of Exercise per Session:    Stress:     Feeling of Stress :    Social Connections:     Frequency of Communication with Friends and Family:     Frequency of Social Gatherings with Friends and Family:     Attends [employer] Services:     Active Member of Clubs or Organizations:     Attends Club or Organization Meetings:     Marital Status:    Intimate Partner Violence:     Fear of Current or Ex-Partner:     Emotionally Abused:     Physically Abused:     Sexually Abused:         Allergies:  No Known Allergies    Medications:  Current Outpatient Medications on File Prior to Visit   Medication Sig Dispense Refill    mesalamine (PENTASA) 250 MG extended release capsule Take 3 capsules by mouth 4 times daily 1080 capsule 2    losartan (COZAAR) 50 MG tablet TAKE ONE TABLET BY MOUTH DAILY 90 tablet 0    potassium chloride (KLOR-CON M) 10 MEQ extended release tablet Take 1 tablet by mouth daily But take twice a day if taking the lasix twice a day 60 tablet 5    omeprazole (PRILOSEC) 20 MG delayed release capsule Take 20 mg by mouth daily      warfarin (COUMADIN) 7.5 MG tablet TAKE ONE TABLET BY MOUTH DAILY EXCEPT ON WEDNESDAY TAKE ONE-HALF TABLET 90 tablet 2    cyanocobalamin 1000 MCG/ML injection inject ONE ML IN THE MUSCLE ONCE A MONTH 6 mL 0    Multiple Vitamins-Minerals (PRESERVISION AREDS 2+MULTI VIT) CAPS Take 1 tablet by mouth daily      Cholecalciferol (VITAMIN D3) 5000 units TABS Take by mouth      B Complex-C-E-Zn (STRESS FORMULA/ZINC PO) Take by mouth      furosemide (LASIX) 40 MG tablet Take 1 tab daily in the morning daily and if needed an extra dose at lunch daily. (Patient not taking: Reported on 6/14/2021) 180 tablet 1     No current facility-administered medications on file prior to visit. Review of Systems:  Review of Systems   Constitutional: Negative for activity change and fever. HENT: Negative for congestion, ear pain and sore throat. Respiratory: Negative for cough, chest tightness and shortness of breath. Cardiovascular: Negative for chest pain. Gastrointestinal: Negative for abdominal pain, diarrhea, nausea and vomiting. Genitourinary: Positive for flank pain. Negative for decreased urine volume, dysuria, frequency, hematuria and urgency. Musculoskeletal: Negative for arthralgias and myalgias. Skin: Negative for color change. Neurological: Negative for dizziness, weakness, light-headedness and numbness. Psychiatric/Behavioral: Negative for agitation. The patient is not nervous/anxious. Vital Signs:  /62   Pulse 64   Temp 98.5 °F (36.9 °C) (Temporal)   Ht 5' 7\" (1.702 m)   Wt 228 lb (103.4 kg)   SpO2 96%   BMI 35.71 kg/m²     Physical Exam:  Physical Exam  Vitals reviewed. Constitutional:       Appearance: Normal appearance. HENT:      Head: Normocephalic. Right Ear: Tympanic membrane normal.      Left Ear: Tympanic membrane normal.      Nose: Nose normal.      Mouth/Throat:      Mouth: Mucous membranes are moist.      Pharynx: Oropharynx is clear. Eyes:      Extraocular Movements: Extraocular movements intact.       Pupils: Pupils are equal, round, and

## 2021-06-16 ENCOUNTER — HOSPITAL ENCOUNTER (OUTPATIENT)
Dept: CARDIAC REHAB | Age: 86
Setting detail: THERAPIES SERIES
Discharge: HOME | End: 2021-06-16
Payer: MEDICARE

## 2021-06-16 PROCEDURE — 93798 PHYS/QHP OP CAR RHAB W/ECG: CPT

## 2021-06-16 RX ORDER — METOPROLOL SUCCINATE 25 MG/1
TABLET, EXTENDED RELEASE ORAL
Qty: 30 TABLET | Refills: 5 | Status: SHIPPED | OUTPATIENT
Start: 2021-06-16 | End: 2022-01-11

## 2021-06-18 ENCOUNTER — HOSPITAL ENCOUNTER (OUTPATIENT)
Dept: CARDIAC REHAB | Age: 86
Setting detail: THERAPIES SERIES
Discharge: HOME | End: 2021-06-18
Payer: MEDICARE

## 2021-06-18 PROCEDURE — 93798 PHYS/QHP OP CAR RHAB W/ECG: CPT

## 2021-06-18 ASSESSMENT — ENCOUNTER SYMPTOMS
VOMITING: 0
COUGH: 0
DIARRHEA: 0
NAUSEA: 0
SORE THROAT: 0
CHEST TIGHTNESS: 0
SHORTNESS OF BREATH: 0
COLOR CHANGE: 0
ABDOMINAL PAIN: 0

## 2021-06-21 ENCOUNTER — OFFICE VISIT (OUTPATIENT)
Dept: PRIMARY CARE CLINIC | Age: 86
End: 2021-06-21
Payer: MEDICARE

## 2021-06-21 ENCOUNTER — HOSPITAL ENCOUNTER (OUTPATIENT)
Dept: CARDIAC REHAB | Age: 86
Setting detail: THERAPIES SERIES
Discharge: HOME | End: 2021-06-21
Payer: MEDICARE

## 2021-06-21 VITALS
BODY MASS INDEX: 36.19 KG/M2 | OXYGEN SATURATION: 97 % | SYSTOLIC BLOOD PRESSURE: 118 MMHG | HEIGHT: 67 IN | DIASTOLIC BLOOD PRESSURE: 64 MMHG | HEART RATE: 58 BPM | TEMPERATURE: 98 F | WEIGHT: 230.6 LBS

## 2021-06-21 DIAGNOSIS — I10 ESSENTIAL HYPERTENSION: Chronic | ICD-10-CM

## 2021-06-21 DIAGNOSIS — N18.31 STAGE 3A CHRONIC KIDNEY DISEASE (HCC): ICD-10-CM

## 2021-06-21 DIAGNOSIS — I50.30 DIASTOLIC CONGESTIVE HEART FAILURE WITH PRESERVED LEFT VENTRICULAR FUNCTION, NYHA CLASS 2 (HCC): Chronic | ICD-10-CM

## 2021-06-21 DIAGNOSIS — R60.9 EDEMA, UNSPECIFIED TYPE: Primary | ICD-10-CM

## 2021-06-21 LAB
ALBUMIN SERPL-MCNC: 4.2 G/DL (ref 3.5–5.2)
ALP BLD-CCNC: 135 U/L (ref 40–130)
ALT SERPL-CCNC: 13 U/L (ref 5–41)
ANION GAP SERPL CALCULATED.3IONS-SCNC: 10 MMOL/L (ref 7–19)
AST SERPL-CCNC: 19 U/L (ref 5–40)
BILIRUB SERPL-MCNC: 0.4 MG/DL (ref 0.2–1.2)
BUN BLDV-MCNC: 12 MG/DL (ref 8–23)
CALCIUM SERPL-MCNC: 9.5 MG/DL (ref 8.8–10.2)
CHLORIDE BLD-SCNC: 105 MMOL/L (ref 98–111)
CO2: 27 MMOL/L (ref 22–29)
CREAT SERPL-MCNC: 1.2 MG/DL (ref 0.5–1.2)
GFR AFRICAN AMERICAN: >59
GFR NON-AFRICAN AMERICAN: 58
GLUCOSE BLD-MCNC: 95 MG/DL (ref 74–109)
POTASSIUM SERPL-SCNC: 4.2 MMOL/L (ref 3.5–5)
SODIUM BLD-SCNC: 142 MMOL/L (ref 136–145)
TOTAL PROTEIN: 7.1 G/DL (ref 6.6–8.7)

## 2021-06-21 PROCEDURE — G8417 CALC BMI ABV UP PARAM F/U: HCPCS | Performed by: NURSE PRACTITIONER

## 2021-06-21 PROCEDURE — 1036F TOBACCO NON-USER: CPT | Performed by: NURSE PRACTITIONER

## 2021-06-21 PROCEDURE — 4040F PNEUMOC VAC/ADMIN/RCVD: CPT | Performed by: NURSE PRACTITIONER

## 2021-06-21 PROCEDURE — 99213 OFFICE O/P EST LOW 20 MIN: CPT | Performed by: NURSE PRACTITIONER

## 2021-06-21 PROCEDURE — 1123F ACP DISCUSS/DSCN MKR DOCD: CPT | Performed by: NURSE PRACTITIONER

## 2021-06-21 PROCEDURE — 93798 PHYS/QHP OP CAR RHAB W/ECG: CPT

## 2021-06-21 PROCEDURE — G8427 DOCREV CUR MEDS BY ELIG CLIN: HCPCS | Performed by: NURSE PRACTITIONER

## 2021-06-21 ASSESSMENT — ENCOUNTER SYMPTOMS
SORE THROAT: 0
SHORTNESS OF BREATH: 0
ABDOMINAL PAIN: 0
CHEST TIGHTNESS: 0
COLOR CHANGE: 0
COUGH: 0
NAUSEA: 0
DIARRHEA: 0
VOMITING: 0

## 2021-06-21 NOTE — PROGRESS NOTES
ChiefComplaint:   Chief Complaint   Patient presents with    Flank Pain    Results     Lab       History of Present Illness:  Evelia Gipson is a 82 y.o. male who presents for follow up left flank pain and swelling in his legs bilaterally. Patient reports pain \"it's pretty much gone and swelling in my legs is better. \"  Patient reports pain on a scale of 1-10 it is a 1-2. Patient wants to discuss lab results-CMP. Patient denies any shortness of breath, palpitations, dysuria, or hematuria. Patient reports he waited till Thursday, Friday and Saturday of last week to take the bumex. Patient reports his legs are not as \"tight\" as they were last week.     History  Past Medical History:   Diagnosis Date    Cancer ([facility]     skin    CHF (congestive heart failure) (HCC)     Cholelithiasis     CKD (chronic kidney disease) stage 3, GFR 30-59 ml/min ([facility] 11/13/2018    Crohn's disease of large intestine with complication ([facility] 89/9/1238    Deep venous insufficiency 11/7/2017    Essential hypertension 11/7/2017    Gastroesophageal reflux disease without esophagitis 11/7/2017    Heart murmur     Hx of blood clots     Mixed hyperlipidemia 11/7/2017    Nonrheumatic aortic valve stenosis 5/19/2018    Primary osteoarthritis involving multiple joints 11/7/2017    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[facility] 2002       Family History   Problem Relation Age of Onset    Stroke Other     Other Other         atherosclerosis of extremities     Social History     Socioeconomic History    Marital status:      Spouse name: Lashae Wild    Number of children: 2    Years of education: 23    Highest education level: Not on file   Occupational History     Employer: RETIRED   Tobacco Use    Smoking status: Never Smoker    Smokeless tobacco: Never Used   Vaping Use    Vaping Use: Never used   Substance and Sexual Activity    Alcohol use: No    Drug use: No    Sexual activity: Not Currently     Partners: Female   Other Topics Concern    Not on file   Social History Narrative    Not on file     Social Determinants of Health     Financial Resource Strain: Low Risk     Difficulty of Paying Living Expenses: Not hard at all   Food Insecurity: No Food Insecurity    Worried About Running Out of Food in the Last Year: Never true    [address] in the Last Year: Never true   Transportation Needs: Unknown    Lack of Transportation (Medical): Not on file    Lack of Transportation (Non-Medical): No   Physical Activity:     Days of Exercise per Week:     Minutes of Exercise per Session:    Stress:     Feeling of Stress :    Social Connections:     Frequency of Communication with Friends and Family:     Frequency of Social Gatherings with Friends and Family:     Attends Hinduism Services:     Active Member of Clubs or Organizations:     Attends Club or Organization Meetings:     Marital Status:    Intimate Partner Violence:     Fear of Current or Ex-Partner:     Emotionally Abused:     Physically Abused:     Sexually Abused:         Allergies:  No Known Allergies    Medications:  Current Outpatient Medications on File Prior to Visit   Medication Sig Dispense Refill    metoprolol succinate (TOPROL XL) 25 MG extended release tablet TAKE ONE TABLET BY MOUTH DAILY 30 tablet 5    bumetanide (BUMEX) 0.5 MG tablet Take 1 tablet by mouth daily 30 tablet 1    mesalamine (PENTASA) 250 MG extended release capsule Take 3 capsules by mouth 4 times daily 1080 capsule 2    losartan (COZAAR) 50 MG tablet TAKE ONE TABLET BY MOUTH DAILY 90 tablet 0    potassium chloride (KLOR-CON M) 10 MEQ extended release tablet Take 1 tablet by mouth daily But take twice a day if taking the lasix twice a day 60 tablet 5    omeprazole (PRILOSEC) 20 MG delayed release capsule Take 20 mg by mouth daily      warfarin (COUMADIN) 7.5 MG tablet TAKE ONE TABLET BY MOUTH DAILY EXCEPT ON WEDNESDAY TAKE ONE-HALF TABLET 90 tablet 2    cyanocobalamin 1000 MCG/ML injection inject ONE ML IN THE MUSCLE ONCE A MONTH 6 mL 0    Multiple Vitamins-Minerals (PRESERVISION AREDS 2+MULTI VIT) CAPS Take 1 tablet by mouth daily      Cholecalciferol (VITAMIN D3) 5000 units TABS Take by mouth      B Complex-C-E-Zn (STRESS FORMULA/ZINC PO) Take by mouth       No current facility-administered medications on file prior to visit. Review of Systems:  Review of Systems   Constitutional: Negative for activity change and fever. HENT: Negative for congestion, ear pain and sore throat. Respiratory: Negative for cough, chest tightness and shortness of breath. Cardiovascular: Positive for leg swelling. Negative for chest pain. Gastrointestinal: Negative for abdominal pain, diarrhea, nausea and vomiting. Genitourinary: Positive for flank pain. Negative for dysuria, frequency, hematuria and urgency. Musculoskeletal: Negative for arthralgias and myalgias. Skin: Negative for color change. Neurological: Negative for dizziness, weakness and numbness. Psychiatric/Behavioral: Negative for agitation. The patient is not nervous/anxious. Vital Signs:  /64   Pulse 58   Temp 98 °F (36.7 °C) (Temporal)   Ht 5' 7\" (1.702 m)   Wt 230 lb 9.6 oz (104.6 kg)   SpO2 97%   BMI 36.12 kg/m²     Physical Exam:  Physical Exam  Vitals reviewed. Constitutional:       Appearance: Normal appearance. HENT:      Head: Normocephalic. Right Ear: Tympanic membrane normal.      Left Ear: Tympanic membrane normal.      Nose: Nose normal.      Mouth/Throat:      Mouth: Mucous membranes are moist.      Pharynx: Oropharynx is clear. Eyes:      Extraocular Movements: Extraocular movements intact. Pupils: Pupils are equal, round, and reactive to light. Cardiovascular:      Rate and Rhythm: Normal rate and regular rhythm. Pulses: Normal pulses. Heart sounds: Normal heart sounds.    Pulmonary:      Effort: Pulmonary effort is normal.      Breath sounds: Normal breath sounds. Abdominal:      General: Bowel sounds are normal.      Palpations: Abdomen is soft. Tenderness: There is no right CVA tenderness or left CVA tenderness. Musculoskeletal:         General: Normal range of motion. Cervical back: Normal range of motion. Right lower le+ Edema present. Left lower le+ Edema present. Skin:     General: Skin is warm and dry. Neurological:      Mental Status: He is alert and oriented to person, place, and time. Psychiatric:         Mood and Affect: Mood normal.         Behavior: Behavior normal.          Assessment & Plan    1. Edema, unspecified type   Instructed patient to continue elevating his legs, eating a low sodium diet and ensure he is drinking 6-8 glasses of water per day. Patient will take bumex 1mg for Tuesday, Wednesday, and Thursday and then taken bumex 0.5 mg daily. 2. Stage 3a chronic kidney disease ([facility]  Discussed his kidney function has improved based on cmp from 2021 compared to 2021.     - Comprehensive Metabolic Panel; Future    3. Essential hypertension  Discussed blood pressure is well controlled. 4. Diastolic congestive heart failure with preserved left ventricular function, NYHA class 2 ([facility]  Discussed importance of reporting any shortness of breath, or weight gain greater than 3 pounds in 1 day or 5 pounds in 1 week. Return in about 1 week (around 2021).

## 2021-06-22 ENCOUNTER — TELEPHONE (OUTPATIENT)
Dept: PRIMARY CARE CLINIC | Age: 86
End: 2021-06-22

## 2021-06-22 NOTE — TELEPHONE ENCOUNTER
Pt called back after receiving voicemail about lab results. I let pt know his kidney function is improving per RUBEN Broderick. Pt thanked me and HARSHA.

## 2021-06-23 ENCOUNTER — HOSPITAL ENCOUNTER (OUTPATIENT)
Dept: CARDIAC REHAB | Age: 86
Setting detail: THERAPIES SERIES
Discharge: HOME | End: 2021-06-23
Payer: MEDICARE

## 2021-06-23 PROCEDURE — 93798 PHYS/QHP OP CAR RHAB W/ECG: CPT

## 2021-06-25 ENCOUNTER — HOSPITAL ENCOUNTER (OUTPATIENT)
Dept: CARDIAC REHAB | Age: 86
Setting detail: THERAPIES SERIES
Discharge: HOME | End: 2021-06-25
Payer: MEDICARE

## 2021-06-25 PROCEDURE — 93798 PHYS/QHP OP CAR RHAB W/ECG: CPT

## 2021-06-28 ENCOUNTER — OFFICE VISIT (OUTPATIENT)
Dept: PRIMARY CARE CLINIC | Age: 86
End: 2021-06-28
Payer: MEDICARE

## 2021-06-28 ENCOUNTER — HOSPITAL ENCOUNTER (OUTPATIENT)
Dept: CARDIAC REHAB | Age: 86
Setting detail: THERAPIES SERIES
Discharge: HOME | End: 2021-06-28
Payer: MEDICARE

## 2021-06-28 ENCOUNTER — TELEPHONE (OUTPATIENT)
Dept: CARDIOLOGY CLINIC | Age: 86
End: 2021-06-28

## 2021-06-28 VITALS
DIASTOLIC BLOOD PRESSURE: 80 MMHG | WEIGHT: 225.8 LBS | TEMPERATURE: 98.3 F | OXYGEN SATURATION: 98 % | HEART RATE: 68 BPM | HEIGHT: 67 IN | SYSTOLIC BLOOD PRESSURE: 138 MMHG | BODY MASS INDEX: 35.44 KG/M2

## 2021-06-28 DIAGNOSIS — R42 DIZZINESS: ICD-10-CM

## 2021-06-28 DIAGNOSIS — G47.9 SLEEP DISTURBANCE: ICD-10-CM

## 2021-06-28 DIAGNOSIS — R60.0 LOCALIZED EDEMA: Primary | ICD-10-CM

## 2021-06-28 DIAGNOSIS — I87.2 DEEP VENOUS INSUFFICIENCY: ICD-10-CM

## 2021-06-28 DIAGNOSIS — Z79.01 CHRONIC ANTICOAGULATION: ICD-10-CM

## 2021-06-28 LAB
INTERNATIONAL NORMALIZATION RATIO, POC: 1.8
PROTHROMBIN TIME, POC: NORMAL

## 2021-06-28 PROCEDURE — G8417 CALC BMI ABV UP PARAM F/U: HCPCS | Performed by: NURSE PRACTITIONER

## 2021-06-28 PROCEDURE — 4040F PNEUMOC VAC/ADMIN/RCVD: CPT | Performed by: NURSE PRACTITIONER

## 2021-06-28 PROCEDURE — G8427 DOCREV CUR MEDS BY ELIG CLIN: HCPCS | Performed by: NURSE PRACTITIONER

## 2021-06-28 PROCEDURE — 85610 PROTHROMBIN TIME: CPT | Performed by: NURSE PRACTITIONER

## 2021-06-28 PROCEDURE — 93798 PHYS/QHP OP CAR RHAB W/ECG: CPT

## 2021-06-28 PROCEDURE — 1123F ACP DISCUSS/DSCN MKR DOCD: CPT | Performed by: NURSE PRACTITIONER

## 2021-06-28 PROCEDURE — 99213 OFFICE O/P EST LOW 20 MIN: CPT | Performed by: NURSE PRACTITIONER

## 2021-06-28 PROCEDURE — 1036F TOBACCO NON-USER: CPT | Performed by: NURSE PRACTITIONER

## 2021-06-28 ASSESSMENT — ENCOUNTER SYMPTOMS
ABDOMINAL PAIN: 0
COUGH: 0
NAUSEA: 0
CHEST TIGHTNESS: 0
VOMITING: 0
COLOR CHANGE: 0
DIARRHEA: 0
SORE THROAT: 0
SHORTNESS OF BREATH: 0

## 2021-06-28 NOTE — PATIENT INSTRUCTIONS
1. Stay on bumex 0.5 mg daily  2. Keep follow up appt with Dr. Makenna Begum 8/31/2021  3.  Call [address] [place] [address] [place] to inquire about socks [phone]

## 2021-06-28 NOTE — PROGRESS NOTES
Other Topics Concern    Not on file   Social History Narrative    Not on file     Social Determinants of Health     Financial Resource Strain: Low Risk     Difficulty of Paying Living Expenses: Not hard at all   Food Insecurity: No Food Insecurity    Worried About Running Out of Food in the Last Year: Never true    [address] in the Last Year: Never true   Transportation Needs: Unknown    Lack of Transportation (Medical): Not on file    Lack of Transportation (Non-Medical): No   Physical Activity:     Days of Exercise per Week:     Minutes of Exercise per Session:    Stress:     Feeling of Stress :    Social Connections:     Frequency of Communication with Friends and Family:     Frequency of Social Gatherings with Friends and Family:     Attends [employer] Services:     Active Member of Clubs or Organizations:     Attends Club or Organization Meetings:     Marital Status:    Intimate Partner Violence:     Fear of Current or Ex-Partner:     Emotionally Abused:     Physically Abused:     Sexually Abused:         Allergies:  No Known Allergies    Medications:  Current Outpatient Medications on File Prior to Visit   Medication Sig Dispense Refill    metoprolol succinate (TOPROL XL) 25 MG extended release tablet TAKE ONE TABLET BY MOUTH DAILY 30 tablet 5    bumetanide (BUMEX) 0.5 MG tablet Take 1 tablet by mouth daily 30 tablet 1    mesalamine (PENTASA) 250 MG extended release capsule Take 3 capsules by mouth 4 times daily 1080 capsule 2    losartan (COZAAR) 50 MG tablet TAKE ONE TABLET BY MOUTH DAILY 90 tablet 0    potassium chloride (KLOR-CON M) 10 MEQ extended release tablet Take 1 tablet by mouth daily But take twice a day if taking the lasix twice a day 60 tablet 5    omeprazole (PRILOSEC) 20 MG delayed release capsule Take 20 mg by mouth daily      warfarin (COUMADIN) 7.5 MG tablet TAKE ONE TABLET BY MOUTH DAILY EXCEPT ON WEDNESDAY TAKE ONE-HALF TABLET 90 tablet 2    cyanocobalamin Normal breath sounds. Abdominal:      General: Bowel sounds are normal.      Palpations: Abdomen is soft. Musculoskeletal:         General: Normal range of motion. Cervical back: Normal range of motion. Right lower le+ Edema present. Left lower le+ Edema present. Skin:     General: Skin is warm and dry. Neurological:      General: No focal deficit present. Mental Status: He is alert and oriented to person, place, and time. Sensory: Sensation is intact. Coordination: Coordination is intact. Gait: Gait is intact. Psychiatric:         Mood and Affect: Mood normal.         Behavior: Behavior normal.          Assessment & Plan    1. Localized edema  Instructed patient to continue taking Bumex 0.5 mg daily    2. Sleep disturbance  Instructed patient to try to reduce the naps he takes during the day so he can sleep more during the night. 3. Dizziness  Encouraged patient to eat before he goes to this bible study class so he is not dizzy. Patient     4. Deep venous insufficiency      5. Chronic anticoagulation    - POCT INR       Return in about 2 months (around 2021).

## 2021-06-29 PROBLEM — H35.369 RETINAL DRUSEN: Status: ACTIVE | Noted: 2018-11-08

## 2021-06-29 PROBLEM — H43.819 VITREOUS DEGENERATION: Status: ACTIVE | Noted: 2018-11-08

## 2021-06-29 PROBLEM — H35.60 RETINAL HEMORRHAGE: Status: ACTIVE | Noted: 2018-11-08

## 2021-06-29 PROBLEM — H26.499 AFTER-CATARACT WITH VISION OBSCURED: Status: ACTIVE | Noted: 2020-02-25

## 2021-07-05 ENCOUNTER — APPOINTMENT (OUTPATIENT)
Dept: CARDIAC REHAB | Age: 86
End: 2021-07-05
Payer: MEDICARE

## 2021-07-07 ENCOUNTER — ANTI-COAG VISIT (OUTPATIENT)
Dept: PRIMARY CARE CLINIC | Age: 86
End: 2021-07-07
Payer: MEDICARE

## 2021-07-07 DIAGNOSIS — I87.2 DEEP VENOUS INSUFFICIENCY: ICD-10-CM

## 2021-07-07 DIAGNOSIS — Z79.01 CHRONIC ANTICOAGULATION: Primary | ICD-10-CM

## 2021-07-07 LAB — INTERNATIONAL NORMALIZATION RATIO, POC: 2.7

## 2021-07-07 PROCEDURE — 93793 ANTICOAG MGMT PT WARFARIN: CPT | Performed by: FAMILY MEDICINE

## 2021-07-07 PROCEDURE — 85610 PROTHROMBIN TIME: CPT | Performed by: FAMILY MEDICINE

## 2021-07-12 ENCOUNTER — TELEPHONE (OUTPATIENT)
Dept: ADMINISTRATIVE | Age: 86
End: 2021-07-12

## 2021-07-12 NOTE — TELEPHONE ENCOUNTER
PT called and said he needed to speak to Everett York about his last visit, he didn't give specifics, just have her return my call, thanks.

## 2021-07-13 NOTE — TELEPHONE ENCOUNTER
Please let patient know he can take the bumex every other day to see if this helps. If no improvement, or if his swelling returns, we can switch him to a different medication. He may also want to ask cardiology their opinion tomorrow based on his weight and swelling in his legs.

## 2021-07-13 NOTE — TELEPHONE ENCOUNTER
Returned patients voice mail and Jenn Decker wants to know if \"I can split that water pill-the Bumex in half. I have to get up to go to the bathroom in the middle of the night and it also makes me itch and my skin is so dry. \" Informed will forward to provider for recommendations

## 2021-07-14 ENCOUNTER — OFFICE VISIT (OUTPATIENT)
Dept: CARDIOLOGY CLINIC | Age: 86
End: 2021-07-14
Payer: MEDICARE

## 2021-07-14 VITALS
HEART RATE: 65 BPM | SYSTOLIC BLOOD PRESSURE: 116 MMHG | WEIGHT: 232 LBS | DIASTOLIC BLOOD PRESSURE: 76 MMHG | OXYGEN SATURATION: 96 % | BODY MASS INDEX: 36.41 KG/M2 | HEIGHT: 67 IN

## 2021-07-14 DIAGNOSIS — I35.0 NONRHEUMATIC AORTIC VALVE STENOSIS: Primary | Chronic | ICD-10-CM

## 2021-07-14 DIAGNOSIS — I10 ESSENTIAL HYPERTENSION: Chronic | ICD-10-CM

## 2021-07-14 DIAGNOSIS — Z95.2 S/P TAVR (TRANSCATHETER AORTIC VALVE REPLACEMENT): ICD-10-CM

## 2021-07-14 DIAGNOSIS — I50.30 DIASTOLIC CONGESTIVE HEART FAILURE WITH PRESERVED LEFT VENTRICULAR FUNCTION, NYHA CLASS 2 (HCC): Chronic | ICD-10-CM

## 2021-07-14 DIAGNOSIS — Z86.718 HISTORY OF DVT (DEEP VEIN THROMBOSIS): ICD-10-CM

## 2021-07-14 DIAGNOSIS — N18.31 STAGE 3A CHRONIC KIDNEY DISEASE (HCC): Chronic | ICD-10-CM

## 2021-07-14 PROCEDURE — 99214 OFFICE O/P EST MOD 30 MIN: CPT | Performed by: CLINICAL NURSE SPECIALIST

## 2021-07-14 PROCEDURE — 4040F PNEUMOC VAC/ADMIN/RCVD: CPT | Performed by: CLINICAL NURSE SPECIALIST

## 2021-07-14 PROCEDURE — G8427 DOCREV CUR MEDS BY ELIG CLIN: HCPCS | Performed by: CLINICAL NURSE SPECIALIST

## 2021-07-14 PROCEDURE — 1123F ACP DISCUSS/DSCN MKR DOCD: CPT | Performed by: CLINICAL NURSE SPECIALIST

## 2021-07-14 PROCEDURE — G8417 CALC BMI ABV UP PARAM F/U: HCPCS | Performed by: CLINICAL NURSE SPECIALIST

## 2021-07-14 PROCEDURE — 1036F TOBACCO NON-USER: CPT | Performed by: CLINICAL NURSE SPECIALIST

## 2021-07-14 RX ORDER — BUMETANIDE 0.5 MG/1
0.5 TABLET ORAL
Qty: 45 TABLET | Refills: 3 | Status: SHIPPED | OUTPATIENT
Start: 2021-07-14 | End: 2021-11-22

## 2021-07-14 ASSESSMENT — ENCOUNTER SYMPTOMS
WHEEZING: 0
COUGH: 0
CHEST TIGHTNESS: 0
ABDOMINAL PAIN: 0
NAUSEA: 0
VOMITING: 0
FACIAL SWELLING: 0
EYE REDNESS: 0
SHORTNESS OF BREATH: 1

## 2021-07-14 NOTE — PROGRESS NOTES
[facility], [address], [address]  [address]  Phone: [phone]  Fax: [phone]    OFFICE VISIT:  2021    June Ramirez - : 1935    Reason For Visit:  Dedra Cheung is a 82 y.o. male who is here for 6 Month Follow-Up (patient was having edema in legs and feet. ), Atrial Fibrillation, and Congestive Heart Failure       Diagnosis Orders   1. Nonrheumatic aortic valve stenosis     2. S/P TAVR (transcatheter aortic valve replacement)     3. Diastolic congestive heart failure with preserved left ventricular function, NYHA class 2 (HCC)  bumetanide (BUMEX) 0.5 MG tablet   4. Essential hypertension  bumetanide (BUMEX) 0.5 MG tablet   5. Stage 3a chronic kidney disease (HCC)  bumetanide (BUMEX) 0.5 MG tablet   6. History of DVT (deep vein thrombosis)           HPI  Patient follows with our office with a history of CAD and aortic stenosis. Patient underwent TAVR 2021 and developed heart block postoperatively requiring a permanent pacemaker. Other history includes diastolic heart failure, hypertension, CKD. Patient has attended cardiac rehab which she states has helped his stamina and he has lost some weight. He has changed his diet and is eating more healthfully. He denies chest pain. He has some mild, chronic dyspnea which is unchanged. He denies orthopnea, PND, sudden weight gain. Reports his PCP recently switched him from Lasix to Bumex. He is concerned about this medication harming his kidneys. He feels like he is \"dried out\" and wants to know if he can decrease the dosage. Xavier Arvizu MD is PCP.   June Ramirez has the following history as recorded in [facility]:    Patient Active Problem List    Diagnosis Date Noted    S/P TAVR (transcatheter aortic valve replacement) 2021    Complete heart block (HCC)     Localized edema 2021    Sleep disturbance 2021    Coagulation defect ([facility] 2021    Parkinson disease ([facility] 06/09/2021    Other forms of angina pectoris ([address] 02/08/2021    Aortic stenosis, severe     Bilateral carotid bruits 10/28/2020    History of DVT (deep vein thrombosis) 10/28/2020    Dizziness 10/28/2020    Moderate aortic stenosis 10/28/2020    After-cataract with vision obscured 02/25/2020    S/P partial resection of colon 05/01/2019    Intestinal adhesions with partial obstruction ([address] 47/40/8119    Diastolic congestive heart failure with preserved left ventricular function, NYHA class 2 ([address] 05/01/2019    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 choroidal neovascularization ([address] 05/01/2019    Morbid obesity with BMI of 40.0-44.9, adult ([address] 05/01/2019    Chronic kidney disease, stage III (moderate) ([address] 11/13/2018    Retinal drusen 11/08/2018    Retinal hemorrhage 11/08/2018    Vitreous degeneration 11/08/2018    Nonrheumatic aortic valve stenosis 05/19/2018    Aortic systolic murmur on examination - suspect aortic stenosis 05/07/2018    Essential hypertension 11/07/2017    Crohn's disease of large intestine with complication ([address] 59/42/7782    Mixed hyperlipidemia 11/07/2017    Deep venous insufficiency 11/07/2017    Primary osteoarthritis involving multiple joints 11/07/2017    Gastroesophageal reflux disease without esophagitis 11/07/2017    Chronic anticoagulation 06/21/2017     Past Medical History:   Diagnosis Date    Cancer ([address]     skin    CHF (congestive heart failure) ([address]     Cholelithiasis     CKD (chronic kidney disease) stage 3, GFR 30-59 ml/min ([address] 11/13/2018    Crohn's disease of large intestine with complication ([address] 73/3/0838    Deep venous insufficiency 11/7/2017    Essential hypertension 11/7/2017    Gastroesophageal reflux disease without esophagitis 11/7/2017    Heart murmur     Hx of blood clots     Mixed hyperlipidemia 11/7/2017    Nonrheumatic aortic valve stenosis 5/19/2018    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-[employer] [place]  Implant MD Dr Francoise Leon    SKIN CANCER EXCISION      SUBTOTAL COLECTOMY      TONSILLECTOMY      TOTAL HIP ARTHROPLASTY       Family History   Problem Relation Age of Onset    Stroke Other     Other Other         atherosclerosis of extremities     Social History     Tobacco Use    Smoking status: Never Smoker    Smokeless tobacco: Never Used   Substance Use Topics    Alcohol use: No      Current Outpatient Medications   Medication Sig Dispense Refill    bumetanide (BUMEX) 0.5 MG tablet Take 1 tablet by mouth three times a week 45 tablet 3    metoprolol succinate (TOPROL XL) 25 MG extended release tablet TAKE ONE TABLET BY MOUTH DAILY 30 tablet 5    mesalamine (PENTASA) 250 MG extended release capsule Take 3 capsules by mouth 4 times daily 1080 capsule 2    losartan (COZAAR) 50 MG tablet TAKE ONE TABLET BY MOUTH DAILY 90 tablet 0    potassium chloride (KLOR-CON M) 10 MEQ extended release tablet Take 1 tablet by mouth daily But take twice a day if taking the lasix twice a day 60 tablet 5    omeprazole (PRILOSEC) 20 MG delayed release capsule Take 20 mg by mouth daily      warfarin (COUMADIN) 7.5 MG tablet TAKE ONE TABLET BY MOUTH DAILY EXCEPT ON WEDNESDAY TAKE ONE-HALF TABLET 90 tablet 2    cyanocobalamin 1000 MCG/ML injection inject ONE ML IN THE MUSCLE ONCE A MONTH 6 mL 0    Multiple Vitamins-Minerals (PRESERVISION AREDS 2+MULTI VIT) CAPS Take 1 tablet by mouth daily      Cholecalciferol (VITAMIN D3) 5000 units TABS Take by mouth      B Complex-C-E-Zn (STRESS FORMULA/ZINC PO) Take by mouth       No current facility-administered medications for this visit. Allergies: Patient has no known allergies. Review of Systems  Review of Systems   Constitutional: Positive for fatigue. Negative for activity change, diaphoresis, fever and unexpected weight change. HENT: Negative for facial swelling and nosebleeds. Eyes: Negative for redness and visual disturbance. Respiratory: Positive for shortness of breath (mild, unchanged, improved since TAVR). Negative for cough, chest tightness and wheezing. Cardiovascular: Positive for leg swelling (improved). Negative for chest pain and palpitations. Gastrointestinal: Negative for abdominal pain, nausea and vomiting. Endocrine: Negative for cold intolerance and heat intolerance. Genitourinary: Negative for dysuria and hematuria. Musculoskeletal: Negative for arthralgias and myalgias. Skin: Negative for pallor and rash. Neurological: Negative for dizziness, seizures, syncope, weakness and light-headedness. Hematological: Does not bruise/bleed easily. Psychiatric/Behavioral: Negative for agitation. The patient is not nervous/anxious. Objective  Vital Signs - /76   Pulse 65   Ht 5' 7\" (1.702 m)   Wt 232 lb (105.2 kg)   SpO2 96%   BMI 36.34 kg/m²    Wt Readings from Last 3 Encounters:   07/14/21 232 lb (105.2 kg)   06/28/21 225 lb 12.8 oz (102.4 kg)   06/21/21 230 lb 9.6 oz (104.6 kg)      Physical Exam  Vitals and nursing note reviewed. Constitutional:       General: He is not in acute distress. Appearance: He is well-developed. He is obese. He is not diaphoretic. Comments: Deconditioned   HENT:      Head: Normocephalic and atraumatic. Right Ear: Hearing and external ear normal.      Left Ear: Hearing and external ear normal.      Nose: Nose normal.   Eyes:      General:         Right eye: No discharge.          Left eye: No discharge. Pupils: Pupils are equal, round, and reactive to light. Neck:      Thyroid: No thyromegaly. Vascular: No carotid bruit or JVD. Trachea: No tracheal deviation. Cardiovascular:      Rate and Rhythm: Normal rate and regular rhythm. Heart sounds: Murmur (2/6 systolic) heard. No friction rub. No gallop. Pulmonary:      Effort: Pulmonary effort is normal. No respiratory distress. Breath sounds: Normal breath sounds. No wheezing or rales. Abdominal:      Palpations: Abdomen is soft. Tenderness: There is no abdominal tenderness. Musculoskeletal:         General: No swelling or deformity. Cervical back: Neck supple. No muscular tenderness. Right lower leg: Edema (1+) present. Left lower leg: Edema (1+) present. Comments: Ambulates with cane   Skin:     General: Skin is warm and dry. Findings: No rash. Neurological:      General: No focal deficit present. Mental Status: He is alert and oriented to person, place, and time. Cranial Nerves: No cranial nerve deficit.    Psychiatric:         Mood and Affect: Mood normal.         Behavior: Behavior normal.         Judgment: Judgment normal.         Data:  Lab Results   Component Value Date    WBC 8.0 05/18/2021    RBC 3.96 05/18/2021    RBC 2.90 06/21/2011    HGB 12.9 05/18/2021    HGB 15.1 05/24/2011    HCT 39.7 05/18/2021    HCT 31.4 07/04/2011    PLT 86 05/18/2021     07/04/2011      Lab Results   Component Value Date    CHOL 171 05/18/2021    TRIG 165 05/18/2021    HDL 52 05/18/2021    LDLCALC 86 05/18/2021     Lab Results   Component Value Date     06/21/2021     07/04/2011    K 4.2 06/21/2021    K 4.2 07/04/2011     06/21/2021     07/04/2011    CO2 27 06/21/2021    GLUCOSE 95 06/21/2021    BUN 12 06/21/2021    CREATININE 1.2 06/21/2021    CREATININE 0.9 07/04/2011    CALCIUM 9.5 06/21/2021    ALT 13 06/21/2021    AST 19 06/21/2021     Lab Results Component Value Date    TSH 3.580 11/11/2020     Echo 2/23/21   Conclusions      Summary   Mitral valve leaflets are mildly thickened with preserved leaflet   mobility. Mitral annular calcification is present. Mild mitral regurgitation is present. S/P TAVR   No AI   Tricuspid valve is structurally normal.   Normal left ventricular size with preserved LV function and an estimated   ejection fraction of approximately 55-60%. Normal left ventricular wall thickness. No regional wall motion abnormalities. Impaired relaxation compatible with diastolic dysfunction. ( reversed E/A   ratio)       Assessment:     Diagnosis Orders   1. Nonrheumatic aortic valve stenosis     2. S/P TAVR (transcatheter aortic valve replacement)     3. Diastolic congestive heart failure with preserved left ventricular function, NYHA class 2 (HCC)  bumetanide (BUMEX) 0.5 MG tablet   4. Essential hypertension  bumetanide (BUMEX) 0.5 MG tablet   5. Stage 3a chronic kidney disease (HCC)  bumetanide (BUMEX) 0.5 MG tablet   6. History of DVT (deep vein thrombosis)         Aortic stenosis/history of TAVR 1/21/2021reviewed echo done in February that showed a normal functioning valve. No change in symptoms. Continue to monitor    Diastolic heart failure NYHA class II, stage Cpatient appears euvolemic on losartan, metoprolol, Bumex. He would like to try to cut back the dosage due to concern about his kidney function long-term. May decrease Bumex to Monday Wednesday Friday. Weight daily. Take an extra Bumex for weight gain of 3 pounds or more overnight or 5 pounds in a week. Low-sodium diet    Hypertensionstable on current regimen with metoprolol and losartan    CKD stage IIIamost recent creatinine on 6/21/2021 is 1.2, improved    History of DVTon chronic anticoagulation with warfarin, managed by PCP office    Stable cardiovascular status. No evidence of overt heart failure,angina or dysrhythmia.      Plan    Return in about 5 months (around 12/14/2021) for Dr. Bob MIRANDA Chol. Decrease Bumetanide to 0.5mg Mon, Wed, Fri    OK to take an extra Bumex for weight gain over 3lbs in 24 hours or 5lbs over a week. If weight is not improving by the next day, call the office.    - Weigh daily and report weight gain of 3lbs or more in 24hrs or 5lbs in one week. - Call for increasing shortness of breath or increasing swelling in feet and legs. (This could mean you are retaining too much fluid)  - 2000mg low sodium diet  - Fluid restriction of 1500ml per day (about 6 cups of fluid per day)    Call with any questionsor concerns  Follow up with Jad Russo MD for non cardiac problems  Report any new problems  Cardiovascular Fitness-Exercise as tolerated. Strive for 15 minutes of exercise most days of the week. Cardiac / HealthyDiet  Continue current medications as directed  Continue plan of treatment  It is always recommended that you bring your medicationsbottles with you to each visit - this is for your safety!        RUBEN Ko

## 2021-07-14 NOTE — PATIENT INSTRUCTIONS
Return in about 5 months (around 12/14/2021) for Dr. Ranjeet MIRANDA. Decrease Bumetanide to 0.5mg Mon, Wed, Fri    OK to take an extra Bumex for weight gain over 3lbs in 24 hours or 5lbs over a week. If weight is not improving by the next day, call the office.    - Weigh daily and report weight gain of 3lbs or more in 24hrs or 5lbs in one week. - Call for increasing shortness of breath or increasing swelling in feet and legs.     (This could mean you are retaining too much fluid)  - 2000mg low sodium diet  - Fluid restriction of 1500ml per day (about 6 cups of fluid per day)

## 2021-07-16 ENCOUNTER — HOSPITAL ENCOUNTER (OUTPATIENT)
Dept: CARDIAC REHAB | Age: 86
Setting detail: THERAPIES SERIES
Discharge: HOME | End: 2021-07-16
Payer: MEDICARE

## 2021-07-16 PROCEDURE — 93798 PHYS/QHP OP CAR RHAB W/ECG: CPT

## 2021-07-19 ENCOUNTER — ANTI-COAG VISIT (OUTPATIENT)
Dept: PRIMARY CARE CLINIC | Age: 86
End: 2021-07-19
Payer: MEDICARE

## 2021-07-19 DIAGNOSIS — Z79.01 CHRONIC ANTICOAGULATION: Primary | ICD-10-CM

## 2021-07-19 DIAGNOSIS — I87.2 DEEP VENOUS INSUFFICIENCY: ICD-10-CM

## 2021-07-19 LAB — INTERNATIONAL NORMALIZATION RATIO, POC: 1.7

## 2021-07-19 PROCEDURE — 93793 ANTICOAG MGMT PT WARFARIN: CPT | Performed by: FAMILY MEDICINE

## 2021-07-19 PROCEDURE — 85610 PROTHROMBIN TIME: CPT | Performed by: FAMILY MEDICINE

## 2021-07-19 NOTE — PROGRESS NOTES
Mr. Nelida De La Fuente was here today. INR today:   Results for orders placed or performed in visit on 07/19/21   POCT INR   Result Value Ref Range    INR 1.7      INR Goal: 2.0-3.0    Dosing Plan  As of 7/19/2021    TTR:  68.2 % (4 y)   Full warfarin instructions:  7/19: 11.25 mg; Otherwise 11.25 mg every Sun, Fri; 7.5 mg all other days            PLAN: INCREASE TONIGHT'S DOSE ONLY TO 11.25 MG, THEN CONTINUE CURRENT DOSE  NEXT COUMADIN CLINIC APT IS: [email or web address]    Coumadin Clinic Hours  Tuesday 7:30am - 4:00pm  Wednesday 7:30am - 4:00pm  Thursday 7:30am - 4:00pm    IF IT'S AN EMERGENCY, PLEASE CALL 911 OR GO TO YOUR NEAREST EMERGENCY ROOM. [facility] INTERNAL MEDICINE COUMADIN CLINIC [phone]  IF UNABLE TO REACH COUMADIN CLINIC, [address] [place] [address] Rd, [phone].

## 2021-07-29 ENCOUNTER — ANTI-COAG VISIT (OUTPATIENT)
Dept: PRIMARY CARE CLINIC | Age: 86
End: 2021-07-29
Payer: MEDICARE

## 2021-07-29 DIAGNOSIS — Z79.01 CHRONIC ANTICOAGULATION: Primary | ICD-10-CM

## 2021-07-29 DIAGNOSIS — I87.2 DEEP VENOUS INSUFFICIENCY: ICD-10-CM

## 2021-07-29 LAB — INTERNATIONAL NORMALIZATION RATIO, POC: 2

## 2021-07-29 PROCEDURE — 85610 PROTHROMBIN TIME: CPT | Performed by: FAMILY MEDICINE

## 2021-07-29 PROCEDURE — 93793 ANTICOAG MGMT PT WARFARIN: CPT | Performed by: FAMILY MEDICINE

## 2021-07-29 NOTE — PROGRESS NOTES
Mr. Stephanie Quach was here today. INR today:   Results for orders placed or performed in visit on 07/29/21   POCT INR   Result Value Ref Range    INR 2.0      INR Goal: 2.0-3.0    Dosing Plan  As of 7/29/2021    TTR:  67.7 % (4.1 y)   Full warfarin instructions:  11.25 mg every Sun, Fri; 7.5 mg all other days            PLAN: CONTINUE CURRENT DOSE  NEXT COUMADIN CLINIC APT IS: [email or web address]    Coumadin Clinic Hours  Tuesday 7:30am - 4:00pm  Wednesday 7:30am - 4:00pm  Thursday 7:30am - 4:00pm    IF IT'S AN EMERGENCY, PLEASE CALL 911 OR GO TO YOUR NEAREST EMERGENCY ROOM. [facility] INTERNAL MEDICINE COUMADIN CLINIC [phone]  IF UNABLE TO REACH COUMADIN CLINIC, [address] [place] [address], [phone].

## 2021-08-05 ENCOUNTER — TELEPHONE (OUTPATIENT)
Dept: PRIMARY CARE CLINIC | Age: 86
End: 2021-08-05

## 2021-08-09 ENCOUNTER — TELEPHONE (OUTPATIENT)
Dept: PRIMARY CARE CLINIC | Age: 86
End: 2021-08-09

## 2021-08-09 NOTE — TELEPHONE ENCOUNTER
----- Message from RUBEN Chao sent at 8/9/2021 12:58 AM CDT -----  Stable INR continue current regimen.

## 2021-08-10 ENCOUNTER — ANTI-COAG VISIT (OUTPATIENT)
Dept: PRIMARY CARE CLINIC | Age: 86
End: 2021-08-10
Payer: MEDICARE

## 2021-08-10 DIAGNOSIS — I87.2 DEEP VENOUS INSUFFICIENCY: ICD-10-CM

## 2021-08-10 DIAGNOSIS — Z79.01 CHRONIC ANTICOAGULATION: Primary | ICD-10-CM

## 2021-08-10 PROCEDURE — 93793 ANTICOAG MGMT PT WARFARIN: CPT | Performed by: STUDENT IN AN ORGANIZED HEALTH CARE EDUCATION/TRAINING PROGRAM

## 2021-08-10 NOTE — PROGRESS NOTES
HOME MONITORING REPORT    INR today: 2.00 8/6/2021    INR Goal: 2.0-3.0    Dosing Plan  As of 8/10/2021    TTR:  67.9 % (4.1 y)   Full warfarin instructions:  11.25 mg every Sun, Fri; 7.5 mg all other days              PLAN: Advised patient/caregiver to continue current dose and recheck in one week.   Patient/Caregiver voiced understanding

## 2021-08-10 NOTE — TELEPHONE ENCOUNTER
Pt called back after receiving voicemail. I let him know his INR is stable and to continue with his current regimen. Pt thanked me and HARSHA.

## 2021-08-19 ENCOUNTER — ANTI-COAG VISIT (OUTPATIENT)
Dept: PRIMARY CARE CLINIC | Age: 86
End: 2021-08-19
Payer: MEDICARE

## 2021-08-19 DIAGNOSIS — I87.2 DEEP VENOUS INSUFFICIENCY: ICD-10-CM

## 2021-08-19 DIAGNOSIS — Z79.01 CHRONIC ANTICOAGULATION: Primary | ICD-10-CM

## 2021-08-19 LAB — INTERNATIONAL NORMALIZATION RATIO, POC: 1.9

## 2021-08-19 PROCEDURE — 85610 PROTHROMBIN TIME: CPT | Performed by: FAMILY MEDICINE

## 2021-08-19 PROCEDURE — 93793 ANTICOAG MGMT PT WARFARIN: CPT | Performed by: FAMILY MEDICINE

## 2021-08-19 NOTE — PROGRESS NOTES
Mr. Amira Brooks was here today. INR today:   Results for orders placed or performed in visit on 08/19/21   POCT INR   Result Value Ref Range    INR 1.9      INR Goal: 2.0-3.0    Dosing Plan  As of 8/19/2021    TTR:  67.3 % (4.1 y)   Full warfarin instructions:  8/19: 11.25 mg; Otherwise 11.25 mg every Sun, Fri; 7.5 mg all other days            PLAN: INCREASE DOSE TONIGHT TO 11.25 MG, THEN CONTINUE CURRENT DOSE  NEXT COUMADIN CLINIC APT IS: [email or web address]    Coumadin Clinic Hours  Tuesday 7:30am - 4:00pm  Wednesday 7:30am - 4:00pm  Thursday 7:30am - 4:00pm    IF IT'S AN EMERGENCY, PLEASE CALL 911 OR GO TO YOUR NEAREST EMERGENCY ROOM. [facility] INTERNAL MEDICINE COUMADIN CLINIC [phone]  IF UNABLE TO REACH COUMADIN CLINIC, [address] [place] [address], [phone].

## 2021-08-31 ENCOUNTER — OFFICE VISIT (OUTPATIENT)
Dept: PRIMARY CARE CLINIC | Age: 86
End: 2021-08-31
Payer: MEDICARE

## 2021-08-31 VITALS
HEIGHT: 67 IN | DIASTOLIC BLOOD PRESSURE: 80 MMHG | BODY MASS INDEX: 35.82 KG/M2 | OXYGEN SATURATION: 96 % | TEMPERATURE: 97.9 F | HEART RATE: 60 BPM | SYSTOLIC BLOOD PRESSURE: 120 MMHG | WEIGHT: 228.2 LBS

## 2021-08-31 DIAGNOSIS — I50.30 DIASTOLIC CONGESTIVE HEART FAILURE WITH PRESERVED LEFT VENTRICULAR FUNCTION, NYHA CLASS 2 (HCC): Primary | ICD-10-CM

## 2021-08-31 DIAGNOSIS — Z95.0 PACEMAKER: Primary | ICD-10-CM

## 2021-08-31 DIAGNOSIS — Z79.01 CHRONIC ANTICOAGULATION: ICD-10-CM

## 2021-08-31 DIAGNOSIS — I44.2 COMPLETE HEART BLOCK (HCC): ICD-10-CM

## 2021-08-31 DIAGNOSIS — N18.31 STAGE 3A CHRONIC KIDNEY DISEASE (HCC): ICD-10-CM

## 2021-08-31 DIAGNOSIS — I10 ESSENTIAL HYPERTENSION: ICD-10-CM

## 2021-08-31 PROCEDURE — 93294 REM INTERROG EVL PM/LDLS PM: CPT | Performed by: CLINICAL NURSE SPECIALIST

## 2021-08-31 PROCEDURE — 93296 REM INTERROG EVL PM/IDS: CPT | Performed by: CLINICAL NURSE SPECIALIST

## 2021-08-31 PROCEDURE — G8417 CALC BMI ABV UP PARAM F/U: HCPCS | Performed by: FAMILY MEDICINE

## 2021-08-31 PROCEDURE — 99214 OFFICE O/P EST MOD 30 MIN: CPT | Performed by: FAMILY MEDICINE

## 2021-08-31 PROCEDURE — G8427 DOCREV CUR MEDS BY ELIG CLIN: HCPCS | Performed by: FAMILY MEDICINE

## 2021-08-31 PROCEDURE — 4040F PNEUMOC VAC/ADMIN/RCVD: CPT | Performed by: FAMILY MEDICINE

## 2021-08-31 PROCEDURE — 1036F TOBACCO NON-USER: CPT | Performed by: FAMILY MEDICINE

## 2021-08-31 PROCEDURE — 1123F ACP DISCUSS/DSCN MKR DOCD: CPT | Performed by: FAMILY MEDICINE

## 2021-08-31 ASSESSMENT — ENCOUNTER SYMPTOMS
NAUSEA: 0
DIARRHEA: 0
CONSTIPATION: 0
VOMITING: 0
ABDOMINAL PAIN: 0
WHEEZING: 0
CHEST TIGHTNESS: 0
SHORTNESS OF BREATH: 0
COUGH: 0

## 2021-08-31 NOTE — PROGRESS NOTES
Kirsten Fisher is a 82 y.o. male who presents today for   Chief Complaint   Patient presents with    Congestive Heart Failure     3 month f/u, pt states he is doing \"real good\"    Chronic Kidney Disease     3 month f/u       HPI  Patient is here for CHF and CKD3. He is doing well he notes. Fluid is better on diuretic. Notes he is doing it on a 3 day per week basisi. Pt notes he has gotten dehydrated before on med mildly - dry skin, itching. He is adjusting bumex as needed when swelling more. Hypertension no issue. On anticoag, no bleeing    No change in PMH, family, social, or surgical history unless mentioned above. I have reviewed the above chief complaint and HPI details charted by staff and claim ownership of the documentation. Review of Systems   Constitutional: Negative for chills and fever. Respiratory: Negative for cough, chest tightness, shortness of breath and wheezing. Cardiovascular: Positive for leg swelling. Negative for chest pain and palpitations. Gastrointestinal: Negative for abdominal pain, constipation, diarrhea, nausea and vomiting. Genitourinary: Negative for difficulty urinating, dysuria and frequency.        Past Medical History:   Diagnosis Date    Cancer [facility])     skin    CHF (congestive heart failure) (HCC)     Cholelithiasis     CKD (chronic kidney disease) stage 3, GFR 30-59 ml/min ([facility] 11/13/2018    Crohn's disease of large intestine with complication ([facility] 85/3/1283    Deep venous insufficiency 11/7/2017    Essential hypertension 11/7/2017    Gastroesophageal reflux disease without esophagitis 11/7/2017    Heart murmur     Hx of blood clots     Mixed hyperlipidemia 11/7/2017    Nonrheumatic aortic valve stenosis 5/19/2018    Primary osteoarthritis involving multiple joints 11/7/2017    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HCC) 2002       Current Outpatient Medications   Medication Sig Dispense Refill    bumetanide (BUMEX) 0.5 MG tablet Take 1 tablet by mouth three times a week (Patient taking differently: Take 0.25 mg by mouth as needed ) 45 tablet 3    metoprolol succinate (TOPROL XL) 25 MG extended release tablet TAKE ONE TABLET BY MOUTH DAILY 30 tablet 5    mesalamine (PENTASA) 250 MG extended release capsule Take 3 capsules by mouth 4 times daily 1080 capsule 2    losartan (COZAAR) 50 MG tablet TAKE ONE TABLET BY MOUTH DAILY 90 tablet 0    potassium chloride (KLOR-CON M) 10 MEQ extended release tablet Take 1 tablet by mouth daily But take twice a day if taking the lasix twice a day 60 tablet 5    omeprazole (PRILOSEC) 20 MG delayed release capsule Take 20 mg by mouth daily      warfarin (COUMADIN) 7.5 MG tablet TAKE ONE TABLET BY MOUTH DAILY EXCEPT ON WEDNESDAY TAKE ONE-HALF TABLET 90 tablet 2    cyanocobalamin 1000 MCG/ML injection inject ONE ML IN THE MUSCLE ONCE A MONTH 6 mL 0    Multiple Vitamins-Minerals (PRESERVISION AREDS 2+MULTI VIT) CAPS Take 1 tablet by mouth daily      Cholecalciferol (VITAMIN D3) 5000 units TABS Take by mouth      B Complex-C-E-Zn (STRESS FORMULA/ZINC PO) Take by mouth       No current facility-administered medications for this visit. No Known Allergies    Past Surgical History:   Procedure Laterality Date    AORTIC VALVE REPLACEMENT  01/21/2021    Transfemoral transcatheter aortic valve replacment(TAVR) using a 29 mm Arreaga Doe S3 valve. 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-[employer] [place]  Implant MD Dr Ruggiero Batch   [address]      TOTAL HIP ARTHROPLASTY         Social History     Tobacco Use    Smoking status: Never Smoker    Smokeless tobacco: Never Used   Vaping Use    Vaping Use: Never used   Substance Use Topics    Alcohol use: No    Drug use:  No Family History   Problem Relation Age of Onset    Stroke Other     Other Other         atherosclerosis of extremities       /80 (Site: Right Upper Arm, Position: Sitting)   Pulse 60   Temp 97.9 °F (36.6 °C) (Temporal)   Ht 5' 7\" (1.702 m)   Wt 228 lb 3.2 oz (103.5 kg)   SpO2 96%   BMI 35.74 kg/m²     Physical Exam  Vitals and nursing note reviewed. Constitutional:       General: He is not in acute distress. Appearance: He is well-developed. He is not toxic-appearing or diaphoretic. HENT:      Head: Normocephalic and atraumatic. Cardiovascular:      Rate and Rhythm: Normal rate and regular rhythm. Heart sounds: Normal heart sounds. No murmur heard. No friction rub. No gallop. Pulmonary:      Effort: Pulmonary effort is normal. No respiratory distress. Breath sounds: Normal breath sounds. No wheezing or rales. Chest:      Chest wall: No tenderness. Abdominal:      General: Bowel sounds are normal. There is no distension. Palpations: Abdomen is soft. There is no mass. Tenderness: There is no abdominal tenderness. There is no guarding or rebound. Musculoskeletal:      Right lower leg: Edema (2+ improved) present. Left lower leg: Edema (1+ improved) present. Skin:     General: Skin is warm and dry. Nails: There is no clubbing. Neurological:      Mental Status: He is alert and oriented to person, place, and time. Coordination: Coordination normal.      Gait: Gait normal.         Assessment:    [id number]    1. Diastolic congestive heart failure with preserved left ventricular function, NYHA class 2 ([facility])  I50.30    2. Chronic anticoagulation  Z79.01    3. Stage 3a chronic kidney disease (HCC)  N18.31    4. Essential hypertension  I10        Plan:   Pt learning to change the bumex as needed. Max dose is safe for his ckd3 and less is better and he is understanding the signs and symtpoms of overload and depletion of fluid.     No orders of the defined types were placed in this encounter. No orders of the defined types were placed in this encounter. There are no discontinued medications. There are no Patient Instructions on file for this visit. Patient given educational handouts and has had all questions answered. Patient voices understanding and agrees to plans along with risks and benefits of plan. Patient isinstructed to continue prior meds, diet, and exercise plans unless instructed otherwise. Patient agrees to follow up as instructed and sooner if needed. Patient agrees to go to ER if condition becomes emergent. Notesmay be completed with dictation device and spelling errors may occur. Materials may be copied and pasted from a notepad outside of EMR, all of which, I, Dr. Gucci Vines MD, take sole intellectual ownership of and have approved adding to my note. Return if symptoms worsen or fail to improve.

## 2021-09-07 DIAGNOSIS — I10 ESSENTIAL HYPERTENSION: ICD-10-CM

## 2021-09-07 RX ORDER — LOSARTAN POTASSIUM 50 MG/1
TABLET ORAL
Qty: 90 TABLET | Refills: 0 | Status: SHIPPED | OUTPATIENT
Start: 2021-09-07 | End: 2021-12-01

## 2021-10-07 ENCOUNTER — ANTI-COAG VISIT (OUTPATIENT)
Dept: PRIMARY CARE CLINIC | Age: 86
End: 2021-10-07
Payer: MEDICARE

## 2021-10-07 DIAGNOSIS — Z23 FLU VACCINE NEED: ICD-10-CM

## 2021-10-07 DIAGNOSIS — I87.2 DEEP VENOUS INSUFFICIENCY: ICD-10-CM

## 2021-10-07 DIAGNOSIS — Z79.01 CHRONIC ANTICOAGULATION: Primary | ICD-10-CM

## 2021-10-07 LAB — INTERNATIONAL NORMALIZATION RATIO, POC: 2.4

## 2021-10-07 PROCEDURE — 85610 PROTHROMBIN TIME: CPT | Performed by: FAMILY MEDICINE

## 2021-10-07 PROCEDURE — 90694 VACC AIIV4 NO PRSRV 0.5ML IM: CPT | Performed by: FAMILY MEDICINE

## 2021-10-07 PROCEDURE — G0008 ADMIN INFLUENZA VIRUS VAC: HCPCS | Performed by: FAMILY MEDICINE

## 2021-10-07 PROCEDURE — 93793 ANTICOAG MGMT PT WARFARIN: CPT | Performed by: NURSE PRACTITIONER

## 2021-10-07 NOTE — PROGRESS NOTES
Mr. Marie Falcon was here today. INR today:   Results for orders placed or performed in visit on 10/07/21   POCT INR   Result Value Ref Range    INR 2.4      INR Goal: 2.0-3.0    Dosing Plan  As of 10/7/2021    TTR:  67.7 % (4.2 y)   Full warfarin instructions:  11.25 mg every Sun, Fri; 7.5 mg all other days            PLAN: CONTINUE CURRENT DOSE  NEXT COUMADIN CLINIC APT IS: 11/22/2021 at appointment with Dr. Brittney Schwartz Hours  Tuesday 7:30am - 4:00pm  Wednesday 7:30am - 4:00pm  Thursday 7:30am - 4:00pm    IF IT'S AN EMERGENCY, PLEASE CALL 911 OR GO TO YOUR NEAREST EMERGENCY ROOM. [facility] INTERNAL MEDICINE COUMADIN CLINIC [phone]  IF UNABLE TO REACH COUMADIN CLINIC, [address] [place] [address], [phone].

## 2021-10-08 DIAGNOSIS — Z95.2 S/P TAVR (TRANSCATHETER AORTIC VALVE REPLACEMENT): Primary | ICD-10-CM

## 2021-11-22 ENCOUNTER — OFFICE VISIT (OUTPATIENT)
Dept: PRIMARY CARE CLINIC | Age: 86
End: 2021-11-22
Payer: MEDICARE

## 2021-11-22 VITALS
WEIGHT: 221.2 LBS | BODY MASS INDEX: 34.72 KG/M2 | OXYGEN SATURATION: 98 % | SYSTOLIC BLOOD PRESSURE: 142 MMHG | HEIGHT: 67 IN | TEMPERATURE: 98.1 F | HEART RATE: 54 BPM | DIASTOLIC BLOOD PRESSURE: 78 MMHG

## 2021-11-22 DIAGNOSIS — Z79.01 CHRONIC ANTICOAGULATION: ICD-10-CM

## 2021-11-22 DIAGNOSIS — Z00.00 ROUTINE GENERAL MEDICAL EXAMINATION AT A HEALTH CARE FACILITY: Primary | ICD-10-CM

## 2021-11-22 DIAGNOSIS — Z71.85 VACCINE COUNSELING: ICD-10-CM

## 2021-11-22 DIAGNOSIS — Z00.00 ANNUAL PHYSICAL EXAM: ICD-10-CM

## 2021-11-22 DIAGNOSIS — Z95.2 S/P TAVR (TRANSCATHETER AORTIC VALVE REPLACEMENT): ICD-10-CM

## 2021-11-22 DIAGNOSIS — I44.2 COMPLETE HEART BLOCK (HCC): ICD-10-CM

## 2021-11-22 DIAGNOSIS — I87.2 DEEP VENOUS INSUFFICIENCY: ICD-10-CM

## 2021-11-22 LAB
INTERNATIONAL NORMALIZATION RATIO, POC: 2.6
PROTHROMBIN TIME, POC: NORMAL

## 2021-11-22 PROCEDURE — G8417 CALC BMI ABV UP PARAM F/U: HCPCS | Performed by: FAMILY MEDICINE

## 2021-11-22 PROCEDURE — G8427 DOCREV CUR MEDS BY ELIG CLIN: HCPCS | Performed by: FAMILY MEDICINE

## 2021-11-22 PROCEDURE — 4040F PNEUMOC VAC/ADMIN/RCVD: CPT | Performed by: FAMILY MEDICINE

## 2021-11-22 PROCEDURE — 1036F TOBACCO NON-USER: CPT | Performed by: FAMILY MEDICINE

## 2021-11-22 PROCEDURE — 1123F ACP DISCUSS/DSCN MKR DOCD: CPT | Performed by: FAMILY MEDICINE

## 2021-11-22 PROCEDURE — 85610 PROTHROMBIN TIME: CPT | Performed by: FAMILY MEDICINE

## 2021-11-22 PROCEDURE — G0439 PPPS, SUBSEQ VISIT: HCPCS | Performed by: FAMILY MEDICINE

## 2021-11-22 PROCEDURE — 99213 OFFICE O/P EST LOW 20 MIN: CPT | Performed by: FAMILY MEDICINE

## 2021-11-22 PROCEDURE — G8484 FLU IMMUNIZE NO ADMIN: HCPCS | Performed by: FAMILY MEDICINE

## 2021-11-22 ASSESSMENT — PATIENT HEALTH QUESTIONNAIRE - PHQ9
SUM OF ALL RESPONSES TO PHQ9 QUESTIONS 1 & 2: 0
2. FEELING DOWN, DEPRESSED OR HOPELESS: 0
SUM OF ALL RESPONSES TO PHQ QUESTIONS 1-9: 0
1. LITTLE INTEREST OR PLEASURE IN DOING THINGS: 0

## 2021-11-22 ASSESSMENT — LIFESTYLE VARIABLES: HOW OFTEN DO YOU HAVE A DRINK CONTAINING ALCOHOL: 0

## 2021-11-22 NOTE — PROGRESS NOTES
Regan Cottrell is a 82 y.o. male who presents today for   Chief Complaint   Patient presents with    Medicare AWV    Discuss Medications     potassium, metoprolol       HPI  Patient is here for 2 separate visits: annual wellness visit as well as acute and chronic issues. The below review of systems and physical exam applies to both encounters. Annual HPI:  Patient is here for annual physical exam today. Patient denies any major changes in family medical history or recent screenings. Vaccine counseling provided as well as cautions provided for Covid    Acute/Chronic HPI:  Patient is here for f/u TAVR and pacemaker. He is ding well off of the bumex. Follow-up for congestive heart failure complicated by valvular disease. He did have trans aortic valvular replacement and is doing very well. He is no longer requiring diuretics. He feels better. His legs are back to baseline. He continues to have right lower extremity swelling greater than left lower extremity but this has been secondary to prior inguinal blood clot that he had many years ago. He notes as well that he did get through COBIT 19 without issue. He is debating if he needs to have a vaccine. He does remain on risk factor modification for his multiple diseases that he has a history of. No change in PMH, family, social, or surgical history unless mentioned above. I have reviewed the above chief complaint and HPI details charted by staff and claim ownership of the documentation.       Review of Systems    Past Medical History:   Diagnosis Date    Cancer ([address]     skin    CHF (congestive heart failure) (HCC)     Cholelithiasis     CKD (chronic kidney disease) stage 3, GFR 30-59 ml/min ([address] 11/13/2018    Crohn's disease of large intestine with complication ([facility] 05/9/7370    Deep venous insufficiency 11/7/2017    Essential hypertension 11/7/2017    Gastroesophageal reflux disease without esophagitis 11/7/2017    Heart murmur     Hx of blood clots     Mixed hyperlipidemia 11/7/2017    Nonrheumatic aortic valve stenosis 5/19/2018    Primary osteoarthritis involving multiple joints 11/7/2017    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HCC) 2002       Current Outpatient Medications   Medication Sig Dispense Refill    losartan (COZAAR) 50 MG tablet TAKE ONE TABLET BY MOUTH DAILY 90 tablet 0    metoprolol succinate (TOPROL XL) 25 MG extended release tablet TAKE ONE TABLET BY MOUTH DAILY 30 tablet 5    mesalamine (PENTASA) 250 MG extended release capsule Take 3 capsules by mouth 4 times daily 1080 capsule 2    omeprazole (PRILOSEC) 20 MG delayed release capsule Take 20 mg by mouth daily      warfarin (COUMADIN) 7.5 MG tablet TAKE ONE TABLET BY MOUTH DAILY EXCEPT ON WEDNESDAY TAKE ONE-HALF TABLET 90 tablet 2    cyanocobalamin 1000 MCG/ML injection inject ONE ML IN THE MUSCLE ONCE A MONTH 6 mL 0    Multiple Vitamins-Minerals (PRESERVISION AREDS 2+MULTI VIT) CAPS Take 1 tablet by mouth daily      Cholecalciferol (VITAMIN D3) 5000 units TABS Take by mouth      B Complex-C-E-Zn (STRESS FORMULA/ZINC PO) Take by mouth       No current facility-administered medications for this visit. No Known Allergies    Past Surgical History:   Procedure Laterality Date    AORTIC VALVE REPLACEMENT  01/21/2021    Transfemoral transcatheter aortic valve replacment(TAVR) using a 29 mm Arreaga Doe S3 valve. 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-[employer] [place]  Implant MD Dr Emili Charles. [address]      TOTAL HIP ARTHROPLASTY         Social History     Tobacco Use    Smoking status: Never Smoker    Smokeless tobacco: Never Used   Vaping Use    Vaping Use: Never used   Substance Use Topics    Alcohol use: No    Drug use:  No Family History   Problem Relation Age of Onset    Stroke Other     Other Other         atherosclerosis of extremities       BP (!) 142/78 (Site: Right Upper Arm, Position: Sitting)   Pulse 54   Temp 98.1 °F (36.7 °C)   Ht 5' 7\" (1.702 m)   Wt 221 lb 3.2 oz (100.3 kg)   SpO2 98%   BMI 34.64 kg/m²     Physical Exam  Vitals and nursing note reviewed. Constitutional:       General: He is not in acute distress. Appearance: He is well-developed. He is not toxic-appearing or diaphoretic. HENT:      Head: Normocephalic and atraumatic. Cardiovascular:      Rate and Rhythm: Normal rate and regular rhythm. Heart sounds: Normal heart sounds. No murmur heard. No friction rub. No gallop. Pulmonary:      Effort: Pulmonary effort is normal. No respiratory distress. Breath sounds: Normal breath sounds. No wheezing or rales. Chest:      Chest wall: No tenderness. Abdominal:      General: Bowel sounds are normal. There is no distension. Palpations: Abdomen is soft. There is no mass. Tenderness: There is no abdominal tenderness. There is no guarding or rebound. Musculoskeletal:      Right lower leg: Edema (improved b/l R>L at 2+) present. Left lower leg: Edema present. Skin:     General: Skin is warm and dry. Nails: There is no clubbing. Neurological:      Mental Status: He is alert and oriented to person, place, and time. Coordination: Coordination normal.      Gait: Gait abnormal (cane). Assessment:    ICD-10-CM    1. Routine general medical examination at a health care facility  Z00.00    2. Chronic anticoagulation  Z79.01 POCT INR   3. Deep venous insufficiency  I87.2    4. Vaccine counseling  Z71.85    5. Annual physical exam  Z00.00    6. Complete heart block (HCC)  I44.2    7.  S/P TAVR (transcatheter aortic valve replacement)  Z95.2        Plan:   Plan #1: Annual exam  I have reviewed and assessed the patient's current health status, risk factors, and progress for available preventative care. Patient received age-related anticipatory guidance in regards to personal and public health as well as USPSTF evidence based guidelines for screening and prevention. This included the need for regular generalized activity and exercise as tolerated and a diet high in plant based fiber with a well balanced diet. Immunizations discussed along with continuing covid precautions. Plan #2: Acute and chronic conditions  Plan #1: Annual exam  I have reviewed and assessed the patient's current health status, risk factors, and progress for available preventative care. Plan #2: Acute and chronic conditions  He has improved substantially since he has had the procedure. Continue risk factor modification. Patient received approximately 17 minutes of total vaccine counseling today. At this time, he remains wary of the vaccine but I have discussed with him that we can reverse any side effects from the vaccine but cannot reverse long-term complications from coronavirus including death and disability. He is going to consider further. My goals that have been vaccinated by February which would be approximately 12 months since his initial illness. Orders Placed This Encounter   Procedures    POCT INR     No orders of the defined types were placed in this encounter. Medications Discontinued During This Encounter   Medication Reason    bumetanide (BUMEX) 0.5 MG tablet LIST CLEANUP    potassium chloride (KLOR-CON M) 10 MEQ extended release tablet LIST CLEANUP     Patient Instructions     Personalized Preventive Plan for Evonne Warren - 11/22/2021  Medicare offers a range of preventive health benefits. Some of the tests and screenings are paid in full while other may be subject to a deductible, co-insurance, and/or copay.     Some of these benefits include a comprehensive review of your medical history including lifestyle, illnesses that may run in your family, and various ([facility]     Cholelithiasis     CKD (chronic kidney disease) stage 3, GFR 30-59 ml/min ([address] 11/13/2018    Crohn's disease of large intestine with complication ([facility] 51/3/2671    Deep venous insufficiency 11/7/2017    Essential hypertension 11/7/2017    Gastroesophageal reflux disease without esophagitis 11/7/2017    Heart murmur     Hx of blood clots     Mixed hyperlipidemia 11/7/2017    Nonrheumatic aortic valve stenosis 5/19/2018    Primary osteoarthritis involving multiple joints 11/7/2017    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[facility] 2002       Past Surgical History:   Procedure Laterality Date    AORTIC VALVE REPLACEMENT  01/21/2021    Transfemoral transcatheter aortic valve replacment(TAVR) using a 29 mm Arreaga Doe S3 valve.    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-[employer] [place]  Implant MD Dr Mariano Wilcox           Family History   Problem Relation Age of Onset    Stroke Other     Other Other         atherosclerosis of extremities       CareTeam (Including outside providers/suppliers regularly involved in providing care):   Patient Care Team:  Sonal Bullock MD as PCP - General (Family Medicine)  Sonal Bullock MD as PCP - [facility]  Faustina Mcbride MD as Consulting Physician (Interventional Cardiology)  Claudene Brunet, MD as Consulting Physician (Cardiology)  Waqar Barnhart MD as Consulting Physician (Cardiothoracic Surgery)    Wt Readings from Last 3 Encounters:   11/22/21 221 lb 3.2 oz (100.3 kg)   08/31/21 228 lb 3.2 oz (103.5 kg)   07/14/21 232 lb (105.2 kg)     Vitals:    11/22/21 1117   BP: (!) 142/78   Site: Right Upper Arm   Position: Sitting   Pulse: 54   Temp: 98.1 °F (36.7 °C)   SpO2: 98% Weight: 221 lb 3.2 oz (100.3 kg)   Height: 5' 7\" (1.702 m)     Body mass index is 34.64 kg/m². Based upon direct observation of the patient, evaluation of cognition reveals recent and remote memory intact. Patient's complete Health Risk Assessment and screening values have been reviewed and are found in Flowsheets. The following problems were reviewed today and where indicated follow up appointments were made and/or referrals ordered. Positive Risk Factor Screenings with Interventions:            General Health and ACP:  General  In general, how would you say your health is?: Good  In the past 7 days, have you experienced any of the following?  New or Increased Pain, New or Increased Fatigue, Loneliness, Social Isolation, Stress or Anger?: None of These  Do you get the social and emotional support that you need?: Yes  Do you have a Living Will?: Yes  Advance Directives     Power of [facility] Will ACP-Advance Directive ACP-Power of     Not on File Not on File Not on File Not on File      General Health Risk Interventions:  · No Living Will: ACP documents already completed- patient asked to provide copy to the office    Health Habits/Nutrition:  Health Habits/Nutrition  Do you exercise for at least 20 minutes 2-3 times per week?: Yes  Have you lost any weight without trying in the past 3 months?: (!) Yes  Do you eat only one meal per day?: No  Have you seen the dentist within the past year?: Yes  Body mass index: (!) 34.64  Health Habits/Nutrition Interventions:  · Inadequate physical activity:  educational materials provided to promote increased physical activity  · Nutritional issues:  educational materials to promote weight loss provided  · Dental exam overdue:  patient encouraged to make appointment with his/her dentist    Hearing/Vision:  No exam data present  Hearing/Vision  Do you or your family notice any trouble with your hearing that hasn't been managed with hearing aids?: No  Do you have difficulty driving, watching TV, or doing any of your daily activities because of your eyesight?: (!) Yes  Have you had an eye exam within the past year?: Yes  Hearing/Vision Interventions:  · Vision concerns:  patient encouraged to make appointment with his/her eye specialist      Personalized Preventive Plan   Current Health Maintenance Status  Immunization History   Administered Date(s) Administered    Influenza, High Dose (Fluzone 65 yrs and older) 11/07/2017, 11/13/2018, 10/01/2020    Influenza, Quadv, IM, PF (6 mo and older Fluzone, Flulaval, Fluarix, and 3 yrs and older Afluria) 11/20/2019    Influenza, Quadv, Recombinant, IM PF (Flublok 18 yrs and older) 10/26/2020    Influenza, Quadv, adjuvanted, 65 yrs +, IM, PF (Fluad) 10/07/2021    Pneumococcal Conjugate 13-valent (Ealypyx16) 09/30/2016    Pneumococcal Polysaccharide ([id number]) 11/13/2018        Health Maintenance   Topic Date Due    DTaP/Tdap/Td vaccine (1 - Tdap) Never done    Shingles Vaccine (1 of 2) Never done    PSA counseling  11/01/2018    Annual Wellness Visit (AWV)  11/19/2021    COVID-19 Vaccine (1) 06/28/2022 (Originally 4/18/1947)    Potassium monitoring  08/06/2022    Creatinine monitoring  08/06/2022    Colon cancer screen colonoscopy  09/28/2023    Flu vaccine  Completed    Pneumococcal 65+ years Vaccine  Completed    Hepatitis A vaccine  Aged Out    Hepatitis B vaccine  Aged Out    Hib vaccine  Aged Out    Meningococcal (ACWY) vaccine  Aged Out     Recommendations for [employer] Due: see orders and patient instructions/AVS.  . Recommended screening schedule for the next 5-10 years is provided to the patient in written form: see Patient Instructions/AVS.    Raysa Aviles was seen today for medicare awv and discuss medications.     Diagnoses and all orders for this visit:    Routine general medical examination at a health care facility    Chronic anticoagulation  -     POCT INR    Deep venous insufficiency    Vaccine counseling    Annual physical exam    Complete heart block (HCC)    S/P TAVR (transcatheter aortic valve replacement)

## 2021-11-22 NOTE — PROGRESS NOTES
HOME MONITORING REPORT    INR today:   Results for orders placed or performed in visit on 11/22/21   POCT INR   Result Value Ref Range    INR 2.6     Protime         INR Goal: 2.0-3.0    Dosing Plan  As of 11/22/2021    TTR:  68.6 % (4.4 y)   Full warfarin instructions:  11.25 mg every Sun, Fri; 7.5 mg all other days              PLAN: Advised patient/caregiver to continue current dose and recheck in one week.   Patient/Caregiver voiced understanding

## 2021-11-24 NOTE — PATIENT INSTRUCTIONS
Personalized Preventive Plan for Daxa Trevizo - 11/22/2021  Medicare offers a range of preventive health benefits. Some of the tests and screenings are paid in full while other may be subject to a deductible, co-insurance, and/or copay. Some of these benefits include a comprehensive review of your medical history including lifestyle, illnesses that may run in your family, and various assessments and screenings as appropriate. After reviewing your medical record and screening and assessments performed today your provider may have ordered immunizations, labs, imaging, and/or referrals for you. A list of these orders (if applicable) as well as your Preventive Care list are included within your After Visit Summary for your review. Other Preventive Recommendations:    · A preventive eye exam performed by an eye specialist is recommended every 1-2 years to screen for glaucoma; cataracts, macular degeneration, and other eye disorders. · A preventive dental visit is recommended every 6 months. · Try to get at least 150 minutes of exercise per week or 10,000 steps per day on a pedometer . · Order or download the FREE \"Exercise & Physical Activity: Your Everyday Guide\" from [employer]. Call 0-812.780.2213 or search [employer] online. · You need 5627-9004 mg of calcium and 6443-7189 IU of vitamin D per day. It is possible to meet your calcium requirement with diet alone, but a vitamin D supplement is usually necessary to meet this goal.  · When exposed to the sun, use a sunscreen that protects against both UVA and UVB radiation with an SPF of 30 or greater. Reapply every 2 to 3 hours or after sweating, drying off with a towel, or swimming. · Always wear a seat belt when traveling in a car. Always wear a helmet when riding a bicycle or motorcycle.

## 2021-11-30 DIAGNOSIS — I10 ESSENTIAL HYPERTENSION: ICD-10-CM

## 2021-11-30 DIAGNOSIS — E53.8 VITAMIN B 12 DEFICIENCY: ICD-10-CM

## 2021-12-01 DIAGNOSIS — Z95.0 PACEMAKER: Primary | ICD-10-CM

## 2021-12-01 DIAGNOSIS — I44.2 COMPLETE HEART BLOCK (HCC): ICD-10-CM

## 2021-12-01 PROCEDURE — 93294 REM INTERROG EVL PM/LDLS PM: CPT | Performed by: CLINICAL NURSE SPECIALIST

## 2021-12-01 PROCEDURE — 93296 REM INTERROG EVL PM/IDS: CPT | Performed by: CLINICAL NURSE SPECIALIST

## 2021-12-01 RX ORDER — CYANOCOBALAMIN 1000 UG/ML
INJECTION INTRAMUSCULAR; SUBCUTANEOUS
Qty: 6 ML | Refills: 0 | Status: SHIPPED | OUTPATIENT
Start: 2021-12-01 | End: 2022-10-20

## 2021-12-01 RX ORDER — LOSARTAN POTASSIUM 50 MG/1
TABLET ORAL
Qty: 90 TABLET | Refills: 0 | Status: SHIPPED | OUTPATIENT
Start: 2021-12-01 | End: 2022-03-01

## 2021-12-01 RX ORDER — WARFARIN SODIUM 7.5 MG/1
TABLET ORAL
Qty: 90 TABLET | Refills: 2 | Status: SHIPPED | OUTPATIENT
Start: 2021-12-01 | End: 2022-05-23

## 2021-12-07 DIAGNOSIS — Z95.0 PACEMAKER: Primary | ICD-10-CM

## 2021-12-07 DIAGNOSIS — I44.2 COMPLETE HEART BLOCK (HCC): ICD-10-CM

## 2021-12-20 ENCOUNTER — NURSE ONLY (OUTPATIENT)
Dept: PRIMARY CARE CLINIC | Age: 86
End: 2021-12-20
Payer: MEDICARE

## 2021-12-20 ENCOUNTER — ANTI-COAG VISIT (OUTPATIENT)
Dept: PRIMARY CARE CLINIC | Age: 86
End: 2021-12-20
Payer: MEDICARE

## 2021-12-20 DIAGNOSIS — Z79.01 CHRONIC ANTICOAGULATION: Primary | ICD-10-CM

## 2021-12-20 DIAGNOSIS — I87.2 DEEP VENOUS INSUFFICIENCY: ICD-10-CM

## 2021-12-20 DIAGNOSIS — E53.8 VITAMIN B 12 DEFICIENCY: Primary | ICD-10-CM

## 2021-12-20 LAB — INTERNATIONAL NORMALIZATION RATIO, POC: 2.2

## 2021-12-20 PROCEDURE — 85610 PROTHROMBIN TIME: CPT | Performed by: FAMILY MEDICINE

## 2021-12-20 PROCEDURE — 93793 ANTICOAG MGMT PT WARFARIN: CPT | Performed by: FAMILY MEDICINE

## 2021-12-20 PROCEDURE — 96372 THER/PROPH/DIAG INJ SC/IM: CPT | Performed by: STUDENT IN AN ORGANIZED HEALTH CARE EDUCATION/TRAINING PROGRAM

## 2021-12-20 RX ORDER — CYANOCOBALAMIN 1000 UG/ML
1000 INJECTION INTRAMUSCULAR; SUBCUTANEOUS ONCE
Status: COMPLETED | OUTPATIENT
Start: 2021-12-20 | End: 2021-12-20

## 2021-12-20 RX ADMIN — CYANOCOBALAMIN 1000 MCG: 1000 INJECTION INTRAMUSCULAR; SUBCUTANEOUS at 11:46

## 2021-12-20 NOTE — PROGRESS NOTES
Mr. Yfn Anaya was here today. INR today:   Results for orders placed or performed in visit on 12/20/21   POCT INR   Result Value Ref Range    INR 2.2      INR Goal: 2.0-3.0    Dosing Plan  As of 12/20/2021    TTR:  69.2 % (4.5 y)   Full warfarin instructions:  11.25 mg every Sun, Fri; 7.5 mg all other days            PLAN: CONTINUE CURRENT DOSE  NEXT COUMADIN CLINIC APT IS: [email or web address]    Coumadin Clinic Hours  Tuesday 7:30am - 4:00pm  Wednesday 7:30am - 4:00pm  Thursday 7:30am - 4:00pm    IF IT'S AN EMERGENCY, PLEASE CALL 911 OR GO TO YOUR NEAREST EMERGENCY ROOM. [facility] INTERNAL MEDICINE COUMADIN CLINIC [phone]  IF UNABLE TO REACH COUMADIN CLINIC, [address] [place] [address] Rd, [phone].

## 2022-01-12 RX ORDER — METOPROLOL SUCCINATE 25 MG/1
TABLET, EXTENDED RELEASE ORAL
Qty: 90 TABLET | Refills: 1 | Status: SHIPPED | OUTPATIENT
Start: 2022-01-12 | End: 2022-04-12

## 2022-01-18 ENCOUNTER — NURSE ONLY (OUTPATIENT)
Dept: PRIMARY CARE CLINIC | Age: 87
End: 2022-01-18
Payer: MEDICARE

## 2022-01-18 ENCOUNTER — ANTI-COAG VISIT (OUTPATIENT)
Dept: PRIMARY CARE CLINIC | Age: 87
End: 2022-01-18
Payer: MEDICARE

## 2022-01-18 DIAGNOSIS — E53.8 VITAMIN B 12 DEFICIENCY: Primary | ICD-10-CM

## 2022-01-18 DIAGNOSIS — I87.2 DEEP VENOUS INSUFFICIENCY: ICD-10-CM

## 2022-01-18 DIAGNOSIS — Z79.01 CHRONIC ANTICOAGULATION: Primary | ICD-10-CM

## 2022-01-18 LAB — INTERNATIONAL NORMALIZATION RATIO, POC: 2.6

## 2022-01-18 PROCEDURE — 96372 THER/PROPH/DIAG INJ SC/IM: CPT | Performed by: NURSE PRACTITIONER

## 2022-01-18 PROCEDURE — 85610 PROTHROMBIN TIME: CPT | Performed by: FAMILY MEDICINE

## 2022-01-18 PROCEDURE — 93793 ANTICOAG MGMT PT WARFARIN: CPT | Performed by: FAMILY MEDICINE

## 2022-01-18 RX ORDER — CYANOCOBALAMIN 1000 UG/ML
1000 INJECTION INTRAMUSCULAR; SUBCUTANEOUS ONCE
Status: COMPLETED | OUTPATIENT
Start: 2022-01-18 | End: 2022-01-18

## 2022-01-18 RX ADMIN — CYANOCOBALAMIN 1000 MCG: 1000 INJECTION INTRAMUSCULAR; SUBCUTANEOUS at 13:21

## 2022-01-18 NOTE — PROGRESS NOTES
Mr. Uvaldo Hugo was here today. INR today:   Results for orders placed or performed in visit on 01/18/22   POCT INR   Result Value Ref Range    INR 2.6      INR Goal: 2.0-3.0    Dosing Plan  As of 1/18/2022    TTR:  69.7 % (4.5 y)   Full warfarin instructions:  11.25 mg every Sun, Fri; 7.5 mg all other days            PLAN: CONTINUE CURRENT DOSE  NEXT COUMADIN CLINIC APT IS: [email or web address]    Coumadin Clinic Hours  Tuesday 7:30am - 4:00pm  Wednesday 7:30am - 4:00pm  Thursday 7:30am - 4:00pm    IF IT'S AN EMERGENCY, PLEASE CALL 911 OR GO TO YOUR NEAREST EMERGENCY ROOM. [facility] INTERNAL MEDICINE COUMADIN CLINIC [phone]  IF UNABLE TO REACH COUMADIN CLINIC, [address] [place] [address], [phone].

## 2022-01-18 NOTE — PROGRESS NOTES
After obtaining consent, and per orders of injection of B12 given in Left deltoid by Kelly Murcia MA. Patient instructed to remain in clinic for 20 minutes afterwards, and to report any adverse reaction to me immediately.

## 2022-02-15 ENCOUNTER — ANTI-COAG VISIT (OUTPATIENT)
Dept: PRIMARY CARE CLINIC | Age: 87
End: 2022-02-15
Payer: MEDICARE

## 2022-02-15 DIAGNOSIS — I87.2 DEEP VENOUS INSUFFICIENCY: ICD-10-CM

## 2022-02-15 DIAGNOSIS — Z79.01 CHRONIC ANTICOAGULATION: Primary | ICD-10-CM

## 2022-02-15 LAB — INTERNATIONAL NORMALIZATION RATIO, POC: 2.1

## 2022-02-15 PROCEDURE — 85610 PROTHROMBIN TIME: CPT | Performed by: FAMILY MEDICINE

## 2022-02-15 PROCEDURE — 93793 ANTICOAG MGMT PT WARFARIN: CPT | Performed by: FAMILY MEDICINE

## 2022-02-15 NOTE — PROGRESS NOTES
Mr. Ariel Gamboa was here today. INR today:   Results for orders placed or performed in visit on 02/15/22   POCT INR   Result Value Ref Range    INR 2.1      INR Goal: 2.0-3.0    Dosing Plan  As of 2/15/2022    TTR:  70.2 % (4.6 y)   Full warfarin instructions:  11.25 mg every Sun, Fri; 7.5 mg all other days            PLAN: CONTINUE CURRENT DOSE  NEXT COUMADIN CLINIC APT IS: [email or web address]    Coumadin Clinic Hours  Tuesday 7:30am - 4:00pm  Wednesday 7:30am - 4:00pm  Thursday 7:30am - 4:00pm    IF IT'S AN EMERGENCY, PLEASE CALL 911 OR GO TO YOUR NEAREST EMERGENCY ROOM. [facility] INTERNAL MEDICINE COUMADIN CLINIC [phone]  IF UNABLE TO REACH COUMADIN CLINIC, [address] [place] [address], [phone].

## 2022-02-21 ENCOUNTER — HOSPITAL ENCOUNTER (OUTPATIENT)
Dept: NON INVASIVE DIAGNOSTICS | Age: 87
Discharge: HOME | End: 2022-02-21
Payer: MEDICARE

## 2022-02-21 DIAGNOSIS — Z95.2 S/P TAVR (TRANSCATHETER AORTIC VALVE REPLACEMENT): ICD-10-CM

## 2022-02-21 LAB
LV EF: 50 %
LVEF MODALITY: NORMAL

## 2022-02-21 PROCEDURE — 93306 TTE W/DOPPLER COMPLETE: CPT

## 2022-03-01 DIAGNOSIS — I10 ESSENTIAL HYPERTENSION: ICD-10-CM

## 2022-03-01 RX ORDER — LOSARTAN POTASSIUM 50 MG/1
TABLET ORAL
Qty: 90 TABLET | Refills: 0 | Status: SHIPPED | OUTPATIENT
Start: 2022-03-01 | End: 2022-04-21 | Stop reason: SDUPTHER

## 2022-03-02 DIAGNOSIS — I44.2 COMPLETE HEART BLOCK (HCC): ICD-10-CM

## 2022-03-02 DIAGNOSIS — Z95.0 PACEMAKER: Primary | ICD-10-CM

## 2022-03-02 PROCEDURE — 93296 REM INTERROG EVL PM/IDS: CPT | Performed by: CLINICAL NURSE SPECIALIST

## 2022-03-02 PROCEDURE — 93294 REM INTERROG EVL PM/LDLS PM: CPT | Performed by: CLINICAL NURSE SPECIALIST

## 2022-03-08 ENCOUNTER — OFFICE VISIT (OUTPATIENT)
Dept: CARDIOLOGY CLINIC | Age: 87
End: 2022-03-08
Payer: MEDICARE

## 2022-03-08 VITALS
BODY MASS INDEX: 34.69 KG/M2 | HEART RATE: 59 BPM | HEIGHT: 67 IN | WEIGHT: 221 LBS | SYSTOLIC BLOOD PRESSURE: 151 MMHG | DIASTOLIC BLOOD PRESSURE: 75 MMHG

## 2022-03-08 DIAGNOSIS — I44.2 COMPLETE HEART BLOCK (HCC): ICD-10-CM

## 2022-03-08 DIAGNOSIS — I50.30 DIASTOLIC CONGESTIVE HEART FAILURE WITH PRESERVED LEFT VENTRICULAR FUNCTION, NYHA CLASS 2 (HCC): ICD-10-CM

## 2022-03-08 DIAGNOSIS — Z95.2 S/P TAVR (TRANSCATHETER AORTIC VALVE REPLACEMENT): Primary | ICD-10-CM

## 2022-03-08 PROCEDURE — 1036F TOBACCO NON-USER: CPT | Performed by: INTERNAL MEDICINE

## 2022-03-08 PROCEDURE — 99214 OFFICE O/P EST MOD 30 MIN: CPT | Performed by: INTERNAL MEDICINE

## 2022-03-08 PROCEDURE — G8417 CALC BMI ABV UP PARAM F/U: HCPCS | Performed by: INTERNAL MEDICINE

## 2022-03-08 PROCEDURE — 4040F PNEUMOC VAC/ADMIN/RCVD: CPT | Performed by: INTERNAL MEDICINE

## 2022-03-08 PROCEDURE — G8428 CUR MEDS NOT DOCUMENT: HCPCS | Performed by: INTERNAL MEDICINE

## 2022-03-08 PROCEDURE — G8484 FLU IMMUNIZE NO ADMIN: HCPCS | Performed by: INTERNAL MEDICINE

## 2022-03-08 PROCEDURE — 1123F ACP DISCUSS/DSCN MKR DOCD: CPT | Performed by: INTERNAL MEDICINE

## 2022-03-08 ASSESSMENT — ENCOUNTER SYMPTOMS
EYE REDNESS: 0
COUGH: 0
ABDOMINAL PAIN: 0
NAUSEA: 0
FACIAL SWELLING: 0
VOMITING: 0
WHEEZING: 0
CHEST TIGHTNESS: 0
SHORTNESS OF BREATH: 0

## 2022-03-08 NOTE — PROGRESS NOTES
[facility], [address] [place] [address], [address] [id number]  Phone: [phone]  Fax: [phone]    OFFICE VISIT:  3/8/2022    [address]: 1935    Reason For Visit:  Zaki Barnes is a 82 y.o. male who is here for Follow-up  One year after TAVR      Diagnosis Orders   1. S/P TAVR (transcatheter aortic valve replacement)     2. Diastolic congestive heart failure with preserved left ventricular function, NYHA class 2 ([address]     3. Complete heart block (HCC)           HPI  Patient follows with our office with a history of CAD and aortic stenosis. Patient underwent TAVR 1/21/2021 and developed heart block postoperatively requiring a permanent pacemaker. Other history includes diastolic heart failure, hypertension, CKD. History of right leg DVT    Patient has completed cardiac rehab after the TAVR procedure    He denies chest pain. He has some mild, chronic dyspnea which is unchanged. He denies orthopnea, PND, sudden weight gain. Jenny Persaud MD is PCP.   Jase Ro has the following history as recorded in [facility]:    Patient Active Problem List    Diagnosis Date Noted    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    Complete heart block ([address]     Other forms of angina pectoris ([address] 02/08/2021    Bilateral carotid bruits 10/28/2020    History of DVT (deep vein thrombosis) 10/28/2020    Dizziness 10/28/2020    Moderate aortic stenosis 10/28/2020    S/P partial resection of colon 05/01/2019    Intestinal adhesions with partial obstruction ([address] 20/52/5280    Diastolic congestive heart failure with preserved left ventricular function, NYHA class 2 ([address] 05/01/2019    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 choroidal neovascularization ([address] 05/01/2019    Morbid obesity with BMI of 40.0-44.9, adult ([address] 05/01/2019    Chronic kidney disease, stage III (moderate) ([address] 11/13/2018    Nonrheumatic aortic valve stenosis 05/19/2018    Aortic systolic murmur on examination - suspect aortic stenosis 05/07/2018    Essential hypertension 11/07/2017    Crohn's disease of large intestine with complication ([address] 60/45/6220    Mixed hyperlipidemia 11/07/2017    Deep venous insufficiency 11/07/2017    Primary osteoarthritis involving multiple joints 11/07/2017    Gastroesophageal reflux disease without esophagitis 11/07/2017    Chronic anticoagulation 06/21/2017    Localized edema 06/28/2021    Sleep disturbance 06/28/2021    Coagulation defect ([address] 06/09/2021    Parkinson disease ([address] 06/09/2021    Aortic stenosis, severe     After-cataract with vision obscured 02/25/2020    Retinal drusen 11/08/2018    Retinal hemorrhage 11/08/2018    Vitreous degeneration 11/08/2018     Past Medical History:   Diagnosis Date    Cancer ([address]     skin    CHF (congestive heart failure) (HCC)     Cholelithiasis     CKD (chronic kidney disease) stage 3, GFR 30-59 ml/min ([address] 11/13/2018    Crohn's disease of large intestine with complication ([address] 21/9/8181    Deep venous insufficiency 11/7/2017    Essential hypertension 11/7/2017    Gastroesophageal reflux disease without esophagitis 11/7/2017    Heart murmur     Hx of blood clots     Mixed hyperlipidemia 11/7/2017    Nonrheumatic aortic valve stenosis 5/19/2018    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-[employer] Lydia  Implant MD Dr Elan Parker   [facility] TONSILLECTOMY      TOTAL HIP ARTHROPLASTY       Family History   Problem Relation Age of Onset    Stroke Other     Other Other         atherosclerosis of extremities     Social History     Tobacco Use    Smoking status: Never Smoker    Smokeless tobacco: Never Used   Substance Use Topics    Alcohol use: No      Current Outpatient Medications   Medication Sig Dispense Refill    losartan (COZAAR) 50 MG tablet TAKE ONE TABLET BY MOUTH DAILY 90 tablet 0    metoprolol succinate (TOPROL XL) 25 MG extended release tablet TAKE ONE TABLET BY MOUTH DAILY 90 tablet 1    warfarin (COUMADIN) 7.5 MG tablet TAKE ONE TABLET BY MOUTH DAILY EXCEPT ON WEDNESDAY TAKE 1/2 TABLET 90 tablet 2    cyanocobalamin 1000 MCG/ML injection inject ONE ML IN THE MUSCLE ONCE A MONTH 6 mL 0    mesalamine (PENTASA) 250 MG extended release capsule Take 3 capsules by mouth 4 times daily 1080 capsule 2    omeprazole (PRILOSEC) 20 MG delayed release capsule Take 20 mg by mouth daily      Multiple Vitamins-Minerals (PRESERVISION AREDS 2+MULTI VIT) CAPS Take 1 tablet by mouth daily      Cholecalciferol (VITAMIN D3) 5000 units TABS Take by mouth      B Complex-C-E-Zn (STRESS FORMULA/ZINC PO) Take by mouth       No current facility-administered medications for this visit. Allergies: Patient has no known allergies. Review of Systems  Review of Systems   Constitutional: Negative for activity change, diaphoresis, fatigue, fever and unexpected weight change. HENT: Negative for facial swelling and nosebleeds. Eyes: Negative for redness and visual disturbance. Respiratory: Negative for cough, chest tightness, shortness of breath and wheezing. Cardiovascular: Positive for leg swelling (improved). Negative for chest pain and palpitations. Gastrointestinal: Negative for abdominal pain, nausea and vomiting. Endocrine: Negative for cold intolerance and heat intolerance. Genitourinary: Negative for dysuria and hematuria. Musculoskeletal: Negative for arthralgias and myalgias. Skin: Negative for pallor and rash. Neurological: Negative for dizziness, seizures, syncope, weakness and light-headedness. Hematological: Does not bruise/bleed easily. Psychiatric/Behavioral: Negative for agitation. The patient is not nervous/anxious. Objective  Vital Signs - BP (!) 151/75   Pulse 59   Ht 5' 7\" (1.702 m)   Wt 221 lb (100.2 kg)   BMI 34.61 kg/m²    Wt Readings from Last 3 Encounters:   03/08/22 221 lb (100.2 kg)   11/22/21 221 lb 3.2 oz (100.3 kg)   08/31/21 228 lb 3.2 oz (103.5 kg)      Physical Exam  Vitals and nursing note reviewed. Constitutional:       General: He is not in acute distress. Appearance: He is well-developed. He is obese. He is not diaphoretic. Comments: Deconditioned   HENT:      Head: Normocephalic and atraumatic. Right Ear: Hearing and external ear normal.      Left Ear: Hearing and external ear normal.      Nose: Nose normal.   Eyes:      General:         Right eye: No discharge. Left eye: No discharge. Pupils: Pupils are equal, round, and reactive to light. Neck:      Thyroid: No thyromegaly. Vascular: No carotid bruit or JVD. Trachea: No tracheal deviation. Cardiovascular:      Rate and Rhythm: Normal rate and regular rhythm. Heart sounds: Murmur (2/6 systolic) heard. No friction rub. No gallop. Pulmonary:      Effort: Pulmonary effort is normal. No respiratory distress. Breath sounds: Normal breath sounds. No wheezing or rales. Abdominal:      Palpations: Abdomen is soft. Tenderness: There is no abdominal tenderness. Musculoskeletal:         General: No swelling or deformity. Cervical back: Neck supple. No muscular tenderness. Right lower leg: Edema (1+) present. Left lower leg: Edema (1+) present. Comments: Ambulates with cane   Skin:     General: Skin is warm and dry. Findings: No rash.    Neurological: LV systolic function. LV   ejection fraction estimated 50%. Unable to comment accurately on wall motion. Grade 1 diastolic dysfunction. Mild concentric LVH. RV appears normal in size with normal systolic function. Mild left atrial enlargement. Borderline mild right atrial enlargement. Pacer wires noted in right-sided chambers. TAVR valve in aortic position. No stenosis (mean gradient 9 mmHg). No   significant regurgitation appreciated. Mitral valve with mild to moderate mitral valve leaflet thickening and   calcification with moderate mitral annular calcification. No stenosis. Moderate mitral regurgitation (not well studied). Mild tricuspid regurgitation. No significant pericardial effusion. Signature      ----------------------------------------------------------------   Electronically signed by Lux Luna MD(Interpreting physician)   on 02/21/2022 05:26 PM   ----------------------------------------------------------------  [facility] remote pacemaker interrogation   Presenting rhythm: AP/, AP 65%,  96.3%   Battery status: 11..0 years   Lead status: lead impedance within range and stable   Sensing: P waves 2.4 mV,  R waves 6.9 mV   Thresholds:  Atrial 0.750 V @ 0.4ms, Ventricular 0.625 V @ 0.4ms   Observations:  None   Next office visit:  03/08/22   Next [employer] home check scheduled for 06/01/22          Assessment:     Diagnosis Orders   1. S/P TAVR (transcatheter aortic valve replacement)     2. Diastolic congestive heart failure with preserved left ventricular function, NYHA class 2 ([address]     3. Complete heart block (HCC)         Aortic stenosis/history of TAVR   One year Echo --  reviewed echo done in February 2022 that showed a normal functioning bioprosthetic aortic valve .   With mean gradient of 9 mmHg and normal LV systolic function moderate mitral regurgitation   Patient stated he is feeling better, no history of heart failure admission over the last year with no change in symptoms. Complete heart block status post permanent pacemaker, interrogation was done last week and showed good battery life normally functioning pacemaker    Diastolic heart failure NYHA class II, stage Cpatient appears euvolemic on losartan, metoprolol     Hypertensionstable on current regimen with metoprolol and losartan    CKD stage IIIamost recent creatinine on 6/21/2021 is 1.2  improved    History of DVTon chronic anticoagulation with warfarin, managed by PCP office    Stable cardiovascular status. No evidence of overt heart failure,angina or dysrhythmia. Plan    F/u after 6 months     - Weigh daily and report weight gain of 3lbs or more in 24hrs or 5lbs in one week. - Call for increasing shortness of breath or increasing swelling in feet and legs. (This could mean you are retaining too much fluid)  - 2000mg low sodium diet  - Fluid restriction of 1500ml per day (about 6 cups of fluid per day)    Call with any questionsor concerns  Follow up with Jenny Persaud MD for non cardiac problems  Report any new problems  Cardiovascular Fitness-Exercise as tolerated. Strive for 15 minutes of exercise most days of the week. Cardiac / HealthyDiet  Continue current medications as directed  Continue plan of treatment  It is always recommended that you bring your medications bottles with you to each visit - this is for your safety!        Cathi Gillis MD, [facility]  Interventional Cardiologist, Endovascular Specialist   Medical Director, Structural Heart Program   [facility]

## 2022-03-08 NOTE — PROGRESS NOTES
Post 1 year TAVR 5 meter Walk test      1st lap (16.5 feet): 21.07 sec  2nd lap (16.5 feet): 25.51 sec  3rd lap (16.5 feet): 20.99 sec      Average time: 35.66 sec      Symptoms: denies

## 2022-03-15 ENCOUNTER — ANTI-COAG VISIT (OUTPATIENT)
Dept: PRIMARY CARE CLINIC | Age: 87
End: 2022-03-15
Payer: MEDICARE

## 2022-03-15 DIAGNOSIS — Z79.01 CHRONIC ANTICOAGULATION: Primary | ICD-10-CM

## 2022-03-15 DIAGNOSIS — I87.2 DEEP VENOUS INSUFFICIENCY: ICD-10-CM

## 2022-03-15 LAB — INTERNATIONAL NORMALIZATION RATIO, POC: 2.2

## 2022-03-15 PROCEDURE — 85610 PROTHROMBIN TIME: CPT | Performed by: FAMILY MEDICINE

## 2022-03-15 PROCEDURE — 93793 ANTICOAG MGMT PT WARFARIN: CPT | Performed by: FAMILY MEDICINE

## 2022-03-15 NOTE — PROGRESS NOTES
Mr. Yfn Anaya was here today. INR today:   Results for orders placed or performed in visit on 03/15/22   POCT INR   Result Value Ref Range    INR 2.2      INR Goal: 2.0-3.0    Dosing Plan  As of 3/15/2022    TTR:  70.7 % (4.7 y)   Full warfarin instructions:  11.25 mg every Sun, Fri; 7.5 mg all other days            PLAN: CONTINUE CURRENT DOSE  NEXT COUMADIN CLINIC APT IS: [email or web address]    Coumadin Clinic Hours  Monday           8:00am - 4:00pm  Tuesday 8:00am - 4:00pm  Wednesday  10:00am - 4:00pm  Thursday 8:00am - 4:00pm    IF IT'S AN EMERGENCY, PLEASE CALL 911 OR GO TO YOUR NEAREST EMERGENCY ROOM. [address] [phone] ([facility]S DIRECT LINE)  IF UNABLE TO REACH COUMADIN CLINIC, [address],     INTERNAL  MEDICINE [phone].     PRIMARY CARE  [phone]    FAMILY MEDICINE  [phone]

## 2022-03-24 ENCOUNTER — OFFICE VISIT (OUTPATIENT)
Dept: PRIMARY CARE CLINIC | Age: 87
End: 2022-03-24
Payer: MEDICARE

## 2022-03-24 VITALS
HEIGHT: 67 IN | WEIGHT: 223 LBS | OXYGEN SATURATION: 98 % | DIASTOLIC BLOOD PRESSURE: 84 MMHG | HEART RATE: 59 BPM | BODY MASS INDEX: 35 KG/M2 | TEMPERATURE: 97.8 F | SYSTOLIC BLOOD PRESSURE: 130 MMHG

## 2022-03-24 DIAGNOSIS — G20 PARKINSON DISEASE (HCC): ICD-10-CM

## 2022-03-24 DIAGNOSIS — N18.31 STAGE 3A CHRONIC KIDNEY DISEASE (HCC): ICD-10-CM

## 2022-03-24 DIAGNOSIS — D68.9 COAGULATION DEFECT (HCC): ICD-10-CM

## 2022-03-24 DIAGNOSIS — H35.3221 EXUDATIVE AGE-RELATED MACULAR DEGENERATION OF LEFT EYE WITH ACTIVE CHOROIDAL NEOVASCULARIZATION (HCC): ICD-10-CM

## 2022-03-24 DIAGNOSIS — K50.119 CROHN'S DISEASE OF LARGE INTESTINE WITH COMPLICATION (HCC): ICD-10-CM

## 2022-03-24 DIAGNOSIS — I44.2 COMPLETE HEART BLOCK (HCC): Primary | ICD-10-CM

## 2022-03-24 DIAGNOSIS — I20.8 OTHER FORMS OF ANGINA PECTORIS (HCC): ICD-10-CM

## 2022-03-24 PROCEDURE — 1123F ACP DISCUSS/DSCN MKR DOCD: CPT | Performed by: FAMILY MEDICINE

## 2022-03-24 PROCEDURE — G8427 DOCREV CUR MEDS BY ELIG CLIN: HCPCS | Performed by: FAMILY MEDICINE

## 2022-03-24 PROCEDURE — 4040F PNEUMOC VAC/ADMIN/RCVD: CPT | Performed by: FAMILY MEDICINE

## 2022-03-24 PROCEDURE — 99213 OFFICE O/P EST LOW 20 MIN: CPT | Performed by: FAMILY MEDICINE

## 2022-03-24 PROCEDURE — G8417 CALC BMI ABV UP PARAM F/U: HCPCS | Performed by: FAMILY MEDICINE

## 2022-03-24 PROCEDURE — G8484 FLU IMMUNIZE NO ADMIN: HCPCS | Performed by: FAMILY MEDICINE

## 2022-03-24 PROCEDURE — 1036F TOBACCO NON-USER: CPT | Performed by: FAMILY MEDICINE

## 2022-03-24 NOTE — PROGRESS NOTES
Dell Segovia is a 82 y.o. male who presents today for   Chief Complaint   Patient presents with    Hypertension    Congestive Heart Failure     4 month f/u    Discuss Medications     pt reports he has been taking 12.5mg hctz and would like to kwow if he should continue       HPI  Patient is here for follow-up congestive heart failure. He continues to do quite well since he did have valve replacement. He notes that he is breathing well and overall his energy is improved substantially. No complications thus far. He is wondering if he needs to remain on hydrochlorothiazide. No change in PMH, family, social, or surgical history unless mentioned above. I have reviewed the above chief complaint and HPI details charted by staff and claim ownership of the documentation. Review of Systems   Constitutional: Negative for chills and fever. Respiratory: Negative for cough, chest tightness, shortness of breath and wheezing. Cardiovascular: Negative for chest pain, palpitations and leg swelling. Gastrointestinal: Negative for abdominal pain, constipation, diarrhea, nausea and vomiting. Genitourinary: Negative for difficulty urinating, dysuria and frequency.        Past Medical History:   Diagnosis Date    Cancer [facility])     skin    CHF (congestive heart failure) (HCC)     Cholelithiasis     CKD (chronic kidney disease) stage 3, GFR 30-59 ml/min ([facility] 11/13/2018    Crohn's disease of large intestine with complication ([facility] 43/8/2277    Deep venous insufficiency 11/7/2017    Essential hypertension 11/7/2017    Gastroesophageal reflux disease without esophagitis 11/7/2017    Heart murmur     Hx of blood clots     Mixed hyperlipidemia 11/7/2017    Nonrheumatic aortic valve stenosis 5/19/2018    Primary osteoarthritis involving multiple joints 11/7/2017    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HCC) 2002       Current Outpatient Medications   Medication Sig Dispense Refill    losartan (COZAAR) 50 MG tablet TAKE ONE TABLET BY MOUTH DAILY 90 tablet 0    metoprolol succinate (TOPROL XL) 25 MG extended release tablet TAKE ONE TABLET BY MOUTH DAILY 90 tablet 1    warfarin (COUMADIN) 7.5 MG tablet TAKE ONE TABLET BY MOUTH DAILY EXCEPT ON WEDNESDAY TAKE 1/2 TABLET 90 tablet 2    cyanocobalamin 1000 MCG/ML injection inject ONE ML IN THE MUSCLE ONCE A MONTH 6 mL 0    mesalamine (PENTASA) 250 MG extended release capsule Take 3 capsules by mouth 4 times daily 1080 capsule 2    omeprazole (PRILOSEC) 20 MG delayed release capsule Take 20 mg by mouth daily      Multiple Vitamins-Minerals (PRESERVISION AREDS 2+MULTI VIT) CAPS Take 1 tablet by mouth daily      Cholecalciferol (VITAMIN D3) 5000 units TABS Take by mouth      B Complex-C-E-Zn (STRESS FORMULA/ZINC PO) Take by mouth       No current facility-administered medications for this visit. No Known Allergies    Past Surgical History:   Procedure Laterality Date    AORTIC VALVE REPLACEMENT  01/21/2021    Transfemoral transcatheter aortic valve replacment(TAVR) using a 29 mm Arreaga Doe S3 valve.    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-[employer] [place]  Implant MD Dr Bradley Greer TOTAL HIP ARTHROPLASTY         Social History     Tobacco Use    Smoking status: Never Smoker    Smokeless tobacco: Never Used   Vaping Use    Vaping Use: Never used   Substance Use Topics    Alcohol use: No    Drug use: No       Family History   Problem Relation Age of Onset    Stroke Other     Other Other         atherosclerosis of extremities       /84 (Site: Right Upper Arm)   Pulse 59   Temp 97.8 °F (36.6 °C)   Ht 5' 7\" (1.702 m)   Wt 223 lb (101.2 kg)   SpO2 98%   BMI 34.93 kg/m²     Physical Exam  Vitals and nursing note reviewed. Constitutional:       General: He is not in acute distress. Appearance: He is well-developed. He is not toxic-appearing or diaphoretic. HENT:      Head: Normocephalic and atraumatic. Cardiovascular:      Rate and Rhythm: Normal rate and regular rhythm. Heart sounds: Normal heart sounds. No murmur heard. No friction rub. No gallop. Pulmonary:      Effort: Pulmonary effort is normal. No respiratory distress. Breath sounds: Normal breath sounds. No wheezing or rales. Chest:      Chest wall: No tenderness. Abdominal:      General: Bowel sounds are normal. There is no distension. Palpations: Abdomen is soft. There is no mass. Tenderness: There is no abdominal tenderness. There is no guarding or rebound. Musculoskeletal:      Right lower leg: No edema. Left lower leg: No edema. Skin:     General: Skin is warm and dry. Nails: There is no clubbing. Neurological:      Mental Status: He is alert and oriented to person, place, and time. Coordination: Coordination normal.      Gait: Gait normal.         Assessment:    ICD-10-CM    1. Complete heart block (HCC)  I44.2    2. Parkinson disease ([address]  G20    3. 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 choroidal neovascularization ([address]  H35.3221    4. Crohn's disease of large intestine with complication ([address]  M39.154    5. Coagulation defect ([address]  D68.9    6. Other forms of angina pectoris (HCC)  I20.8    7. Stage 3a chronic kidney disease (HCC)  N18.31        Plan:   Patient's chronic conditions are high risk but he remains stable at this time and is improved overall since his TAVR, we will continue current risk factor modification as he is currently stable and also continue with anticoagulant monitoring. No orders of the defined types were placed in this encounter. No orders of the defined types were placed in this encounter.     There are no discontinued medications. There are no Patient Instructions on file for this visit. Patient given educational handouts and has had all questions answered. Patient voices understanding and agrees to plans along with risks and benefits of plan. Patient isinstructed to continue prior meds, diet, and exercise plans unless instructed otherwise. Patient agrees to follow up as instructed and sooner if needed. Patient agrees to go to ER if condition becomes emergent. Notesmay be completed with dictation device and spelling errors may occur. Materials may be copied and pasted from a notepad outside of EMR, all of which, I, Dr. Jose Ramon Thomas MD, take sole intellectual ownership of and have approved adding to my note. Return in about 6 months (around 9/24/2022) for ov.

## 2022-03-31 ASSESSMENT — ENCOUNTER SYMPTOMS
ABDOMINAL PAIN: 0
COUGH: 0
CHEST TIGHTNESS: 0
CONSTIPATION: 0
NAUSEA: 0
SHORTNESS OF BREATH: 0
DIARRHEA: 0
WHEEZING: 0
VOMITING: 0

## 2022-04-12 DIAGNOSIS — K50.119 CROHN'S DISEASE OF LARGE INTESTINE WITH COMPLICATION (HCC): ICD-10-CM

## 2022-04-12 RX ORDER — MESALAMINE 250 MG/1
CAPSULE ORAL
Qty: 1080 CAPSULE | Refills: 2 | Status: SHIPPED | OUTPATIENT
Start: 2022-04-12

## 2022-04-12 RX ORDER — METOPROLOL SUCCINATE 25 MG/1
TABLET, EXTENDED RELEASE ORAL
Qty: 90 TABLET | Refills: 1 | Status: SHIPPED | OUTPATIENT
Start: 2022-04-12 | End: 2022-10-11

## 2022-04-13 ENCOUNTER — ANTI-COAG VISIT (OUTPATIENT)
Dept: PRIMARY CARE CLINIC | Age: 87
End: 2022-04-13
Payer: MEDICARE

## 2022-04-13 DIAGNOSIS — Z79.01 CHRONIC ANTICOAGULATION: Primary | ICD-10-CM

## 2022-04-13 DIAGNOSIS — I87.2 DEEP VENOUS INSUFFICIENCY: ICD-10-CM

## 2022-04-13 LAB — INTERNATIONAL NORMALIZATION RATIO, POC: 2.1

## 2022-04-13 PROCEDURE — 93793 ANTICOAG MGMT PT WARFARIN: CPT | Performed by: FAMILY MEDICINE

## 2022-04-13 PROCEDURE — 85610 PROTHROMBIN TIME: CPT | Performed by: FAMILY MEDICINE

## 2022-04-13 NOTE — PROGRESS NOTES
Mr. Erika Oquendo was here today. INR today:   Results for orders placed or performed in visit on 04/13/22   POCT INR   Result Value Ref Range    INR 2.1      INR Goal: 2.0-3.0    Dosing Plan  As of 4/13/2022    TTR:  71.2 % (4.8 y)   Full warfarin instructions:  11.25 mg every Sun, Fri; 7.5 mg all other days            PLAN: CONTINUE CURRENT DOSE  NEXT COUMADIN CLINIC APT IS: [email or web address]    Coumadin Clinic Hours  Monday           8:00am - 4:00pm  Tuesday 8:00am - 4:00pm  Wednesday  10:00am - 4:00pm  Thursday 8:00am - 4:00pm    IF IT'S AN EMERGENCY, PLEASE CALL 911 OR GO TO YOUR NEAREST EMERGENCY ROOM. [address] [phone] ([place]S DIRECT LINE)  IF UNABLE TO REACH COUMADIN CLINIC, [facility], [facility],     INTERNAL  MEDICINE [phone].     PRIMARY CARE  [phone]    FAMILY MEDICINE  [phone]

## 2022-04-18 DIAGNOSIS — I44.2 COMPLETE HEART BLOCK (HCC): ICD-10-CM

## 2022-04-18 DIAGNOSIS — Z95.0 PACEMAKER: Primary | ICD-10-CM

## 2022-04-18 DIAGNOSIS — I48.92 ATRIAL FLUTTER, PAROXYSMAL (HCC): ICD-10-CM

## 2022-04-18 DIAGNOSIS — I47.29 NSVT (NONSUSTAINED VENTRICULAR TACHYCARDIA): ICD-10-CM

## 2022-04-20 PROBLEM — Z96.1 PSEUDOPHAKIA: Status: ACTIVE | Noted: 2021-07-22

## 2022-04-20 RX ORDER — IRBESARTAN AND HYDROCHLOROTHIAZIDE 150; 12.5 MG/1; MG/1
TABLET, FILM COATED ORAL
COMMUNITY
Start: 2021-07-22 | End: 2022-04-21

## 2022-04-20 RX ORDER — LANSOPRAZOLE 15 MG/1
CAPSULE, DELAYED RELEASE ORAL
COMMUNITY
Start: 2021-07-22

## 2022-04-21 ENCOUNTER — OFFICE VISIT (OUTPATIENT)
Dept: FAMILY MEDICINE CLINIC | Age: 87
End: 2022-04-21
Payer: MEDICARE

## 2022-04-21 VITALS
OXYGEN SATURATION: 98 % | DIASTOLIC BLOOD PRESSURE: 78 MMHG | HEIGHT: 67 IN | BODY MASS INDEX: 34.78 KG/M2 | HEART RATE: 72 BPM | SYSTOLIC BLOOD PRESSURE: 116 MMHG | TEMPERATURE: 98.3 F | WEIGHT: 221.6 LBS

## 2022-04-21 DIAGNOSIS — Z51.81 MEDICATION MONITORING ENCOUNTER: ICD-10-CM

## 2022-04-21 DIAGNOSIS — E78.2 MIXED HYPERLIPIDEMIA: ICD-10-CM

## 2022-04-21 DIAGNOSIS — I10 ESSENTIAL HYPERTENSION: Primary | Chronic | ICD-10-CM

## 2022-04-21 DIAGNOSIS — R25.1 TREMOR: ICD-10-CM

## 2022-04-21 DIAGNOSIS — I50.30 DIASTOLIC CONGESTIVE HEART FAILURE WITH PRESERVED LEFT VENTRICULAR FUNCTION, NYHA CLASS 2 (HCC): Chronic | ICD-10-CM

## 2022-04-21 DIAGNOSIS — E53.8 B12 DEFICIENCY: ICD-10-CM

## 2022-04-21 DIAGNOSIS — K50.119 CROHN'S DISEASE OF LARGE INTESTINE WITH COMPLICATION (HCC): Chronic | ICD-10-CM

## 2022-04-21 DIAGNOSIS — R60.9 EDEMA, UNSPECIFIED TYPE: ICD-10-CM

## 2022-04-21 DIAGNOSIS — Z95.2 S/P TAVR (TRANSCATHETER AORTIC VALVE REPLACEMENT): ICD-10-CM

## 2022-04-21 PROCEDURE — 99214 OFFICE O/P EST MOD 30 MIN: CPT | Performed by: FAMILY MEDICINE

## 2022-04-21 PROCEDURE — 1036F TOBACCO NON-USER: CPT | Performed by: FAMILY MEDICINE

## 2022-04-21 PROCEDURE — 4040F PNEUMOC VAC/ADMIN/RCVD: CPT | Performed by: FAMILY MEDICINE

## 2022-04-21 PROCEDURE — G8417 CALC BMI ABV UP PARAM F/U: HCPCS | Performed by: FAMILY MEDICINE

## 2022-04-21 PROCEDURE — G8427 DOCREV CUR MEDS BY ELIG CLIN: HCPCS | Performed by: FAMILY MEDICINE

## 2022-04-21 PROCEDURE — 1123F ACP DISCUSS/DSCN MKR DOCD: CPT | Performed by: FAMILY MEDICINE

## 2022-04-21 RX ORDER — LOSARTAN POTASSIUM 50 MG/1
TABLET ORAL
Qty: 90 TABLET | Refills: 1 | Status: SHIPPED | OUTPATIENT
Start: 2022-04-21

## 2022-04-21 RX ORDER — BUMETANIDE 0.5 MG/1
0.5 TABLET ORAL PRN
COMMUNITY

## 2022-04-21 ASSESSMENT — PATIENT HEALTH QUESTIONNAIRE - PHQ9
1. LITTLE INTEREST OR PLEASURE IN DOING THINGS: 0
SUM OF ALL RESPONSES TO PHQ QUESTIONS 1-9: 0
SUM OF ALL RESPONSES TO PHQ QUESTIONS 1-9: 0
2. FEELING DOWN, DEPRESSED OR HOPELESS: 0
SUM OF ALL RESPONSES TO PHQ QUESTIONS 1-9: 0
SUM OF ALL RESPONSES TO PHQ9 QUESTIONS 1 & 2: 0
SUM OF ALL RESPONSES TO PHQ QUESTIONS 1-9: 0

## 2022-04-21 NOTE — PROGRESS NOTES
[facility] PHYSICIAN SERVICES  [facility] FAMILY MEDICINE  [address] [address] [address]  Dept: [phone]  Dept Fax: [phone]  Loc: [phone]    Subjective:     Flora Dhillon is a 82 y.o. male who presents today for his medical conditions/complaints as noted below. Flora Dhillon is c/o of [facility]        HPI:   Patient presents to establish care. He and his spouse were both seen by Dr. Kwesi Ryan, who is apparently leaving the practice. He had a recent follow-up appointment with labs prior to this, notes reviewed. He is doing fairly well overall. He has a history of Crohn's disease, well controlled on Pentasa though he states it is very expensive. Recently underwent TAVR, follows with cardiology, notes reviewed, and is also on Coumadin. He does have a slight resting tremor, which his wife states started when they had COVID in 2020. She does feel like it has become more noticeable over time. Hypertension  Compliant with medications. No adverse effects from medication. No lightheadedness, palpitations, or chest pain. PHQ Scores 4/21/2022 11/22/2021 4/27/2021 11/18/2020 2/20/2020 11/20/2019 2/27/2019   PHQ2 Score 0 0 0 0 0 0 0   PHQ9 Score 0 0 0 0 0 0 0     Interpretation of Total Score Depression Severity: 1-4 = Minimal depression, 5-9 = Mild depression, 10-14 = Moderate depression, 15-19 = Moderately severe depression, 20-27 = Severe depression     No flowsheet data found. Interpretation of CHARIS-7 score: 5-9 = mild anxiety, 10-14 = moderate anxiety, 15+ = severe anxiety. Recommend referral to behavioral health for scores 10 or greater.      Past Medical History:   Diagnosis Date    Cancer [facility])     skin    CHF (congestive heart failure) (HCC)     Cholelithiasis     CKD (chronic kidney disease) stage 3, GFR 30-59 ml/min ([facility] 11/13/2018    Crohn's disease of large intestine with complication ([facility] [facility] 23/0/8536    Deep venous insufficiency 11/7/2017    Essential hypertension 11/7/2017    Gastroesophageal reflux disease without esophagitis 11/7/2017    Heart murmur     Hx of blood clots     Mixed hyperlipidemia 11/7/2017    Nonrheumatic aortic valve stenosis 5/19/2018    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-[employer] [place]  Implant MD Dr Sy Davis   [address] [facility]      TONSILLECTOMY      TOTAL HIP ARTHROPLASTY  2011       Family History   Problem Relation Age of Onset    Stroke Other     Other Other         atherosclerosis of extremities       Social History     Tobacco Use    Smoking status: Never Smoker    Smokeless tobacco: Never Used   Substance Use Topics    Alcohol use: No      Current Outpatient Medications   Medication Sig Dispense Refill    losartan (COZAAR) 50 MG tablet TAKE ONE TABLET BY MOUTH DAILY 90 tablet 1    bumetanide (BUMEX) 0.5 MG tablet Take 0.5 mg by mouth daily      Aspirin Buf,CaCarb-MgCarb-MgO, 81 MG TABS by NOT APPLICABLE route      lansoprazole (PREVACID) 15 MG delayed release capsule by NOT APPLICABLE route      PENTASA 250 MG extended release capsule TAKE THREE CAPSULES BY MOUTH FOUR TIMES DAILY 1080 capsule 2    metoprolol succinate (TOPROL XL) 25 MG extended release tablet TAKE ONE TABLET BY MOUTH DAILY 90 tablet 1    warfarin (COUMADIN) 7.5 MG tablet TAKE ONE TABLET BY MOUTH DAILY EXCEPT ON WEDNESDAY TAKE 1/2 TABLET 90 tablet 2    cyanocobalamin 1000 MCG/ML injection inject ONE ML IN THE MUSCLE ONCE A MONTH 6 mL 0    omeprazole (PRILOSEC) 20 MG delayed release capsule Take 20 mg by mouth daily      Multiple Vitamins-Minerals (PRESERVISION AREDS 2+MULTI VIT) CAPS Take 1 tablet by mouth daily      Cholecalciferol (VITAMIN D3) 5000 units TABS Take by mouth      B Complex-C-E-Zn (STRESS FORMULA/ZINC PO) Take by mouth       No current facility-administered medications for this visit. No Known Allergies    Review of Systems   Constitutional: Negative for chills and fever. HENT: Negative for facial swelling and mouth sores. Eyes: Negative for discharge and itching. Respiratory: Negative for apnea and stridor. Cardiovascular: Negative for chest pain and palpitations. Gastrointestinal: Negative for nausea and vomiting. Endocrine: Negative for cold intolerance and heat intolerance. Genitourinary: Negative for frequency and urgency. Musculoskeletal: Negative for arthralgias and back pain. Skin: Negative for color change and rash. Neurological: Positive for tremors. See HPI for visit specific review of symptoms. All others negative      Objective:   /78   Pulse 72   Temp 98.3 °F (36.8 °C)   Ht 5' 7\" (1.702 m)   Wt 221 lb 9.6 oz (100.5 kg)   SpO2 98%   BMI 34.71 kg/m²   Physical Exam  Constitutional:       Appearance: He is well-developed. He is obese. HENT:      Head: Normocephalic and atraumatic. Right Ear: External ear normal.      Left Ear: External ear normal.   Eyes:      Conjunctiva/sclera: Conjunctivae normal.      Pupils: Pupils are equal, round, and reactive to light. Cardiovascular:      Rate and Rhythm: Normal rate and regular rhythm. Heart sounds: Normal heart sounds. Pulmonary:      Effort: Pulmonary effort is normal. No respiratory distress. Breath sounds: Normal breath sounds. Abdominal:      General: Bowel sounds are normal. There is no distension. Palpations: Abdomen is soft. Tenderness: There is no abdominal tenderness. Musculoskeletal:         General: Normal range of motion. Cervical back: Normal range of motion and neck supple. Skin:     General: Skin is warm. Capillary Refill: Capillary refill takes less than 2 seconds. Findings: No rash. Neurological:      Mental Status: He is alert and oriented to person, place, and time. Cranial Nerves: No cranial nerve deficit. Psychiatric:         Thought Content: Thought content normal.           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     Priority: High    Complete heart block ([address]      Priority: High    Pseudophakia 07/22/2021     Priority: Medium    Localized edema 06/28/2021    Sleep disturbance 06/28/2021    Coagulation defect ([address] 06/09/2021    Parkinson disease ([address] 06/09/2021    Other forms of angina pectoris ([address] 02/08/2021    Aortic stenosis, severe     Bilateral carotid bruits 10/28/2020    History of DVT (deep vein thrombosis) 10/28/2020    Dizziness 10/28/2020    Moderate aortic stenosis 10/28/2020    After-cataract with vision obscured 02/25/2020    S/P partial resection of colon 05/01/2019    Intestinal adhesions with partial obstruction ([address] 12/51/8146    Diastolic congestive heart failure with preserved left ventricular function, NYHA class 2 ([address] 05/01/2019    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 choroidal neovascularization ([address] 05/01/2019    Morbid obesity with BMI of 40.0-44.9, adult ([address] 05/01/2019    Chronic kidney disease, stage III (moderate) ([address] 11/13/2018    Retinal drusen 11/08/2018    Retinal hemorrhage 11/08/2018    Vitreous degeneration 11/08/2018    Nonrheumatic aortic valve stenosis 05/19/2018    Aortic systolic murmur on examination - suspect aortic stenosis 05/07/2018    Essential hypertension 11/07/2017    Crohn's disease of large intestine with complication ([facility] [address] [facility] 88/38/6446    Mixed hyperlipidemia 11/07/2017    Deep venous insufficiency 11/07/2017    Primary osteoarthritis involving multiple joints 11/07/2017    Gastroesophageal reflux disease without esophagitis 11/07/2017    Chronic anticoagulation 06/21/2017     Overview Note:     Updating Deprecated Diagnoses          Nonda Goodell was seen today for establish care. Diagnoses and all orders for this visit:    Essential hypertension  -     losartan (COZAAR) 50 MG tablet; TAKE ONE TABLET BY MOUTH DAILY  -     CBC with Auto Differential; Future  -     Comprehensive Metabolic Panel; Future    Crohn's disease of large intestine with complication ([facility] [address]  -     External Referral To Gastroenterology    Diastolic congestive heart failure with preserved left ventricular function, NYHA class 2 (HCC)    S/P TAVR (transcatheter aortic valve replacement)    Mixed hyperlipidemia  -     Lipid Panel; Future    B12 deficiency  -     Vitamin B12; Future    Medication monitoring encounter  -     TSH; Future    Tremor  -     TSH;  Future    Edema, unspecified type        Health Maintenance   Topic Date Due    DTaP/Tdap/Td vaccine (1 - Tdap) Never done    Shingles Vaccine (1 of 2) Never done    COVID-19 Vaccine (1) 06/28/2022 (Originally 4/18/1940)    Prostate Specific Antigen (PSA) Screening or Monitoring  04/21/2023 (Originally 11/1/2018)    Potassium  08/06/2022    Creatinine  08/06/2022    Annual Wellness Visit (AWV)  11/23/2022    Depression Screen  04/21/2023    Colorectal Cancer Screen  09/28/2023    Flu vaccine  Completed    Pneumococcal 65+ years Vaccine  Completed    Hepatitis A vaccine  Aged Out    Hepatitis B vaccine  Aged Out    Hib vaccine  Aged Out    Meningococcal (ACWY) vaccine  Aged Out     Declined COVID-vaccine. Shows his last colonoscopy was in 2020. I reviewed up-to-date but cannot find clear guidelines as far as when to stop doing surveillance colonoscopy with history of Crohn's disease. Therefore will refer to GI, externally as requested, so they can advise. His wife was also referred to GI for    Return in about 3 months (around 7/21/2022) for HTN, in office, declined vaccine, 40 minute appt. Discussed use, benefit, and side effects of prescribed medications. All patient questions answered. Pt voiced understanding. Reviewed health maintenance. Instructed to continue current medications, diet and exercise. Patient agreedwith treatment plan. Follow up as directed. Old records reviewed, where available.     Ronit Mehta MD    Note:  dictated using Dragon software

## 2022-04-23 ASSESSMENT — ENCOUNTER SYMPTOMS
NAUSEA: 0
EYE DISCHARGE: 0
APNEA: 0
FACIAL SWELLING: 0
BACK PAIN: 0
STRIDOR: 0
COLOR CHANGE: 0
VOMITING: 0
EYE ITCHING: 0

## 2022-05-11 ENCOUNTER — ANTI-COAG VISIT (OUTPATIENT)
Dept: PRIMARY CARE CLINIC | Age: 87
End: 2022-05-11
Payer: MEDICARE

## 2022-05-11 DIAGNOSIS — I87.2 DEEP VENOUS INSUFFICIENCY: ICD-10-CM

## 2022-05-11 DIAGNOSIS — Z79.01 CHRONIC ANTICOAGULATION: Primary | ICD-10-CM

## 2022-05-11 LAB — INTERNATIONAL NORMALIZATION RATIO, POC: 2.1

## 2022-05-11 PROCEDURE — 93793 ANTICOAG MGMT PT WARFARIN: CPT | Performed by: FAMILY MEDICINE

## 2022-05-11 PROCEDURE — 85610 PROTHROMBIN TIME: CPT | Performed by: FAMILY MEDICINE

## 2022-05-11 NOTE — PROGRESS NOTES
Mr. Clarise Schaumann was here today. INR today:   Results for orders placed or performed in visit on 05/11/22   POCT INR   Result Value Ref Range    INR 2.1      INR Goal: 2.0-3.0    Dosing Plan  As of 5/11/2022    TTR:  71.7 % (4.8 y)   Full warfarin instructions:  11.25 mg every Sun, Fri; 7.5 mg all other days            PLAN: CONTINUE CURRENT DOSE  NEXT COUMADIN CLINIC APT IS: [email or web address]    Coumadin Clinic Hours  Monday           8:00am - 4:00pm  Tuesday 8:00am - 4:00pm  Wednesday  10:00am - 4:00pm  Thursday 8:00am - 4:00pm    IF IT'S AN EMERGENCY, PLEASE CALL 911 OR GO TO YOUR NEAREST EMERGENCY ROOM. [address] [phone] ([place])  IF UNABLE TO REACH COUMADIN CLINIC, [address],     INTERNAL  MEDICINE [phone].     PRIMARY CARE  [phone]    FAMILY MEDICINE  [phone]

## 2022-05-22 NOTE — PROGRESS NOTES
No chief complaint on file.      PCP: Brent Rodriguez MD  REFER: No ref. provider found    Subjective     HPI    Diagnosed with Crohn's Disease over 25 years ago.   He is successfully maintained on Pentasa.  Bowels described as moving without difficulty.  No bright red blood per rectum, no melena.    No weight loss.       COLONOSCOPY (09/28/2020 11:46)-stricture TI    COLONOSCOPY (10/30/2017 09:14)  SCANNED - COLONOSCOPY (10/31/2014 00:00)    Hx of colectomy (18 inches per pt) due to colon mass     CScope (Dr Schroeder) 2014 narrowing of anastomosis  CScope (Dr Schroeder) 2013 adenomatous polyp removed   CScope (Dr Schroeder) 2010 narrowing of anastomosis     Endoscopy (Dr Schroeder) 2014-normal    Past Medical History:   Diagnosis Date   • Crohn's disease (HCC)    • DVT (deep venous thrombosis) (HCC)    • History of transfusion    • Hypertension    • PONV (postoperative nausea and vomiting)    • Spinal headache        Past Surgical History:   Procedure Laterality Date   • COLON SURGERY     • COLONOSCOPY  10/31/2014    significant narrowing to his anastomosis   • COLONOSCOPY N/A 10/30/2017    Procedure: COLONOSCOPY WITH ANESTHESIA;  Surgeon: Manuelito Schroeder DO;  Location: [facility] ENDOSCOPY;  Service:    • COLONOSCOPY N/A 9/28/2020    Procedure: COLONOSCOPY WITH ANESTHESIA;  Surgeon: Manuelito Schroeder DO;  Location: [facility] ENDOSCOPY;  Service: Gastroenterology;  Laterality: N/A;  pre: crohns   post: anastomotic stenosis  brent rodriguez    • ENDOSCOPY  09/25/2014    normal   • HIP SURGERY Right        Outpatient Medications Marked as Taking for the 5/24/22 encounter (Office Visit) with Sriram Junior APRN   Medication Sig Dispense Refill   • lansoprazole (PREVACID) 15 MG capsule Take 15 mg by mouth Daily.     • losartan (COZAAR) 50 MG tablet TAKE ONE TABLET BY MOUTH DAILY  3   • Mesalamine (PENTASA PO) Take  by mouth.     • metoprolol tartrate (LOPRESSOR) 25 MG tablet Take 25 mg by mouth 2 (Two) Times a Day.     • Multiple  "Vitamin (STRESS B PO) Take  by mouth.     • Multiple Vitamins-Minerals (PRESERVISION AREDS 2 PO) Take  by mouth.     • vitamin D3 125 MCG (5000 UT) capsule capsule Take 5,000 Units by mouth Daily.     • warfarin (COUMADIN) 7.5 MG tablet TAKE ONE TABLET BY MOUTH EVERY DAY EXCEPT ON SUNDAY TAKE 1.5 TABLETS BY MOUTH  1       No Known Allergies    Social History     Socioeconomic History   • Marital status:    Tobacco Use   • Smoking status: Never Smoker   • Smokeless tobacco: Never Used   Vaping Use   • Vaping Use: Never used   Substance and Sexual Activity   • Alcohol use: No   • Drug use: No   • Sexual activity: Defer       Review of Systems   Constitutional: Negative for unexpected weight change.   Respiratory: Negative for shortness of breath.    Cardiovascular: Negative for chest pain.   Gastrointestinal: Negative for abdominal pain and anal bleeding.       Objective     Vitals:    05/24/22 1305   BP: 134/80   Pulse: 69   Temp: 97.3 °F (36.3 °C)   SpO2: 97%   Weight: 99.3 kg (219 lb)   Height: 167.6 cm (66\")     Body mass index is 35.35 kg/m².    Physical Exam  Constitutional:       Appearance: Normal appearance. He is well-developed.   Eyes:      General: No scleral icterus.  Cardiovascular:      Rate and Rhythm: Regular rhythm.      Heart sounds: Normal heart sounds. No murmur heard.  Pulmonary:      Effort: Pulmonary effort is normal. No accessory muscle usage.      Breath sounds: Normal breath sounds.   Abdominal:      General: Bowel sounds are normal. There is no distension.      Palpations: Abdomen is soft. There is no mass.      Tenderness: There is no abdominal tenderness. There is no guarding or rebound.   Skin:     General: Skin is warm and dry.      Coloration: Skin is not jaundiced.   Neurological:      Mental Status: He is alert.   Psychiatric:         Behavior: Behavior is cooperative.         Imaging Results (Most Recent)     None          Body mass index is 35.35 kg/m².    Assessment & Plan "     Diagnoses and all orders for this visit:    1. Crohn's disease without complication, unspecified gastrointestinal tract location (HCC) (Primary)    2. Diarrhea, unspecified type    Other orders  -     colestipol (COLESTID) 1 g tablet; Take 1 tablet by mouth 4 (Four) Times a Day.  Dispense: 90 tablet; Refill: 6         * Surgery not found *    Rayo Siddiqi was under impression he was meeting with surgeon today to discuss surgical options due to his crohn's.  Explained with his lack of symptoms I would not recommend surgery.  Crohn's is currently controlled and would recommend future colonoscopy based on symptomology        Sriram Junior, APRN  05/31/22          There are no Patient Instructions on file for this visit.

## 2022-05-23 RX ORDER — WARFARIN SODIUM 7.5 MG/1
TABLET ORAL
Qty: 90 TABLET | Refills: 2 | Status: SHIPPED | OUTPATIENT
Start: 2022-05-23

## 2022-05-24 ENCOUNTER — OFFICE VISIT (OUTPATIENT)
Dept: GASTROENTEROLOGY | Facility: CLINIC | Age: 87
End: 2022-05-24

## 2022-05-24 VITALS
SYSTOLIC BLOOD PRESSURE: 134 MMHG | HEART RATE: 69 BPM | WEIGHT: 219 LBS | HEIGHT: 66 IN | OXYGEN SATURATION: 97 % | TEMPERATURE: 97.3 F | DIASTOLIC BLOOD PRESSURE: 80 MMHG | BODY MASS INDEX: 35.2 KG/M2

## 2022-05-24 DIAGNOSIS — K50.90 CROHN'S DISEASE WITHOUT COMPLICATION, UNSPECIFIED GASTROINTESTINAL TRACT LOCATION: Primary | ICD-10-CM

## 2022-05-24 DIAGNOSIS — R19.7 DIARRHEA, UNSPECIFIED TYPE: ICD-10-CM

## 2022-05-24 PROCEDURE — 99213 OFFICE O/P EST LOW 20 MIN: CPT | Performed by: NURSE PRACTITIONER

## 2022-05-24 RX ORDER — MONTELUKAST SODIUM 4 MG/1
1 TABLET, CHEWABLE ORAL 4 TIMES DAILY
Qty: 90 TABLET | Refills: 6 | Status: SHIPPED | OUTPATIENT
Start: 2022-05-24

## 2022-06-03 ENCOUNTER — TELEPHONE (OUTPATIENT)
Dept: PRIMARY CARE CLINIC | Age: 87
End: 2022-06-03

## 2022-06-03 DIAGNOSIS — Z79.01 CHRONIC ANTICOAGULATION: Primary | ICD-10-CM

## 2022-06-08 ENCOUNTER — TELEPHONE (OUTPATIENT)
Dept: FAMILY MEDICINE CLINIC | Age: 87
End: 2022-06-08

## 2022-06-08 DIAGNOSIS — Z79.01 CHRONIC ANTICOAGULATION: ICD-10-CM

## 2022-06-08 NOTE — TELEPHONE ENCOUNTER
That is my understanding as well. Patient ask if I would call [facility] & find out if they are able to do finger sticks in their office. Are you ok with me doing so?

## 2022-06-08 NOTE — TELEPHONE ENCOUNTER
I called and spoke with patient & he does not want to have venipuncture. He wants to have his finger pricked to have this checked. Please advise?

## 2022-06-10 ENCOUNTER — TRANSCRIBE ORDERS (OUTPATIENT)
Dept: ADMINISTRATIVE | Facility: HOSPITAL | Age: 87
End: 2022-06-10

## 2022-06-10 ENCOUNTER — LAB (OUTPATIENT)
Dept: LAB | Facility: HOSPITAL | Age: 87
End: 2022-06-10

## 2022-06-10 DIAGNOSIS — Z79.01 CHRONIC ANTICOAGULATION: ICD-10-CM

## 2022-06-10 DIAGNOSIS — Z79.01 CHRONIC ANTICOAGULATION: Primary | ICD-10-CM

## 2022-06-10 LAB
INR PPP: 2.4 (ref 0.91–1.09)
PROTHROMBIN TIME: 28.5 SECONDS (ref 10–13.8)

## 2022-06-10 PROCEDURE — 85610 PROTHROMBIN TIME: CPT | Performed by: FAMILY MEDICINE

## 2022-06-29 ENCOUNTER — APPOINTMENT (OUTPATIENT)
Dept: GENERAL RADIOLOGY | Age: 87
End: 2022-06-29
Payer: MEDICARE

## 2022-06-29 ENCOUNTER — HOSPITAL ENCOUNTER (OUTPATIENT)
Age: 87
Setting detail: OBSERVATION
Discharge: HOME HEALTH | End: 2022-06-30
Attending: EMERGENCY MEDICINE
Payer: MEDICARE

## 2022-06-29 DIAGNOSIS — R53.1 GENERAL WEAKNESS: ICD-10-CM

## 2022-06-29 DIAGNOSIS — U07.1 COVID-19: Primary | ICD-10-CM

## 2022-06-29 LAB
ALBUMIN SERPL-MCNC: 3.6 G/DL (ref 3.5–5.2)
ALP BLD-CCNC: 115 U/L (ref 40–130)
ALT SERPL-CCNC: 11 U/L (ref 5–41)
ANION GAP SERPL CALCULATED.3IONS-SCNC: 11 MMOL/L (ref 7–19)
AST SERPL-CCNC: 17 U/L (ref 5–40)
BASOPHILS ABSOLUTE: 0 K/UL (ref 0–0.2)
BASOPHILS RELATIVE PERCENT: 0.5 % (ref 0–1)
BILIRUB SERPL-MCNC: 0.5 MG/DL (ref 0.2–1.2)
BILIRUBIN URINE: NEGATIVE
BLOOD, URINE: NEGATIVE
BUN BLDV-MCNC: 16 MG/DL (ref 8–23)
C-REACTIVE PROTEIN: 0.66 MG/DL (ref 0–0.5)
CALCIUM SERPL-MCNC: 9 MG/DL (ref 8.8–10.2)
CHLORIDE BLD-SCNC: 105 MMOL/L (ref 98–111)
CLARITY: CLEAR
CO2: 23 MMOL/L (ref 22–29)
COLOR: YELLOW
CREAT SERPL-MCNC: 1.4 MG/DL (ref 0.5–1.2)
D DIMER: 1.02 UG/ML FEU (ref 0–0.48)
EOSINOPHILS ABSOLUTE: 0 K/UL (ref 0–0.6)
EOSINOPHILS RELATIVE PERCENT: 0.4 % (ref 0–5)
GFR AFRICAN AMERICAN: 58
GFR NON-AFRICAN AMERICAN: 48
GLUCOSE BLD-MCNC: 143 MG/DL (ref 74–109)
GLUCOSE URINE: NEGATIVE MG/DL
HCT VFR BLD CALC: 39.1 % (ref 42–52)
HEMOGLOBIN: 12.8 G/DL (ref 14–18)
IMMATURE GRANULOCYTES #: 0 K/UL
INR BLD: 2.39 (ref 0.88–1.18)
KETONES, URINE: ABNORMAL MG/DL
LACTIC ACID: 1.1 MMOL/L (ref 0.5–1.9)
LACTIC ACID: 2.9 MMOL/L (ref 0.5–1.9)
LEUKOCYTE ESTERASE, URINE: NEGATIVE
LYMPHOCYTES ABSOLUTE: 0.6 K/UL (ref 1.1–4.5)
LYMPHOCYTES RELATIVE PERCENT: 8.4 % (ref 20–40)
MCH RBC QN AUTO: 33.1 PG (ref 27–31)
MCHC RBC AUTO-ENTMCNC: 32.7 G/DL (ref 33–37)
MCV RBC AUTO: 101 FL (ref 80–94)
MONOCYTES ABSOLUTE: 0.3 K/UL (ref 0–0.9)
MONOCYTES RELATIVE PERCENT: 4.5 % (ref 0–10)
NEUTROPHILS ABSOLUTE: 6.4 K/UL (ref 1.5–7.5)
NEUTROPHILS RELATIVE PERCENT: 85.7 % (ref 50–65)
NITRITE, URINE: NEGATIVE
PDW BLD-RTO: 14.1 % (ref 11.5–14.5)
PH UA: 5 (ref 5–8)
PLATELET # BLD: 90 K/UL (ref 130–400)
PMV BLD AUTO: 9.7 FL (ref 9.4–12.4)
POTASSIUM SERPL-SCNC: 4 MMOL/L (ref 3.5–5)
PROTEIN UA: NEGATIVE MG/DL
PROTHROMBIN TIME: 26.8 SEC (ref 12–14.6)
RBC # BLD: 3.87 M/UL (ref 4.7–6.1)
SARS-COV-2, NAAT: DETECTED
SODIUM BLD-SCNC: 139 MMOL/L (ref 136–145)
SPECIFIC GRAVITY UA: 1.02 (ref 1–1.03)
TOTAL PROTEIN: 6.1 G/DL (ref 6.6–8.7)
TROPONIN: 0.01 NG/ML (ref 0–0.03)
TSH SERPL DL<=0.05 MIU/L-ACNC: 2.12 UIU/ML (ref 0.27–4.2)
UROBILINOGEN, URINE: 1 E.U./DL
WBC # BLD: 7.5 K/UL (ref 4.8–10.8)

## 2022-06-29 PROCEDURE — 85379 FIBRIN DEGRADATION QUANT: CPT

## 2022-06-29 PROCEDURE — 6370000000 HC RX 637 (ALT 250 FOR IP): Performed by: EMERGENCY MEDICINE

## 2022-06-29 PROCEDURE — 6370000000 HC RX 637 (ALT 250 FOR IP)

## 2022-06-29 PROCEDURE — 84443 ASSAY THYROID STIM HORMONE: CPT

## 2022-06-29 PROCEDURE — 82306 VITAMIN D 25 HYDROXY: CPT

## 2022-06-29 PROCEDURE — 87040 BLOOD CULTURE FOR BACTERIA: CPT

## 2022-06-29 PROCEDURE — 82607 VITAMIN B-12: CPT

## 2022-06-29 PROCEDURE — 81003 URINALYSIS AUTO W/O SCOPE: CPT

## 2022-06-29 PROCEDURE — 85025 COMPLETE CBC W/AUTO DIFF WBC: CPT

## 2022-06-29 PROCEDURE — 71045 X-RAY EXAM CHEST 1 VIEW: CPT

## 2022-06-29 PROCEDURE — 82746 ASSAY OF FOLIC ACID SERUM: CPT

## 2022-06-29 PROCEDURE — 80053 COMPREHEN METABOLIC PANEL: CPT

## 2022-06-29 PROCEDURE — 85610 PROTHROMBIN TIME: CPT

## 2022-06-29 PROCEDURE — 36415 COLL VENOUS BLD VENIPUNCTURE: CPT

## 2022-06-29 PROCEDURE — 84484 ASSAY OF TROPONIN QUANT: CPT

## 2022-06-29 PROCEDURE — 83605 ASSAY OF LACTIC ACID: CPT

## 2022-06-29 PROCEDURE — 87635 SARS-COV-2 COVID-19 AMP PRB: CPT

## 2022-06-29 PROCEDURE — G0378 HOSPITAL OBSERVATION PER HR: HCPCS

## 2022-06-29 PROCEDURE — 71045 X-RAY EXAM CHEST 1 VIEW: CPT | Performed by: RADIOLOGY

## 2022-06-29 PROCEDURE — 86140 C-REACTIVE PROTEIN: CPT

## 2022-06-29 PROCEDURE — 2580000003 HC RX 258: Performed by: EMERGENCY MEDICINE

## 2022-06-29 PROCEDURE — 99285 EMERGENCY DEPT VISIT HI MDM: CPT

## 2022-06-29 RX ORDER — 0.9 % SODIUM CHLORIDE 0.9 %
1000 INTRAVENOUS SOLUTION INTRAVENOUS ONCE
Status: COMPLETED | OUTPATIENT
Start: 2022-06-29 | End: 2022-06-30

## 2022-06-29 RX ORDER — ACETAMINOPHEN 325 MG/1
650 TABLET ORAL ONCE
Status: COMPLETED | OUTPATIENT
Start: 2022-06-29 | End: 2022-06-29

## 2022-06-29 RX ORDER — POLYETHYLENE GLYCOL 3350 17 G/17G
17 POWDER, FOR SOLUTION ORAL DAILY PRN
Status: DISCONTINUED | OUTPATIENT
Start: 2022-06-29 | End: 2022-06-30 | Stop reason: HOSPADM

## 2022-06-29 RX ORDER — SODIUM CHLORIDE 0.9 % (FLUSH) 0.9 %
5-40 SYRINGE (ML) INJECTION PRN
Status: DISCONTINUED | OUTPATIENT
Start: 2022-06-29 | End: 2022-06-30 | Stop reason: HOSPADM

## 2022-06-29 RX ORDER — ACETAMINOPHEN 325 MG/1
650 TABLET ORAL EVERY 6 HOURS PRN
Status: DISCONTINUED | OUTPATIENT
Start: 2022-06-29 | End: 2022-06-30 | Stop reason: HOSPADM

## 2022-06-29 RX ORDER — ACETAMINOPHEN 650 MG/1
650 SUPPOSITORY RECTAL EVERY 6 HOURS PRN
Status: DISCONTINUED | OUTPATIENT
Start: 2022-06-29 | End: 2022-06-30 | Stop reason: HOSPADM

## 2022-06-29 RX ORDER — SODIUM CHLORIDE 9 MG/ML
INJECTION, SOLUTION INTRAVENOUS PRN
Status: DISCONTINUED | OUTPATIENT
Start: 2022-06-29 | End: 2022-06-30 | Stop reason: HOSPADM

## 2022-06-29 RX ORDER — SODIUM CHLORIDE 0.9 % (FLUSH) 0.9 %
5-40 SYRINGE (ML) INJECTION EVERY 12 HOURS SCHEDULED
Status: DISCONTINUED | OUTPATIENT
Start: 2022-06-29 | End: 2022-06-30 | Stop reason: HOSPADM

## 2022-06-29 RX ORDER — M-VIT,TX,IRON,MINS/CALC/FOLIC 27MG-0.4MG
1 TABLET ORAL DAILY
Status: DISCONTINUED | OUTPATIENT
Start: 2022-06-30 | End: 2022-06-30 | Stop reason: HOSPADM

## 2022-06-29 RX ORDER — METOPROLOL SUCCINATE 25 MG/1
25 TABLET, EXTENDED RELEASE ORAL DAILY
Status: DISCONTINUED | OUTPATIENT
Start: 2022-06-30 | End: 2022-06-30 | Stop reason: HOSPADM

## 2022-06-29 RX ORDER — MECOBALAMIN 5000 MCG
5 TABLET,DISINTEGRATING ORAL NIGHTLY
Status: DISCONTINUED | OUTPATIENT
Start: 2022-06-29 | End: 2022-06-30 | Stop reason: HOSPADM

## 2022-06-29 RX ORDER — SODIUM CHLORIDE 9 MG/ML
INJECTION, SOLUTION INTRAVENOUS CONTINUOUS
Status: DISCONTINUED | OUTPATIENT
Start: 2022-06-29 | End: 2022-06-30

## 2022-06-29 RX ORDER — ALBUTEROL SULFATE 90 UG/1
2 AEROSOL, METERED RESPIRATORY (INHALATION) 4 TIMES DAILY
Status: DISCONTINUED | OUTPATIENT
Start: 2022-06-30 | End: 2022-06-30 | Stop reason: HOSPADM

## 2022-06-29 RX ORDER — VITAMIN B COMPLEX
1000 TABLET ORAL DAILY
Status: DISCONTINUED | OUTPATIENT
Start: 2022-06-30 | End: 2022-06-30 | Stop reason: HOSPADM

## 2022-06-29 RX ORDER — FAMOTIDINE 20 MG/1
20 TABLET, FILM COATED ORAL 2 TIMES DAILY
Status: DISCONTINUED | OUTPATIENT
Start: 2022-06-29 | End: 2022-06-30

## 2022-06-29 RX ORDER — ONDANSETRON 2 MG/ML
4 INJECTION INTRAMUSCULAR; INTRAVENOUS EVERY 6 HOURS PRN
Status: DISCONTINUED | OUTPATIENT
Start: 2022-06-29 | End: 2022-06-30 | Stop reason: HOSPADM

## 2022-06-29 RX ORDER — ONDANSETRON 4 MG/1
4 TABLET, ORALLY DISINTEGRATING ORAL EVERY 8 HOURS PRN
Status: DISCONTINUED | OUTPATIENT
Start: 2022-06-29 | End: 2022-06-30 | Stop reason: HOSPADM

## 2022-06-29 RX ORDER — BUDESONIDE AND FORMOTEROL FUMARATE DIHYDRATE 160; 4.5 UG/1; UG/1
2 AEROSOL RESPIRATORY (INHALATION) 2 TIMES DAILY
Status: DISCONTINUED | OUTPATIENT
Start: 2022-06-30 | End: 2022-06-30 | Stop reason: HOSPADM

## 2022-06-29 RX ORDER — GUAIFENESIN/DEXTROMETHORPHAN 100-10MG/5
5 SYRUP ORAL EVERY 4 HOURS PRN
Status: DISCONTINUED | OUTPATIENT
Start: 2022-06-29 | End: 2022-06-30 | Stop reason: HOSPADM

## 2022-06-29 RX ORDER — ZINC SULFATE 50(220)MG
50 CAPSULE ORAL DAILY
Status: DISCONTINUED | OUTPATIENT
Start: 2022-06-30 | End: 2022-06-30 | Stop reason: HOSPADM

## 2022-06-29 RX ADMIN — SODIUM CHLORIDE 1000 ML: 9 INJECTION, SOLUTION INTRAVENOUS at 20:07

## 2022-06-29 RX ADMIN — WARFARIN SODIUM 7.5 MG: 5 TABLET ORAL at 23:54

## 2022-06-29 RX ADMIN — ACETAMINOPHEN 650 MG: 325 TABLET ORAL at 20:04

## 2022-06-29 ASSESSMENT — ENCOUNTER SYMPTOMS
SHORTNESS OF BREATH: 0
ABDOMINAL PAIN: 0
NAUSEA: 0
DIARRHEA: 0
VOMITING: 0
BACK PAIN: 0
SORE THROAT: 0
RHINORRHEA: 0
COUGH: 1

## 2022-06-29 ASSESSMENT — PAIN - FUNCTIONAL ASSESSMENT: PAIN_FUNCTIONAL_ASSESSMENT: NONE - DENIES PAIN

## 2022-06-29 ASSESSMENT — LIFESTYLE VARIABLES: HOW OFTEN DO YOU HAVE A DRINK CONTAINING ALCOHOL: NEVER

## 2022-06-30 VITALS
SYSTOLIC BLOOD PRESSURE: 128 MMHG | WEIGHT: 205 LBS | OXYGEN SATURATION: 98 % | DIASTOLIC BLOOD PRESSURE: 68 MMHG | BODY MASS INDEX: 32.18 KG/M2 | RESPIRATION RATE: 19 BRPM | HEIGHT: 67 IN | TEMPERATURE: 98.1 F | HEART RATE: 62 BPM

## 2022-06-30 LAB
ALBUMIN SERPL-MCNC: 3.4 G/DL (ref 3.5–5.2)
ALP BLD-CCNC: 103 U/L (ref 40–130)
ALT SERPL-CCNC: 10 U/L (ref 5–41)
ANION GAP SERPL CALCULATED.3IONS-SCNC: 9 MMOL/L (ref 7–19)
AST SERPL-CCNC: 15 U/L (ref 5–40)
BASOPHILS ABSOLUTE: 0 K/UL (ref 0–0.2)
BASOPHILS RELATIVE PERCENT: 0.4 % (ref 0–1)
BILIRUB SERPL-MCNC: 0.7 MG/DL (ref 0.2–1.2)
BUN BLDV-MCNC: 15 MG/DL (ref 8–23)
C-REACTIVE PROTEIN: 1.57 MG/DL (ref 0–0.5)
CALCIUM SERPL-MCNC: 8.9 MG/DL (ref 8.8–10.2)
CHLORIDE BLD-SCNC: 108 MMOL/L (ref 98–111)
CO2: 25 MMOL/L (ref 22–29)
CREAT SERPL-MCNC: 1.1 MG/DL (ref 0.5–1.2)
D DIMER: 1.03 UG/ML FEU (ref 0–0.48)
EOSINOPHILS ABSOLUTE: 0 K/UL (ref 0–0.6)
EOSINOPHILS RELATIVE PERCENT: 0.6 % (ref 0–5)
FOLATE: 19.9 NG/ML (ref 4.5–32.2)
GFR AFRICAN AMERICAN: >59
GFR NON-AFRICAN AMERICAN: >60
GLUCOSE BLD-MCNC: 103 MG/DL (ref 74–109)
HBA1C MFR BLD: 5.1 % (ref 4–6)
HCT VFR BLD CALC: 36.8 % (ref 42–52)
HEMOGLOBIN: 12 G/DL (ref 14–18)
IMMATURE GRANULOCYTES #: 0 K/UL
INR BLD: 2.28 (ref 0.88–1.18)
LACTIC ACID: 1 MMOL/L (ref 0.5–1.9)
LYMPHOCYTES ABSOLUTE: 0.8 K/UL (ref 1.1–4.5)
LYMPHOCYTES RELATIVE PERCENT: 11.9 % (ref 20–40)
MCH RBC QN AUTO: 32.8 PG (ref 27–31)
MCHC RBC AUTO-ENTMCNC: 32.6 G/DL (ref 33–37)
MCV RBC AUTO: 100.5 FL (ref 80–94)
MONOCYTES ABSOLUTE: 0.5 K/UL (ref 0–0.9)
MONOCYTES RELATIVE PERCENT: 6.4 % (ref 0–10)
NEUTROPHILS ABSOLUTE: 5.6 K/UL (ref 1.5–7.5)
NEUTROPHILS RELATIVE PERCENT: 80.4 % (ref 50–65)
PDW BLD-RTO: 14 % (ref 11.5–14.5)
PLATELET # BLD: 84 K/UL (ref 130–400)
PMV BLD AUTO: 10 FL (ref 9.4–12.4)
POTASSIUM REFLEX MAGNESIUM: 3.7 MMOL/L (ref 3.5–5)
PRO-BNP: 2668 PG/ML (ref 0–1800)
PROTHROMBIN TIME: 25.7 SEC (ref 12–14.6)
RBC # BLD: 3.66 M/UL (ref 4.7–6.1)
SODIUM BLD-SCNC: 142 MMOL/L (ref 136–145)
TOTAL PROTEIN: 5.7 G/DL (ref 6.6–8.7)
VITAMIN B-12: 744 PG/ML (ref 211–946)
VITAMIN D 25-HYDROXY: 55.7 NG/ML
WBC # BLD: 7 K/UL (ref 4.8–10.8)

## 2022-06-30 PROCEDURE — 97535 SELF CARE MNGMENT TRAINING: CPT

## 2022-06-30 PROCEDURE — 85610 PROTHROMBIN TIME: CPT

## 2022-06-30 PROCEDURE — 85379 FIBRIN DEGRADATION QUANT: CPT

## 2022-06-30 PROCEDURE — 97116 GAIT TRAINING THERAPY: CPT

## 2022-06-30 PROCEDURE — 6370000000 HC RX 637 (ALT 250 FOR IP)

## 2022-06-30 PROCEDURE — 85025 COMPLETE CBC W/AUTO DIFF WBC: CPT

## 2022-06-30 PROCEDURE — 97165 OT EVAL LOW COMPLEX 30 MIN: CPT

## 2022-06-30 PROCEDURE — 83605 ASSAY OF LACTIC ACID: CPT

## 2022-06-30 PROCEDURE — 6370000000 HC RX 637 (ALT 250 FOR IP): Performed by: STUDENT IN AN ORGANIZED HEALTH CARE EDUCATION/TRAINING PROGRAM

## 2022-06-30 PROCEDURE — 2580000003 HC RX 258

## 2022-06-30 PROCEDURE — 36415 COLL VENOUS BLD VENIPUNCTURE: CPT

## 2022-06-30 PROCEDURE — 97161 PT EVAL LOW COMPLEX 20 MIN: CPT

## 2022-06-30 PROCEDURE — 83036 HEMOGLOBIN GLYCOSYLATED A1C: CPT

## 2022-06-30 PROCEDURE — 83880 ASSAY OF NATRIURETIC PEPTIDE: CPT

## 2022-06-30 PROCEDURE — G0378 HOSPITAL OBSERVATION PER HR: HCPCS

## 2022-06-30 PROCEDURE — 86140 C-REACTIVE PROTEIN: CPT

## 2022-06-30 PROCEDURE — 80053 COMPREHEN METABOLIC PANEL: CPT

## 2022-06-30 RX ORDER — FAMOTIDINE 20 MG/1
20 TABLET, FILM COATED ORAL 2 TIMES DAILY
Status: DISCONTINUED | OUTPATIENT
Start: 2022-06-30 | End: 2022-06-30 | Stop reason: HOSPADM

## 2022-06-30 RX ORDER — FAMOTIDINE 20 MG/1
20 TABLET, FILM COATED ORAL DAILY
Status: DISCONTINUED | OUTPATIENT
Start: 2022-06-30 | End: 2022-06-30

## 2022-06-30 RX ADMIN — MULTIPLE VITAMINS W/ MINERALS TAB 1 TABLET: TAB at 08:58

## 2022-06-30 RX ADMIN — Medication 1000 UNITS: at 08:58

## 2022-06-30 RX ADMIN — SODIUM CHLORIDE, PRESERVATIVE FREE 10 ML: 5 INJECTION INTRAVENOUS at 08:58

## 2022-06-30 RX ADMIN — ALBUTEROL SULFATE 2 PUFF: 90 AEROSOL, METERED RESPIRATORY (INHALATION) at 08:58

## 2022-06-30 RX ADMIN — METOPROLOL SUCCINATE 25 MG: 25 TABLET, EXTENDED RELEASE ORAL at 08:58

## 2022-06-30 RX ADMIN — FAMOTIDINE 20 MG: 20 TABLET ORAL at 08:58

## 2022-06-30 RX ADMIN — ALBUTEROL SULFATE 2 PUFF: 90 AEROSOL, METERED RESPIRATORY (INHALATION) at 16:13

## 2022-06-30 RX ADMIN — ZINC SULFATE 220 MG (50 MG) CAPSULE 50 MG: CAPSULE at 08:58

## 2022-06-30 RX ADMIN — ALBUTEROL SULFATE 2 PUFF: 90 AEROSOL, METERED RESPIRATORY (INHALATION) at 11:37

## 2022-06-30 RX ADMIN — BUDESONIDE AND FORMOTEROL FUMARATE DIHYDRATE 2 PUFF: 160; 4.5 AEROSOL RESPIRATORY (INHALATION) at 08:58

## 2022-06-30 ASSESSMENT — ENCOUNTER SYMPTOMS
NAUSEA: 0
VOMITING: 0
ABDOMINAL DISTENTION: 0
COUGH: 0
CONSTIPATION: 0
CHEST TIGHTNESS: 0
ABDOMINAL PAIN: 0
COLOR CHANGE: 0
WHEEZING: 0
SHORTNESS OF BREATH: 0

## 2022-06-30 NOTE — PROGRESS NOTES
Pharmacy Adjustment per [facility] protocol    Marie Falcon is a 82 y.o. male. Pharmacy has adjusted medications per [facility] protocol. Recent Labs     06/29/22 1945 06/30/22  0814   BUN 16 15       Recent Labs     06/29/22 1945 06/30/22  0814   CREATININE 1.4* 1.1       Estimated Creatinine Clearance: 53 mL/min (based on SCr of 1.1 mg/dL). Height:   Ht Readings from Last 1 Encounters:   06/29/22 5' 7\" (1.702 m)     Weight:  Wt Readings from Last 1 Encounters:   06/29/22 205 lb (93 kg)         Plan: Adjust the following medications based on [facility] protocol: Increase Pepcid to 20mg by mouth twice daily.     PANCOH TABARES, PHARM D, 6/30/2022, 2:54 PM

## 2022-06-30 NOTE — DISCHARGE SUMMARY
input(s): CHOL, HDL in the last 72 hours. Invalid input(s): LDLCALCU  ABGs:No results for input(s): PHART, TWE4VCI, PO2ART, EEJ6NQI, BEART, HGBAE, Y2NKWVDJ, CARBOXHGBART, 02THERAPY in the last 72 hours. HgBA1c:    Recent Labs     06/30/22  0814   LABA1C 5.1         Hospital Course:   82-year-old male with CKD stage III, heart failure, hypertension, severe AS s/p TAVR, history of blood clots anticoagulated on warfarin, presented due to worsening fatigue and generalized weakness as well as fever. He tested positive for COVID-19 although no evidence of hypoxia or any significant respiratory symptomatology. Chest x-ray without any evidence of pneumonia. Noted lactic acidosis suspected from hypovolemia which improved with IV fluid hydration. Patient improved overnight with IV fluid hydration and supportive care. No evidence of moderate to severe COVID infection, therefore no indication for treatment with steroids or otherwise. The following day Patient able to ambulate, noted to have adequate functional status with therapy. Patient declined any consideration for inpatient rehab or SNF but agreeable to home health. Patient medically stable for discharge home with home health arranged. Instructed to follow-up with PCP within 1 week. Physical Exam:  Vital Signs: /68   Pulse 62   Temp 98.1 °F (36.7 °C) (Temporal)   Resp 19   Ht 5' 7\" (1.702 m)   Wt 205 lb (93 kg)   SpO2 98%   BMI 32.11 kg/m²   General appearance:. Alert and Cooperative   HEENT: Normocephalic. Atraumatic  Chest: Normal effort, no intercostal retractions, no use of accessory muscles  Cardiac: Normal rate, regular rhythm  Abdomen: Nondistended, bowel sounds present  Extremities: No clubbing or cyanosis. No peripheral edema.   Neurologic: Alert, conversant, normal speech, no focal lateralizing weakness      Discharge Medications:         Medication List      CONTINUE taking these medications    Aspirin Buf(CaCarb-MgCarb-MgO) 81 MG Tabs     bumetanide 0.5 MG tablet  Commonly known as: BUMEX     cyanocobalamin 1000 MCG/ML injection  inject ONE ML IN THE MUSCLE ONCE A MONTH     losartan 50 MG tablet  Commonly known as: COZAAR  TAKE ONE TABLET BY MOUTH DAILY     metoprolol succinate 25 MG extended release tablet  Commonly known as: TOPROL XL  TAKE ONE TABLET BY MOUTH DAILY     Pentasa 250 MG extended release capsule  Generic drug: mesalamine  TAKE THREE CAPSULES BY MOUTH FOUR TIMES DAILY     PreserVision AREDS 2+Multi Vit Caps     Prevacid 15 MG delayed release capsule  Generic drug: lansoprazole     STRESS FORMULA/ZINC PO     Vitamin D3 125 MCG (5000 UT) Tabs     warfarin 7.5 MG tablet  Commonly known as: COUMADIN  Take as directed. If you are unsure how to take this medication, talk to your nurse or doctor. Original instructions: TAKE ONE TABLET BY MOUTH DAILY EXCEPT ON WEDNESDAY TAKE 1/2 TABLET        STOP taking these medications    omeprazole 20 MG delayed release capsule  Commonly known as: PRILOSEC            Discharge Instructions: Follow up with Bernardo James MD within 1 week. .  Take medications as directed. Resume activity as tolerated. Disposition: Patient is medically stable and will be discharged home with home health. Time spent on discharge 35 minutes.     Signed:  Diana Mercado MD  6/30/2022 1:37 PM

## 2022-06-30 NOTE — ED PROVIDER NOTES
[facility] EMERGENCY DEPT  eMERGENCY dEPARTMENT eNCOUnter      Pt Name: Liz Mei  MRN: [id number]  [place] 1935  Date of evaluation: 6/29/2022  Provider: Narda Fall MD    [address] [place] [address]       Chief Complaint   Patient presents with    Fatigue     has been fatigued last few days. tonight pt could not stand due to weakness. tried to get out of chair and had to sit down on the floor. had fever 101.4 here. HISTORY OF PRESENT ILLNESS   (Location/Symptom, Timing/Onset,Context/Setting, Quality, Duration, Modifying Factors, Severity)  Note limiting factors. Liz Mei is a 82 y.o. male who presents to the emergency department for generalized weakness and fatigue. The wife reports that this seemed to be quite acute onset today. He had gone to the bathroom and she was trying to help him back but he became too weak to even stand and she helped lower him slowly down on the ground and since then he has been unable to stand. Ultimately EMS was called he was transported here for further evaluation. He arrives here with a temp of 101.4. Has a mild cough but denies any chest pain or shortness of breath. No abdominal pain or GI symptoms. Denies any UTI symptoms. HPI    NursingNotes were reviewed. REVIEW OF SYSTEMS    (2-9 systems for level 4, 10 or more for level 5)     Review of Systems   Constitutional: Positive for activity change, fatigue and fever. Negative for chills. HENT: Negative for rhinorrhea and sore throat. Respiratory: Positive for cough. Negative for shortness of breath. Cardiovascular: Negative for chest pain and leg swelling. Gastrointestinal: Negative for abdominal pain, diarrhea, nausea and vomiting. Genitourinary: Negative for dysuria and frequency. Musculoskeletal: Negative for back pain and neck pain. Neurological: Negative for dizziness and headaches. All other systems reviewed and are negative.            PAST MEDICALHISTORY     Past Medical History:   Diagnosis Date  Cancer ([facility]     skin    CHF (congestive heart failure) (HCC)     Cholelithiasis     CKD (chronic kidney disease) stage 3, GFR 30-59 ml/min ([facility] 11/13/2018    Cro[facility]'s disease of large intestine with complication ([facility] 51/0/4350    Deep venous insufficiency 11/7/2017    Essential hypertension 11/7/2017    Gastroesophageal reflux disease without esophagitis 11/7/2017    Heart murmur     Hx of blood clots     Mixed hyperlipidemia 11/7/2017    Nonrheumatic aortic valve stenosis 5/19/2018    Primary osteoarthritis involving multiple joints 11/7/2017    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[facility] 2002         SURGICAL HISTORY       Past Surgical History:   Procedure Laterality Date    AORTIC VALVE REPLACEMENT  01/21/2021    Transfemoral transcatheter aortic valve replacment(TAVR) using a 29 mm Arreaga Doe S3 valve.    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-[employer] [place]  Implant MD Dr Tye Moctezuma    SKIN CANCER EXCISION      SUBTOTAL COLECTOMY      TONSILLECTOMY      TOTAL HIP ARTHROPLASTY  2011         CURRENT MEDICATIONS     Previous Medications    ASPIRIN BUF,CACARB-MGCARB-MGO, 81 MG TABS    by NOT APPLICABLE route    B COMPLEX-C-E-ZN (STRESS FORMULA/ZINC PO)    Take by mouth    BUMETANIDE (BUMEX) 0.5 MG TABLET    Take 0.5 mg by mouth daily    CHOLECALCIFEROL (VITAMIN D3) 5000 UNITS TABS    Take by mouth    CYANOCOBALAMIN 1000 MCG/ML INJECTION    inject ONE ML IN THE MUSCLE ONCE A MONTH    LANSOPRAZOLE (PREVACID) 15 MG DELAYED RELEASE CAPSULE    by NOT APPLICABLE route    LOSARTAN (COZAAR) 50 MG TABLET    TAKE ONE TABLET BY MOUTH DAILY    METOPROLOL SUCCINATE (TOPROL XL) 25 MG EXTENDED RELEASE TABLET    TAKE ONE TABLET BY MOUTH DAILY    MULTIPLE VITAMINS-MINERALS (PRESERVISION AREDS 2+MULTI VIT) CAPS    Take 1 tablet by mouth daily    OMEPRAZOLE (PRILOSEC) 20 MG DELAYED RELEASE CAPSULE    Take 20 mg by mouth daily    PENTASA 250 MG EXTENDED RELEASE CAPSULE    TAKE THREE CAPSULES BY MOUTH FOUR TIMES DAILY    WARFARIN (COUMADIN) 7.5 MG TABLET    TAKE ONE TABLET BY MOUTH DAILY EXCEPT ON WEDNESDAY TAKE 1/2 TABLET       ALLERGIES     Patient has no known allergies. FAMILY HISTORY       Family History   Problem Relation Age of Onset    Stroke Other     Other Other         atherosclerosis of extremities          SOCIAL HISTORY       Social History     Socioeconomic History    Marital status:      Spouse name: Caty George    Number of children: 2    Years of education: 23    Highest education level: None   Occupational History     Employer: RETIRED   Tobacco Use    Smoking status: Never Smoker    Smokeless tobacco: Never Used   Vaping Use    Vaping Use: Never used   Substance and Sexual Activity    Alcohol use: No    Drug use: No    Sexual activity: Not Currently     Partners: Female   Other Topics Concern    None   Social History Narrative    None     Social Determinants of Health     Financial Resource Strain:     Difficulty of Paying Living Expenses: Not on file   Food Insecurity:     Worried About Running Out of Food in the Last Year: Not on file    Capri of Food in the Last Year: Not on file   Transportation Needs:     Lack of Transportation (Medical): Not on file    Lack of Transportation (Non-Medical):  Not on file   Physical Activity:     Days of Exercise per Week: Not on file    Minutes of Exercise per Session: Not on file   Stress:     Feeling of Stress : Not on file   Social Connections:     Frequency of Communication with Friends and Family: Not on file    Frequency of Social Gatherings with Friends and Family: Not on file    Attends Yazdanism Services: Not on file    Active Member of Clubs or Organizations: Not on file    Attends Club or Organization Meetings: Not on file    Marital Status: Not on file   Intimate Partner Violence:     Fear of Current or Ex-Partner: Not on file    Emotionally Abused: Not on file    Physically Abused: Not on file    Sexually Abused: Not on file   Housing Stability:     Unable to Pay for Housing in the Last Year: Not on file    Number of [place] in the Last Year: Not on file    Unstable Housing in the Last Year: Not on file       SCREENINGS    [place] Coma Scale  Eye Opening: Spontaneous  Best Verbal Response: Oriented  Best Motor Response: Obeys commands  [place] Coma Scale Score: 15        PHYSICAL EXAM    (up to 7 for level 4, 8 or more for level 5)     ED Triage Vitals [06/29/22 1941]   BP Temp Temp Source Heart Rate Resp SpO2 Height Weight   134/60 (!) 101.4 °F (38.6 °C) Oral 74 22 94 % 5' 7\" (1.702 m) 205 lb (93 kg)       Physical Exam  Vitals and nursing note reviewed. Constitutional:       Appearance: Normal appearance. He is well-developed. He is ill-appearing. He is not toxic-appearing. HENT:      Head: Normocephalic and atraumatic. Nose: Nose normal.      Mouth/Throat:      Mouth: Mucous membranes are moist.   Eyes:      Conjunctiva/sclera: Conjunctivae normal.   Neck:      Trachea: No tracheal deviation. Cardiovascular:      Rate and Rhythm: Normal rate and regular rhythm. Pulses: Normal pulses. Heart sounds: Normal heart sounds. No murmur heard. Pulmonary:      Effort: Pulmonary effort is normal.      Breath sounds: Normal breath sounds. No wheezing or rales. Abdominal:      Palpations: Abdomen is soft. There is no mass. Tenderness: There is no abdominal tenderness. Musculoskeletal:         General: Normal range of motion. Cervical back: Normal range of motion and neck supple. Right lower leg: No edema. Left lower leg: No edema. Skin:     General: Skin is warm and dry. Neurological:      Mental Status: He is alert and oriented to person, place, and time.       Comments: Normal tone in all extremities speech is clear         DIAGNOSTIC RESULTS     EKG: All EKG's areinterpreted by the Emergency Department Physician who either signs or Co-signs this chart in the absence of a cardiologist.    67 paced rhythm no obvious ST changes nondiagnostic EKG    RADIOLOGY:  Non-plain film images such as CT, Ultrasound and MRI are read by the radiologist. Plain radiographic images are visualized and preliminarily interpreted bythe emergency physician with the below findings:        XR CHEST PORTABLE   Final Result   1. Mild cardiomegaly. 2. No pulmonary parenchymal infiltrates. 3. Pacemaker in situ. Recommendation: Follow up as clinically indicated.     Electronically Signed by Jermaine Moreno at 29-Jun-2022 09:25:36 PM                       LABS:  Labs Reviewed   COVID-19, RAPID - Abnormal; Notable for the following components:       Result Value    SARS-CoV-2, NAAT DETECTED (*)     All other components within normal limits    Narrative:     [address] tel[address] ,  Results called to Vicki Kemp RN ER, 06/29/2022 20:33, by [facility]   CBC WITH AUTO DIFFERENTIAL - Abnormal; Notable for the following components:    RBC 3.87 (*)     Hemoglobin 12.8 (*)     Hematocrit 39.1 (*)     .0 (*)     MCH 33.1 (*)     MCHC 32.7 (*)     Platelets 90 (*)     Neutrophils % 85.7 (*)     Lymphocytes % 8.4 (*)     Lymphocytes Absolute 0.6 (*)     All other components within normal limits   COMPREHENSIVE METABOLIC PANEL - Abnormal; Notable for the following components:    Glucose 143 (*)     CREATININE 1.4 (*)     GFR Non- 48 (*)     GFR  58 (*)     Total Protein 6.1 (*)     All other components within normal limits   LACTIC ACID - Abnormal; Notable for the following components:    Lactic Acid 2.9 (*)     All other components within normal limits    Narrative:     CALL  [facility] tel. ,  Chemistry results called to and read back by PATRICK CANELA [address], 06/29/2022 20:24,  by Kayla Haley Abnormal; Notable for the following components:    Protime 26.8 (*)     INR 2.39 (*)     All other components within normal limits   CULTURE, BLOOD 1   CULTURE, BLOOD 2   TROPONIN   LACTIC ACID   URINALYSIS WITH REFLEX TO CULTURE       All other labs were within normal range or not returned as of this dictation. EMERGENCY DEPARTMENT COURSE and DIFFERENTIAL DIAGNOSIS/MDM:   Vitals:    Vitals:    06/29/22 1941   BP: 134/60   Pulse: 74   Resp: 22   Temp: (!) 101.4 °F (38.6 °C)   TempSrc: Oral   SpO2: 94%   Weight: 205 lb (93 kg)   Height: 5' 7\" (1.702 m)       MDM  Number of Diagnoses or Management Options     Amount and/or Complexity of Data Reviewed  Clinical lab tests: ordered and reviewed  Tests in the radiology section of CPT®: ordered and reviewed  Discuss the patient with other providers: yes  Independent visualization of images, tracings, or specimens: yes      Patient arrives here febrile with generalized weakness and fatigue, too weak to stand or ambulate at home, labs reassuring overall lactic of 2.9 noted, he is covid positive suspect this is likely the etiology of his presentation today, due to his generalized weakness and inability to ambulate have discussed with the hospitalist for observation admission      CONSULTS:  None    PROCEDURES:  Unless otherwise noted below, none     Procedures    FINAL IMPRESSION      1. COVID-19    2. General weakness          DISPOSITION/PLAN   DISPOSITION Decision To Admit 06/29/2022 08:37:16 PM      PATIENT REFERRED TO:  No follow-up provider specified.     DISCHARGE MEDICATIONS:  New Prescriptions    No medications on file          (Please note that portions of this note were completed with a voice recognition program.  Efforts were made to edit thedictations but occasionally words are mis-transcribed.)    Gennaro Najjar, MD (electronically signed)  Attending Emergency Physician        Michelle Rodriguez MD  06/29/22 3532

## 2022-06-30 NOTE — H&P
[facility]      Hospitalist - History & Physical      PCP: Sam Massey MD    Date of Admission: 6/29/2022    Date of Service: 6/29/2022    Chief Complaint:  Fatigue     History Of Present Illness: The patient is a 82 y.o. male who presented to [facility] ER via EMS with PMH CKD stage III, HTN, hyperlipidemia, GERD, complaining of worsening fatigue. Patient states that he had used the restroom and was unable to get himself off of the commode due to worsening weakness. Spouse attempted to help and she assisted him slowly down to the ground due to inability to stand. Endorses temp max 101 and nonproductive cough. Denies nausea, vomiting, abdominal pain, urinary symptoms, shortness of breath and chest pain. Work-up in ER CXR no acute cardiopulmonary process, creatinine 1.4 BUN 16 lactic acid 2.9, troponin negative, and blood cultures pending. Patient is to be admitted to the hospitalist service due to COVID-19. Past Medical History:        Diagnosis Date    Cancer [facility])     skin    CHF (congestive heart failure) (HCC)     Cholelithiasis     CKD (chronic kidney disease) stage 3, GFR 30-59 ml/min ([address] 11/13/2018    Crohn's disease of large intestine with complication ([address] 33/9/6017    Deep venous insufficiency 11/7/2017    Essential hypertension 11/7/2017    Gastroesophageal reflux disease without esophagitis 11/7/2017    Heart murmur     Hx of blood clots     Mixed hyperlipidemia 11/7/2017    Nonrheumatic aortic valve stenosis 5/19/2018    Primary osteoarthritis involving multiple joints 11/7/2017    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[address] 2002       Past Surgical History:        Procedure Laterality Date    AORTIC VALVE REPLACEMENT  01/21/2021    Transfemoral transcatheter aortic valve replacment(TAVR) using a 29 mm Arreaga Doe S3 valve.    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 Authorizing Provider   warfarin (COUMADIN) 7.5 MG tablet TAKE ONE TABLET BY MOUTH DAILY EXCEPT ON WEDNESDAY TAKE 1/2 TABLET 5/23/22   Stephanie Solo MD   losartan (COZAAR) 50 MG tablet TAKE ONE TABLET BY MOUTH DAILY 4/21/22   Tommie Rivera MD   bumetanide (BUMEX) 0.5 MG tablet Take 0.5 mg by mouth daily    Historical Provider, MD   Aspirin Buf,CaCarb-MgCarb-MgO, 81 MG TABS by NOT APPLICABLE route 0/77/55   Historical Provider, MD   lansoprazole (PREVACID) 15 MG delayed release capsule by NOT APPLICABLE route 6/20/03   Historical Provider, MD   PENTASA 250 MG extended release capsule TAKE THREE CAPSULES BY MOUTH FOUR TIMES DAILY 4/12/22   Stephanie Solo MD   metoprolol succinate (TOPROL XL) 25 MG extended release tablet TAKE ONE TABLET BY MOUTH DAILY 4/12/22   RUBEN Martinez   cyanocobalamin 1000 MCG/ML injection inject ONE ML IN THE MUSCLE ONCE A MONTH 12/1/21   Stephanie Solo MD   omeprazole (PRILOSEC) 20 MG delayed release capsule Take 20 mg by mouth daily    Historical Provider, MD   Multiple Vitamins-Minerals (PRESERVISION AREDS 2+MULTI VIT) CAPS Take 1 tablet by mouth daily    Historical Provider, MD   Cholecalciferol (VITAMIN D3) 5000 units TABS Take by mouth    Historical Provider, MD   B Complex-C-E-Zn (STRESS FORMULA/ZINC PO) Take by mouth    Historical Provider, MD       Allergies:    Patient has no known allergies. Social History:    The patient currently lives home  Tobacco:   reports that he has never smoked. He has never used smokeless tobacco.  Alcohol:   reports no history of alcohol use.   Illicit Drugs: denies    Family History:      Problem Relation Age of Onset General: No swelling. Normal range of motion. Cervical back: Normal range of motion and neck supple. No rigidity or tenderness. Right lower leg: No edema. Left lower leg: No edema. Skin:     General: Skin is warm and dry. Capillary Refill: Capillary refill takes less than 2 seconds. Coloration: Skin is pale. Neurological:      General: No focal deficit present. Mental Status: He is alert and oriented to person, place, and time. Cranial Nerves: No cranial nerve deficit. Motor: Weakness present. Psychiatric:         Mood and Affect: Mood normal.         Behavior: Behavior normal.          Diagnostic Data:  CBC:  Recent Labs     06/29/22 1945   WBC 7.5   HGB 12.8*   HCT 39.1*   PLT 90*     BMP:  Recent Labs     06/29/22 1945      K 4.0      CO2 23   BUN 16   CREATININE 1.4*   CALCIUM 9.0     Recent Labs     06/29/22 1945   AST 17   ALT 11   BILITOT 0.5   ALKPHOS 115     Coag Panel:   Recent Labs     06/29/22 1945   INR 2.39*   PROTIME 26.8*     Cardiac Enzymes:   Recent Labs     06/29/22 1945   TROPONINI 0.01     Urinalysis:  Lab Results   Component Value Date/Time    NITRU Negative 08/06/2021 11:00 AM    WBCUA 1 08/06/2021 11:00 AM    BACTERIA NEGATIVE 08/06/2021 11:00 AM    RBCUA 3 08/06/2021 11:00 AM    BLOODU Negative 08/06/2021 11:00 AM    SPECGRAV 1.011 08/06/2021 11:00 AM    GLUCOSEU Negative 08/06/2021 11:00 AM       XR CHEST PORTABLE    Result Date: 6/29/2022  NO PRIOR REPORT AVAILABLE Exam: X-RAY OF [facility] Clinical data:Fatigue. Technique:Single view of the chest. Prior studies: Chest x-ray dated 01/21/2021. CT of chest dated 01/05/2021 (images only). Findings: Pacemaker is noted over the left side with the tip of the leads in the lumen of the right atrium and ventricle. Mild cardiomegaly is noted. The lungs are grossly clear; noevidence of acute infiltrate or pleural effusion. No acute osseous abnormality is detected. 1. Mild cardiomegaly. 2. No pulmonary parenchymal infiltrates. 3. Pacemaker in situ. Recommendation: Follow up as clinically indicated.  Electronically Signed by Nella Dalal at 29-Jun-2022 09:25:36 PM               Assessment/Plan:  Principal Problem:    COVID-19  - Covid adjuvant therapy with zinc, vitamin D, Pepcid,  melatonin   - Robitussin as needed   - Bronchodilators   - Incentive spirometry   - Encourage deep breathing and cough   - Supplemental oxygen as needed   -CRP, D-Dimer    -IVF    - Droplet plus isolation   -PT/OT   -Fall precautions     Mixed hyperlipidemia    Chronic kidney disease, stage III (moderate) (HCC)    DVT prophylaxis warfarin    Signed:  RUBEN Araiza - CNP, 6/29/2022 10:03 PM     Medicine attendign     Patient evaluated     Admitted with covid infection     Plan of care formulated and discussed with NP   He states he is too weak to ambulate at home

## 2022-06-30 NOTE — CARE COORDINATION
Spoke with patient regarding MD orders for [place] [facility]. Pt agreeable and chose [facility]. Spoke with Susana Goode. Referral faxed and PENDING. Please notify [place] when patient discharges and Fax DC Summary,  DC med list and any new [place] orders. P. [phone]  F. [phone]  The Patient and/or patient representative was provided with a choice of provider and agrees   with the discharge plan. [x] Yes [] No    Freedom of choice list was provided with basic dialogue that supports the patient's individualized plan of care/goals, treatment preferences and shares the quality data associated with the providers.  [x] Yes [] No  Electronically signed by Clayton Colunga on 6/30/22 at 2:08 PM CDT

## 2022-06-30 NOTE — CARE COORDINATION
Initial CM Assessment    Initial Assessment Completed at bedside with:    [x]   Patient  []   Family/Caregiver/Guardian   []   Other:      Patient Contact Information:  [facility]  [phone] (home)   Above information verified? [x]   Yes  []   No    ADLS:   Patient lives at home with his spouse. He uses a 4 prong cane. He has a walker, if needed. Support System:   (P) Spouse/Significant Other  Plan to return to current housing:   [x]   Yes - discussed option of discharging to a SNF for short-term Rehab. Patient is adamant that he plans on discharging home. []   No    Transportation plan for Discharge:  Spouse    Do you have any unmet social needs that would keep you from returning home safely:  []   Yes  [x]   No              Unmet Social Needs Notes:       Had [address] [facility] prior to admission:    [x]   Yes  []   No    Has a pulse oximetry unit at home:   []   Yes  [x]   No    Currently ACTIVE with Home Health CARE:    []   Yes  []   No  [x]   Interested at discharge  [address] [place] [address]:      Current PCP:  Salma Link MD  PCP verified? [x]   Yes  []   No    Have you been vaccinated for COVID-19 (SARS-CoV-2)? []   Yes  [x]   No                   If so, when? Which :  []   Pfizer-BioNTech  []   Moderna  []   [employer]  []   Other:       Pharmacy:    [facility],[address], [address] [place] [address] [address] - P [phone] - F [phone]  [address]  [place] [address]  Phone: [phone] Fax: [address] [phone] [facility] [phone]  [address]   [address] [place] [address]  Phone: [phone] Fax: [phone]    Prefer to use Meds to Bed? []   Yes  [x]   No  Potential assistance purchasing medications?      []   Yes  [x]   No    Active with HD/PD prior to admission:           []   Yes  [x]   No  HD Center:       Financial:  Payor: MEDICARE / Plan: MEDICARE PART A AND B / Product Type: *No Product type* /     Pre-Cert required for SNF:   []   Yes  [x]   No    Patient Deficits:  [x]   Yes - weakness  []   No    If yes:  []   Confusion/Memory  []   Visual  []   Motor/Sensory         []   Right arm         []   Right leg         []   Left arm         []   Left leg  []   Language/Speech         []   Aphasia         []   Dysarthria         []   Swallow         [place] Coma Scale  Eye Opening: Spontaneous  Best Verbal Response: Oriented  Best Motor Response: Obeys commands  [place] Coma Scale Score: 15    Patient Deficit Notes: Additional CM/SW Notes:   Explained [employer] Work[employer] program.  Patient consented to participate in CHW program.  Information faxed to [facility].     Anabel Hollingsworth and/or his family were provided with choice of provider:  [x]   Yes   []   No      Meaghan Palomo RN  [facility]  Care Management       06/30/22 3175   Service Assessment   Patient Orientation Alert and Oriented;Person;Place;Situation   Cognition Alert   History Provided By Patient   Primary Caregiver Self   Support Systems Spouse/Significant Other   PCP Verified by CM Yes   Prior Functional Level Independent in ADLs/IADLs   Can patient return to prior living arrangement Yes   Ability to make needs known: Good   Family able to assist with home care needs: Yes   Social/Functional History   Lives With Spouse   Type of [address] Two level  (bedroom is upstairs)   Home Access Stairs to enter without rails   Entrance Stairs - Number of Steps 1   Bathroom Shower/Tub Walk-in shower   Home Equipment Walker, rolling;Cane, quad   ADL Assistance Independent   Ambulation Assistance Independent   Transfer Assistance Independent   Discharge Planning   Type of [address] Medications No   Type of [address] Services;OT;PT;Nursing Services   Patient expects to be discharged to: [facility] Discharge   Services [facility] Discharge Home Health   Mode of Transport at Discharge Other (see comment)  (spouse)   Condition of Participation: Discharge Planning   The Patient and/or Patient Representative was provided with a Choice of Provider? Patient   The Patient and/Or Patient Representative agree with the Discharge Plan?  Yes

## 2022-06-30 NOTE — ACP (ADVANCE CARE PLANNING)
Advance Care Planning     Advance Care Planning Activator (Inpatient)  Conversation Note      Date of ACP Conversation: 6/30/2022     Conversation Conducted with:   Pt: Lexx De Paz, who is alert and oriented. ACP Activator: Elijah Baker RN        Health Care Decision Maker:     Current Designated Health Care Decision Maker:     Primary Decision Maker: Magi Vidales - [facility] - [phone]      Care Preferences    Ventilation: \"If you were in your present state of health and suddenly became very ill and were unable to breathe on your own, what would your preference be about the use of a ventilator (breathing machine) if it were available to you? \"      Would the patient desire the use of ventilator (breathing machine)?: YES          Resuscitation  \"CPR works best to restart the heart when there is a sudden event, like a heart attack, in someone who is otherwise healthy. Unfortunately, CPR does not typically restart the heart for people who have serious health conditions or who are very sick. \"    \"In the event your heart stopped as a result of an underlying serious health condition, would you want attempts to be made to restart your heart (answer \"yes\" for attempt to resuscitate) or would you prefer a natural death (answer \"no\" for do not attempt to resuscitate)? \"   YES         Conversation Outcomes:  [x] ACP discussion completed

## 2022-06-30 NOTE — PROGRESS NOTES
Occupational Therapy Initial Assessment  Date: 2022   Patient Name: Esequiel Pagan  MRN: [id number]     : 1935    Date of Service: 2022    Discharge Recommendations:  Patient would benefit from continued therapy after discharge       Assessment   Assessment: Evaluation completed and tx initiated. No overt barriers to discharge home except the patient does have a full flight of stairs to get to bedroom. States he can sleep in recliner. Balance was significantly better with use of RW. Will continue to follow to upgrade functional status  Treatment Diagnosis: Covid 19, CKD, weakness  History: spouse had to to lower patient to floor due to weakness  REQUIRES OT FOLLOW-UP: Yes  Activity Tolerance  Activity Tolerance: Patient Tolerated treatment well           Patient Diagnosis(es): The primary encounter diagnosis was COVID-19. A diagnosis of General weakness was also pertinent to this visit. has a past medical history of Cancer ([address] CHF (congestive heart failure) ([address] Cholelithiasis, CKD (chronic kidney disease) stage 3, GFR 30-59 ml/min ([facility]), Crohn's disease of large intestine with complication ([address] Deep venous insufficiency, Essential hypertension, Gastroesophageal reflux disease without esophagitis, Heart murmur, Hx of blood clots, Mixed hyperlipidemia, Nonrheumatic aortic valve stenosis, Palliative care patient, Primary osteoarthritis involving multiple joints, and 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[address] has a past surgical history that includes other surgical history; subtotal colectomy; Tonsillectomy; Total hip arthroplasty (); Cataract removal with implant (); Skin cancer excision; Cardiac surgery; Pacemaker insertion (2021); Aortic valve replacement (2021); and Cardiac pacemaker placement.     Treatment Diagnosis: Covid 19, CKD, weakness      Restrictions  Restrictions/Precautions  Restrictions/Precautions: Fall Risk  Position Activity Restriction  Other position/activity restrictions: Droplet +    Subjective   General  Chart Reviewed: Yes  Patient assessed for rehabilitation services?: Yes  Family / Caregiver Present: No  Pre Treatment Pain Screening  Pain at present: 0  Scale Used: Numeric Score  Intervention List: Patient able to continue with treatment  Comments / Details: no complaints of pain with activity  Vital Signs  Temp: 98.1 °F (36.7 °C)  Temp Source: Temporal  Heart Rate: 62  Heart Rate Source: Monitor  Resp: 19  BP: 128/68  BP Location: Left upper arm  MAP (Calculated): 88  Patient Position: Sitting  Oxygen Therapy  SpO2: 98 %  O2 Device: None (Room air)    Social/Functional History  Social/Functional History  Lives With: Spouse  Type of Home: House  Home Layout: Two level  Home Access: Stairs to enter without rails  Entrance Stairs - Number of Steps: 1  Bathroom Shower/Tub: Walk-in shower  Home Equipment: Walker, rolling,Cane, quad,Sock aid  ADL Assistance: Independent  Ambulation Assistance: Independent  Transfer Assistance: Independent  Additional Comments: Full flight of stairs to get to bedroom, but has recliner on main floor       Objective              Toilet Transfers  Toilet - Technique: Ambulating  Toilet Transfer: Stand by assistance  Toilet Transfers Comments: with RW  ADL  Feeding: Independent  Grooming: Independent;Setup  UE Bathing: Supervision  LE Bathing: Minimal assistance  UE Dressing: Independent;Setup  LE Dressing: Stand by assistance (uses AE for seated aspects, declines needs for further training in AE)  Toileting: Stand by assistance        Bed mobility  Supine to Sit: Stand by assistance  Transfers  Stand Step Transfers: Stand by assistance     Cognition  Overall Cognitive Status: WNL                 LUE AROM (degrees)  LUE AROM : WFL  RUE AROM (degrees)  RUE AROM : WFL                    Plan   Plan  Times per Week: 3-5    Goals  Short Term Goals  Short Term Goal 1: Modified independent with dressing  Short Term Goal 2: Modified independent with toileting  Short Term Goal 3: Modified independent with transfers  Short Term Goal 4: Modified independent with light ambulatory ADL  Short Term Goal 5: Independent with therapeutic activity recommendations         Tx initiated:  Balance and mobility tasks, recommendations for therapeutic activity.   (15 mins)          FOUZIA Ward  Electronically signed by Presley FRAGOSO on 6/30/2022 at 12:57 PM.

## 2022-06-30 NOTE — CARE COORDINATION
Pulse ox and Eliquis coupon placed in pt's chart for use at [facility].    Electronically signed by Lexus Ospina on 6/30/2022 at 1:55 PM

## 2022-06-30 NOTE — PROGRESS NOTES
Clinical Pharmacy Note    Nikko Lee is a 82 y.o. male for whom pharmacy has been asked to manage warfarin therapy. Reason for Admission: COVID; general weakness    Consulting Physician: RUBEN Way  Warfarin dose prior to admission: SuFr 11.5mg; MoTuWeThSa 7.5mg  Warfarin indication: History of DVT  Target INR range: 2-3   Outpatient warfarin provider: Mars Caba    Past Medical History:   Diagnosis Date    Cancer [facility])     skin    CHF (congestive heart failure) [address]     Cholelithiasis     CKD (chronic kidney disease) stage 3, GFR 30-59 ml/min ([facility] 11/13/2018    Crohn's disease of large intestine with complication ([facility] 92/09/1500    Deep venous insufficiency 11/07/2017    Essential hypertension 11/07/2017    Gastroesophageal reflux disease without esophagitis 11/07/2017    Heart murmur     Hx of blood clots     Mixed hyperlipidemia 11/07/2017    Nonrheumatic aortic valve stenosis 05/19/2018    Palliative care patient 06/30/2022    Primary osteoarthritis involving multiple joints 11/07/2017    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[facility] 2002                Recent Labs     06/30/22  0506   INR 2.28*     Recent Labs     06/29/22  1945 06/30/22  0814   HGB 12.8* 12.0*   HCT 39.1* 36.8*   PLT 90* 84*       Current warfarin drug-drug interactions: none        Date INR Warfarin Dose   06/29/22 2.39 7.5mg   06/30/22 2.28 7.5mg                                Daily PT/INR until stable within therapeutic range. Thank you for the consult.      PANCHO TABARES, PHARM D, 6/30/2022, 3:09 PM

## 2022-07-01 ENCOUNTER — TELEPHONE (OUTPATIENT)
Dept: INTERNAL MEDICINE | Age: 87
End: 2022-07-01

## 2022-07-01 ENCOUNTER — CARE COORDINATION (OUTPATIENT)
Dept: CASE MANAGEMENT | Age: 87
End: 2022-07-01

## 2022-07-01 DIAGNOSIS — R05.9 COUGH: ICD-10-CM

## 2022-07-01 DIAGNOSIS — U07.1 COVID-19: Primary | ICD-10-CM

## 2022-07-01 PROBLEM — Z51.5 PALLIATIVE CARE PATIENT: Status: ACTIVE | Noted: 2022-07-01

## 2022-07-01 PROCEDURE — 1111F DSCHRG MED/CURRENT MED MERGE: CPT | Performed by: FAMILY MEDICINE

## 2022-07-01 RX ORDER — BENZONATATE 200 MG/1
200 CAPSULE ORAL 3 TIMES DAILY PRN
Qty: 30 CAPSULE | Refills: 0 | Status: SHIPPED | OUTPATIENT
Start: 2022-07-01 | End: 2022-07-08

## 2022-07-01 NOTE — TELEPHONE ENCOUNTER
Sathish Delcid called to request a refill on his medication.       Last office visit : Visit date not found   Next office visit : Visit date not found     Requested Prescriptions     Pending Prescriptions Disp Refills    benzonatate (TESSALON) 200 MG capsule 30 capsule 0     Sig: Take 1 capsule by mouth 3 times daily as needed for Cough            Chapis Salinas MA

## 2022-07-01 NOTE — CARE COORDINATION
[facility] Transitions Initial Follow Up Call    Call within 2 business days of discharge: Yes    Patient: Evonne Warren Patient : 1935   MRN: [id number]  Reason for Admission: COVID 19  Discharge Date: 22 RARS: No data recorded    Last Discharge [facility]       Complaint Diagnosis Description Type Department Provider    22 Fatigue COVID-19 . .. ED to Hosp-Admission (Discharged) (ADMITTED) Juan Salguero MD; Di Mckeon, ... Spoke with: Evonne Warren and wife      Facility: [address]      Non-face-to-face services provided:  Reviewed encounter information for continuity of care prior to follow up Care Transitions Coordination phone call - chart notes, consults, progress notes, test results, med list, appointments, AVS, other information. Care Transitions 24 Hour Call    Schedule Follow Up Appointment with PCP: Completed  Do you have a copy of your discharge instructions?: Yes  Do you have all of your prescriptions and are they filled?: Yes  Have you been contacted by a [address]?: No  Have you scheduled your follow up appointment?: Yes  How are you going to get to your appointment?: Car - family or friend to transport  [address]: [address] you feel like you have everything you need to keep you well at home?: Yes  Care Transitions Interventions         Follow Up  Future Appointments   Date Time Provider Roselyn Fleming   [facility]   [facility]22  1:20 PM [facility]   2022  1:45 PM RUBEN Robertson [facility] [place]     Placed a call to the number listed for patient for an initial follow up call for Care Transitions Coordination following discharge from the hospital.  Introduced myself and explained the purpose of my call as well as verifying name, date of birth of patient.   Spoke with patient and wife over speaker phone regarding hospitalization, discharge and status thus far. She did most of the talking. They reported that he is doing better. Had a good night's sleep. She said he has had a cough. She said she had a Tessalon left from when he had COVID 19 once before so she gave him that last night before he went to bed and he did not cough at all during the night. She said he has ate a decent breakfast this morning, but appetite is not quite normal yet. He is weak. She denied any shortness of breath. She said they do have a pulse ox that they brought from the hospital.  She has not put it together yet. She did put the batteries in while talking with me. I did explain how to use it and what the numbers represent. I told them normal readings and what we want them to be. His initial reading was 93% and went to 96% at rest while on the phone. I did tell them to report consistent readings before 90%. Did discuss breathing exercises, cough and deep breathing. She said he normally walks on the treadmill every day and wants to continue to do that but she told him that she does not think that is a good idea. I suggested that he just walk around the house some, do some simple leg exercises, etc and take it easy, rest often, listen to his body until he has recovered back to his normal before he exerts himself. Dicussed a good nutritious diet, healthy vitamins, minerals, proteins and carbs, etc.  Discussed fluid intake needs. She said they did hear from [facility] and were told they would do a phone conference next week since he is COVID 19 positive. Informed/reminded of Care Transitions Coordination process, purpose, future calls, etc and is agreeable with appropriate follow up. Ensured to have CTN contact information to be used as needed. Encouraged to call for new/ongoing issues, questions, concerns, etc.  Encouraged to make contact with PCP as indicated for any further needs as well.   Given the opportunity to ask questions and answered appropriately. CTN to follow up as indicated. Transitions of Care Initial Call    Was this an external facility discharge? No    Challenges to be reviewed by the provider   Additional needs identified to be addressed with provider: No       Method of communication with provider : none    Advance Care Planning:   Does patient have an Advance Directive: not on file. Advance Care Planning   Healthcare Decision Maker:    Primary Decision Maker: Magi Vidales - [facility] - [phone]    Care Transition Nurse contacted the patient by telephone to perform post hospital discharge assessment. Verified name and  with patient as identifiers. Provided introduction to self, and explanation of the CTN role. CTN reviewed discharge instructions, medical action plan and red flags with patient who verbalized understanding. Patient given an opportunity to ask questions and does not have any further questions or concerns at this time. Were discharge instructions available to patient? Yes. Reviewed appropriate site of care based on symptoms and resources available to patient including: PCP  Urgent care clinics  [address] [place] [address]  When to call [address] The patient agrees to contact the PCP office for questions related to their healthcare. Medication reconciliation was performed with patient, who verbalizes understanding of administration of home medications. Advised obtaining a 90-day supply of all daily and as-needed medications. Was patient discharged with a pulse oximeter? yes, discussed and confirmed pulse oximeter discharge instructions and when to notify provider or seek emergency care. CTN provided contact information. Plan for follow-up call in 5-7 days based on severity of symptoms and risk factors.   Plan for next call: - Plan for next call - assess overall status, pain, appetite, sleep patterns, abnormal signs and symptoms, availability and effectiveness of medications, activity level and tolerance, falls/other unexpected events, findings from follow up appointments, medication/treatment changes, any additional teaching needs, ie education regarding self care mgmt - disease process mgmt, symptom mgmt, diet/hydration, pain control needs, medication mgmt, activity level, fall precautions & home safety needs, infection control, seeking medical attention, who/when to call prn any needs, etc.    Shelly Pulido RN

## 2022-07-04 LAB — BLOOD CULTURE, ROUTINE: NORMAL

## 2022-07-05 LAB — CULTURE, BLOOD 2: NORMAL

## 2022-07-06 ENCOUNTER — CARE COORDINATION (OUTPATIENT)
Dept: CASE MANAGEMENT | Age: 87
End: 2022-07-06

## 2022-07-06 NOTE — CARE COORDINATION
[address] Transitions Follow Up Call    2022    Patient: Nikko Lee  Patient : 1935   MRN: [id number]    Discharge Date: 22 RARS: No data recorded       Spoke with: [facility] Transitions Subsequent and Final Call    Schedule Follow Up Appointment with PCP: Completed  Subsequent and Final Calls  Do you have any ongoing symptoms?: No  Have your medications changed?: No  Do you have any questions related to your medications?: No  Are you currently active with any services?: Home Health  Do you have any needs or concerns that I can assist you with?: No  Identified Barriers: None  Care Transitions Interventions  Other Interventions: Follow Up  Future Appointments   Date Time Provider Roselyn Fleming   2022 10:30 AM RUBEN Baker [facility]   2022  1:20 PM Mars Caba MD [facility]   2022  1:45 PM RUBEN Chavez [facility]     Spoke with patient for follow up. He reported that he is getting better on a daily basis. He said that he is not having any shortness of breath, cough or congestion. He said his pulse ox is running in the 94-96% range. He said he is still weak, but is getting around better, good for him. He said he never does get around too well anymore. He is eating and drinking well. He said he is doing ok really. He did say that his wife is COVID 19 positive at this time. He said she had a really hard time yesterday, had a bad headache. He said she is some better today. He said they are able to get meals and take care of other needs. He is not aware of any needs. Will follow up with him again in a few days. To call prn any needs.       Tabitha Cartagena RN

## 2022-07-08 ENCOUNTER — OFFICE VISIT (OUTPATIENT)
Dept: FAMILY MEDICINE CLINIC | Age: 87
End: 2022-07-08
Payer: MEDICARE

## 2022-07-08 VITALS
WEIGHT: 217.6 LBS | BODY MASS INDEX: 34.15 KG/M2 | HEIGHT: 67 IN | DIASTOLIC BLOOD PRESSURE: 80 MMHG | OXYGEN SATURATION: 96 % | SYSTOLIC BLOOD PRESSURE: 124 MMHG | HEART RATE: 61 BPM | TEMPERATURE: 97.4 F

## 2022-07-08 DIAGNOSIS — Z09 HOSPITAL DISCHARGE FOLLOW-UP: Primary | ICD-10-CM

## 2022-07-08 DIAGNOSIS — U07.1 COVID-19 VIRUS INFECTION: ICD-10-CM

## 2022-07-08 DIAGNOSIS — I10 ESSENTIAL HYPERTENSION: Chronic | ICD-10-CM

## 2022-07-08 DIAGNOSIS — Z95.2 S/P TAVR (TRANSCATHETER AORTIC VALVE REPLACEMENT): ICD-10-CM

## 2022-07-08 DIAGNOSIS — I50.22 CHRONIC SYSTOLIC (CONGESTIVE) HEART FAILURE (HCC): ICD-10-CM

## 2022-07-08 PROCEDURE — 1111F DSCHRG MED/CURRENT MED MERGE: CPT | Performed by: NURSE PRACTITIONER

## 2022-07-08 PROCEDURE — 99495 TRANSJ CARE MGMT MOD F2F 14D: CPT | Performed by: NURSE PRACTITIONER

## 2022-07-08 ASSESSMENT — ENCOUNTER SYMPTOMS
ABDOMINAL PAIN: 0
DIARRHEA: 0
WHEEZING: 0
COUGH: 0
SORE THROAT: 0
SHORTNESS OF BREATH: 0
NAUSEA: 0
CHEST TIGHTNESS: 0

## 2022-07-08 NOTE — PROGRESS NOTES
Post-Discharge Transitional Care Follow Up      Evonen Warren   YOB: 1935    Date of Office Visit:  7/8/2022  Date of Hospital Admission: 6/29/22  Date of Hospital Discharge: 6/30/22  Readmission Risk Score (high >=14%. Medium >=10%):No data recorded    Care management risk score Rising risk (score 2-5) and Complex Care (Scores >=6): 3     Non face to face  following discharge, date last encounter closed (first attempt may have been earlier): 7/1/2022 10:52 AM     Call initiated 2 business days of discharge: Yes     COVID-19 virus infection  -Stable, improved with supportive care.  -He has completed his quarantine. He should wear a mask at least through tomorrow.    -He is not interested in vaccination. S/P TAVR (transcatheter aortic valve replacement)  -Stable, continue management per cardiology. Chronic systolic (congestive) heart failure  -Stable, compensated with taking Bumex as needed. Continue 1/2 tablet daily as needed. Essential hypertension  -Stable, controlled. Continue current medication. Medical Decision Making: high complexity  Return for as scheduled. Subjective:   HPI    Inpatient course: Discharge summary reviewed- see chart. Interval history/Current status:     He was admitted to [facility] with weakness, fatigue. COVID test was positive. He is not vaccinated and does not wish to be vaccinated. He states he has had no recent significant cough or congestion. No shortness of breath. No fever or chills. CXR negative for infiltrate. Labs showed slight decrease in renal function in setting of CKD. BNP elevated. No other acute lab findings. He was treated with IV fluids with improvement. Renal function improved. Has chronic CHF and is s/p TAVR. He is followed by [facility] cardiology. CHF symptoms currently compensated with reducing dietary sodium. He states he did not tolerate Lasix in the past.  He now takes Bumex only as needed.   He has noticed some increased swelling but no worsening shortness of breath. BP has been stable on current meds. Patient Active Problem List   Diagnosis    Chronic anticoagulation    Essential hypertension    Crohn's disease of large intestine with complication ([address]    Mixed hyperlipidemia    Deep venous insufficiency    Primary osteoarthritis involving multiple joints    Gastroesophageal reflux disease without esophagitis    Aortic systolic murmur on examination - suspect aortic stenosis    Nonrheumatic aortic valve stenosis    Chronic kidney disease, stage III (moderate) (HCC)    S/P partial resection of colon    Intestinal adhesions with partial obstruction (HCC)    Diastolic congestive heart failure with preserved left ventricular function, NYHA class 2 (HCC)    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 choroidal neovascularization ([address]    Morbid obesity with BMI of 40.0-44.9, adult ([address]    Bilateral carotid bruits    History of DVT (deep vein thrombosis)    Dizziness    Moderate aortic stenosis    Aortic stenosis, severe    S/P TAVR (transcatheter aortic valve replacement)    Complete heart block (HCC)    Other forms of angina pectoris (HCC)    Coagulation defect (HCC)    Parkinson disease ([address]    Localized edema    Sleep disturbance    After-cataract with vision obscured    Retinal drusen    Retinal hemorrhage    Vitreous degeneration    Pseudophakia    COVID-19    Palliative care patient    Chronic systolic (congestive) heart failure       Medications listed as ordered at the time of discharge from hospital     Medication List          Accurate as of July 8, 2022 11:12 AM. If you have any questions, ask your nurse or doctor.             CONTINUE taking these medications    Aspirin Buf(CaCarb-MgCarb-MgO) 81 MG Tabs     benzonatate 200 MG capsule  Commonly known as: TESSALON  Take 1 capsule by mouth 3 times daily as needed for Cough     bumetanide 0.5 MG tablet  Commonly known as: BUMEX     cyanocobalamin 1000 MCG/ML injection  inject ONE ML IN THE MUSCLE ONCE A MONTH     losartan 50 MG tablet  Commonly known as: COZAAR  TAKE ONE TABLET BY MOUTH DAILY     metoprolol succinate 25 MG extended release tablet  Commonly known as: TOPROL XL  TAKE ONE TABLET BY MOUTH DAILY     Pentasa 250 MG extended release capsule  Generic drug: mesalamine  TAKE THREE CAPSULES BY MOUTH FOUR TIMES DAILY     PreserVision AREDS 2+Multi Vit Caps     Prevacid 15 MG delayed release capsule  Generic drug: lansoprazole     STRESS FORMULA/ZINC PO     Vitamin D3 125 MCG (5000 UT) Tabs     warfarin 7.5 MG tablet  Commonly known as: COUMADIN  Take as directed by the anticoagulation clinic. If you are unsure how to take this medication, talk to your nurse or doctor.   Original instructions: TAKE ONE TABLET BY MOUTH DAILY EXCEPT ON WEDNESDAY TAKE 1/2 TABLET             Medications marked \"taking\" at this time  Outpatient Medications Marked as Taking for the 7/8/22 encounter (Office Visit) with RUBEN Devi   Medication Sig Dispense Refill    warfarin (COUMADIN) 7.5 MG tablet TAKE ONE TABLET BY MOUTH DAILY EXCEPT ON WEDNESDAY TAKE 1/2 TABLET 90 tablet 2    losartan (COZAAR) 50 MG tablet TAKE ONE TABLET BY MOUTH DAILY 90 tablet 1    Aspirin Buf,CaCarb-MgCarb-MgO, 81 MG TABS by NOT APPLICABLE route      lansoprazole (PREVACID) 15 MG delayed release capsule by NOT APPLICABLE route      PENTASA 250 MG extended release capsule TAKE THREE CAPSULES BY MOUTH FOUR TIMES DAILY 1080 capsule 2    metoprolol succinate (TOPROL XL) 25 MG extended release tablet TAKE ONE TABLET BY MOUTH DAILY 90 tablet 1    cyanocobalamin 1000 MCG/ML injection inject ONE ML IN THE MUSCLE ONCE A MONTH 6 mL 0    Multiple Vitamins-Minerals (PRESERVISION AREDS 2+MULTI VIT) CAPS Take 1 tablet by mouth daily      Cholecalciferol (VITAMIN D3) 5000 units TABS Take by mouth      B Complex-C-E-Zn (STRESS FORMULA/ZINC PO) Take by mouth          Medications patient taking as of now reconciled against medications ordered at time of hospital discharge: Yes    Review of Systems   Constitutional: Negative for chills and fever. HENT: Negative for congestion, ear pain and sore throat. Respiratory: Negative for cough, chest tightness, shortness of breath and wheezing. Cardiovascular: Positive for leg swelling (chronic). Negative for chest pain. Gastrointestinal: Negative for abdominal pain, diarrhea and nausea. Musculoskeletal: Negative for arthralgias and myalgias. Skin: Negative for rash. Neurological: Positive for weakness (improving). Objective:    /80   Pulse 61   Temp 97.4 °F (36.3 °C)   Ht 5' 7\" (1.702 m)   Wt 217 lb 9.6 oz (98.7 kg)   SpO2 96%   BMI 34.08 kg/m²   Physical Exam  Vitals reviewed. Constitutional:       General: He is not in acute distress. Appearance: Normal appearance. He is well-developed. HENT:      Head: Normocephalic. Eyes:      Conjunctiva/sclera: Conjunctivae normal.      Pupils: Pupils are equal, round, and reactive to light. Neck:      Thyroid: No thyromegaly. Vascular: No carotid bruit or JVD. Trachea: No tracheal deviation. Cardiovascular:      Rate and Rhythm: Normal rate and regular rhythm. Heart sounds: Normal heart sounds. No murmur heard. Pulmonary:      Effort: Pulmonary effort is normal. No respiratory distress. Comments: Slightly diminished breath sounds bilaterally. No wheeze or rales. Respirations are even and unlabored. Musculoskeletal:         General: Normal range of motion. Cervical back: Normal range of motion and neck supple. Lymphadenopathy:      Cervical: No cervical adenopathy. Skin:     General: Skin is warm and dry. Findings: No rash. Neurological:      Mental Status: He is alert.    Psychiatric:         Mood and Affect: Mood normal.         Behavior: Behavior normal.         Thought Content: Thought content normal.         An electronic signature was used to authenticate this note.   --RUBEN Calderon

## 2022-07-11 ENCOUNTER — TELEPHONE (OUTPATIENT)
Dept: FAMILY MEDICINE CLINIC | Age: 87
End: 2022-07-11

## 2022-07-12 ENCOUNTER — CARE COORDINATION (OUTPATIENT)
Dept: CASE MANAGEMENT | Age: 87
End: 2022-07-12

## 2022-07-14 DIAGNOSIS — I10 ESSENTIAL HYPERTENSION: Chronic | ICD-10-CM

## 2022-07-14 DIAGNOSIS — Z79.01 CHRONIC ANTICOAGULATION: ICD-10-CM

## 2022-07-14 DIAGNOSIS — E53.8 B12 DEFICIENCY: ICD-10-CM

## 2022-07-14 DIAGNOSIS — Z51.81 MEDICATION MONITORING ENCOUNTER: ICD-10-CM

## 2022-07-14 DIAGNOSIS — R25.1 TREMOR: ICD-10-CM

## 2022-07-14 DIAGNOSIS — E78.2 MIXED HYPERLIPIDEMIA: ICD-10-CM

## 2022-07-14 LAB
ALBUMIN SERPL-MCNC: 3.8 G/DL (ref 3.5–5.2)
ALP BLD-CCNC: 110 U/L (ref 40–130)
ALT SERPL-CCNC: 12 U/L (ref 5–41)
ANION GAP SERPL CALCULATED.3IONS-SCNC: 8 MMOL/L (ref 7–19)
AST SERPL-CCNC: 20 U/L (ref 5–40)
BASOPHILS ABSOLUTE: 0.1 K/UL (ref 0–0.2)
BASOPHILS RELATIVE PERCENT: 0.8 % (ref 0–1)
BILIRUB SERPL-MCNC: 0.4 MG/DL (ref 0.2–1.2)
BUN BLDV-MCNC: 21 MG/DL (ref 8–23)
CALCIUM SERPL-MCNC: 9.4 MG/DL (ref 8.8–10.2)
CHLORIDE BLD-SCNC: 107 MMOL/L (ref 98–111)
CHOLESTEROL, TOTAL: 136 MG/DL (ref 160–199)
CO2: 26 MMOL/L (ref 22–29)
CREAT SERPL-MCNC: 1.1 MG/DL (ref 0.5–1.2)
EOSINOPHILS ABSOLUTE: 0.2 K/UL (ref 0–0.6)
EOSINOPHILS RELATIVE PERCENT: 2 % (ref 0–5)
GFR AFRICAN AMERICAN: >59
GFR NON-AFRICAN AMERICAN: >60
GLUCOSE BLD-MCNC: 96 MG/DL (ref 74–109)
HCT VFR BLD CALC: 39.9 % (ref 42–52)
HDLC SERPL-MCNC: 60 MG/DL (ref 55–121)
HEMOGLOBIN: 13.1 G/DL (ref 14–18)
IMMATURE GRANULOCYTES #: 0 K/UL
INR BLD: 2.19 (ref 0.88–1.18)
LDL CHOLESTEROL CALCULATED: 61 MG/DL
LYMPHOCYTES ABSOLUTE: 3 K/UL (ref 1.1–4.5)
LYMPHOCYTES RELATIVE PERCENT: 39.2 % (ref 20–40)
MCH RBC QN AUTO: 32.8 PG (ref 27–31)
MCHC RBC AUTO-ENTMCNC: 32.8 G/DL (ref 33–37)
MCV RBC AUTO: 100 FL (ref 80–94)
MONOCYTES ABSOLUTE: 0.5 K/UL (ref 0–0.9)
MONOCYTES RELATIVE PERCENT: 6 % (ref 0–10)
NEUTROPHILS ABSOLUTE: 3.9 K/UL (ref 1.5–7.5)
NEUTROPHILS RELATIVE PERCENT: 51.6 % (ref 50–65)
PDW BLD-RTO: 14.3 % (ref 11.5–14.5)
PLATELET # BLD: 151 K/UL (ref 130–400)
PMV BLD AUTO: 9.9 FL (ref 9.4–12.4)
POTASSIUM SERPL-SCNC: 4.4 MMOL/L (ref 3.5–5)
PROTHROMBIN TIME: 24.9 SEC (ref 12–14.6)
RBC # BLD: 3.99 M/UL (ref 4.7–6.1)
SODIUM BLD-SCNC: 141 MMOL/L (ref 136–145)
TOTAL PROTEIN: 6.5 G/DL (ref 6.6–8.7)
TRIGL SERPL-MCNC: 77 MG/DL (ref 0–149)
TSH SERPL DL<=0.05 MIU/L-ACNC: 4.93 UIU/ML (ref 0.27–4.2)
VITAMIN B-12: 867 PG/ML (ref 211–946)
WBC # BLD: 7.6 K/UL (ref 4.8–10.8)

## 2022-07-15 ENCOUNTER — CARE COORDINATION (OUTPATIENT)
Dept: CASE MANAGEMENT | Age: 87
End: 2022-07-15

## 2022-07-15 NOTE — CARE COORDINATION
[address] Transitions Follow Up Call    7/15/2022    Patient: Evonne Warren  Patient : 1935   MRN: [id number]  Discharge Date: 22 RARS: No data recorded       Spoke with: Mrs. Villa [facility] [address] Transitions Subsequent and Final Call    Subsequent and Final Calls  Are you currently active with any services?: Home Health  Care Transitions Interventions  Other Interventions: Follow Up  Future Appointments   Date Time Provider Roselyn Fleming   2022  1:20 PM MD ROBIN Peoples [facility] [place]   2022  1:45 PM RUBEN Robertson [facility] [place]     Placed a call to the home and spoke with wife. She reported that they are both doing better. She said that she is finally turning the corner and getting stronger. She said that they really don't having any one that comes around on a regular basis to check on them, but they do have friends from [place] that call. She said a friend did bring a meal the other day. She said she tries to be sure they at least get breakfast every day and then one good meal and then snack around for other foods. She said she knows they can't get well if they are not eating and drinking. She said their symptoms are improving, he is better than she is. No needs noted. He went out today and got lab work done for his appointment with his PCP next week and also got his Vitamin B 12 injection. No other needs noted. Will follow up next week and if stable, plan to sign off.       Kay Hernandez RN

## 2022-07-18 PROCEDURE — 93294 REM INTERROG EVL PM/LDLS PM: CPT | Performed by: NURSE PRACTITIONER

## 2022-07-18 PROCEDURE — 93296 REM INTERROG EVL PM/IDS: CPT | Performed by: NURSE PRACTITIONER

## 2022-07-19 DIAGNOSIS — I44.2 COMPLETE HEART BLOCK (HCC): ICD-10-CM

## 2022-07-19 DIAGNOSIS — I48.92 ATRIAL FLUTTER, PAROXYSMAL (HCC): ICD-10-CM

## 2022-07-19 DIAGNOSIS — Z95.0 PACEMAKER: Primary | ICD-10-CM

## 2022-07-20 PROBLEM — Z96.1 PRESENCE OF INTRAOCULAR LENS: Status: ACTIVE | Noted: 2022-06-27

## 2022-07-20 RX ORDER — MONTELUKAST SODIUM 4 MG/1
1 TABLET, CHEWABLE ORAL 4 TIMES DAILY
COMMUNITY
Start: 2022-05-24 | End: 2022-09-06

## 2022-07-21 ENCOUNTER — OFFICE VISIT (OUTPATIENT)
Dept: FAMILY MEDICINE CLINIC | Age: 87
End: 2022-07-21
Payer: MEDICARE

## 2022-07-21 VITALS
OXYGEN SATURATION: 96 % | HEIGHT: 67 IN | TEMPERATURE: 98 F | HEART RATE: 70 BPM | DIASTOLIC BLOOD PRESSURE: 82 MMHG | BODY MASS INDEX: 32.96 KG/M2 | SYSTOLIC BLOOD PRESSURE: 126 MMHG | WEIGHT: 210 LBS

## 2022-07-21 DIAGNOSIS — E78.2 MIXED HYPERLIPIDEMIA: Primary | ICD-10-CM

## 2022-07-21 DIAGNOSIS — E66.1 CLASS 1 DRUG-INDUCED OBESITY WITHOUT SERIOUS COMORBIDITY WITH BODY MASS INDEX (BMI) OF 32.0 TO 32.9 IN ADULT: ICD-10-CM

## 2022-07-21 DIAGNOSIS — I10 ESSENTIAL HYPERTENSION: ICD-10-CM

## 2022-07-21 DIAGNOSIS — U09.9 POST COVID-19 CONDITION, UNSPECIFIED: ICD-10-CM

## 2022-07-21 PROBLEM — E66.811 CLASS 1 DRUG-INDUCED OBESITY WITHOUT SERIOUS COMORBIDITY WITH BODY MASS INDEX (BMI) OF 32.0 TO 32.9 IN ADULT: Status: ACTIVE | Noted: 2019-05-01

## 2022-07-21 PROCEDURE — 99214 OFFICE O/P EST MOD 30 MIN: CPT | Performed by: FAMILY MEDICINE

## 2022-07-21 PROCEDURE — 1123F ACP DISCUSS/DSCN MKR DOCD: CPT | Performed by: FAMILY MEDICINE

## 2022-07-21 NOTE — PROGRESS NOTES
[facility] PHYSICIAN SERVICES  [facility] FAMILY MEDICINE  [address] [address]  Dept: [phone]  Dept Fax: [phone]  Loc: [phone]    Subjective:     Liz Mei is a 82 y.o. male who presents today for his medical conditions/complaints as noted below. Liz Mei is c/o of 3 Month Follow-Up        HPI:   Patient presents for 3-month follow-up of hypertension. Recently he was admitted to the hospital with COVID, overnight June 29 through 30. He followed up with Baljit Miguel the next week. He is feeling overall better, still has some fatigue and also he thinks the tremor has gotten worse since he had COVID. He also did labs, which were reviewed. His total cholesterol is 136, TSH 4.93 so slightly elevated and INR 2.19. No new concerns, does not need any refills. Hyperlipidemia  Tolerating cholesterol medication without adverse effects. Hypertension  Compliant with medications. No adverse effects from medication. No lightheadedness, palpitations, or chest pain. PHQ Scores 4/21/2022 11/22/2021 4/27/2021 11/18/2020 2/20/2020 11/20/2019 2/27/2019   PHQ2 Score 0 0 0 0 0 0 0   PHQ9 Score 0 0 0 0 0 0 0     Interpretation of Total Score Depression Severity: 1-4 = Minimal depression, 5-9 = Mild depression, 10-14 = Moderate depression, 15-19 = Moderately severe depression, 20-27 = Severe depression     No flowsheet data found. Interpretation of CHARIS-7 score: 5-9 = mild anxiety, 10-14 = moderate anxiety, 15+ = severe anxiety. Recommend referral to behavioral health for scores 10 or greater.      Past Medical History:   Diagnosis Date    Cancer ([address]     skin    CHF (congestive heart failure) (HCC)     Cholelithiasis     CKD (chronic kidney disease) stage 3, GFR 30-59 ml/min ([address] 11/13/2018    Crohn's disease of large intestine with complication ([facility] [address] 21/96/6641    Deep venous insufficiency 11/07/2017    Essential hypertension 11/07/2017    Gastroesophageal reflux disease without esophagitis 11/07/2017    Heart murmur     Hx of blood clots     Mixed hyperlipidemia 11/07/2017    Nonrheumatic aortic valve stenosis 05/19/2018    Palliative care patient 06/30/2022    Primary osteoarthritis involving multiple joints 11/07/2017    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[address] 2002     Past Surgical History:   Procedure Laterality Date    AORTIC VALVE REPLACEMENT  01/21/2021    Transfemoral transcatheter aortic valve replacment(TAVR) using a 29 mm Arreaga Doe S3 valve. CARDIAC PACEMAKER PLACEMENT      CARDIAC SURGERY      CARDIAC CATH    CATARACT REMOVAL WITH IMPLANT  2018    OTHER SURGICAL HISTORY      Fistula-in-ano repair    PACEMAKER INSERTION  01/22/2021    3rd degree AV block-[employer] Lydia  Implant MD Dr Aspen Chavarria    SKIN CANCER [address] HIP ARTHROPLASTY  2011       Family History   Problem Relation Age of Onset    Stroke Other     Other Other         atherosclerosis of extremities       Social History     Tobacco Use    Smoking status: Never    Smokeless tobacco: Never   Substance Use Topics    Alcohol use: No      Current Outpatient Medications   Medication Sig Dispense Refill    Multiple Vitamins-Minerals (PRESERVISION AREDS 2+MULTI VIT PO) Take by mouth      colestipol (COLESTID) 1 g tablet Take 1 g by mouth in the morning and 1 g at noon and 1 g in the evening and 1 g before bedtime.       warfarin (COUMADIN) 7.5 MG tablet TAKE ONE TABLET BY MOUTH DAILY EXCEPT ON WEDNESDAY TAKE 1/2 TABLET 90 tablet 2    losartan (COZAAR) 50 MG tablet TAKE ONE TABLET BY MOUTH DAILY 90 tablet 1    Aspirin Buf,CaCarb-MgCarb-MgO, 81 MG TABS by NOT APPLICABLE route      lansoprazole (PREVACID) 15 MG delayed release capsule by NOT APPLICABLE route      PENTASA 250 MG extended release capsule TAKE THREE CAPSULES BY MOUTH FOUR TIMES DAILY 1080 capsule 2    metoprolol succinate (TOPROL XL) 25 MG extended release tablet TAKE ONE TABLET BY MOUTH DAILY 90 tablet 1    cyanocobalamin 1000 MCG/ML injection inject ONE ML IN THE MUSCLE ONCE A MONTH 6 mL 0    Multiple Vitamins-Minerals (PRESERVISION AREDS 2+MULTI VIT) CAPS Take 1 tablet by mouth daily      Cholecalciferol (VITAMIN D3) 5000 units TABS Take by mouth      B Complex-C-E-Zn (STRESS FORMULA/ZINC PO) Take by mouth      bumetanide (BUMEX) 0.5 MG tablet Take 0.5 mg by mouth daily (Patient not taking: No sig reported)       No current facility-administered medications for this visit. No Known Allergies    Review of Systems   Constitutional:  Negative for chills and fever. HENT:  Negative for facial swelling and mouth sores. Eyes:  Negative for discharge and itching. Respiratory:  Negative for apnea and stridor. Cardiovascular:  Negative for chest pain and palpitations. Gastrointestinal:  Negative for nausea and vomiting. Endocrine: Negative for cold intolerance and heat intolerance. Genitourinary:  Negative for frequency and urgency. Musculoskeletal:  Negative for arthralgias and back pain. Skin:  Negative for color change and rash. Neurological:  Positive for tremors. See HPI for visit specific review of symptoms. All others negative      Objective:   /82   Pulse 70   Temp 98 °F (36.7 °C)   Ht 5' 7\" (1.702 m)   Wt 210 lb (95.3 kg)   SpO2 96%   BMI 32.89 kg/m²   Physical Exam  Constitutional:       Appearance: He is well-developed. He is obese. HENT:      Head: Normocephalic and atraumatic. Right Ear: External ear normal.      Left Ear: External ear normal.   Eyes:      Conjunctiva/sclera: Conjunctivae normal.      Pupils: Pupils are equal, round, and reactive to light. Cardiovascular:      Rate and Rhythm: Normal rate and regular rhythm. Heart sounds: Normal heart sounds. Pulmonary:      Effort: Pulmonary effort is normal. No respiratory distress. Breath sounds: Normal breath sounds.    Abdominal:      General: Bowel sounds are normal. There is no distension. Palpations: Abdomen is soft. Tenderness: There is no abdominal tenderness. Musculoskeletal:         General: Normal range of motion. Cervical back: Normal range of motion and neck supple. Skin:     General: Skin is warm. Capillary Refill: Capillary refill takes less than 2 seconds. Findings: No rash. Neurological:      Mental Status: He is alert and oriented to person, place, and time. Cranial Nerves: No cranial nerve deficit. Psychiatric:         Thought Content:  Thought content normal.         Lab Review   Recent Results (from the past 672 hour(s))   CBC with Auto Differential    Collection Time: 06/29/22  7:45 PM   Result Value Ref Range    WBC 7.5 4.8 - 10.8 K/uL    RBC 3.87 (L) 4.70 - 6.10 M/uL    Hemoglobin 12.8 (L) 14.0 - 18.0 g/dL    Hematocrit 39.1 (L) 42.0 - 52.0 %    .0 (H) 80.0 - 94.0 fL    MCH 33.1 (H) 27.0 - 31.0 pg    MCHC 32.7 (L) 33.0 - 37.0 g/dL    RDW 14.1 11.5 - 14.5 %    Platelets 90 (L) 134 - 400 K/uL    MPV 9.7 9.4 - 12.4 fL    Neutrophils % 85.7 (H) 50.0 - 65.0 %    Lymphocytes % 8.4 (L) 20.0 - 40.0 %    Monocytes % 4.5 0.0 - 10.0 %    Eosinophils % 0.4 0.0 - 5.0 %    Basophils % 0.5 0.0 - 1.0 %    Neutrophils Absolute 6.4 1.5 - 7.5 K/uL    Immature Granulocytes # 0.0 K/uL    Lymphocytes Absolute 0.6 (L) 1.1 - 4.5 K/uL    Monocytes Absolute 0.30 0.00 - 0.90 K/uL    Eosinophils Absolute 0.00 0.00 - 0.60 K/uL    Basophils Absolute 0.00 0.00 - 0.20 K/uL   Comprehensive Metabolic Panel    Collection Time: 06/29/22  7:45 PM   Result Value Ref Range    Sodium 139 136 - 145 mmol/L    Potassium 4.0 3.5 - 5.0 mmol/L    Chloride 105 98 - 111 mmol/L    CO2 23 22 - 29 mmol/L    Anion Gap 11 7 - 19 mmol/L    Glucose 143 (H) 74 - 109 mg/dL    BUN 16 8 - 23 mg/dL    Creatinine 1.4 (H) 0.5 - 1.2 mg/dL    GFR Non- 48 (A) >60    GFR  58 (L) >59    Calcium 9.0 8.8 - 10.2 mg/dL    Total Protein 6.1 (L) 6.6 - 8.7 g/dL Albumin 3.6 3.5 - 5.2 g/dL    Total Bilirubin 0.5 0.2 - 1.2 mg/dL    Alkaline Phosphatase 115 40 - 130 U/L    ALT 11 5 - 41 U/L    AST 17 5 - 40 U/L   Lactic Acid    Collection Time: 06/29/22  7:45 PM   Result Value Ref Range    Lactic Acid 2.9 (HH) 0.5 - 1.9 mmol/L   Troponin    Collection Time: 06/29/22  7:45 PM   Result Value Ref Range    Troponin 0.01 0.00 - 0.03 ng/mL   Protime-INR    Collection Time: 06/29/22  7:45 PM   Result Value Ref Range    Protime 26.8 (H) 12.0 - 14.6 sec    INR 2.39 (H) 0.88 - 1.18   C-Reactive Protein    Collection Time: 06/29/22  7:45 PM   Result Value Ref Range    CRP 0.66 (H) 0.00 - 0.50 mg/dL   D-Dimer, Quantitative    Collection Time: 06/29/22  7:45 PM   Result Value Ref Range    D-Dimer, Quant 1.02 (H) 0.00 - 0.48 ug/mL FEU   TSH    Collection Time: 06/29/22  7:45 PM   Result Value Ref Range    TSH 2.120 0.270 - 4.200 uIU/mL   Vitamin B12 & Folate    Collection Time: 06/29/22  7:45 PM   Result Value Ref Range    Vitamin B-12 744 211 - 946 pg/mL    Folate 19.9 4.5 - 32.2 ng/mL   Vitamin D 25 Hydroxy    Collection Time: 06/29/22  7:45 PM   Result Value Ref Range    Vit D, 25-Hydroxy 55.7 >=30 ng/mL   Culture, Blood 1    Collection Time: 06/29/22  8:04 PM    Specimen: Blood   Result Value Ref Range    Blood Culture, Routine No growth after 5 days of incubation. COVID-19, Rapid    Collection Time: 06/29/22  8:08 PM    Specimen: Nasopharyngeal Swab   Result Value Ref Range    SARS-CoV-2, NAAT DETECTED (AA) Not Detected   Culture, Blood 2    Collection Time: 06/29/22 10:35 PM    Specimen: Blood   Result Value Ref Range    Culture, Blood 2 No growth after 5 days of incubation.     Urinalysis with Reflex to Culture    Collection Time: 06/29/22 10:44 PM   Result Value Ref Range    Color, UA YELLOW Straw/Yellow    Clarity, UA Clear Clear    Glucose, Ur Negative Negative mg/dL    Bilirubin Urine Negative Negative    Ketones, Urine TRACE (A) Negative mg/dL    Specific Gravity, UA 1.018 80. 4 (H) 50.0 - 65.0 %    Lymphocytes % 11.9 (L) 20.0 - 40.0 %    Monocytes % 6.4 0.0 - 10.0 %    Eosinophils % 0.6 0.0 - 5.0 %    Basophils % 0.4 0.0 - 1.0 %    Neutrophils Absolute 5.6 1.5 - 7.5 K/uL    Immature Granulocytes # 0.0 K/uL    Lymphocytes Absolute 0.8 (L) 1.1 - 4.5 K/uL    Monocytes Absolute 0.50 0.00 - 0.90 K/uL    Eosinophils Absolute 0.00 0.00 - 0.60 K/uL    Basophils Absolute 0.00 0.00 - 0.20 K/uL   Brain Natriuretic Peptide    Collection Time: 06/30/22  8:14 AM   Result Value Ref Range    Pro-BNP 2,668 (H) 0 - 1,800 pg/mL   Hemoglobin A1C    Collection Time: 06/30/22  8:14 AM   Result Value Ref Range    Hemoglobin A1C 5.1 4.0 - 6.0 %   TSH    Collection Time: 07/14/22  9:02 AM   Result Value Ref Range    TSH 4.930 (H) 0.270 - 4.200 uIU/mL   Vitamin B12    Collection Time: 07/14/22  9:02 AM   Result Value Ref Range    Vitamin B-12 867 211 - 946 pg/mL   Lipid Panel    Collection Time: 07/14/22  9:02 AM   Result Value Ref Range    Cholesterol, Total 136 (L) 160 - 199 mg/dL    Triglycerides 77 0 - 149 mg/dL    HDL 60 55 - 121 mg/dL    LDL Calculated 61 <100 mg/dL   Comprehensive Metabolic Panel    Collection Time: 07/14/22  9:02 AM   Result Value Ref Range    Sodium 141 136 - 145 mmol/L    Potassium 4.4 3.5 - 5.0 mmol/L    Chloride 107 98 - 111 mmol/L    CO2 26 22 - 29 mmol/L    Anion Gap 8 7 - 19 mmol/L    Glucose 96 74 - 109 mg/dL    BUN 21 8 - 23 mg/dL    Creatinine 1.1 0.5 - 1.2 mg/dL    GFR Non-African American >60 >60    GFR African American >59 >59    Calcium 9.4 8.8 - 10.2 mg/dL    Total Protein 6.5 (L) 6.6 - 8.7 g/dL    Albumin 3.8 3.5 - 5.2 g/dL    Total Bilirubin 0.4 0.2 - 1.2 mg/dL    Alkaline Phosphatase 110 40 - 130 U/L    ALT 12 5 - 41 U/L    AST 20 5 - 40 U/L   CBC with Auto Differential    Collection Time: 07/14/22  9:02 AM   Result Value Ref Range    WBC 7.6 4.8 - 10.8 K/uL    RBC 3.99 (L) 4.70 - 6.10 M/uL    Hemoglobin 13.1 (L) 14.0 - 18.0 g/dL    Hematocrit 39.9 (L) 42.0 - 52.0 % .0 (H) 80.0 - 94.0 fL    MCH 32.8 (H) 27.0 - 31.0 pg    MCHC 32.8 (L) 33.0 - 37.0 g/dL    RDW 14.3 11.5 - 14.5 %    Platelets 472 527 - 821 K/uL    MPV 9.9 9.4 - 12.4 fL    Neutrophils % 51.6 50.0 - 65.0 %    Lymphocytes % 39.2 20.0 - 40.0 %    Monocytes % 6.0 0.0 - 10.0 %    Eosinophils % 2.0 0.0 - 5.0 %    Basophils % 0.8 0.0 - 1.0 %    Neutrophils Absolute 3.9 1.5 - 7.5 K/uL    Immature Granulocytes # 0.0 K/uL    Lymphocytes Absolute 3.0 1.1 - 4.5 K/uL    Monocytes Absolute 0.50 0.00 - 0.90 K/uL    Eosinophils Absolute 0.20 0.00 - 0.60 K/uL    Basophils Absolute 0.10 0.00 - 0.20 K/uL   Protime-INR    Collection Time: 07/14/22  9:02 AM   Result Value Ref Range    Protime 24.9 (H) 12.0 - 14.6 sec    INR 2.19 (H) 0.88 - 1.18               Assessment & Plan:      The following diagnoses and conditions are stable with no further orders unless indicated:    Patient Active Problem List    Diagnosis Date Noted    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     Priority: High    Complete heart block ([facility]      Priority: High    Post covid-19 condition, 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     Priority: Medium    Chronic systolic (congestive) heart failure 07/08/2022     Priority: Medium    Palliative care patient 07/01/2022     Priority: Medium    COVID-19 06/29/2022     Priority: Medium    Presence of intraocular lens 06/27/2022     Priority: Medium    Pseudophakia 07/22/2021     Priority: Medium    Localized edema 06/28/2021    Sleep disturbance 06/28/2021    Coagulation defect ([address] 06/09/2021    Parkinson disease ([address] 06/09/2021    Other forms of angina pectoris ([address] 02/08/2021    Aortic stenosis, severe     Bilateral carotid bruits 10/28/2020    History of DVT (deep vein thrombosis) 10/28/2020    Dizziness 10/28/2020    Moderate aortic stenosis 10/28/2020    After-cataract with vision obscured 02/25/2020    S/P partial resection of colon 05/01/2019    Intestinal adhesions with partial obstruction ([address] 12/37/4611    Diastolic congestive heart failure with preserved left ventricular function, NYHA class 2 ([address] 05/01/2019    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 choroidal neovascularization ([address] 05/01/2019    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 serious comorbidity with body mass index (BMI) of 32.0 to 32.9 in adult 05/01/2019    Chronic kidney disease, stage III (moderate) ([address] 11/13/2018    Retinal drusen 11/08/2018    Retinal hemorrhage 11/08/2018    Vitreous degeneration 11/08/2018    Nonrheumatic aortic valve stenosis 93/14/0514    Aortic systolic murmur on examination - suspect aortic stenosis 05/07/2018    Essential hypertension 11/07/2017    Crohn's disease of large intestine with complication ([facility] 45/29/9379    Mixed hyperlipidemia 11/07/2017    Deep venous insufficiency 11/07/2017    Primary osteoarthritis involving multiple joints 11/07/2017    Gastroesophageal reflux disease without esophagitis 11/07/2017    Chronic anticoagulation 06/21/2017     Overview Note:     Updating Deprecated Diagnoses          Kenia Bhandari was seen today for 3 month follow-up.     Diagnoses and all orders for this visit:    Mixed hyperlipidemia    Essential hypertension    Post covid-19 condition, unspecified    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 serious comorbidity with body mass index (BMI) of 32.0 to 32.9 in adult      Health Maintenance   Topic Date Due    COVID-19 Vaccine (1) Never done    DTaP/Tdap/Td vaccine (1 - Tdap) Never done    Shingles vaccine (1 of 2) Never done    Prostate Specific Antigen (PSA) Screening or Monitoring  04/21/2023 (Originally 11/1/2018)    Flu vaccine (1) 09/01/2022    Annual Wellness Visit (AWV)  11/23/2022    Depression Screen  04/21/2023    Colorectal Cancer Screen  09/28/2030    Pneumococcal 65+ years Vaccine  Completed    Hepatitis A vaccine  Aged Out    Hepatitis B vaccine  Aged Out    Hib vaccine  Aged Out    Meningococcal (ACWY) vaccine  Aged Out         Return in about 19 weeks (around 12/1/2022) for AWV 40 minute appt (not before 11/23), in office, declined vaccine. Discussed use, benefit, and side effects of prescribed medications. All patient questions answered. Pt voiced understanding. Reviewed health maintenance. Instructed to continue current medications, diet and exercise. Patient agreedwith treatment plan. Follow up as directed. Old records reviewed, where available.     Mekhi Madsen MD    Note:  dictated using Dragon software

## 2022-07-22 ENCOUNTER — CARE COORDINATION (OUTPATIENT)
Dept: CASE MANAGEMENT | Age: 87
End: 2022-07-22

## 2022-07-22 NOTE — CARE COORDINATION
[address] Transitions Follow Up Call    2022    Patient: Gia Gustafson  Patient : 1935   MRN: [id number]    Discharge Date: 22 RARS: No data recorded       Spoke with: N/A    Care Transitions Subsequent and Final Call    Subsequent and Final Calls  Are you currently active with any services?: Home Health  Care Transitions Interventions  Other Interventions:           Attempted to make contact with patient/caregiver for a routine Care Transitions Coordination follow up call without success. Unable to leave a HIPAA compliant message regarding intent of call and call back information. Call was forwarded to an unidentifiable telephone answering machine. CTN to try again at another time.       Follow Up  Future Appointments   Date Time Provider Roselyn Fleming   2022  1:45 PM RUBEN Wiggins [facility] [place]   2022  2:40 PM Colleen Ren MD [facility]       Doug Enciso RN

## 2022-07-23 ASSESSMENT — ENCOUNTER SYMPTOMS
STRIDOR: 0
EYE DISCHARGE: 0
BACK PAIN: 0
FACIAL SWELLING: 0
COLOR CHANGE: 0
VOMITING: 0
NAUSEA: 0
APNEA: 0
EYE ITCHING: 0

## 2022-07-28 ENCOUNTER — CARE COORDINATION (OUTPATIENT)
Dept: CASE MANAGEMENT | Age: 87
End: 2022-07-28

## 2022-07-28 NOTE — CARE COORDINATION
[address] Transitions Follow Up Call    2022    Patient: Rodriguez Fernandez  Patient : 1935   MRN: [id number]   Discharge Date: 22 RARS: No data recorded       Spoke with:N/A    Care Transitions Subsequent and Final Call    Subsequent and Final Calls  Are you currently active with any services?: Home Health  Care Transitions Interventions  Other Interventions:           Attempted to make contact with patient/caregiver for a routine Care Transitions Coordination follow up call without success. Unable to leave a HIPAA compliant message regarding intent of call and call back information. Call was forwarded to an unidentifiable telephone answering machine. As this repeated attempt to make contact was unsuccessful, will disengage at this time.       Follow Up  Future Appointments   Date Time Provider Roselyn Fleming   2022  1:45 PM RUBEN Iverson [facility]   2022  2:40 PM MD ROBIN Boyd [facility]       Stacy Davidson RN

## 2022-09-02 ENCOUNTER — LAB (OUTPATIENT)
Dept: LAB | Facility: HOSPITAL | Age: 87
End: 2022-09-02

## 2022-09-02 ENCOUNTER — TRANSCRIBE ORDERS (OUTPATIENT)
Dept: ADMINISTRATIVE | Facility: HOSPITAL | Age: 87
End: 2022-09-02

## 2022-09-02 DIAGNOSIS — Z79.01 CHRONIC ANTICOAGULATION: Primary | ICD-10-CM

## 2022-09-02 DIAGNOSIS — Z79.01 CHRONIC ANTICOAGULATION: ICD-10-CM

## 2022-09-02 LAB
INR PPP: 1.9 (ref 0.91–1.09)
PROTHROMBIN TIME: 23 SECONDS (ref 10–13.8)

## 2022-09-02 PROCEDURE — 85610 PROTHROMBIN TIME: CPT | Performed by: FAMILY MEDICINE

## 2022-09-06 ENCOUNTER — OFFICE VISIT (OUTPATIENT)
Dept: CARDIOLOGY CLINIC | Age: 87
End: 2022-09-06
Payer: MEDICARE

## 2022-09-06 VITALS
HEIGHT: 67 IN | OXYGEN SATURATION: 96 % | BODY MASS INDEX: 34.21 KG/M2 | HEART RATE: 60 BPM | SYSTOLIC BLOOD PRESSURE: 120 MMHG | DIASTOLIC BLOOD PRESSURE: 60 MMHG | WEIGHT: 218 LBS

## 2022-09-06 DIAGNOSIS — I50.22 CHRONIC SYSTOLIC (CONGESTIVE) HEART FAILURE (HCC): ICD-10-CM

## 2022-09-06 DIAGNOSIS — I48.92 ATRIAL FLUTTER, PAROXYSMAL (HCC): ICD-10-CM

## 2022-09-06 DIAGNOSIS — I44.2 COMPLETE HEART BLOCK (HCC): ICD-10-CM

## 2022-09-06 DIAGNOSIS — Z95.2 S/P TAVR (TRANSCATHETER AORTIC VALVE REPLACEMENT): ICD-10-CM

## 2022-09-06 DIAGNOSIS — Z95.0 PACEMAKER: ICD-10-CM

## 2022-09-06 DIAGNOSIS — I10 ESSENTIAL HYPERTENSION: Primary | ICD-10-CM

## 2022-09-06 DIAGNOSIS — Z79.01 CHRONIC ANTICOAGULATION: ICD-10-CM

## 2022-09-06 DIAGNOSIS — E78.2 MIXED HYPERLIPIDEMIA: ICD-10-CM

## 2022-09-06 PROCEDURE — 1123F ACP DISCUSS/DSCN MKR DOCD: CPT | Performed by: NURSE PRACTITIONER

## 2022-09-06 PROCEDURE — G8427 DOCREV CUR MEDS BY ELIG CLIN: HCPCS | Performed by: NURSE PRACTITIONER

## 2022-09-06 PROCEDURE — 93280 PM DEVICE PROGR EVAL DUAL: CPT | Performed by: NURSE PRACTITIONER

## 2022-09-06 PROCEDURE — 1036F TOBACCO NON-USER: CPT | Performed by: NURSE PRACTITIONER

## 2022-09-06 PROCEDURE — 99214 OFFICE O/P EST MOD 30 MIN: CPT | Performed by: NURSE PRACTITIONER

## 2022-09-06 PROCEDURE — G8417 CALC BMI ABV UP PARAM F/U: HCPCS | Performed by: NURSE PRACTITIONER

## 2022-09-06 NOTE — PROGRESS NOTES
[facility], [place]. [address], [facility], [address] [facility]  [phone] office  [phone] fax      OFFICE VISIT:  2022    Ryan Schaefer - : 1935  Reason For Visit:  Diana Boston is a 82 y.o. male who is here for 6 Month Follow-Up (And pacemaker check, patient denies any cardiac symptoms. Dr. Royal Weller follows INR's), Hypertension, and Atrial Fibrillation    History:   Diagnosis Orders   1. Essential hypertension        2. S/P TAVR (transcatheter aortic valve replacement)        3. Atrial flutter, paroxysmal ([address]        4. Chronic systolic (congestive) heart failure        5. Mixed hyperlipidemia        6. Pacemaker        7. Complete heart block [facility])          The patient presents today for cardiology follow up and pacer check. Overall, the patient is doing well from a cardiac standpoint. The patient denies symptoms to suggest myocardial ischemia, heart failure or arrhythmia. BP is well controlled on current regimen. The patient's PCP monitors cholesterol. Subjective  Diana Boston denies exertional chest pain, shortness of breath, orthopnea, paroxysmal nocturnal dyspnea, syncope, presyncope, sensed arrhythmia, edema and fatigue. The patient denies numbness or weakness to suggest cerebrovascular accident or transient ischemic attack.       Ryan Schaefer has the following history as recorded in [facility]:  Patient Active Problem List   Diagnosis Code    Chronic anticoagulation Z79.01    Essential hypertension I10    Crohn's disease of large intestine with complication ([address] A34.343    Mixed hyperlipidemia E78.2    Deep venous insufficiency I87.2    Primary osteoarthritis involving multiple joints M89.49    Gastroesophageal reflux disease without esophagitis X13.5    Aortic systolic murmur on examination - suspect aortic stenosis I35.8    Nonrheumatic aortic valve stenosis I35.0    Chronic kidney disease, stage III (moderate) ([address] N18.30 Laterality Date    AORTIC VALVE REPLACEMENT  01/21/2021    Transfemoral transcatheter aortic valve replacment(TAVR) using a 29 mm Arreaga Doe S3 valve. CARDIAC PACEMAKER PLACEMENT      CARDIAC SURGERY      CARDIAC CATH    CATARACT REMOVAL WITH IMPLANT  2018    OTHER SURGICAL HISTORY      Fistula-in-ano repair    PACEMAKER INSERTION  01/22/2021    3rd degree AV block-[employer] Lydia  Implant MD Dr Zamora Shoulder    SKIN CANCER [address]      TOTAL HIP ARTHROPLASTY  2011     Family History   Problem Relation Age of Onset    Stroke Other     Other Other         atherosclerosis of extremities     Social History     Tobacco Use    Smoking status: Never    Smokeless tobacco: Never   Substance Use Topics    Alcohol use: No      Current Outpatient Medications   Medication Sig Dispense Refill    Multiple Vitamins-Minerals (PRESERVISION AREDS 2+MULTI VIT PO) Take by mouth in the morning and at bedtime      warfarin (COUMADIN) 7.5 MG tablet TAKE ONE TABLET BY MOUTH DAILY EXCEPT ON WEDNESDAY TAKE 1/2 TABLET 90 tablet 2    losartan (COZAAR) 50 MG tablet TAKE ONE TABLET BY MOUTH DAILY 90 tablet 1    bumetanide (BUMEX) 0.5 MG tablet Take 0.5 mg by mouth as needed      lansoprazole (PREVACID) 15 MG delayed release capsule by NOT APPLICABLE route      PENTASA 250 MG extended release capsule TAKE THREE CAPSULES BY MOUTH FOUR TIMES DAILY 1080 capsule 2    metoprolol succinate (TOPROL XL) 25 MG extended release tablet TAKE ONE TABLET BY MOUTH DAILY 90 tablet 1    cyanocobalamin 1000 MCG/ML injection inject ONE ML IN THE MUSCLE ONCE A MONTH 6 mL 0    Cholecalciferol (VITAMIN D3) 5000 units TABS Take by mouth      B Complex-C-E-Zn (STRESS FORMULA/ZINC PO) Take by mouth       No current facility-administered medications for this visit. Allergies: Patient has no known allergies. Review of Systems  Constitutional - no appetite change, or unexpected weight change.  No fever, chills or diaphoresis. No significant change in activity level or new onset of fatigue. HEENT - no significant rhinorrhea or epistaxis. No tinnitus or significant hearing loss. Eyes - no sudden vision change or amaurosis. No corneal arcus, xantholasma, subconjunctival hemorrhage or discharge. Respiratory - no significant wheezing, stridor, apnea or cough. No dyspnea on exertion or shortness of air. Cardiovascular - no exertional chest pain to suggest myocardial ischemia. No orthopnea or PND. No sensation of sustained arrythmia. No occurrence of slow heart rate. No palpitations. No claudication. Gastrointestinal - no abdominal swelling or pain. No blood in stool. No severe constipation, diarrhea, nausea, or vomiting. Genitourinary - no dysuria, frequency, or urgency. No flank pain or hematuria. Musculoskeletal - no back pain or myalgia. No problems with gait. Extremities - no clubbing, cyanosis or extremity edema. Skin - no color change or rash. No pallor. No new surgical incision. Neurologic - no speech difficulty, facial asymmetry or lateralizing weakness. No seizures, presyncope or syncope. No significant dizziness. Hematologic - no easy bruising or excessive bleeding. Psychiatric - no severe anxiety or insomnia. No confusion. All other review of systems are negative. Objective  Vital Signs - /60   Pulse 60   Ht 5' 7\" (1.702 m)   Wt 218 lb (98.9 kg)   SpO2 96%   BMI 34.14 kg/m²   General - Kenia Bhandari is alert, cooperative, and pleasant. Well groomed. No acute distress. Body habitus - Body mass index is 34.14 kg/m². HEENT - Head is normocephalic. No circumoral cyanosis. Dentition is normal.  EYES -   Lids normal without ptosis. No discharge, edema or subconjunctival hemorrhage. Neck - Symmetrical without apparent mass or lymphadenopathy. Respiratory - Normal respiratory effort without use of accessory muscles.   Ausculatation reveals vesicular breath sounds without 26 07/14/2022    BUN 21 07/14/2022    CREATININE 1.1 07/14/2022    GLUCOSE 96 07/14/2022    CALCIUM 9.4 07/14/2022    PROT 6.5 (L) 07/14/2022    LABALBU 3.8 07/14/2022    BILITOT 0.4 07/14/2022    ALKPHOS 110 07/14/2022    AST 20 07/14/2022    ALT 12 07/14/2022    LABGLOM >60 07/14/2022    GFRAA >59 07/14/2022       Lab Results   Component Value Date    CHOL 136 (L) 07/14/2022    CHOL 142 (L) 08/06/2021    CHOL 171 05/18/2021     Lab Results   Component Value Date    TRIG 77 07/14/2022    TRIG 155 (H) 08/06/2021    TRIG 165 (H) 05/18/2021     Lab Results   Component Value Date    HDL 60 07/14/2022    HDL 53 (L) 08/06/2021    HDL 52 (L) 05/18/2021     Lab Results   Component Value Date    LDLCALC 61 07/14/2022    LDLCALC 58 08/06/2021    LDLCALC 86 05/18/2021       BP Readings from Last 3 Encounters:   09/06/22 120/60   07/21/22 126/82   07/08/22 124/80    Pulse Readings from Last 3 Encounters:   09/06/22 60   07/21/22 70   07/08/22 61        Wt Readings from Last 3 Encounters:   09/06/22 218 lb (98.9 kg)   07/21/22 210 lb (95.3 kg)   07/08/22 217 lb 9.6 oz (98.7 kg)     Assessment/Plan:   Diagnosis Orders   1. Essential hypertension        2. S/P TAVR (transcatheter aortic valve replacement)        3. Atrial flutter, paroxysmal ([address]        4. Chronic systolic (congestive) heart failure        5. Mixed hyperlipidemia        6. Pacemaker        7. Complete heart block (HCC)          [id number] Score: 4  Disclaimer: Risk Score calculation is dependent on accuracy of patient problem list and past encounter diagnosis. Pacer check:  Pacemaker check showed adequate battery status. 10.3 yeawrs longevity. Mode: DDD. Lead impedances are stable. Pacing:  AP 63.9%:  95.5%. Reprogramming for sensitivity and threshold testing. Appropriate diagnostics and safety margins noted. A output 0.750 V at 0.40 ms; P wave 1.8 mV. V output 0.625 V at 0.40 ms, R wave 16.0 mV. AF burden 0.1%.   Next Carelink remote transmission: 3 months. HTN - BP today 120/60. Continue current anti-hypertensive therapy. TAVR - 2/22 echo: TAVR valve in aortic position. No stenosis (mean gradient 9 mmHg). No  significant regurgitation appreciated. Follow with 2D echocardiogram.    Paroxysmal atrial flutter - AF burden on pacer check <0.1%. Continues on coumadin with no bleeding issues. INR 1.9 today. See anti coag encounter for dosing. Hyperlipidemia - monitored by PCP. LDL 61. Patient is compliant with medication regimen. Previous cardiac history and records reviewed. Continue current medical management for cardiac related condition. Continue other current medications as directed. Continue to follow up with primary care provider for non cardiac medical problems. If your primary care provider is outside of the [facility], please request that your labs be faxed to this office at [phone]. BP goal 130/80 or less. Call the office with any problems, questions or concerns at [phone]. Cardiology follow up as scheduled in [address] appointments. Educational included in patient instructions. Heart health.       RUBEN Calvillo

## 2022-09-06 NOTE — PATIENT INSTRUCTIONS
New instructions for today:      Patient Instructions:  Continue current medications as prescribed. Always keep a current medication list. Bring your medications to every office visit. Continue to follow up with primary care provider for non cardiac medical problems. Call the office with any problems, questions or concerns at [phone]. If you have been asked to keep a blood pressure log, do so for 2 weeks. Call the office to report readings to the triage nurse at [phone]. Follow up with cardiologist as scheduled. The following educational material has been included in this after visit summary for your review: Life simple 7. Heart health. Life simple 7  1) Manage blood pressure - high blood pressure is a major risk factor for heart disease and stroke. Keeping blood pressure in health range reduces strain on your heart, arteries and kidneys. Blood pressure goal is less than 130/80. 2) Control cholesterol - contributes to plaque, which can clog arteries and lead to heart disease and stroke. When you control your cholesterol you are giving your arteries their best chance to remain clear. It is recommended that you get cholesterol lab work done once a year. 3) Reduce blood sugar - most of the food we eat is turning into glucose or blood sugar that our body uses for energy. Over time, high levels of blood sugar can damage your heart, kidneys, eyes and nerves. 4) Get active - living an active life is one of the most rewarding gifts you can give yourself and those you love. Simply put, daily physical activity increases your length and quality of life. Strive to exercise 15 minutes most days of the week. 5)  Eat better - A healthy diet is one of your best weapons for fighting cardiovascular disease. When you eat a heart healthy diet, you improve your chances for feeling good and staying healthy for life.   6)  Lose weight - when you shed extra fat an unnecessary pounds, you reduce the burden on your hear, lungs, blood vessels and skeleton. You give yourself the gift of active living, you lower your blood pressure and help yourself feel better. 7) Stop smoking - cigarette smokers have a higher risk of developing cardiovascular disease. If  You smoke, quitting is the best thing you can do for your health. Check American Heart Association on line for more information on Life's Simple 7 and tips for healthy living. A Healthy Heart: Care Instructions  Your Care Instructions     Coronary artery disease, also called heart disease, occurs when a substance called plaque builds up in the vessels that supply oxygen-rich blood to your heart muscle. This can narrow the blood vessels and reduce blood flow. A heart attack happens when blood flow is completely blocked. A high-fat diet, smoking, and other factors increase the risk of heart disease. Your doctor has found that you have a chance of having heart disease. You can do lots of things to keep your heart healthy. It may not be easy, but you can change your diet, exercise more, and quit smoking. These steps really work to lower your chance of heart disease. Follow-up care is a key part of your treatment and safety. Be sure to make and go to all appointments, and call your doctor if you are having problems. It's also a good idea to know your test results and keep a list of the medicines you take. How can you care for yourself at home? Diet  Use less salt when you cook and eat. This helps lower your blood pressure. Taste food before salting. Add only a little salt when you think you need it. With time, your taste buds will adjust to less salt. Eat fewer snack items, fast foods, canned soups, and other high-salt, high-fat, processed foods. Read food labels and try to avoid saturated and trans fats. They increase your risk of heart disease by raising cholesterol levels. Limit the amount of solid fat-butter, margarine, and shortening-you eat.  Use olive, peanut, or canola oil when you cook. Bake, broil, and steam foods instead of frying them. Eat a variety of fruit and vegetables every day. Dark green, deep orange, red, or yellow fruits and vegetables are especially good for you. Examples include spinach, carrots, peaches, and berries. Foods high in fiber can reduce your cholesterol and provide important vitamins and minerals. High-fiber foods include whole-grain cereals and breads, oatmeal, beans, brown rice, citrus fruits, and apples. Eat lean proteins. Heart-healthy proteins include seafood, lean meats and poultry, eggs, beans, peas, nuts, seeds, and soy products. Limit drinks and foods with added sugar. These include candy, desserts, and soda pop. Lifestyle changes  If your doctor recommends it, get more exercise. Walking is a good choice. Bit by bit, increase the amount you walk every day. Try for at least 30 minutes on most days of the week. You also may want to swim, bike, or do other activities. Do not smoke. If you need help quitting, talk to your doctor about stop-smoking programs and medicines. These can increase your chances of quitting for good. Quitting smoking may be the most important step you can take to protect your heart. It is never too late to quit. Limit alcohol to 2 drinks a day for men and 1 drink a day for women. Too much alcohol can cause health problems. Manage other health problems such as diabetes, high blood pressure, and high cholesterol. If you think you may have a problem with alcohol or drug use, talk to your doctor. Medicines  Take your medicines exactly as prescribed. Call your doctor if you think you are having a problem with your medicine. If your doctor recommends aspirin, take the amount directed each day. Make sure you take aspirin and not another kind of pain reliever, such as acetaminophen (Tylenol). When should you call for help? [id number] if you have symptoms of a heart attack.  These may include:  Chest pain or pressure, or a strange feeling in the chest.  Sweating. Shortness of breath. Pain, pressure, or a strange feeling in the back, neck, jaw, or upper belly or in one or both shoulders or arms. Lightheadedness or sudden weakness. A fast or irregular heartbeat. After you call 911, the  may tell you to chew 1 adult-strength or 2 to 4 low-dose aspirin. Wait for an ambulance. Do not try to drive yourself. Watch closely for changes in your health, and be sure to contact your doctor if you have any problems. Where can you learn more? Go to https://[employer]. org and sign in to your [employer] account. Enter C493 in the [employer] box to learn more about \"A Healthy Heart: Care Instructions. \"     If you do not have an account, please click on the \"Sign Up Now\" link. Current as of: December 16, 2019               Content Version: 12.5  © 2796-8143 Healthwise, Incorporated. Care instructions adapted under license by [facility] ([facility]). If you have questions about a medical condition or this instruction, always ask your healthcare professional. [place] [address] any warranty or liability for your use of this information.

## 2022-09-26 ENCOUNTER — ANTI-COAG VISIT (OUTPATIENT)
Dept: CARDIOLOGY CLINIC | Age: 87
End: 2022-09-26
Payer: MEDICARE

## 2022-09-26 DIAGNOSIS — I87.2 DEEP VENOUS INSUFFICIENCY: ICD-10-CM

## 2022-09-26 DIAGNOSIS — Z79.01 CHRONIC ANTICOAGULATION: Primary | ICD-10-CM

## 2022-09-26 LAB
INTERNATIONAL NORMALIZATION RATIO, POC: 2
PROTHROMBIN TIME, POC: 22.2

## 2022-09-26 PROCEDURE — 85610 PROTHROMBIN TIME: CPT | Performed by: NURSE PRACTITIONER

## 2022-10-11 RX ORDER — METOPROLOL SUCCINATE 25 MG/1
TABLET, EXTENDED RELEASE ORAL
Qty: 90 TABLET | Refills: 1 | Status: SHIPPED | OUTPATIENT
Start: 2022-10-11

## 2022-10-20 DIAGNOSIS — E53.8 VITAMIN B 12 DEFICIENCY: ICD-10-CM

## 2022-10-20 RX ORDER — CYANOCOBALAMIN 1000 UG/ML
INJECTION INTRAMUSCULAR; SUBCUTANEOUS
Qty: 6 ML | Refills: 0 | Status: SHIPPED | OUTPATIENT
Start: 2022-10-20

## 2022-10-24 ENCOUNTER — ANTI-COAG VISIT (OUTPATIENT)
Dept: CARDIOLOGY CLINIC | Age: 87
End: 2022-10-24
Payer: MEDICARE

## 2022-10-24 DIAGNOSIS — I87.2 DEEP VENOUS INSUFFICIENCY: Primary | ICD-10-CM

## 2022-10-24 DIAGNOSIS — Z79.01 CHRONIC ANTICOAGULATION: ICD-10-CM

## 2022-10-24 LAB
INTERNATIONAL NORMALIZATION RATIO, POC: 1.9
PROTHROMBIN TIME, POC: 20.7

## 2022-10-24 PROCEDURE — 85610 PROTHROMBIN TIME: CPT | Performed by: NURSE PRACTITIONER

## 2022-11-21 ENCOUNTER — ANTI-COAG VISIT (OUTPATIENT)
Dept: CARDIOLOGY CLINIC | Age: 87
End: 2022-11-21
Payer: MEDICARE

## 2022-11-21 DIAGNOSIS — Z79.01 CHRONIC ANTICOAGULATION: Primary | ICD-10-CM

## 2022-11-21 DIAGNOSIS — I87.2 DEEP VENOUS INSUFFICIENCY: ICD-10-CM

## 2022-11-21 LAB
INTERNATIONAL NORMALIZATION RATIO, POC: 2
PROTHROMBIN TIME, POC: 22.1

## 2022-11-21 PROCEDURE — 93793 ANTICOAG MGMT PT WARFARIN: CPT | Performed by: CLINICAL NURSE SPECIALIST

## 2022-11-21 PROCEDURE — 85610 PROTHROMBIN TIME: CPT | Performed by: CLINICAL NURSE SPECIALIST

## 2022-11-28 DIAGNOSIS — I10 ESSENTIAL HYPERTENSION: Chronic | ICD-10-CM

## 2022-11-28 RX ORDER — LOSARTAN POTASSIUM 50 MG/1
TABLET ORAL
Qty: 90 TABLET | Refills: 1 | Status: SHIPPED | OUTPATIENT
Start: 2022-11-28

## 2022-11-28 NOTE — TELEPHONE ENCOUNTER
Cory Graham called to request a refill on his medication.       Last office visit : 7/21/2022   Next office visit : 12/1/2022     Requested Prescriptions     Pending Prescriptions Disp Refills    losartan (COZAAR) 50 MG tablet [Pharmacy Med Name: losartan 50 mg tablet] 90 tablet 1     Sig: TAKE ONE TABLET  S Y[place], [place]
Left arm;

## 2022-11-29 DIAGNOSIS — I10 ESSENTIAL HYPERTENSION: ICD-10-CM

## 2022-11-29 DIAGNOSIS — N18.30 CHRONIC KIDNEY DISEASE, STAGE III (MODERATE) (HCC): ICD-10-CM

## 2022-11-29 DIAGNOSIS — H35.3221 EXUDATIVE AGE-RELATED MACULAR DEGENERATION OF LEFT EYE WITH ACTIVE CHOROIDAL NEOVASCULARIZATION (HCC): ICD-10-CM

## 2022-11-29 LAB
BILIRUBIN URINE: NEGATIVE
BLOOD, URINE: NEGATIVE
CLARITY: CLEAR
COLOR: YELLOW
GLUCOSE URINE: NEGATIVE MG/DL
KETONES, URINE: ABNORMAL MG/DL
LEUKOCYTE ESTERASE, URINE: NEGATIVE
NITRITE, URINE: NEGATIVE
PH UA: 5.5 (ref 5–8)
PROTEIN UA: NEGATIVE MG/DL
SPECIFIC GRAVITY UA: 1.02 (ref 1–1.03)
TSH SERPL DL<=0.05 MIU/L-ACNC: 4.16 UIU/ML (ref 0.27–4.2)
UROBILINOGEN, URINE: 0.2 E.U./DL

## 2022-12-05 PROCEDURE — 93294 REM INTERROG EVL PM/LDLS PM: CPT | Performed by: NURSE PRACTITIONER

## 2022-12-05 PROCEDURE — 93296 REM INTERROG EVL PM/IDS: CPT | Performed by: NURSE PRACTITIONER

## 2022-12-06 DIAGNOSIS — I44.2 COMPLETE HEART BLOCK (HCC): ICD-10-CM

## 2022-12-06 DIAGNOSIS — Z95.0 PACEMAKER: Primary | ICD-10-CM

## 2022-12-06 DIAGNOSIS — I48.0 PAF (PAROXYSMAL ATRIAL FIBRILLATION) (HCC): ICD-10-CM

## 2022-12-19 ENCOUNTER — ANTI-COAG VISIT (OUTPATIENT)
Dept: CARDIOLOGY CLINIC | Age: 87
End: 2022-12-19
Payer: MEDICARE

## 2022-12-19 DIAGNOSIS — I87.2 DEEP VENOUS INSUFFICIENCY: ICD-10-CM

## 2022-12-19 DIAGNOSIS — Z79.01 CHRONIC ANTICOAGULATION: Primary | ICD-10-CM

## 2022-12-19 LAB
INTERNATIONAL NORMALIZATION RATIO, POC: 2
PROTHROMBIN TIME, POC: 22.1

## 2022-12-19 PROCEDURE — 85610 PROTHROMBIN TIME: CPT | Performed by: NURSE PRACTITIONER

## 2022-12-21 ENCOUNTER — TELEPHONE (OUTPATIENT)
Dept: PRIMARY CARE CLINIC | Age: 87
End: 2022-12-21

## 2023-01-16 ENCOUNTER — ANTI-COAG VISIT (OUTPATIENT)
Dept: CARDIOLOGY CLINIC | Age: 88
End: 2023-01-16
Payer: MEDICARE

## 2023-01-16 DIAGNOSIS — Z79.01 CHRONIC ANTICOAGULATION: Primary | ICD-10-CM

## 2023-01-16 DIAGNOSIS — I87.2 DEEP VENOUS INSUFFICIENCY: ICD-10-CM

## 2023-01-16 LAB
INTERNATIONAL NORMALIZATION RATIO, POC: 1.5
PROTHROMBIN TIME, POC: 16.6

## 2023-01-16 PROCEDURE — 85610 PROTHROMBIN TIME: CPT | Performed by: CLINICAL NURSE SPECIALIST

## 2023-01-16 PROCEDURE — 93793 ANTICOAG MGMT PT WARFARIN: CPT | Performed by: CLINICAL NURSE SPECIALIST

## 2023-01-27 DIAGNOSIS — K50.119 CROHN'S DISEASE OF LARGE INTESTINE WITH COMPLICATION (HCC): ICD-10-CM

## 2023-01-30 RX ORDER — MESALAMINE 250 MG/1
CAPSULE ORAL
Qty: 1080 CAPSULE | Refills: 0 | Status: SHIPPED | OUTPATIENT
Start: 2023-01-30

## 2023-01-30 NOTE — TELEPHONE ENCOUNTER
[place] called to request a refill on his medication.       Last office visit : 3/24/2022   Next office visit : 2023     Requested Prescriptions     Pending Prescriptions Disp Refills    PENTASA 250 MG extended release capsule [Pharmacy Med Name: Pentasa 250 mg capsule,controlled release]  2     [place] [address] [place], [place]

## 2023-02-13 ENCOUNTER — ANTI-COAG VISIT (OUTPATIENT)
Dept: CARDIOLOGY CLINIC | Age: 88
End: 2023-02-13
Payer: MEDICARE

## 2023-02-13 DIAGNOSIS — I87.2 DEEP VENOUS INSUFFICIENCY: ICD-10-CM

## 2023-02-13 DIAGNOSIS — Z79.01 CHRONIC ANTICOAGULATION: Primary | ICD-10-CM

## 2023-02-13 LAB
INTERNATIONAL NORMALIZATION RATIO, POC: 1.9
PROTHROMBIN TIME, POC: 20.8

## 2023-02-13 PROCEDURE — 85610 PROTHROMBIN TIME: CPT | Performed by: CLINICAL NURSE SPECIALIST

## 2023-02-13 PROCEDURE — 93793 ANTICOAG MGMT PT WARFARIN: CPT | Performed by: CLINICAL NURSE SPECIALIST

## 2023-02-14 ENCOUNTER — OFFICE VISIT (OUTPATIENT)
Dept: PRIMARY CARE CLINIC | Age: 88
End: 2023-02-14

## 2023-02-14 ENCOUNTER — TELEPHONE (OUTPATIENT)
Dept: NEUROLOGY | Age: 88
End: 2023-02-14

## 2023-02-14 VITALS
HEIGHT: 67 IN | TEMPERATURE: 98.2 F | WEIGHT: 212 LBS | SYSTOLIC BLOOD PRESSURE: 139 MMHG | BODY MASS INDEX: 33.27 KG/M2 | OXYGEN SATURATION: 96 % | HEART RATE: 64 BPM | DIASTOLIC BLOOD PRESSURE: 79 MMHG

## 2023-02-14 DIAGNOSIS — Z91.81 AT HIGH RISK FOR FALLS: ICD-10-CM

## 2023-02-14 DIAGNOSIS — G20 PARKINSON DISEASE (HCC): ICD-10-CM

## 2023-02-14 DIAGNOSIS — D68.9 COAGULATION DEFECT (HCC): ICD-10-CM

## 2023-02-14 DIAGNOSIS — K50.119 CROHN'S DISEASE OF LARGE INTESTINE WITH COMPLICATION (HCC): ICD-10-CM

## 2023-02-14 DIAGNOSIS — N18.30 STAGE 3 CHRONIC KIDNEY DISEASE, UNSPECIFIED WHETHER STAGE 3A OR 3B CKD (HCC): ICD-10-CM

## 2023-02-14 DIAGNOSIS — I20.8 OTHER FORMS OF ANGINA PECTORIS (HCC): ICD-10-CM

## 2023-02-14 DIAGNOSIS — I50.22 CHRONIC SYSTOLIC (CONGESTIVE) HEART FAILURE (HCC): ICD-10-CM

## 2023-02-14 DIAGNOSIS — H35.3221 EXUDATIVE AGE-RELATED MACULAR DEGENERATION OF LEFT EYE WITH ACTIVE CHOROIDAL NEOVASCULARIZATION (HCC): ICD-10-CM

## 2023-02-14 DIAGNOSIS — I44.2 COMPLETE HEART BLOCK (HCC): ICD-10-CM

## 2023-02-14 DIAGNOSIS — I10 ESSENTIAL HYPERTENSION: Primary | Chronic | ICD-10-CM

## 2023-02-14 ASSESSMENT — PATIENT HEALTH QUESTIONNAIRE - PHQ9
SUM OF ALL RESPONSES TO PHQ9 QUESTIONS 1 & 2: 0
SUM OF ALL RESPONSES TO PHQ QUESTIONS 1-9: 0
SUM OF ALL RESPONSES TO PHQ QUESTIONS 1-9: 0
1. LITTLE INTEREST OR PLEASURE IN DOING THINGS: 0
SUM OF ALL RESPONSES TO PHQ QUESTIONS 1-9: 0
2. FEELING DOWN, DEPRESSED OR HOPELESS: 0
SUM OF ALL RESPONSES TO PHQ QUESTIONS 1-9: 0

## 2023-02-14 NOTE — PROGRESS NOTES
[address] [facility]  [address] [id number] [address] [address]  Dept: [phone]  Dept Fax: [phone]  Loc: [phone]    Subjective:     Rodriguez Fernandez is a 82 y.o. male who presents today for his medical conditions/complaints as noted below. Rodriguez Fernandez is c/o of Follow-up (Patient is shaking in his hands x 3 months ago)        HPI:   Patient presents for follow-up of hypertension, Crohn's disease. His blood pressure is at goal today. He is taking the Pentasa as prescribed but states is very expensive. Main concern today is Parkinson symptoms. He was previously diagnosed with Parkinson's disease by Dr. Khushbu Vargas, per chart review. His wife feels like his symptoms are getting worse, specifically the tremor. She would like him be further evaluated for this, and treated. Hypertension  Compliant with medications. No adverse effects from medication. No lightheadedness, palpitations, or chest pain. PHQ Scores 2/14/2023 4/21/2022 11/22/2021 4/27/2021 11/18/2020 2/20/2020 11/20/2019   PHQ2 Score 0 0 0 0 0 0 0   PHQ9 Score 0 0 0 0 0 0 0     Interpretation of Total Score Depression Severity: 1-4 = Minimal depression, 5-9 = Mild depression, 10-14 = Moderate depression, 15-19 = Moderately severe depression, 20-27 = Severe depression     No flowsheet data found. Interpretation of CHARIS-7 score: 5-9 = mild anxiety, 10-14 = moderate anxiety, 15+ = severe anxiety. Recommend referral to behavioral health for scores 10 or greater.      Past Medical History:   Diagnosis Date    Cancer ([facility] [facility]     skin    CHF (congestive heart failure) (HCC)     Cholelithiasis     CKD (chronic kidney disease) stage 3, GFR 30-59 ml/min ([address] 11/13/2018    Crohn's disease of large intestine with complication ([facility] [address] 31/83/2173    Deep venous insufficiency 11/07/2017    Essential hypertension 11/07/2017    Gastroesophageal reflux disease without esophagitis 11/07/2017    Heart murmur     Hx of blood clots     Mixed hyperlipidemia 11/07/2017    Nonrheumatic aortic valve stenosis 05/19/2018    Palliative care patient 06/30/2022    Primary osteoarthritis involving multiple joints 11/07/2017    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[address] 2002     Past Surgical History:   Procedure Laterality Date    AORTIC VALVE REPLACEMENT  01/21/2021    Transfemoral transcatheter aortic valve replacment(TAVR) using a 29 mm Arreaga Doe S3 valve.     CARDIAC PACEMAKER PLACEMENT      CARDIAC SURGERY      CARDIAC CATH    CATARACT REMOVAL WITH IMPLANT  2018    OTHER SURGICAL HISTORY      Fistula-in-ano repair    PACEMAKER INSERTION  01/22/2021    3rd degree AV block-[employer] Lydia  Implant MD Dr Viktor Chavarria      TOTAL HIP ARTHROPLASTY  2011       Family History   Problem Relation Age of Onset    Stroke Other     Other Other         atherosclerosis of extremities       Social History     Tobacco Use    Smoking status: Never    Smokeless tobacco: Never   Substance Use Topics    Alcohol use: No      Current Outpatient Medications   Medication Sig Dispense Refill    PENTASA 250 MG extended release capsule TAKE THREE CAPSULES BY MOUTH FOUR TIMES DAILY 1080 capsule 0    losartan (COZAAR) 50 MG tablet TAKE ONE TABLET BY MOUTH DAILY 90 tablet 1    cyanocobalamin 1000 MCG/ML injection inject 1ml IN THE MUSCLE ONCE A MONTH 6 mL 0    metoprolol succinate (TOPROL XL) 25 MG extended release tablet TAKE ONE TABLET BY MOUTH DAILY 90 tablet 1    Multiple Vitamins-Minerals (PRESERVISION AREDS 2+MULTI VIT PO) Take by mouth in the morning and at bedtime      warfarin (COUMADIN) 7.5 MG tablet TAKE ONE TABLET BY MOUTH DAILY EXCEPT ON WEDNESDAY TAKE 1/2 TABLET 90 tablet 2    bumetanide (BUMEX) 0.5 MG tablet Take 0.5 mg by mouth as needed      lansoprazole (PREVACID) 15 MG delayed release capsule by NOT APPLICABLE route      Cholecalciferol (VITAMIN D3) 5000 units TABS Take by mouth      B Complex-C-E-Zn (STRESS FORMULA/ZINC PO) Take by mouth       No current facility-administered medications for this visit. No Known Allergies    Review of Systems   Constitutional:  Negative for chills and fever. HENT:  Negative for facial swelling and mouth sores. Eyes:  Negative for discharge and itching. Respiratory:  Negative for apnea and stridor. Cardiovascular:  Negative for chest pain and palpitations. Gastrointestinal:  Negative for nausea and vomiting. Endocrine: Negative for cold intolerance and heat intolerance. Genitourinary:  Negative for frequency and urgency. Musculoskeletal:  Negative for arthralgias and back pain. Skin:  Negative for color change and rash. Neurological:  Positive for tremors. See HPI for visit specific review of symptoms. All others negative      Objective:   /79   Pulse 64   Temp 98.2 °F (36.8 °C)   Ht 5' 7\" (1.702 m)   Wt 212 lb (96.2 kg)   SpO2 96%   BMI 33.20 kg/m²   Physical Exam  Constitutional:       Appearance: He is well-developed. HENT:      Head: Normocephalic and atraumatic. Right Ear: External ear normal.      Left Ear: External ear normal.   Eyes:      Conjunctiva/sclera: Conjunctivae normal.      Pupils: Pupils are equal, round, and reactive to light. Cardiovascular:      Rate and Rhythm: Normal rate and regular rhythm. Heart sounds: Normal heart sounds. Pulmonary:      Effort: Pulmonary effort is normal. No respiratory distress. Breath sounds: Normal breath sounds. Abdominal:      General: Bowel sounds are normal. There is no distension. Palpations: Abdomen is soft. Tenderness: There is no abdominal tenderness. Musculoskeletal:         General: Normal range of motion. Cervical back: Normal range of motion and neck supple. Skin:     General: Skin is warm. Capillary Refill: Capillary refill takes less than 2 seconds. Findings: No rash.    Neurological:      Mental Status: He is alert and oriented to person, place, and time. GCS: GCS eye subscore is 4. GCS verbal subscore is 5. GCS motor subscore is 6. Cranial Nerves: No cranial nerve deficit. Motor: Tremor (Pill-rolling tremor present at rest but also with movement as well. Small handwriting and turns en bloc.) present. Psychiatric:         Thought Content: Thought content normal.         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     Priority: Medium    Chronic systolic (congestive) heart failure 07/08/2022     Priority: Medium    Palliative care patient 07/01/2022     Priority: Medium    COVID-19 06/29/2022     Priority: Medium    Presence of intraocular lens 06/27/2022     Priority: Medium    Pseudophakia 07/22/2021     Priority: Medium    Localized edema 06/28/2021    Sleep disturbance 06/28/2021    Coagulation defect ([address] 06/09/2021    Parkinson disease ([address] 06/09/2021    Other forms of angina pectoris ([address] 02/08/2021    Aortic stenosis, severe     Bilateral carotid bruits 10/28/2020    History of DVT (deep vein thrombosis) 10/28/2020    Dizziness 10/28/2020    Moderate aortic stenosis 10/28/2020    After-cataract with vision obscured 02/25/2020    S/P partial resection of colon 05/01/2019    Intestinal adhesions with partial obstruction ([address] 97/76/6740    Diastolic congestive heart failure with preserved left ventricular function, NYHA class 2 ([address] 05/01/2019    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 choroidal neovascularization ([address] 05/01/2019    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 serious comorbidity with body mass index (BMI) of 32.0 to 32.9 in adult 05/01/2019    Chronic kidney disease, stage III (moderate) ([address] 11/13/2018    Retinal drusen 11/08/2018    Retinal hemorrhage 11/08/2018    Vitreous degeneration 11/08/2018    Nonrheumatic aortic valve stenosis 62/84/4597    Aortic systolic murmur on examination - suspect aortic stenosis 05/07/2018    Essential hypertension 11/07/2017    Crohn's disease of large intestine with complication ([address] 92/46/1760    Mixed hyperlipidemia 11/07/2017    Deep venous insufficiency 11/07/2017    Primary osteoarthritis involving multiple joints 11/07/2017    Gastroesophageal reflux disease without esophagitis 11/07/2017    Chronic anticoagulation 06/21/2017     Overview Note:     Updating Deprecated Diagnoses          Monalisa Menendez was seen today for follow-up. Diagnoses and all orders for this visit:    Essential hypertension    Parkinson disease ([address]  -     Olman Lockhart MD, Neurology, [place]    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 choroidal neovascularization ([address]    Chronic systolic (congestive) heart failure ([address]    Crohn's disease of large intestine with complication ([address]    Complete heart block ([address]    Coagulation defect ([address]    Other forms of angina pectoris ([address]    Stage 3 chronic kidney disease, unspecified whether stage 3a or 3b CKD ([address]    At high risk for falls    Patient to neurology as requested.     Health Maintenance   Topic Date Due    COVID-19 Vaccine (1) Never done    DTaP/Tdap/Td vaccine (1 - Tdap) Never done    Shingles vaccine (1 of 2) Never done    Annual Wellness Visit (AWV)  11/23/2022    Prostate Specific Antigen (PSA) Screening or Monitoring  04/21/2023 (Originally 11/1/2018)    Depression Screen  02/14/2024    Colorectal Cancer Screen  09/28/2030    Flu vaccine  Completed    Pneumococcal 65+ years Vaccine  Completed    Hepatitis A vaccine  Aged Out    Hib vaccine  Aged Out    Meningococcal (ACWY) vaccine  Aged Out         Return in 3 months (on 5/14/2023) for HTN, 40 minute appt, in office. Discussed use, benefit, and side effects of prescribed medications. All patient questions answered. Pt voiced understanding. Reviewed health maintenance. Instructed to continue current medications, diet and exercise. Patient agreedwith treatment plan. Follow up as directed. Old records reviewed, where available. Miah Coffey MD    Note:  dictated using Dragon software        On the basis of positive falls risk screening, assessment and plan is as follows: home safety tips provided.

## 2023-02-14 NOTE — TELEPHONE ENCOUNTER
Received a referral for this patient. Called home and cell number to see about getting patient scheduled an appointment with Dr. Marnie Starkey. No answer on either line. I have left patient a VM on both phones to call our office back to where we can get patient scheduled an appointment.

## 2023-02-19 ASSESSMENT — ENCOUNTER SYMPTOMS
APNEA: 0
BACK PAIN: 0
FACIAL SWELLING: 0
STRIDOR: 0
EYE DISCHARGE: 0
COLOR CHANGE: 0
VOMITING: 0
NAUSEA: 0
EYE ITCHING: 0

## 2023-03-06 ENCOUNTER — OFFICE VISIT (OUTPATIENT)
Dept: CARDIOLOGY CLINIC | Age: 88
End: 2023-03-06
Payer: MEDICARE

## 2023-03-06 VITALS
HEIGHT: 67 IN | BODY MASS INDEX: 33.27 KG/M2 | HEART RATE: 60 BPM | SYSTOLIC BLOOD PRESSURE: 120 MMHG | DIASTOLIC BLOOD PRESSURE: 70 MMHG | WEIGHT: 212 LBS

## 2023-03-06 DIAGNOSIS — Z95.0 PACEMAKER: ICD-10-CM

## 2023-03-06 DIAGNOSIS — Z79.01 CHRONIC ANTICOAGULATION: Primary | ICD-10-CM

## 2023-03-06 DIAGNOSIS — Z95.2 HISTORY OF TRANSCATHETER AORTIC VALVE REPLACEMENT (TAVR): ICD-10-CM

## 2023-03-06 DIAGNOSIS — I87.2 DEEP VENOUS INSUFFICIENCY: ICD-10-CM

## 2023-03-06 DIAGNOSIS — I44.2 COMPLETE HEART BLOCK (HCC): ICD-10-CM

## 2023-03-06 LAB
INTERNATIONAL NORMALIZATION RATIO, POC: 1.9
PROTHROMBIN TIME, POC: 21.3

## 2023-03-06 PROCEDURE — 99214 OFFICE O/P EST MOD 30 MIN: CPT | Performed by: INTERNAL MEDICINE

## 2023-03-06 PROCEDURE — 93280 PM DEVICE PROGR EVAL DUAL: CPT | Performed by: INTERNAL MEDICINE

## 2023-03-06 PROCEDURE — 1123F ACP DISCUSS/DSCN MKR DOCD: CPT | Performed by: INTERNAL MEDICINE

## 2023-03-06 PROCEDURE — 3074F SYST BP LT 130 MM HG: CPT | Performed by: INTERNAL MEDICINE

## 2023-03-06 PROCEDURE — 85610 PROTHROMBIN TIME: CPT | Performed by: INTERNAL MEDICINE

## 2023-03-06 PROCEDURE — 1036F TOBACCO NON-USER: CPT | Performed by: INTERNAL MEDICINE

## 2023-03-06 PROCEDURE — 3078F DIAST BP <80 MM HG: CPT | Performed by: INTERNAL MEDICINE

## 2023-03-06 PROCEDURE — G8427 DOCREV CUR MEDS BY ELIG CLIN: HCPCS | Performed by: INTERNAL MEDICINE

## 2023-03-06 PROCEDURE — G8417 CALC BMI ABV UP PARAM F/U: HCPCS | Performed by: INTERNAL MEDICINE

## 2023-03-06 PROCEDURE — G8484 FLU IMMUNIZE NO ADMIN: HCPCS | Performed by: INTERNAL MEDICINE

## 2023-03-06 ASSESSMENT — ENCOUNTER SYMPTOMS
EYE REDNESS: 0
DIARRHEA: 0
SHORTNESS OF BREATH: 0
VOMITING: 0
EYE DISCHARGE: 0
APNEA: 0
SORE THROAT: 0
BLOOD IN STOOL: 0
CONSTIPATION: 0
CHEST TIGHTNESS: 0
FACIAL SWELLING: 0
ABDOMINAL PAIN: 0
COUGH: 0
ABDOMINAL DISTENTION: 0
WHEEZING: 0
NAUSEA: 0
EYE PAIN: 0

## 2023-03-06 NOTE — PROGRESS NOTES
Pacemaker interrogated  Presenting rhythm:  AP , AP 65.5%,  96.7%  Battey voltage 9.8 years  Lead status:  Lead impedance within range and stable  Sensing:  P waves 2.3 mV,  R waves paced  Thresholds:  Atrial 0.75V @ 0.4ms, ventricular 0.75@ 0.4ms  Observations:  1 monitored NSVT  See scanned report for details  Reprogramming for sensitivity and threshold testing  [facility]:  [facility]

## 2023-03-06 NOTE — PROGRESS NOTES
Cardiology Office Visit Note  [facility]  [address]  Phone: [phone]  Fax: [phone]                            Date:  3/6/2023  Patient: Jese Bustos  Age:  82 y.o., 1935    Referral: No ref.  provider found    REASON FOR VISIT:  Follow-up (Essential hypertension)         PROBLEM LIST:    Patient Active Problem List    Diagnosis Date Noted    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     Priority: High    Complete heart block ([address]      Priority: High    Post covid-19 condition, 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     Priority: Medium    Chronic systolic (congestive) heart failure 07/08/2022     Priority: Medium    Palliative care patient 07/01/2022     Priority: Medium    COVID-19 06/29/2022     Priority: Medium    Presence of intraocular lens 06/27/2022     Priority: Medium    Pseudophakia 07/22/2021     Priority: Medium    Localized edema 06/28/2021     Priority: Low    Sleep disturbance 06/28/2021     Priority: Low    Coagulation defect ([address] 06/09/2021     Priority: Low    Parkinson disease ([address] 06/09/2021     Priority: Low    Other forms of angina pectoris ([address] 02/08/2021     Priority: Low    Aortic stenosis, severe      Priority: Low    Bilateral carotid bruits 10/28/2020     Priority: Low    History of DVT (deep vein thrombosis) 10/28/2020     Priority: Low    Dizziness 10/28/2020     Priority: Low    Moderate aortic stenosis 10/28/2020     Priority: Low    After-cataract with vision obscured 02/25/2020     Priority: Low    S/P partial resection of colon 05/01/2019     Priority: Low    Intestinal adhesions with partial obstruction ([address] 05/01/2019     Priority: Low    Diastolic congestive heart failure with preserved left ventricular function, NYHA class 2 ([address] 05/01/2019     Priority: Low    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 choroidal neovascularization ([address] 05/01/2019     Priority: Low    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 serious comorbidity with body mass index (BMI) of 32.0 to 32.9 in adult 05/01/2019     Priority: Low    Chronic kidney disease, stage III (moderate) ([facility] 11/13/2018     Priority: Low    Retinal drusen 11/08/2018     Priority: Low    Retinal hemorrhage 11/08/2018     Priority: Low    Vitreous degeneration 11/08/2018     Priority: Low    Nonrheumatic aortic valve stenosis 05/19/2018     Priority: Low    Aortic systolic murmur on examination - suspect aortic stenosis 05/07/2018     Priority: Low    Essential hypertension 11/07/2017     Priority: Low    Crohn's disease of large intestine with complication ([facility] 77/84/0328     Priority: Low    Mixed hyperlipidemia 11/07/2017     Priority: Low    Deep venous insufficiency 11/07/2017     Priority: Low    Primary osteoarthritis involving multiple joints 11/07/2017     Priority: Low    Gastroesophageal reflux disease without esophagitis 11/07/2017     Priority: Low    Chronic anticoagulation 06/21/2017     Priority: Low     Overview Note:     Updating Deprecated Diagnoses           PRESENTATION: Syl Gallardo is a 82y.o. year old male is seen today in cardiology follow-up. His history is notable for congestive heart failure, TAVR, hypertension, chronic kidney disease, permanent pacemaker and DVT on chronic warfarin therapy. He has no new or progressively worsening cardiopulmonary symptoms. He is concerned about resting tremors and unsteady gait for which she has an appointment with neurology tomorrow morning. He is compliant with his medications which include losartan, metoprolol succinate and warfarin. REVIEW OF SYSTEMS:  Review of Systems   Constitutional:  Negative for chills, fatigue and fever. HENT:  Negative for congestion, facial swelling, hearing loss and sore throat. Eyes:  Negative for pain, discharge, redness and visual disturbance. Respiratory:  Negative for apnea, cough, chest tightness, shortness of breath and wheezing. Cardiovascular:  Negative for chest pain, palpitations and leg swelling. Gastrointestinal:  Negative for abdominal distention, abdominal pain, blood in stool, constipation, diarrhea, nausea and vomiting. Endocrine: Negative for polydipsia, polyphagia and polyuria. Genitourinary:  Negative for dysuria, flank pain, frequency and hematuria. Musculoskeletal:  Negative for joint swelling, myalgias and neck pain. Skin:  Negative for pallor and rash. Neurological:  Positive for tremors. Negative for dizziness, syncope, speech difficulty, light-headedness, numbness and headaches. Psychiatric/Behavioral:  Negative for confusion, hallucinations and sleep disturbance. Past Medical History:      Diagnosis Date    Cancer ([address]     skin    CHF (congestive heart failure) (HCC)     Cholelithiasis     CKD (chronic kidney disease) stage 3, GFR 30-59 ml/min ([address] 11/13/2018    Crohn's disease of large intestine with complication ([address] 30/97/8919    Deep venous insufficiency 11/07/2017    Essential hypertension 11/07/2017    Gastroesophageal reflux disease without esophagitis 11/07/2017    Heart murmur     Hx of blood clots     Mixed hyperlipidemia 11/07/2017    Nonrheumatic aortic valve stenosis 05/19/2018    Palliative care patient 06/30/2022    Primary osteoarthritis involving multiple joints 11/07/2017    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[address] 2002       Past Surgical History:      Procedure Laterality Date    AORTIC VALVE REPLACEMENT  01/21/2021    Transfemoral transcatheter aortic valve replacment(TAVR) using a 29 mm Arreaga Doe S3 valve.     CARDIAC PACEMAKER PLACEMENT      CARDIAC SURGERY      CARDIAC CATH    CATARACT REMOVAL WITH IMPLANT  2018    OTHER SURGICAL HISTORY      Fistula-in-ano repair    PACEMAKER INSERTION  01/22/2021    3rd degree Andrea Rodriguez  Implant MD Dr Lilliam Pulido ARTHROPLASTY  2011 Medications:  Current Outpatient Medications   Medication Sig Dispense Refill    PENTASA 250 MG extended release capsule TAKE THREE CAPSULES BY MOUTH FOUR TIMES DAILY 1080 capsule 0    losartan (COZAAR) 50 MG tablet TAKE ONE TABLET BY MOUTH DAILY 90 tablet 1    cyanocobalamin 1000 MCG/ML injection inject 1ml IN THE MUSCLE ONCE A MONTH 6 mL 0    metoprolol succinate (TOPROL XL) 25 MG extended release tablet TAKE ONE TABLET BY MOUTH DAILY 90 tablet 1    Multiple Vitamins-Minerals (PRESERVISION AREDS 2+MULTI VIT PO) Take by mouth in the morning and at bedtime      warfarin (COUMADIN) 7.5 MG tablet TAKE ONE TABLET BY MOUTH DAILY EXCEPT ON WEDNESDAY TAKE 1/2 TABLET 90 tablet 2    bumetanide (BUMEX) 0.5 MG tablet Take 0.5 mg by mouth as needed      lansoprazole (PREVACID) 15 MG delayed release capsule by NOT APPLICABLE route      Cholecalciferol (VITAMIN D3) 5000 units TABS Take by mouth      B Complex-C-E-Zn (STRESS FORMULA/ZINC PO) Take by mouth       No current facility-administered medications for this visit. Allergies:  Patient has no known allergies. Social History:  Social History     Occupational History     Employer: RETIRED   Tobacco Use    Smoking status: Never    Smokeless tobacco: Never   Vaping Use    Vaping Use: Never used   Substance and Sexual Activity    Alcohol use: No    Drug use: No    Sexual activity: Not Currently     Partners: Female         Family History:       Problem Relation Age of Onset    Stroke Other     Other Other         atherosclerosis of extremities         Physical Examination:  /70   Pulse 60   Ht 5' 7\" (1.702 m)   Wt 212 lb (96.2 kg)   BMI 33.20 kg/m²   Physical Exam  Vitals reviewed. Constitutional:       General: He is not in acute distress. Appearance: He is obese. He is not ill-appearing, toxic-appearing or diaphoretic. HENT:      Head: Normocephalic and atraumatic. Eyes:      General: No scleral icterus. Right eye: No discharge. Left eye: No discharge. Conjunctiva/sclera: Conjunctivae normal.   Neck:      Vascular: No carotid bruit. Cardiovascular:      Rate and Rhythm: Normal rate and regular rhythm. No extrasystoles are present. Chest Wall: PMI is not displaced. No thrill. Pulses:           Carotid pulses are 3+ on the right side and 3+ on the left side. Radial pulses are 3+ on the right side and 3+ on the left side. Dorsalis pedis pulses are 3+ on the right side and 3+ on the left side. Posterior tibial pulses are 3+ on the right side and 3+ on the left side. Heart sounds: S1 normal and S2 normal. No murmur heard. No friction rub. No gallop. Pulmonary:      Effort: Pulmonary effort is normal. No tachypnea or respiratory distress. Breath sounds: Normal breath sounds. No stridor. No wheezing, rhonchi or rales. Chest:      Chest wall: No tenderness. Abdominal:      General: Bowel sounds are normal. There is no distension. Palpations: Abdomen is soft. There is no mass. Tenderness: There is no abdominal tenderness. There is no guarding. Musculoskeletal:         General: No swelling. Cervical back: Normal range of motion and neck supple. No rigidity. Right lower leg: Edema present. Left lower leg: Edema present. Skin:     General: Skin is warm and dry. Coloration: Skin is not jaundiced. Findings: No erythema or rash. Neurological:      General: No focal deficit present. Mental Status: He is alert and oriented to person, place, and time. Mental status is at baseline. Psychiatric:         Mood and Affect: Mood normal.         Behavior: Behavior normal.         Thought Content:  Thought content normal.         Labs:   CBC: No results found for: CBC   BMP: No results found for: BMP    BNP: No results found for: BNPINT   PT/INR:   Prothrombin Time   Date Value Ref Range Status   07/05/2011 25.32 (H) 9.7 - 12.5 SEC Final     Prothrombin time,(POC) Date Value Ref Range Status   03/06/2023 21.3  Final     INR   Date Value Ref Range Status   07/14/2022 2.19 (H) 0.88 - 1.18 Final     Comment:     INR  < or = 1.3  Normal  INR = 2.0 - 3.0  Therapeutic  INR = 2.5 - 3.5  Therapeutic for patients with mechanical  prosthetic heart valve & MI prophylaxis  INR  > or = 3.5  Abnormal/Elevated  INR  > or = 5.0  Critical (requires immediate physician  notification)       INR,(POC)   Date Value Ref Range Status   03/06/2023 1.9  Final     Comment:     normal       APTT:  No results found for: APTT   CARDIAC ENZYMES:   Troponin   Date Value Ref Range Status   06/29/2022 0.01 0.00 - 0.03 ng/mL Final     Comment:     <0.030 ng/ml       No measurable cardiac damage. 0.030-0.099 ng/ml  Values of troponin in this range suggest  possible myocardial damage. Repeat assay  4 to 6 hours after the current specimen.    >= 0.100 ng/ml     Indicative of myocardial damage. Recommend continued monitoring of  patient status and cardiac markers.         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 Electronically signed by Stephen Stevens MD on 3/6/2023 at 2:19 PM    Stephen Stevens MD, [place], [facility]  Noninvasive Cardiology Consultant    This dictation was generated by voice recognition computer software. Although all attempts are made to edit the dictation for accuracy, there may be errors in the transcription that are not intended.

## 2023-03-07 ENCOUNTER — OFFICE VISIT (OUTPATIENT)
Dept: NEUROLOGY | Age: 88
End: 2023-03-07
Payer: MEDICARE

## 2023-03-07 VITALS
BODY MASS INDEX: 33.27 KG/M2 | DIASTOLIC BLOOD PRESSURE: 74 MMHG | HEART RATE: 61 BPM | WEIGHT: 212 LBS | SYSTOLIC BLOOD PRESSURE: 159 MMHG | HEIGHT: 67 IN

## 2023-03-07 DIAGNOSIS — R25.1 TREMOR: Primary | ICD-10-CM

## 2023-03-07 DIAGNOSIS — R26.9 GAIT ABNORMALITY: ICD-10-CM

## 2023-03-07 DIAGNOSIS — G20 PARKINSON'S DISEASE (HCC): ICD-10-CM

## 2023-03-07 DIAGNOSIS — U09.9 POST COVID-19 CONDITION, UNSPECIFIED: ICD-10-CM

## 2023-03-07 PROBLEM — G20.A1 PARKINSON'S DISEASE: Status: ACTIVE | Noted: 2023-03-07

## 2023-03-07 PROCEDURE — 3078F DIAST BP <80 MM HG: CPT | Performed by: PSYCHIATRY & NEUROLOGY

## 2023-03-07 PROCEDURE — 1036F TOBACCO NON-USER: CPT | Performed by: PSYCHIATRY & NEUROLOGY

## 2023-03-07 PROCEDURE — G8484 FLU IMMUNIZE NO ADMIN: HCPCS | Performed by: PSYCHIATRY & NEUROLOGY

## 2023-03-07 PROCEDURE — G8417 CALC BMI ABV UP PARAM F/U: HCPCS | Performed by: PSYCHIATRY & NEUROLOGY

## 2023-03-07 PROCEDURE — G8427 DOCREV CUR MEDS BY ELIG CLIN: HCPCS | Performed by: PSYCHIATRY & NEUROLOGY

## 2023-03-07 PROCEDURE — 99204 OFFICE O/P NEW MOD 45 MIN: CPT | Performed by: PSYCHIATRY & NEUROLOGY

## 2023-03-07 PROCEDURE — 1123F ACP DISCUSS/DSCN MKR DOCD: CPT | Performed by: PSYCHIATRY & NEUROLOGY

## 2023-03-07 PROCEDURE — 3077F SYST BP >= 140 MM HG: CPT | Performed by: PSYCHIATRY & NEUROLOGY

## 2023-03-07 NOTE — PROGRESS NOTES
Chief Complaint   Patient presents with    New Patient     Referred by Dr. Frances Meza for Parkinson disease          Evonne Warren is a 82y.o. year old male who is seen for evaluation of tremor. The patient indicates that he has had tremor for least 3 months. This is primarily in the hands. He does use a cane to ambulate and has had a previous fall. He denies any back pain. He denies any significant cognitive issues. His voice is softer. Stress and tension can make his tremor worse. There is no family history of tremor. He indicates he had COVID in 2021 and a few months ago.     Active Ambulatory Problems     Diagnosis Date Noted    Chronic anticoagulation 06/21/2017    Essential hypertension 11/07/2017    Crohn's disease of large intestine with complication ([address] 27/94/3199    Mixed hyperlipidemia 11/07/2017    Deep venous insufficiency 11/07/2017    Primary osteoarthritis involving multiple joints 11/07/2017    Gastroesophageal reflux disease without esophagitis 91/39/2761    Aortic systolic murmur on examination - suspect aortic stenosis 05/07/2018    Nonrheumatic aortic valve stenosis 05/19/2018    Chronic kidney disease, stage III (moderate) ([address] 11/13/2018    S/P partial resection of colon 05/01/2019    Intestinal adhesions with partial obstruction ([address] 03/14/5150    Diastolic congestive heart failure with preserved left ventricular function, NYHA class 2 ([address] 05/01/2019    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 choroidal neovascularization ([address] 05/01/2019    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 serious comorbidity with body mass index (BMI) of 32.0 to 32.9 in adult 05/01/2019    Bilateral carotid bruits 10/28/2020    History of DVT (deep vein thrombosis) 10/28/2020    Dizziness 10/28/2020    Moderate aortic stenosis 10/28/2020    Aortic stenosis, severe     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    Complete heart block ([address]     Other forms of angina pectoris ([facility] 02/08/2021    Coagulation defect ([facility] 06/09/2021    Parkinson disease ([facility] 06/09/2021    Localized edema 06/28/2021    Sleep disturbance 06/28/2021    After-cataract with vision obscured 02/25/2020    Retinal drusen 11/08/2018    Retinal hemorrhage 11/08/2018    Vitreous degeneration 11/08/2018    Pseudophakia 07/22/2021    COVID-19 06/29/2022    Palliative care patient 07/01/2022    Chronic systolic (congestive) heart failure 07/08/2022    Presence of intraocular lens 06/27/2022    Post covid-19 condition, 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    Tremor 03/07/2023    Parkinson's disease ([facility] 03/07/2023    Gait abnormality 03/07/2023     Resolved Ambulatory Problems     Diagnosis Date Noted    No Resolved Ambulatory Problems     Past Medical History:   Diagnosis Date    Cancer ([facility]     CHF (congestive heart failure) ([facility])     Cholelithiasis     CKD (chronic kidney disease) stage 3, GFR 30-59 ml/min ([facility]) 11/13/2018    Heart murmur     Hx of blood clots     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[facility] 2002       Past Surgical History:   Procedure Laterality Date    AORTIC VALVE REPLACEMENT  01/21/2021    Transfemoral transcatheter aortic valve replacment(TAVR) using a 29 mm [place] Doe S3 valve.     CARDIAC PACEMAKER PLACEMENT      CARDIAC SURGERY      CARDIAC CATH    CATARACT REMOVAL WITH IMPLANT  2018    OTHER SURGICAL HISTORY      Fistula-in-ano repair    PACEMAKER INSERTION  01/22/2021    3rd degree AV block-[employer] [place]  Implant MD Dr .Raguel Goodpasture    SKIN CANCER [address] HIP ARTHROPLASTY  2011       Family History   Problem Relation Age of Onset    Stroke Other     Other Other         atherosclerosis of extremities       No Known Allergies    Social History     Socioeconomic History    Marital status:      Spouse name: Arn Screen    Number of children: 2    Years of education: 23    Highest education level: Not on file   Occupational History Employer: RETIRED   Tobacco Use    Smoking status: Never    Smokeless tobacco: Never   Vaping Use    Vaping Use: Never used   Substance and Sexual Activity    Alcohol use: No    Drug use: No    Sexual activity: Not Currently     Partners: Female   Other Topics Concern    Not on file   Social History Narrative    Not on file     Social Determinants of Health     Financial Resource Strain: Not on file   Food Insecurity: Not on file   Transportation Needs: Not on file   Physical Activity: Not on file   Stress: Not on file   Social Connections: Not on file   Intimate Partner Violence: Not on file   Housing Stability: Not on file       Review of Systems     Constitutional - No fever or chills. No diaphoresis or significant fatigue. HENT -  No tinnitus or significant hearing loss. Eyes - no sudden vision change or eye pain  Respiratory - no significant shortness of breath or cough  Cardiovascular - no chest pain No palpitations or significant leg swelling  Gastrointestinal - no abdominal swelling or pain. Genitourinary - No difficulty urinating, dysuria  Musculoskeletal - no back pain or myalgia. Skin - no color change or rash  Neurologic - No seizures. No lateralizing weakness. Hematologic - yes easy bruising or excessive bleeding. Psychiatric - no severe anxiety or nervousness. All other review of systems are negative.       Current Outpatient Medications   Medication Sig Dispense Refill    PENTASA 250 MG extended release capsule TAKE THREE CAPSULES BY MOUTH FOUR TIMES DAILY 1080 capsule 0    losartan (COZAAR) 50 MG tablet TAKE ONE TABLET BY MOUTH DAILY 90 tablet 1    cyanocobalamin 1000 MCG/ML injection inject 1ml IN THE MUSCLE ONCE A MONTH 6 mL 0    metoprolol succinate (TOPROL XL) 25 MG extended release tablet TAKE ONE TABLET BY MOUTH DAILY 90 tablet 1    Multiple Vitamins-Minerals (PRESERVISION AREDS 2+MULTI VIT PO) Take by mouth in the morning and at bedtime      warfarin (COUMADIN) 7.5 MG tablet TAKE ONE TABLET BY MOUTH DAILY EXCEPT ON WEDNESDAY TAKE 1/2 TABLET 90 tablet 2    bumetanide (BUMEX) 0.5 MG tablet Take 0.5 mg by mouth as needed      lansoprazole (PREVACID) 15 MG delayed release capsule by NOT APPLICABLE route      Cholecalciferol (VITAMIN D3) 5000 units TABS Take by mouth      B Complex-C-E-Zn (STRESS FORMULA/ZINC PO) Take by mouth       No current facility-administered medications for this visit. BP (!) 159/74   Pulse 61   Ht 5' 7\" (1.702 m)   Wt 212 lb (96.2 kg)   BMI 33.20 kg/m²     Constitutional - well developed, well nourished. Eyes - conjunctiva normal.  Pupils not tested  Ear, nose, throat -hearing intact to finger rub No scars, masses, or lesions over external nose or ears, no atrophy of tongue  Neck-symmetric, no masses noted, no jugular vein distension  Respiration- chest wall appears symmetric, good expansion,   normal effort without use of accessory muscles  Musculoskeletal - no significant wasting of muscles noted, no bony deformities  Extremities-no clubbing, cyanosis or edema  Skin - warm, dry, and intact. No rash, erythema, or pallor.   Psychiatric - mood, affect, and behavior appear normal.      Neurological exam  Awake, alert, fluent oriented x 3 appropriate affect  Attention and concentration appear appropriate  Recent and remote memory appears unremarkable  Speech normal without dysarthria  No clear issues with language of fund of knowledge    Cranial Nerve Exam   CN II- Visual fields grossly unremarkable  CN III, IV,VI-EOMI, No nystagmus, conjugate eye movements, no ptosis  CN V-sensation intact to LT over face  CN VII-no facial assymetry  CN VIII-Hearing intact to finger rub  CN IX and X- Palate not tested  CN XI-not test shoulder shrug  CN XII-Tongue midline with no fasciculations or fibrillations    Motor Exam  V/V throughout upper and lower extremities bilaterally, + cogwheeling in the wrists with reinforcement, normal tone    Sensory Exam  Sensation intact to light touch and temperature upper and lower extremities bilaterally    Reflexes   Not tested    Tremors-resting tremor in the hands    Gait  Slightly slowed with a cane    Coordination  Finger to nose-unremarkable    Lab Results   Component Value Date    XOXTLRDG32 867 07/14/2022     Lab Results   Component Value Date    WBC 7.6 07/14/2022    HGB 13.1 (L) 07/14/2022    HCT 39.9 (L) 07/14/2022    .0 (H) 07/14/2022     07/14/2022     Lab Results   Component Value Date     07/14/2022    K 4.4 07/14/2022     07/14/2022    CO2 26 07/14/2022    BUN 21 07/14/2022    CREATININE 1.1 07/14/2022    GLUCOSE 96 07/14/2022    CALCIUM 9.4 07/14/2022    PROT 6.5 (L) 07/14/2022    LABALBU 3.8 07/14/2022    BILITOT 0.4 07/14/2022    ALKPHOS 110 07/14/2022    AST 20 07/14/2022    ALT 12 07/14/2022    LABGLOM >60 07/14/2022    GFRAA >59 07/14/2022           Assessment    ICD-10-CM    1. Tremor  R25.1       2. Parkinson's disease ([facility]  G20       3. Gait abnormality  R26.9       4. Post covid-19 condition, unspecified  U09.9           His neurological examination today was significant for a resting tremor in both hands. He had some cogwheeling in the wrists with reinforcement. His gait was slightly slowed with a cane. Based upon his history and examination his tremor is most suggestive of a parkinsonian tremor. At this time he did not wish to initiate any medications for tremor. He wishes to think about it. The patient indicated understanding of the management plan. He is to follow-up with me in approximately 3 months and call with any further problems. Plan    No orders of the defined types were placed in this encounter. No orders of the defined types were placed in this encounter. Return in about 3 months (around 6/7/2023). EMR Dragon/transcription disclaimer:Significant part of this  encounter note is electronic transcription/translation of spoken language to printed text.  The electronic translation of spoken language may be erroneous, or at times, nonsensical words or phrases may be inadvertently transcribed.  Although I have reviewed the note for such errors, some may still exist.

## 2023-03-13 ENCOUNTER — HOSPITAL ENCOUNTER (OUTPATIENT)
Dept: NON INVASIVE DIAGNOSTICS | Age: 88
Discharge: HOME | End: 2023-03-13
Payer: MEDICARE

## 2023-03-13 DIAGNOSIS — Z95.2 HISTORY OF TRANSCATHETER AORTIC VALVE REPLACEMENT (TAVR): ICD-10-CM

## 2023-03-13 PROCEDURE — 6360000004 HC RX CONTRAST MEDICATION: Performed by: INTERNAL MEDICINE

## 2023-03-13 PROCEDURE — C8929 TTE W OR WO FOL WCON,DOPPLER: HCPCS

## 2023-03-13 RX ADMIN — PERFLUTREN 1.5 ML: 6.52 INJECTION, SUSPENSION INTRAVENOUS at 14:04

## 2023-03-28 ENCOUNTER — TELEPHONE (OUTPATIENT)
Dept: INTERNAL MEDICINE | Age: 88
End: 2023-03-28

## 2023-03-28 DIAGNOSIS — Z95.2 S/P TAVR (TRANSCATHETER AORTIC VALVE REPLACEMENT): Primary | ICD-10-CM

## 2023-03-28 RX ORDER — WARFARIN SODIUM 7.5 MG/1
TABLET ORAL
Qty: 90 TABLET | Refills: 2 | Status: SHIPPED | OUTPATIENT
Start: 2023-03-28

## 2023-03-28 RX ORDER — WARFARIN SODIUM 7.5 MG/1
TABLET ORAL
Qty: 90 TABLET | Refills: 2 | Status: CANCELLED | OUTPATIENT
Start: 2023-03-28

## 2023-03-28 NOTE — TELEPHONE ENCOUNTER
Babar Ayaan called to request a refill on his medication. Last office visit : Visit date not found   Next office visit : Visit date not found     Requested Prescriptions     Pending Prescriptions Disp Refills    warfarin (COUMADIN) 7.5 MG tablet 90 tablet 2            SWATHI Lunaael Ayaan called to request a refill on his medication.       Last office visit : Visit date not found   Next office visit : Visit date not found     Requested Prescriptions      No prescriptions requested or ordered in this encounter            Scotty Diane MA

## 2023-03-28 NOTE — TELEPHONE ENCOUNTER
Mary Beth Busch called to request a refill on his medication. Last office visit : Visit date not found   Next office visit : Visit date not found     Last UDS: No results found for: Hazle Hams, LABBENZ, BUPRENUR, COCAIMETSCRU, GABAPENTIN, MDMA, METAMPU, OPIATESCREENURINE, OXTCOSU, PHENCYCLIDINESCREENURINE, PROPOXYPHENE, THCSCREENUR, TRICYUR    Last Peter Armor: ***  Medication Contract: ***   Last Fill: ***    Requested Prescriptions     Pending Prescriptions Disp Refills    warfarin (COUMADIN) 7.5 MG tablet 90 tablet 2     Sig: As directed         Please approve or refuse this medication.    Arielle Vasquez MA

## 2023-03-29 DIAGNOSIS — Z95.2 S/P TAVR (TRANSCATHETER AORTIC VALVE REPLACEMENT): ICD-10-CM

## 2023-03-29 RX ORDER — WARFARIN SODIUM 7.5 MG/1
TABLET ORAL
Qty: 90 TABLET | Refills: 2 | OUTPATIENT
Start: 2023-03-29

## 2023-03-29 NOTE — TELEPHONE ENCOUNTER
Sathish Delcid called to request a refill on his medication. Last office visit : 2/14/2023   Next office visit : 5/16/2023     Requested Prescriptions     Pending Prescriptions Disp Refills    warfarin (COUMADIN) 7.5 MG tablet 90 tablet 2     Sig: TAKE ONE TABLET BY MOUTH DAILY EXCEPT ON WEDNESDAY TAKE 1/2 TABLET            SWATHI Green called to request a refill on his medication.

## 2023-05-05 DIAGNOSIS — K50.119 CROHN'S DISEASE OF LARGE INTESTINE WITH COMPLICATION (HCC): ICD-10-CM

## 2023-05-05 DIAGNOSIS — I10 ESSENTIAL HYPERTENSION: Chronic | ICD-10-CM

## 2023-05-05 RX ORDER — LOSARTAN POTASSIUM 50 MG/1
50 TABLET ORAL DAILY
Qty: 90 TABLET | Refills: 1 | Status: SHIPPED | OUTPATIENT
Start: 2023-05-05

## 2023-05-15 ENCOUNTER — ANTI-COAG VISIT (OUTPATIENT)
Dept: CARDIOLOGY CLINIC | Age: 88
End: 2023-05-15
Payer: MEDICARE

## 2023-05-15 DIAGNOSIS — Z79.01 CHRONIC ANTICOAGULATION: ICD-10-CM

## 2023-05-15 DIAGNOSIS — I87.2 DEEP VENOUS INSUFFICIENCY: Primary | Chronic | ICD-10-CM

## 2023-05-15 LAB
INTERNATIONAL NORMALIZATION RATIO, POC: 2.1
PROTHROMBIN TIME, POC: 23

## 2023-05-15 PROCEDURE — 93793 ANTICOAG MGMT PT WARFARIN: CPT | Performed by: NURSE PRACTITIONER

## 2023-05-16 ENCOUNTER — OFFICE VISIT (OUTPATIENT)
Dept: PRIMARY CARE CLINIC | Age: 88
End: 2023-05-16
Payer: MEDICARE

## 2023-05-16 VITALS
SYSTOLIC BLOOD PRESSURE: 134 MMHG | TEMPERATURE: 98.1 F | RESPIRATION RATE: 18 BRPM | HEART RATE: 61 BPM | OXYGEN SATURATION: 97 % | DIASTOLIC BLOOD PRESSURE: 84 MMHG | WEIGHT: 210 LBS | BODY MASS INDEX: 32.89 KG/M2

## 2023-05-16 DIAGNOSIS — N18.30 STAGE 3 CHRONIC KIDNEY DISEASE, UNSPECIFIED WHETHER STAGE 3A OR 3B CKD (HCC): ICD-10-CM

## 2023-05-16 DIAGNOSIS — E78.2 MIXED HYPERLIPIDEMIA: ICD-10-CM

## 2023-05-16 DIAGNOSIS — E53.8 VITAMIN B 12 DEFICIENCY: ICD-10-CM

## 2023-05-16 DIAGNOSIS — I10 ESSENTIAL HYPERTENSION: Primary | ICD-10-CM

## 2023-05-16 DIAGNOSIS — I10 ESSENTIAL HYPERTENSION: ICD-10-CM

## 2023-05-16 DIAGNOSIS — G20 PARKINSON DISEASE (HCC): ICD-10-CM

## 2023-05-16 LAB
ALBUMIN SERPL-MCNC: 4.4 G/DL (ref 3.5–5.2)
ALP SERPL-CCNC: 123 U/L (ref 40–130)
ALT SERPL-CCNC: 14 U/L (ref 5–41)
ANION GAP SERPL CALCULATED.3IONS-SCNC: 13 MMOL/L (ref 7–19)
AST SERPL-CCNC: 21 U/L (ref 5–40)
BASOPHILS # BLD: 0.1 K/UL (ref 0–0.2)
BASOPHILS NFR BLD: 0.6 % (ref 0–1)
BILIRUB SERPL-MCNC: 0.7 MG/DL (ref 0.2–1.2)
BUN SERPL-MCNC: 19 MG/DL (ref 8–23)
CALCIUM SERPL-MCNC: 9.8 MG/DL (ref 8.8–10.2)
CHLORIDE SERPL-SCNC: 106 MMOL/L (ref 98–111)
CHOLEST SERPL-MCNC: 160 MG/DL (ref 160–199)
CO2 SERPL-SCNC: 24 MMOL/L (ref 22–29)
CREAT SERPL-MCNC: 1.2 MG/DL (ref 0.5–1.2)
EOSINOPHIL # BLD: 0.1 K/UL (ref 0–0.6)
EOSINOPHIL NFR BLD: 1.5 % (ref 0–5)
ERYTHROCYTE [DISTWIDTH] IN BLOOD BY AUTOMATED COUNT: 13.9 % (ref 11.5–14.5)
GLUCOSE SERPL-MCNC: 96 MG/DL (ref 74–109)
HCT VFR BLD AUTO: 42.7 % (ref 42–52)
HDLC SERPL-MCNC: 64 MG/DL (ref 55–121)
HGB BLD-MCNC: 14.4 G/DL (ref 14–18)
IMM GRANULOCYTES # BLD: 0 K/UL
LDLC SERPL CALC-MCNC: 71 MG/DL
LYMPHOCYTES # BLD: 3.3 K/UL (ref 1.1–4.5)
LYMPHOCYTES NFR BLD: 41.4 % (ref 20–40)
MCH RBC QN AUTO: 34.4 PG (ref 27–31)
MCHC RBC AUTO-ENTMCNC: 33.7 G/DL (ref 33–37)
MCV RBC AUTO: 101.9 FL (ref 80–94)
MONOCYTES # BLD: 0.4 K/UL (ref 0–0.9)
MONOCYTES NFR BLD: 5.4 % (ref 0–10)
NEUTROPHILS # BLD: 4 K/UL (ref 1.5–7.5)
NEUTS SEG NFR BLD: 50.8 % (ref 50–65)
PLATELET # BLD AUTO: 108 K/UL (ref 130–400)
PMV BLD AUTO: 10 FL (ref 9.4–12.4)
POTASSIUM SERPL-SCNC: 4.2 MMOL/L (ref 3.5–5)
PROT SERPL-MCNC: 7.2 G/DL (ref 6.6–8.7)
RBC # BLD AUTO: 4.19 M/UL (ref 4.7–6.1)
SODIUM SERPL-SCNC: 143 MMOL/L (ref 136–145)
TRIGL SERPL-MCNC: 126 MG/DL (ref 0–149)
VIT B12 SERPL-MCNC: 827 PG/ML (ref 211–946)
WBC # BLD AUTO: 8 K/UL (ref 4.8–10.8)

## 2023-05-16 PROCEDURE — 1036F TOBACCO NON-USER: CPT | Performed by: FAMILY MEDICINE

## 2023-05-16 PROCEDURE — G8427 DOCREV CUR MEDS BY ELIG CLIN: HCPCS | Performed by: FAMILY MEDICINE

## 2023-05-16 PROCEDURE — G8417 CALC BMI ABV UP PARAM F/U: HCPCS | Performed by: FAMILY MEDICINE

## 2023-05-16 PROCEDURE — 1123F ACP DISCUSS/DSCN MKR DOCD: CPT | Performed by: FAMILY MEDICINE

## 2023-05-16 PROCEDURE — 99214 OFFICE O/P EST MOD 30 MIN: CPT | Performed by: FAMILY MEDICINE

## 2023-05-16 ASSESSMENT — ENCOUNTER SYMPTOMS
COLOR CHANGE: 0
BACK PAIN: 0
VOMITING: 0
EYE DISCHARGE: 0
NAUSEA: 0
FACIAL SWELLING: 0
STRIDOR: 0
ABDOMINAL PAIN: 1
APNEA: 0
EYE ITCHING: 0

## 2023-05-16 NOTE — PROGRESS NOTES
without serious comorbidity with body mass index (BMI) of 32.0 to 32.9 in adult 05/01/2019    Chronic kidney disease, stage III (moderate) ([address] 11/13/2018    Retinal drusen 11/08/2018    Retinal hemorrhage 11/08/2018    Vitreous degeneration 11/08/2018    Nonrheumatic aortic valve stenosis 87/51/4516    Aortic systolic murmur on examination - suspect aortic stenosis 05/07/2018    Essential hypertension 11/07/2017    Crohn's disease of large intestine with complication ([address] 32/87/7874    Mixed hyperlipidemia 11/07/2017    Deep venous insufficiency 11/07/2017    Primary osteoarthritis involving multiple joints 11/07/2017    Gastroesophageal reflux disease without esophagitis 11/07/2017    Chronic anticoagulation 06/21/2017     Overview Note:     Updating Deprecated Diagnoses          Rigoberto Santiago was seen today for 3 month follow-up. Diagnoses and all orders for this visit:    Essential hypertension  -     CBC with Auto Differential; Future  -     Comprehensive Metabolic Panel; Future    Parkinson disease ([facility]    Stage 3 chronic kidney disease, unspecified whether stage 3a or 3b CKD ([address]  -     Comprehensive Metabolic Panel; Future    Vitamin B 12 deficiency  -     Vitamin B12; Future    Mixed hyperlipidemia  -     Lipid Panel; Future        Health Maintenance   Topic Date Due    COVID-19 Vaccine (1) Never done    DTaP/Tdap/Td vaccine (1 - Tdap) Never done    Shingles vaccine (1 of 2) Never done    Annual Wellness Visit (AWV)  11/23/2022    Prostate Specific Antigen (PSA) Screening or Monitoring  05/16/2024 (Originally 11/1/2018)    Depression Screen  02/14/2024    Colorectal Cancer Screen  10/30/2027    Flu vaccine  Completed    Pneumococcal 65+ years Vaccine  Completed    Hepatitis A vaccine  Aged Out    Hib vaccine  Aged Out    Meningococcal (ACWY) vaccine  Aged Out         Return in about 3 months (around 8/16/2023) for AWV 40 minute appt, lab results, in office.            Discussed use, benefit, and side effects

## 2023-05-16 NOTE — PATIENT INSTRUCTIONS
Dr Avila Pass office:  Address: [address], [place]  Phone: [phone]    Go to room 405 for labs prior to next visit. Be sure to fast for at least 12 hours prior to laboratory appointment, unless otherwise instructed by me. Would recommend getting labs about 1 week prior to the appointment time to ensure they are available at the time of the appointment. No appointment is necessary for laboratory work as the orders have already been placed.     Lab opens at 6:30 am and closes at 5:00 pm.

## 2023-05-25 ENCOUNTER — OFFICE VISIT (OUTPATIENT)
Dept: PRIMARY CARE CLINIC | Age: 88
End: 2023-05-25
Payer: MEDICARE

## 2023-05-25 VITALS
OXYGEN SATURATION: 97 % | HEIGHT: 67 IN | HEART RATE: 73 BPM | WEIGHT: 212.8 LBS | SYSTOLIC BLOOD PRESSURE: 134 MMHG | TEMPERATURE: 97.6 F | BODY MASS INDEX: 33.4 KG/M2 | DIASTOLIC BLOOD PRESSURE: 70 MMHG

## 2023-05-25 DIAGNOSIS — L03.115 CELLULITIS OF RIGHT LOWER EXTREMITY: Primary | ICD-10-CM

## 2023-05-25 DIAGNOSIS — R60.0 LOWER EXTREMITY EDEMA: ICD-10-CM

## 2023-05-25 PROCEDURE — 1123F ACP DISCUSS/DSCN MKR DOCD: CPT | Performed by: NURSE PRACTITIONER

## 2023-05-25 PROCEDURE — 1036F TOBACCO NON-USER: CPT | Performed by: NURSE PRACTITIONER

## 2023-05-25 PROCEDURE — 99213 OFFICE O/P EST LOW 20 MIN: CPT | Performed by: NURSE PRACTITIONER

## 2023-05-25 PROCEDURE — G8417 CALC BMI ABV UP PARAM F/U: HCPCS | Performed by: NURSE PRACTITIONER

## 2023-05-25 PROCEDURE — G8427 DOCREV CUR MEDS BY ELIG CLIN: HCPCS | Performed by: NURSE PRACTITIONER

## 2023-05-25 RX ORDER — SULFAMETHOXAZOLE AND TRIMETHOPRIM 800; 160 MG/1; MG/1
1 TABLET ORAL 2 TIMES DAILY
Qty: 14 TABLET | Refills: 0 | Status: SHIPPED | OUTPATIENT
Start: 2023-05-25 | End: 2023-06-04

## 2023-05-25 RX ORDER — BUMETANIDE 0.5 MG/1
0.5 TABLET ORAL PRN
Qty: 30 TABLET | Refills: 1 | Status: SHIPPED | OUTPATIENT
Start: 2023-05-25

## 2023-05-31 PROBLEM — L03.115 CELLULITIS OF RIGHT LOWER EXTREMITY: Status: ACTIVE | Noted: 2023-05-31

## 2023-05-31 ASSESSMENT — ENCOUNTER SYMPTOMS
CHEST TIGHTNESS: 0
NAUSEA: 0
SHORTNESS OF BREATH: 0
SORE THROAT: 0
COLOR CHANGE: 0
ABDOMINAL PAIN: 0
VOMITING: 0
COUGH: 0
DIARRHEA: 0

## 2023-05-31 NOTE — ASSESSMENT & PLAN NOTE
Patient here today with concerns of right lower extremity edema after hitting it 3 days ago. He states his worsening Parkinson's Disease caused him to fall and hit his leg. There is a small abrasion noted on the right lateral lower leg. Surrounding the area is significant erythema and swelling. Will start him on oral Bactrim for 10 days, and will send in a refill on his Bumex in which he states he takes a quarter of a tablet as needed. Examined along with Dr. Duong Bueno, who is agreence  with treatment plan, and will plan to see him back in 2 weeks to reevaluate his leg at that point. Strongly encouraged patient to report to the Emergency Department with worsen symptoms or development of fever or chills.

## 2023-06-07 ENCOUNTER — OFFICE VISIT (OUTPATIENT)
Dept: NEUROLOGY | Age: 88
End: 2023-06-07
Payer: MEDICARE

## 2023-06-07 VITALS
WEIGHT: 205 LBS | DIASTOLIC BLOOD PRESSURE: 69 MMHG | BODY MASS INDEX: 32.18 KG/M2 | HEIGHT: 67 IN | HEART RATE: 58 BPM | RESPIRATION RATE: 20 BRPM | SYSTOLIC BLOOD PRESSURE: 135 MMHG

## 2023-06-07 DIAGNOSIS — G20 PARKINSON'S DISEASE (HCC): ICD-10-CM

## 2023-06-07 DIAGNOSIS — I44.2 COMPLETE HEART BLOCK (HCC): ICD-10-CM

## 2023-06-07 DIAGNOSIS — R25.1 TREMOR: Primary | ICD-10-CM

## 2023-06-07 DIAGNOSIS — Z95.0 PACEMAKER: Primary | ICD-10-CM

## 2023-06-07 DIAGNOSIS — R26.9 GAIT ABNORMALITY: ICD-10-CM

## 2023-06-07 PROCEDURE — 1036F TOBACCO NON-USER: CPT | Performed by: PSYCHIATRY & NEUROLOGY

## 2023-06-07 PROCEDURE — 99214 OFFICE O/P EST MOD 30 MIN: CPT | Performed by: PSYCHIATRY & NEUROLOGY

## 2023-06-07 PROCEDURE — G8417 CALC BMI ABV UP PARAM F/U: HCPCS | Performed by: PSYCHIATRY & NEUROLOGY

## 2023-06-07 PROCEDURE — 1123F ACP DISCUSS/DSCN MKR DOCD: CPT | Performed by: PSYCHIATRY & NEUROLOGY

## 2023-06-07 PROCEDURE — G8427 DOCREV CUR MEDS BY ELIG CLIN: HCPCS | Performed by: PSYCHIATRY & NEUROLOGY

## 2023-06-07 NOTE — PROGRESS NOTES
Review of Systems    Constitutional - No fever or chills. No diaphoresis or significant fatigue. HENT -  No tinnitus or significant hearing loss. Eyes - no sudden vision change or eye pain  Respiratory - no significant shortness of breath or cough  Cardiovascular - no chest pain No palpitations or significant leg swelling  Gastrointestinal - no abdominal swelling or pain. Genitourinary - No difficulty urinating, dysuria  Musculoskeletal - no back pain or myalgia. Skin - no color change or rash  Neurologic - No seizures. No lateralizing weakness. Hematologic - no easy bruising or excessive bleeding. Psychiatric - no severe anxiety or nervousness. All other review of systems are negative.
on file   Occupational History     Employer: RETIRED   Tobacco Use    Smoking status: Never    Smokeless tobacco: Never   Vaping Use    Vaping Use: Never used   Substance and Sexual Activity    Alcohol use: No    Drug use: No    Sexual activity: Not Currently     Partners: Female   Other Topics Concern    Not on file   Social History Narrative    Not on file     Social Determinants of Health     Financial Resource Strain: Not on file   Food Insecurity: Not on file   Transportation Needs: Not on file   Physical Activity: Not on file   Stress: Not on file   Social Connections: Not on file   Intimate Partner Violence: Not on file   Housing Stability: Not on file       Review of Systems     Constitutional - No fever or chills. No diaphoresis or significant fatigue. HENT -  No tinnitus or significant hearing loss. Eyes - no sudden vision change or eye pain  Respiratory - no significant shortness of breath or cough  Cardiovascular - no chest pain No palpitations or significant leg swelling  Gastrointestinal - no abdominal swelling or pain. Genitourinary - No difficulty urinating, dysuria  Musculoskeletal - no back pain or myalgia. Skin - no color change or rash  Neurologic - No seizures. No lateralizing weakness. Hematologic - no easy bruising or excessive bleeding. Psychiatric - no severe anxiety or nervousness. All other review of systems are negative.        Current Outpatient Medications   Medication Sig Dispense Refill    bumetanide (BUMEX) 0.5 MG tablet Take 1 tablet by mouth as needed (swelling) 30 tablet 1    losartan (COZAAR) 50 MG tablet Take 1 tablet by mouth daily 90 tablet 1    mesalamine (PENTASA) 250 MG extended release capsule TAKE THREE CAPSULES BY MOUTH FOUR TIMES DAILY 1080 capsule 0    metoprolol succinate (TOPROL XL) 25 MG extended release tablet TAKE ONE TABLET BY MOUTH DAILY 90 tablet 1    warfarin (COUMADIN) 7.5 MG tablet TAKE ONE TABLET BY MOUTH DAILY EXCEPT ON WEDNESDAY TAKE 1/2 TABLET

## 2023-06-19 ENCOUNTER — ANTI-COAG VISIT (OUTPATIENT)
Dept: CARDIOLOGY CLINIC | Age: 88
End: 2023-06-19
Payer: MEDICARE

## 2023-06-19 DIAGNOSIS — I87.2 DEEP VENOUS INSUFFICIENCY: Chronic | ICD-10-CM

## 2023-06-19 DIAGNOSIS — Z79.01 CHRONIC ANTICOAGULATION: Primary | ICD-10-CM

## 2023-06-19 LAB
INTERNATIONAL NORMALIZATION RATIO, POC: 2.6
PROTHROMBIN TIME, POC: 27.7

## 2023-06-19 PROCEDURE — 93793 ANTICOAG MGMT PT WARFARIN: CPT | Performed by: NURSE PRACTITIONER

## 2023-07-17 ENCOUNTER — ANTI-COAG VISIT (OUTPATIENT)
Dept: CARDIOLOGY CLINIC | Age: 88
End: 2023-07-17
Payer: MEDICARE

## 2023-07-17 DIAGNOSIS — I87.2 DEEP VENOUS INSUFFICIENCY: Chronic | ICD-10-CM

## 2023-07-17 DIAGNOSIS — Z79.01 CHRONIC ANTICOAGULATION: Primary | ICD-10-CM

## 2023-07-17 LAB
INTERNATIONAL NORMALIZATION RATIO, POC: 2.5
PROTHROMBIN TIME, POC: 27.4

## 2023-07-17 PROCEDURE — 93793 ANTICOAG MGMT PT WARFARIN: CPT | Performed by: NURSE PRACTITIONER

## 2023-08-10 DIAGNOSIS — K50.119 CROHN'S DISEASE OF LARGE INTESTINE WITH COMPLICATION (HCC): ICD-10-CM

## 2023-08-10 NOTE — TELEPHONE ENCOUNTER
Ryan Huynh called to request a refill on his medication.       Last office visit : 2023   Next office visit : 2023     Requested Prescriptions     Pending Prescriptions Disp Refills    mesalamine (PENTASA) 250 MG extended release capsule 1080 capsule 0     Si Norris Lobo LPN

## 2023-08-17 ENCOUNTER — OFFICE VISIT (OUTPATIENT)
Dept: PRIMARY CARE CLINIC | Age: 88
End: 2023-08-17
Payer: MEDICARE

## 2023-08-17 VITALS
TEMPERATURE: 98 F | OXYGEN SATURATION: 96 % | SYSTOLIC BLOOD PRESSURE: 124 MMHG | HEIGHT: 67 IN | WEIGHT: 211 LBS | HEART RATE: 60 BPM | BODY MASS INDEX: 33.12 KG/M2 | DIASTOLIC BLOOD PRESSURE: 68 MMHG

## 2023-08-17 DIAGNOSIS — Z00.00 MEDICARE ANNUAL WELLNESS VISIT, SUBSEQUENT: Primary | ICD-10-CM

## 2023-08-17 PROCEDURE — 1123F ACP DISCUSS/DSCN MKR DOCD: CPT | Performed by: FAMILY MEDICINE

## 2023-08-17 PROCEDURE — G0439 PPPS, SUBSEQ VISIT: HCPCS | Performed by: FAMILY MEDICINE

## 2023-08-17 ASSESSMENT — PATIENT HEALTH QUESTIONNAIRE - PHQ9
SUM OF ALL RESPONSES TO PHQ9 QUESTIONS 1 & 2: 0
2. FEELING DOWN, DEPRESSED OR HOPELESS: 0
SUM OF ALL RESPONSES TO PHQ QUESTIONS 1-9: 0
1. LITTLE INTEREST OR PLEASURE IN DOING THINGS: 0
SUM OF ALL RESPONSES TO PHQ QUESTIONS 1-9: 0

## 2023-08-17 ASSESSMENT — LIFESTYLE VARIABLES
HOW OFTEN DO YOU HAVE A DRINK CONTAINING ALCOHOL: NEVER
HOW MANY STANDARD DRINKS CONTAINING ALCOHOL DO YOU HAVE ON A TYPICAL DAY: PATIENT DOES NOT DRINK

## 2023-08-22 ENCOUNTER — TELEPHONE (OUTPATIENT)
Dept: CARDIOLOGY CLINIC | Age: 88
End: 2023-08-22

## 2023-08-22 NOTE — TELEPHONE ENCOUNTER
Called and left message for patient to reschedule appt with Dr. Tyler Hurt on 09/05/23  To see an APRN instead. If patient calls back please move to APRN any appt that works best for him.

## 2023-08-28 ENCOUNTER — ANTI-COAG VISIT (OUTPATIENT)
Dept: CARDIOLOGY CLINIC | Age: 88
End: 2023-08-28
Payer: MEDICARE

## 2023-08-28 DIAGNOSIS — I87.2 DEEP VENOUS INSUFFICIENCY: Chronic | ICD-10-CM

## 2023-08-28 DIAGNOSIS — Z79.01 CHRONIC ANTICOAGULATION: Primary | ICD-10-CM

## 2023-08-28 LAB
INTERNATIONAL NORMALIZATION RATIO, POC: 2.4
PROTHROMBIN TIME, POC: 25.7

## 2023-08-28 PROCEDURE — 93793 ANTICOAG MGMT PT WARFARIN: CPT | Performed by: NURSE PRACTITIONER

## 2023-09-11 ENCOUNTER — OFFICE VISIT (OUTPATIENT)
Dept: CARDIOLOGY CLINIC | Age: 88
End: 2023-09-11
Payer: MEDICARE

## 2023-09-11 VITALS
HEIGHT: 67 IN | SYSTOLIC BLOOD PRESSURE: 120 MMHG | HEART RATE: 72 BPM | OXYGEN SATURATION: 97 % | WEIGHT: 210 LBS | BODY MASS INDEX: 32.96 KG/M2 | DIASTOLIC BLOOD PRESSURE: 80 MMHG

## 2023-09-11 DIAGNOSIS — I50.22 CHRONIC SYSTOLIC (CONGESTIVE) HEART FAILURE (HCC): Primary | ICD-10-CM

## 2023-09-11 DIAGNOSIS — Z95.2 HISTORY OF TRANSCATHETER AORTIC VALVE REPLACEMENT (TAVR): ICD-10-CM

## 2023-09-11 DIAGNOSIS — I44.2 COMPLETE HEART BLOCK (HCC): ICD-10-CM

## 2023-09-11 DIAGNOSIS — I87.2 DEEP VENOUS INSUFFICIENCY: Chronic | ICD-10-CM

## 2023-09-11 DIAGNOSIS — I10 ESSENTIAL HYPERTENSION: ICD-10-CM

## 2023-09-11 DIAGNOSIS — E78.2 MIXED HYPERLIPIDEMIA: ICD-10-CM

## 2023-09-11 DIAGNOSIS — I48.92 ATRIAL FLUTTER, PAROXYSMAL (HCC): ICD-10-CM

## 2023-09-11 DIAGNOSIS — Z95.0 PACEMAKER: ICD-10-CM

## 2023-09-11 DIAGNOSIS — Z79.01 CHRONIC ANTICOAGULATION: ICD-10-CM

## 2023-09-11 LAB
INTERNATIONAL NORMALIZATION RATIO, POC: 2.5
PROTHROMBIN TIME, POC: 25.9

## 2023-09-11 PROCEDURE — 99214 OFFICE O/P EST MOD 30 MIN: CPT | Performed by: NURSE PRACTITIONER

## 2023-09-11 PROCEDURE — 1036F TOBACCO NON-USER: CPT | Performed by: NURSE PRACTITIONER

## 2023-09-11 PROCEDURE — 1123F ACP DISCUSS/DSCN MKR DOCD: CPT | Performed by: NURSE PRACTITIONER

## 2023-09-11 PROCEDURE — G8427 DOCREV CUR MEDS BY ELIG CLIN: HCPCS | Performed by: NURSE PRACTITIONER

## 2023-09-11 PROCEDURE — 93280 PM DEVICE PROGR EVAL DUAL: CPT | Performed by: NURSE PRACTITIONER

## 2023-09-11 PROCEDURE — G8417 CALC BMI ABV UP PARAM F/U: HCPCS | Performed by: NURSE PRACTITIONER

## 2023-09-11 NOTE — PATIENT INSTRUCTIONS
New instructions for today:  [place] at the [facility] and [facility] located on the first floor of [address] [place] [address] through hospital main entrance and turn immediately to your left. Date/Time:     Patient's contact number:  [phone] (home)     Echocardiogram -  No prep. Takes approximately 30 min. An echocardiogram uses sound waves to produce images of your heart. This commonly used test allows your doctor to see how your heart is beating and pumping blood. Your doctor can use the images from an echocardiogram to identify various abnormalities in the heart muscle and valves. This test has 2 parts: You will be asked to disrobe from the waist up and given a gown to wear. The technologist will then hook up an EKG monitor to you for the entire exam.   You will then have an ultrasound of your heart (echocardiogram) to assess the heart muscle, heart valves and heart function. You may eat and take any medicines before the exam.     If you need to change your appointment, please call outpatient scheduling at [phone]. Patient Instructions:  Continue current medications as prescribed. Always keep a current medication list. Bring your medications to every office visit. Continue to follow up with primary care provider for non cardiac medical problems. Call the office with any problems, questions or concerns at [phone]. If you have been asked to keep a blood pressure log, do so for 2 weeks. Call the office to report readings to the triage nurse at [phone]. Follow up with cardiologist as scheduled. The following educational material has been included in this after visit summary for your review: Life simple 7. Heart health. Life simple 7  1) Manage blood pressure - high blood pressure is a major risk factor for heart disease and stroke. Keeping blood pressure in health range reduces strain on your heart, arteries and kidneys.   Blood pressure

## 2023-09-11 NOTE — PROGRESS NOTES
130/80 or less. Call the office with any problems, questions or concerns at [phone]. Cardiology follow up as scheduled in [address] [facility] appointments. Educational included in patient instructions. Heart health.       Gus Sarabia, APRN

## 2023-09-27 DIAGNOSIS — E53.8 VITAMIN B 12 DEFICIENCY: ICD-10-CM

## 2023-09-27 DIAGNOSIS — I10 ESSENTIAL HYPERTENSION: Primary | Chronic | ICD-10-CM

## 2023-09-27 DIAGNOSIS — E55.9 VITAMIN D DEFICIENCY: ICD-10-CM

## 2023-09-27 DIAGNOSIS — E78.2 MIXED HYPERLIPIDEMIA: Chronic | ICD-10-CM

## 2023-09-27 DIAGNOSIS — R25.1 TREMOR: ICD-10-CM

## 2023-10-03 RX ORDER — METOPROLOL SUCCINATE 25 MG/1
TABLET, EXTENDED RELEASE ORAL
Qty: 90 TABLET | Refills: 1 | Status: SHIPPED | OUTPATIENT
Start: 2023-10-03

## 2023-10-05 ENCOUNTER — OFFICE VISIT (OUTPATIENT)
Dept: PRIMARY CARE CLINIC | Age: 88
End: 2023-10-05
Payer: MEDICARE

## 2023-10-05 DIAGNOSIS — Z23 FLU VACCINE NEED: ICD-10-CM

## 2023-10-05 DIAGNOSIS — T14.8XXA PUNCTURE WOUND: Primary | ICD-10-CM

## 2023-10-05 PROCEDURE — G0008 ADMIN INFLUENZA VIRUS VAC: HCPCS | Performed by: FAMILY MEDICINE

## 2023-10-05 PROCEDURE — G8428 CUR MEDS NOT DOCUMENT: HCPCS | Performed by: FAMILY MEDICINE

## 2023-10-05 PROCEDURE — G8484 FLU IMMUNIZE NO ADMIN: HCPCS | Performed by: FAMILY MEDICINE

## 2023-10-05 PROCEDURE — 90694 VACC AIIV4 NO PRSRV 0.5ML IM: CPT | Performed by: FAMILY MEDICINE

## 2023-10-05 PROCEDURE — 90471 IMMUNIZATION ADMIN: CPT | Performed by: FAMILY MEDICINE

## 2023-10-05 PROCEDURE — 99213 OFFICE O/P EST LOW 20 MIN: CPT | Performed by: FAMILY MEDICINE

## 2023-10-05 PROCEDURE — 1036F TOBACCO NON-USER: CPT | Performed by: FAMILY MEDICINE

## 2023-10-05 PROCEDURE — 1123F ACP DISCUSS/DSCN MKR DOCD: CPT | Performed by: FAMILY MEDICINE

## 2023-10-05 PROCEDURE — G8417 CALC BMI ABV UP PARAM F/U: HCPCS | Performed by: FAMILY MEDICINE

## 2023-10-05 PROCEDURE — 90715 TDAP VACCINE 7 YRS/> IM: CPT | Performed by: FAMILY MEDICINE

## 2023-10-05 NOTE — PROGRESS NOTES
After obtaining consent, and per orders of Dr. Thiago huang, injection of Tdap given in Left deltoid by Evy Buchanna MA. Patient instructed to remain in clinic for 20 minutes afterwards, and to report any adverse reaction to me immediately. After obtaining consent, and per orders of Dr. Amador Murdock, injection of HD Flu given in Left deltoid by Evy Buchanan MA. Patient instructed to remain in clinic for 20 minutes afterwards, and to report any adverse reaction to me immediately.
appearance. HENT:      Head: Normocephalic and atraumatic. Nose: Nose normal. No congestion or rhinorrhea. Mouth/Throat:      Mouth: Mucous membranes are moist.   Eyes:      Extraocular Movements: Extraocular movements intact. Conjunctiva/sclera: Conjunctivae normal.   Cardiovascular:      Rate and Rhythm: Normal rate and regular rhythm. Pulmonary:      Effort: Pulmonary effort is normal.      Breath sounds: Normal breath sounds. No wheezing. Chest:      Chest wall: No tenderness. Abdominal:      General: Abdomen is flat. Bowel sounds are normal.      Tenderness: There is no abdominal tenderness. Musculoskeletal:         General: Tenderness present. Comments: He has 5 puncture wounds on the top of his foot. These are not draining or bleeding. He is able to move his toes without pain. He has no numbness of his toe or foot. Skin:     General: Skin is warm and dry. Neurological:      Mental Status: He is alert. Psychiatric:         Mood and Affect: Mood normal.         Behavior: Behavior normal.         Thought Content: Thought content normal.           No results found for this visit on 10/05/23.           Cr Adame MD

## 2023-10-11 ASSESSMENT — ENCOUNTER SYMPTOMS
DIARRHEA: 0
CONSTIPATION: 0
COUGH: 0
ABDOMINAL PAIN: 0
SHORTNESS OF BREATH: 0
NAUSEA: 0
TROUBLE SWALLOWING: 0
VOMITING: 0

## 2023-10-23 ENCOUNTER — ANTI-COAG VISIT (OUTPATIENT)
Dept: CARDIOLOGY CLINIC | Age: 88
End: 2023-10-23

## 2023-10-23 DIAGNOSIS — Z79.01 CHRONIC ANTICOAGULATION: Primary | ICD-10-CM

## 2023-10-23 DIAGNOSIS — I87.2 DEEP VENOUS INSUFFICIENCY: Chronic | ICD-10-CM

## 2023-10-23 LAB
INTERNATIONAL NORMALIZATION RATIO, POC: 2
PROTHROMBIN TIME, POC: 20.9

## 2023-10-27 DIAGNOSIS — R25.1 TREMOR: ICD-10-CM

## 2023-10-27 DIAGNOSIS — E53.8 VITAMIN B 12 DEFICIENCY: ICD-10-CM

## 2023-10-27 DIAGNOSIS — E55.9 VITAMIN D DEFICIENCY: ICD-10-CM

## 2023-10-27 DIAGNOSIS — E78.2 MIXED HYPERLIPIDEMIA: Chronic | ICD-10-CM

## 2023-10-27 DIAGNOSIS — I10 ESSENTIAL HYPERTENSION: Chronic | ICD-10-CM

## 2023-10-27 LAB
25(OH)D3 SERPL-MCNC: 56.6 NG/ML
ALBUMIN SERPL-MCNC: 4.2 G/DL (ref 3.5–5.2)
ALP SERPL-CCNC: 108 U/L (ref 40–130)
ALT SERPL-CCNC: 12 U/L (ref 5–41)
ANION GAP SERPL CALCULATED.3IONS-SCNC: 13 MMOL/L (ref 7–19)
AST SERPL-CCNC: 18 U/L (ref 5–40)
BASOPHILS # BLD: 0.1 K/UL (ref 0–0.2)
BASOPHILS NFR BLD: 0.9 % (ref 0–1)
BILIRUB DIRECT SERPL-MCNC: 0.1 MG/DL (ref 0–0.3)
BILIRUB INDIRECT SERPL-MCNC: 0.4 MG/DL (ref 0.1–1)
BILIRUB SERPL-MCNC: 0.5 MG/DL (ref 0.2–1.2)
BUN SERPL-MCNC: 16 MG/DL (ref 8–23)
CALCIUM SERPL-MCNC: 9.1 MG/DL (ref 8.8–10.2)
CHLORIDE SERPL-SCNC: 106 MMOL/L (ref 98–111)
CHOLEST SERPL-MCNC: 160 MG/DL (ref 160–199)
CO2 SERPL-SCNC: 26 MMOL/L (ref 22–29)
CREAT SERPL-MCNC: 1.1 MG/DL (ref 0.5–1.2)
EOSINOPHIL # BLD: 0.2 K/UL (ref 0–0.6)
EOSINOPHIL NFR BLD: 3.6 % (ref 0–5)
ERYTHROCYTE [DISTWIDTH] IN BLOOD BY AUTOMATED COUNT: 14.2 % (ref 11.5–14.5)
FOLATE SERPL-MCNC: >20 NG/ML (ref 4.5–32.2)
GLUCOSE SERPL-MCNC: 89 MG/DL (ref 74–109)
HCT VFR BLD AUTO: 40.1 % (ref 42–52)
HDLC SERPL-MCNC: 63 MG/DL (ref 55–121)
HGB BLD-MCNC: 13 G/DL (ref 14–18)
IMM GRANULOCYTES # BLD: 0 K/UL
LDLC SERPL CALC-MCNC: 73 MG/DL
LYMPHOCYTES # BLD: 2.3 K/UL (ref 1.1–4.5)
LYMPHOCYTES NFR BLD: 39.1 % (ref 20–40)
MCH RBC QN AUTO: 33.6 PG (ref 27–31)
MCHC RBC AUTO-ENTMCNC: 32.4 G/DL (ref 33–37)
MCV RBC AUTO: 103.6 FL (ref 80–94)
MONOCYTES # BLD: 0.4 K/UL (ref 0–0.9)
MONOCYTES NFR BLD: 6.4 % (ref 0–10)
NEUTROPHILS # BLD: 2.9 K/UL (ref 1.5–7.5)
NEUTS SEG NFR BLD: 49.8 % (ref 50–65)
PLATELET # BLD AUTO: 113 K/UL (ref 130–400)
PMV BLD AUTO: 10.1 FL (ref 9.4–12.4)
POTASSIUM SERPL-SCNC: 4 MMOL/L (ref 3.5–5)
PROT SERPL-MCNC: 6.5 G/DL (ref 6.6–8.7)
RBC # BLD AUTO: 3.87 M/UL (ref 4.7–6.1)
SODIUM SERPL-SCNC: 145 MMOL/L (ref 136–145)
T4 FREE SERPL-MCNC: 1.01 NG/DL (ref 0.93–1.7)
TRIGL SERPL-MCNC: 122 MG/DL (ref 0–149)
TSH SERPL DL<=0.005 MIU/L-ACNC: 4.64 UIU/ML (ref 0.35–5.5)
VIT B12 SERPL-MCNC: 732 PG/ML (ref 211–946)
WBC # BLD AUTO: 5.8 K/UL (ref 4.8–10.8)

## 2023-11-08 ENCOUNTER — OFFICE VISIT (OUTPATIENT)
Dept: NEUROLOGY | Age: 88
End: 2023-11-08
Payer: MEDICARE

## 2023-11-08 VITALS
HEIGHT: 67 IN | DIASTOLIC BLOOD PRESSURE: 90 MMHG | BODY MASS INDEX: 32.96 KG/M2 | WEIGHT: 210 LBS | SYSTOLIC BLOOD PRESSURE: 140 MMHG

## 2023-11-08 DIAGNOSIS — R25.1 TREMOR: Primary | ICD-10-CM

## 2023-11-08 DIAGNOSIS — U09.9 POST COVID-19 CONDITION, UNSPECIFIED: ICD-10-CM

## 2023-11-08 DIAGNOSIS — G20.A1 PARKINSON'S DISEASE, UNSPECIFIED WHETHER DYSKINESIA PRESENT, UNSPECIFIED WHETHER MANIFESTATIONS FLUCTUATE: ICD-10-CM

## 2023-11-08 DIAGNOSIS — R26.9 GAIT ABNORMALITY: ICD-10-CM

## 2023-11-08 PROCEDURE — 99214 OFFICE O/P EST MOD 30 MIN: CPT | Performed by: PSYCHIATRY & NEUROLOGY

## 2023-11-08 PROCEDURE — G8484 FLU IMMUNIZE NO ADMIN: HCPCS | Performed by: PSYCHIATRY & NEUROLOGY

## 2023-11-08 PROCEDURE — G8417 CALC BMI ABV UP PARAM F/U: HCPCS | Performed by: PSYCHIATRY & NEUROLOGY

## 2023-11-08 PROCEDURE — G8427 DOCREV CUR MEDS BY ELIG CLIN: HCPCS | Performed by: PSYCHIATRY & NEUROLOGY

## 2023-11-08 PROCEDURE — 1036F TOBACCO NON-USER: CPT | Performed by: PSYCHIATRY & NEUROLOGY

## 2023-11-08 PROCEDURE — 1123F ACP DISCUSS/DSCN MKR DOCD: CPT | Performed by: PSYCHIATRY & NEUROLOGY

## 2023-11-08 NOTE — PROGRESS NOTES
Review of Systems    Constitutional - No fever or chills. No diaphoresis or significant fatigue. HENT -  No tinnitus or significant hearing loss. Eyes - no sudden vision change or eye pain  Respiratory - no significant shortness of breath or cough  Cardiovascular - no chest pain No palpitations or significant leg swelling  Gastrointestinal - no abdominal swelling or pain. Genitourinary - No difficulty urinating, dysuria  Musculoskeletal - no back pain or myalgia. Skin - no color change or rash  Neurologic - No seizures. No lateralizing weakness. Hematologic - no easy bruising or excessive bleeding. Psychiatric - no severe anxiety or nervousness. All other review of systems are negative.
to think about it. The patient indicated understanding of the management plan. He is to follow-up with me in approximately 3 months and call with any further problems. Plan    No orders of the defined types were placed in this encounter. Orders Placed This Encounter   Medications    carbidopa-levodopa (SINEMET)  MG per tablet     Sig: Take 1 tablet by mouth 3 times daily     Dispense:  90 tablet     Refill:  3         Return in about 3 months (around 2/8/2024). EMR Dragon/transcription disclaimer:Significant part of this  encounter note is electronic transcription/translation of spoken language to printed text. The electronic translation of spoken language may be erroneous, or at times, nonsensical words or phrases may be inadvertently transcribed.  Although I have reviewed the note for such errors, some may still exist.

## 2023-11-17 ENCOUNTER — OFFICE VISIT (OUTPATIENT)
Dept: PRIMARY CARE CLINIC | Age: 88
End: 2023-11-17

## 2023-11-17 VITALS
WEIGHT: 206 LBS | HEIGHT: 67 IN | TEMPERATURE: 98.2 F | SYSTOLIC BLOOD PRESSURE: 134 MMHG | OXYGEN SATURATION: 94 % | BODY MASS INDEX: 32.33 KG/M2 | HEART RATE: 71 BPM | DIASTOLIC BLOOD PRESSURE: 78 MMHG

## 2023-11-17 DIAGNOSIS — I10 ESSENTIAL HYPERTENSION: Chronic | ICD-10-CM

## 2023-11-17 NOTE — PROGRESS NOTES
Reason For Visit:   He is here for a follow up on HTN. Combined HPI and A/P:      Diagnosis Orders   1. Essential hypertension  losartan (COZAAR) 50 MG tablet          1.) Hypertension:        - This is a chronic, controlled problem. He does not check his BP at home. The patient does follow a low salt diet. The patient denies headaches, blurry vision, dizziness, heart palpitations, or chest pain. - The patient's BP is at goal.        - The patient currently takes Losartan 50 mg 1 tab daily and Metoprolol succinate ER 25 mg 1 daily. He reports he is compliant with these. I will continue these medications at the current dose. - The patient has recently had labs to monitor kidney function and electrolytes       2.) Tremors: He was started on Sinemet by neurology. He feels this has been helping with his tremors. Return in about 3 months (around 2/17/2024) for HTN, . We discussed use, benefit, and side effects of prescribed medications. All patient questions answered. Patient agreed with treatment plan. I reviewed available records in our system and care everywhere. In cases where records are not available, the records have been requested and will be reviewed when available. Subjective      Past Surgical History:   Procedure Laterality Date    AORTIC VALVE REPLACEMENT  01/21/2021    Transfemoral transcatheter aortic valve replacment(TAVR) using a 29 mm Arreaga Doe S3 valve.     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

## 2023-11-21 ENCOUNTER — APPOINTMENT (OUTPATIENT)
Dept: CT IMAGING | Age: 88
End: 2023-11-21
Payer: MEDICARE

## 2023-11-21 ENCOUNTER — HOSPITAL ENCOUNTER (EMERGENCY)
Age: 88
Discharge: HOME | End: 2023-11-21
Payer: MEDICARE

## 2023-11-21 VITALS
HEIGHT: 67 IN | HEART RATE: 70 BPM | SYSTOLIC BLOOD PRESSURE: 140 MMHG | TEMPERATURE: 98.2 F | DIASTOLIC BLOOD PRESSURE: 66 MMHG | OXYGEN SATURATION: 98 % | RESPIRATION RATE: 18 BRPM | WEIGHT: 206 LBS | BODY MASS INDEX: 32.33 KG/M2
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
## 2023-11-21 DIAGNOSIS — S09.90XA INJURY OF HEAD, INITIAL ENCOUNTER: ICD-10-CM

## 2023-11-21 DIAGNOSIS — S01.01XA LACERATION OF SCALP, INITIAL ENCOUNTER: Primary | ICD-10-CM

## 2023-11-21 PROCEDURE — 72125 CT NECK SPINE W/O DYE: CPT

## 2023-11-21 PROCEDURE — 6370000000 HC RX 637 (ALT 250 FOR IP): Performed by: PHYSICIAN ASSISTANT

## 2023-11-21 PROCEDURE — 12002 RPR S/N/AX/GEN/TRNK2.6-7.5CM: CPT

## 2023-11-21 PROCEDURE — 99284 EMERGENCY DEPT VISIT MOD MDM: CPT

## 2023-11-21 PROCEDURE — 2500000003 HC RX 250 WO HCPCS: Performed by: PHYSICIAN ASSISTANT

## 2023-11-21 PROCEDURE — 70450 CT HEAD/BRAIN W/O DYE: CPT

## 2023-11-21 RX ORDER — ONDANSETRON 2 MG/ML
INJECTION INTRAMUSCULAR; INTRAVENOUS
Status: DISCONTINUED
Start: 2023-11-21 | End: 2023-11-21 | Stop reason: WASHOUT

## 2023-11-21 RX ORDER — LIDOCAINE HYDROCHLORIDE AND EPINEPHRINE 10; 10 MG/ML; UG/ML
20 INJECTION, SOLUTION INFILTRATION; PERINEURAL ONCE
Status: COMPLETED | OUTPATIENT
Start: 2023-11-21 | End: 2023-11-21

## 2023-11-21 RX ADMIN — Medication 3 ML: at 10:39

## 2023-11-21 RX ADMIN — LIDOCAINE HYDROCHLORIDE,EPINEPHRINE BITARTRATE 20 ML: 10; .01 INJECTION, SOLUTION INFILTRATION; PERINEURAL at 10:41

## 2023-11-21 ASSESSMENT — ENCOUNTER SYMPTOMS
SHORTNESS OF BREATH: 0
COUGH: 0
PHOTOPHOBIA: 0
COLOR CHANGE: 0
EYE DISCHARGE: 0
APNEA: 0
BACK PAIN: 0
EYE ITCHING: 0

## 2023-11-21 NOTE — ED NOTES
Pt ambulated independently with cane in room       [facility] [address]  11/21/23 [id number]

## 2023-11-21 NOTE — DISCHARGE INSTRUCTIONS
10-14 days staple removal warm water and soap can run over starting tonight  Sxs redness swelling pain drainage needs abx.

## 2023-11-26 RX ORDER — LOSARTAN POTASSIUM 50 MG/1
50 TABLET ORAL DAILY
Qty: 90 TABLET | Refills: 3 | Status: SHIPPED | OUTPATIENT
Start: 2023-11-26

## 2023-11-26 ASSESSMENT — ENCOUNTER SYMPTOMS
TROUBLE SWALLOWING: 0
NAUSEA: 0
SHORTNESS OF BREATH: 0
VOMITING: 0
ABDOMINAL PAIN: 0
DIARRHEA: 0
CONSTIPATION: 0
COUGH: 0

## 2023-12-01 ENCOUNTER — HOSPITAL ENCOUNTER (EMERGENCY)
Age: 88
Discharge: HOME | End: 2023-12-01
Payer: MEDICARE

## 2023-12-01 VITALS
HEART RATE: 62 BPM | RESPIRATION RATE: 16 BRPM | SYSTOLIC BLOOD PRESSURE: 121 MMHG | TEMPERATURE: 98.1 F | DIASTOLIC BLOOD PRESSURE: 57 MMHG | OXYGEN SATURATION: 99 %

## 2023-12-01 DIAGNOSIS — Z48.02 ENCOUNTER FOR STAPLE REMOVAL: Primary | ICD-10-CM

## 2023-12-01 PROCEDURE — 99282 EMERGENCY DEPT VISIT SF MDM: CPT

## 2023-12-01 ASSESSMENT — PAIN - FUNCTIONAL ASSESSMENT: PAIN_FUNCTIONAL_ASSESSMENT: NONE - DENIES PAIN

## 2023-12-01 ASSESSMENT — ENCOUNTER SYMPTOMS: RESPIRATORY NEGATIVE: 1

## 2023-12-04 ENCOUNTER — ANTI-COAG VISIT (OUTPATIENT)
Dept: CARDIOLOGY CLINIC | Age: 88
End: 2023-12-04
Payer: MEDICARE

## 2023-12-04 DIAGNOSIS — Z79.01 CHRONIC ANTICOAGULATION: Primary | ICD-10-CM

## 2023-12-04 DIAGNOSIS — I87.2 DEEP VENOUS INSUFFICIENCY: Chronic | ICD-10-CM

## 2023-12-04 LAB
INTERNATIONAL NORMALIZATION RATIO, POC: 2.8
PROTHROMBIN TIME, POC: 28.5

## 2023-12-04 PROCEDURE — 93793 ANTICOAG MGMT PT WARFARIN: CPT | Performed by: NURSE PRACTITIONER

## 2023-12-28 ENCOUNTER — TELEPHONE (OUTPATIENT)
Dept: PRIMARY CARE CLINIC | Age: 88
End: 2023-12-28

## 2023-12-28 DIAGNOSIS — K50.119 CROHN'S DISEASE OF LARGE INTESTINE WITH COMPLICATION (HCC): Primary | ICD-10-CM

## 2023-12-28 NOTE — TELEPHONE ENCOUNTER
----- Message from Donato Dsouza sent at 12/27/2023 12:26 PM CST -----  Subject: Referral Request    Reason for referral request? PT needs a referral for gastrologists. Provider patient wants to be referred to(if known): Nolberto Burns    Provider Phone Number(if known): Additional Information for Provider?  Pt needs to get this done ASAP   please.   ---------------------------------------------------------------------------  --------------  Louann Marker INFO    [phone]; OK to leave message on voicemail  ---------------------------------------------------------------------------  --------------

## 2024-01-08 NOTE — TELEPHONE ENCOUNTER
Requested Prescriptions     Pending Prescriptions Disp Refills    carbidopa-levodopa (SINEMET)  MG per tablet [Pharmacy Med Name: carbidopa 25 mg-levodopa 100 mg tablet] 90 tablet 3     Sig: TAKE ONE TABLET BY MOUTH THREE TIMES DAILY       Last Office Visit: 11/8/2023  Next Office Visit: 2/8/2024  Last Medication Refill: 11/8/2023 with 3 RF

## 2024-01-31 DIAGNOSIS — E53.8 VITAMIN B 12 DEFICIENCY: ICD-10-CM

## 2024-02-01 ENCOUNTER — OFFICE VISIT (OUTPATIENT)
Dept: GASTROENTEROLOGY | Age: 89
End: 2024-02-01
Payer: MEDICARE

## 2024-02-01 VITALS
OXYGEN SATURATION: 96 % | BODY MASS INDEX: 33.19 KG/M2 | HEART RATE: 80 BPM | DIASTOLIC BLOOD PRESSURE: 78 MMHG | SYSTOLIC BLOOD PRESSURE: 130 MMHG | HEIGHT: 68 IN | WEIGHT: 219 LBS

## 2024-02-01 DIAGNOSIS — G20.A1 PARKINSON'S DISEASE, UNSPECIFIED WHETHER DYSKINESIA PRESENT, UNSPECIFIED WHETHER MANIFESTATIONS FLUCTUATE: ICD-10-CM

## 2024-02-01 DIAGNOSIS — K50.919 CROHN'S DISEASE WITH COMPLICATION, UNSPECIFIED GASTROINTESTINAL TRACT LOCATION (HCC): Primary | ICD-10-CM

## 2024-02-01 DIAGNOSIS — R19.7 DIARRHEA, UNSPECIFIED TYPE: ICD-10-CM

## 2024-02-01 DIAGNOSIS — K50.919 CROHN'S DISEASE WITH COMPLICATION, UNSPECIFIED GASTROINTESTINAL TRACT LOCATION (HCC): ICD-10-CM

## 2024-02-01 LAB
CRP SERPL HS-MCNC: <0.3 MG/DL (ref 0–0.5)
ERYTHROCYTE [SEDIMENTATION RATE] IN BLOOD BY WESTERGREN METHOD: 15 MM/HR (ref 0–15)

## 2024-02-01 PROCEDURE — G8417 CALC BMI ABV UP PARAM F/U: HCPCS | Performed by: NURSE PRACTITIONER

## 2024-02-01 PROCEDURE — 1123F ACP DISCUSS/DSCN MKR DOCD: CPT | Performed by: NURSE PRACTITIONER

## 2024-02-01 PROCEDURE — G8484 FLU IMMUNIZE NO ADMIN: HCPCS | Performed by: NURSE PRACTITIONER

## 2024-02-01 PROCEDURE — 1036F TOBACCO NON-USER: CPT | Performed by: NURSE PRACTITIONER

## 2024-02-01 PROCEDURE — G8427 DOCREV CUR MEDS BY ELIG CLIN: HCPCS | Performed by: NURSE PRACTITIONER

## 2024-02-01 PROCEDURE — 99204 OFFICE O/P NEW MOD 45 MIN: CPT | Performed by: NURSE PRACTITIONER

## 2024-02-01 RX ORDER — CYANOCOBALAMIN 1000 UG/ML
INJECTION, SOLUTION INTRAMUSCULAR; SUBCUTANEOUS
Qty: 6 ML | Refills: 0 | Status: SHIPPED | OUTPATIENT
Start: 2024-02-01

## 2024-02-01 NOTE — PROGRESS NOTES
Subjective:     Patient ID: Bernard Vidales is a 88 y.o. male  PCP: Chino Mari MD  Referring Provider: Chino Mari, *    HPI  Patient presents to the office today with the following complaints: New Patient      Patient seen in the office today to establish care, he has been seen by Dr. Hector and would like to establish with our office and Dr. Casey   He was diagnosed with Crohn's disease in 1989   He also had a Colon resection in 1989 due to crohn's  He has been on Pentasa for 30 years and would like to switch to something else due to the cost of the medication and he has been having increased   diarrhea lately over the past 1 year   Reports there is no blood in the stool      Assessment:     1. Crohn's disease with complication, unspecified gastrointestinal tract location (HCC)  -     Calprotectin Stool; Future  -     Sedimentation Rate; Future  -     C-Reactive Protein; Future  2. Diarrhea, unspecified type  3. Parkinson's disease, unspecified whether dyskinesia present, unspecified whether manifestations fluctuate           Plan:   Labs today   Stop pentasa   Start Lialda 3 tablets daily in the AM   Follow up in 3 months   Orders  Orders Placed This Encounter   Procedures    Calprotectin Stool     Standing Status:   Future     Standing Expiration Date:   1/31/2025    Sedimentation Rate     Standing Status:   Future     Number of Occurrences:   1     Standing Expiration Date:   2/1/2025    C-Reactive Protein     Standing Status:   Future     Number of Occurrences:   1     Standing Expiration Date:   2/1/2025     Medications  Orders Placed This Encounter   Medications    mesalamine (LIALDA) 1.2 g EC tablet     Sig: Take 3 tablets by mouth daily (with breakfast)     Dispense:  90 tablet     Refill:  11         Patient History:     Past Medical History:   Diagnosis Date    Cancer (HCC)     skin    CHF (congestive heart failure) (HCC)     Cholelithiasis     CKD (chronic kidney disease) stage 3, GFR

## 2024-02-02 ENCOUNTER — TELEPHONE (OUTPATIENT)
Dept: GASTROENTEROLOGY | Age: 89
End: 2024-02-02

## 2024-02-02 DIAGNOSIS — K50.919 CROHN'S DISEASE WITH COMPLICATION, UNSPECIFIED GASTROINTESTINAL TRACT LOCATION (HCC): ICD-10-CM

## 2024-02-02 RX ORDER — MESALAMINE 1.2 G/1
3600 TABLET, DELAYED RELEASE ORAL
Qty: 90 TABLET | Refills: 11 | Status: SHIPPED | OUTPATIENT
Start: 2024-02-02

## 2024-02-02 ASSESSMENT — ENCOUNTER SYMPTOMS
CONSTIPATION: 0
DIARRHEA: 1
VOMITING: 0
BLOOD IN STOOL: 0
ABDOMINAL PAIN: 0
RECTAL PAIN: 0
COUGH: 0
NAUSEA: 0
ABDOMINAL DISTENTION: 0
ANAL BLEEDING: 0
CHOKING: 0
TROUBLE SWALLOWING: 0
SHORTNESS OF BREATH: 0

## 2024-02-02 NOTE — TELEPHONE ENCOUNTER
02- Patient called to see when he needed to stop the current medication as that he has already taken one dose this am.     Recommend to start new medication tomorrow.     Oked per patient

## 2024-02-02 NOTE — TELEPHONE ENCOUNTER
----- Message from RUBEN Rogers sent at 2/2/2024  8:49 AM CST -----  Will you please call Bernard and let him know that we have changed his Crohn's medication to Lialda and I have sent the prescription to his pharmacy. He will take 3 tablets every morning  Thank you  Terri

## 2024-02-02 NOTE — TELEPHONE ENCOUNTER
02-02-24  Pt has been notified of new Rx sent to [facility] pharmacy.  Pt voiced understanding and appreciation.

## 2024-02-05 ENCOUNTER — ANTI-COAG VISIT (OUTPATIENT)
Dept: CARDIOLOGY CLINIC | Age: 89
End: 2024-02-05
Payer: MEDICARE

## 2024-02-05 DIAGNOSIS — I87.2 DEEP VENOUS INSUFFICIENCY: Chronic | ICD-10-CM

## 2024-02-05 DIAGNOSIS — I48.92 ATRIAL FLUTTER, PAROXYSMAL (HCC): Primary | ICD-10-CM

## 2024-02-05 DIAGNOSIS — Z79.01 CHRONIC ANTICOAGULATION: ICD-10-CM

## 2024-02-05 LAB
CALPROTECTIN STL-MCNT: 113 UG/G
INTERNATIONAL NORMALIZATION RATIO, POC: 2.6
PROTHROMBIN TIME, POC: 26.8

## 2024-02-05 PROCEDURE — 93793 ANTICOAG MGMT PT WARFARIN: CPT | Performed by: NURSE PRACTITIONER

## 2024-02-06 DIAGNOSIS — Z95.2 S/P TAVR (TRANSCATHETER AORTIC VALVE REPLACEMENT): ICD-10-CM

## 2024-02-06 RX ORDER — WARFARIN SODIUM 7.5 MG/1
TABLET ORAL
Qty: 135 TABLET | Refills: 2 | Status: SHIPPED | OUTPATIENT
Start: 2024-02-06

## 2024-02-07 ENCOUNTER — TELEPHONE (OUTPATIENT)
Dept: GASTROENTEROLOGY | Age: 89
End: 2024-02-07

## 2024-02-07 NOTE — TELEPHONE ENCOUNTER
2-7-24    Thanks Terri,  pt has been notified of results and recommendations.  Results routed to PCP, Dr. Mari      Personal reminder put in epic.

## 2024-02-07 NOTE — RESULT ENCOUNTER NOTE
Inflammatory labs sedate and CRP are normal Fecal calprotectin slightly elevated   We will repeat fecal calprotectin in 6 months after he starts Lialda

## 2024-02-07 NOTE — TELEPHONE ENCOUNTER
----- Message from RUBEN Rogers sent at 2/6/2024  9:09 PM CST -----  Inflammatory labs sedate and CRP are normal Fecal calprotectin slightly elevated   We will repeat fecal calprotectin in 6 months after he starts Lialda

## 2024-02-08 ENCOUNTER — OFFICE VISIT (OUTPATIENT)
Dept: NEUROLOGY | Age: 89
End: 2024-02-08
Payer: MEDICARE

## 2024-02-08 VITALS
HEIGHT: 68 IN | HEART RATE: 61 BPM | WEIGHT: 210 LBS | BODY MASS INDEX: 31.83 KG/M2 | DIASTOLIC BLOOD PRESSURE: 80 MMHG | SYSTOLIC BLOOD PRESSURE: 170 MMHG

## 2024-02-08 DIAGNOSIS — R25.1 TREMOR: Primary | ICD-10-CM

## 2024-02-08 DIAGNOSIS — G20.A1 PARKINSON'S DISEASE, UNSPECIFIED WHETHER DYSKINESIA PRESENT, UNSPECIFIED WHETHER MANIFESTATIONS FLUCTUATE: ICD-10-CM

## 2024-02-08 DIAGNOSIS — R26.9 GAIT ABNORMALITY: ICD-10-CM

## 2024-02-08 PROCEDURE — G8484 FLU IMMUNIZE NO ADMIN: HCPCS | Performed by: PSYCHIATRY & NEUROLOGY

## 2024-02-08 PROCEDURE — G8417 CALC BMI ABV UP PARAM F/U: HCPCS | Performed by: PSYCHIATRY & NEUROLOGY

## 2024-02-08 PROCEDURE — 1123F ACP DISCUSS/DSCN MKR DOCD: CPT | Performed by: PSYCHIATRY & NEUROLOGY

## 2024-02-08 PROCEDURE — 1036F TOBACCO NON-USER: CPT | Performed by: PSYCHIATRY & NEUROLOGY

## 2024-02-08 PROCEDURE — G8427 DOCREV CUR MEDS BY ELIG CLIN: HCPCS | Performed by: PSYCHIATRY & NEUROLOGY

## 2024-02-08 PROCEDURE — 99214 OFFICE O/P EST MOD 30 MIN: CPT | Performed by: PSYCHIATRY & NEUROLOGY

## 2024-02-08 NOTE — PROGRESS NOTES
Review of Systems    Constitutional - No fever or chills.  No diaphoresis or significant fatigue.  HENT -  No tinnitus or significant hearing loss.  Eyes - no sudden vision change or eye pain  Respiratory - no significant shortness of breath or cough  Cardiovascular - no chest pain No palpitations or significant leg swelling  Gastrointestinal - no abdominal swelling or pain.    Genitourinary - No difficulty urinating, dysuria  Musculoskeletal - no back pain or myalgia.  Skin - no color change or rash  Neurologic - No seizures.  No lateralizing weakness.  Hematologic - no easy bruising or excessive bleeding.  Psychiatric - no severe anxiety or nervousness.   All other review of systems are negative.    
appears symmetric, good expansion,   normal effort without use of accessory muscles  Musculoskeletal - no significant wasting of muscles noted, no bony deformities  Extremities-no clubbing, cyanosis or edema  Skin - warm, dry, and intact.  No rash, erythema, or pallor.  Psychiatric - mood, affect, and behavior appear normal.      Neurological exam  Awake, alert, fluent oriented  appropriate affect  Attention and concentration appear appropriate  Recent and remote memory appears unremarkable  Speech normal without dysarthria  No clear issues with language of fund of knowledge    Cranial Nerve Exam     CN III, IV,VI-EOMI, No nystagmus, conjugate eye movements, no ptosis  CN VII-no facial assymetry      Motor Exam  antigravity throughout upper and lower extremities bilaterally    Tremors-resting tremor in the hands    Gait  Slightly slowed with a cane        Lab Results   Component Value Date    EXTHEQQC38 732 10/27/2023     Lab Results   Component Value Date    WBC 5.8 10/27/2023    HGB 13.0 (L) 10/27/2023    HCT 40.1 (L) 10/27/2023    .6 (H) 10/27/2023     (L) 10/27/2023     Lab Results   Component Value Date     10/27/2023    K 4.0 10/27/2023     10/27/2023    CO2 26 10/27/2023    BUN 16 10/27/2023    CREATININE 1.1 10/27/2023    GLUCOSE 89 10/27/2023    CALCIUM 9.1 10/27/2023    PROT 6.5 (L) 10/27/2023    LABALBU 4.2 10/27/2023    BILITOT 0.5 10/27/2023    ALKPHOS 108 10/27/2023    AST 18 10/27/2023    ALT 12 10/27/2023    LABGLOM >60 10/27/2023    GFRAA >59 07/14/2022           Assessment    ICD-10-CM    1. Tremor  R25.1       2. Parkinson's disease, unspecified whether dyskinesia present, unspecified whether manifestations fluctuate  G20.A1       3. Gait abnormality  R26.9               His neurological examination was significant for a resting tremor in both hands.  He had some cogwheeling in the wrists with reinforcement.  His gait was slightly slowed with a cane.  Based upon his

## 2024-02-19 ENCOUNTER — OFFICE VISIT (OUTPATIENT)
Dept: PRIMARY CARE CLINIC | Age: 89
End: 2024-02-19

## 2024-02-19 VITALS
BODY MASS INDEX: 32.43 KG/M2 | HEART RATE: 60 BPM | OXYGEN SATURATION: 98 % | HEIGHT: 68 IN | DIASTOLIC BLOOD PRESSURE: 80 MMHG | SYSTOLIC BLOOD PRESSURE: 124 MMHG | WEIGHT: 214 LBS

## 2024-02-19 DIAGNOSIS — H61.22 IMPACTED CERUMEN OF LEFT EAR: ICD-10-CM

## 2024-02-19 DIAGNOSIS — N18.30 STAGE 3 CHRONIC KIDNEY DISEASE, UNSPECIFIED WHETHER STAGE 3A OR 3B CKD (HCC): ICD-10-CM

## 2024-02-19 DIAGNOSIS — G20.B2 PARKINSON'S DISEASE WITH DYSKINESIA AND FLUCTUATING MANIFESTATIONS: ICD-10-CM

## 2024-02-19 DIAGNOSIS — H35.3221 EXUDATIVE AGE-RELATED MACULAR DEGENERATION OF LEFT EYE WITH ACTIVE CHOROIDAL NEOVASCULARIZATION (HCC): ICD-10-CM

## 2024-02-19 DIAGNOSIS — D68.9 COAGULATION DEFECT (HCC): ICD-10-CM

## 2024-02-19 DIAGNOSIS — I20.89 STABLE ANGINA PECTORIS: ICD-10-CM

## 2024-02-19 DIAGNOSIS — I10 ESSENTIAL HYPERTENSION: Primary | Chronic | ICD-10-CM

## 2024-02-19 DIAGNOSIS — R53.82 CHRONIC FATIGUE: ICD-10-CM

## 2024-02-19 DIAGNOSIS — I50.22 CHRONIC SYSTOLIC (CONGESTIVE) HEART FAILURE (HCC): ICD-10-CM

## 2024-02-19 ASSESSMENT — PATIENT HEALTH QUESTIONNAIRE - PHQ9
2. FEELING DOWN, DEPRESSED OR HOPELESS: 0
SUM OF ALL RESPONSES TO PHQ QUESTIONS 1-9: 0
SUM OF ALL RESPONSES TO PHQ QUESTIONS 1-9: 0
SUM OF ALL RESPONSES TO PHQ9 QUESTIONS 1 & 2: 0
SUM OF ALL RESPONSES TO PHQ QUESTIONS 1-9: 0
1. LITTLE INTEREST OR PLEASURE IN DOING THINGS: 0
SUM OF ALL RESPONSES TO PHQ QUESTIONS 1-9: 0

## 2024-02-19 NOTE — PROGRESS NOTES
Reason For Visit:   He is here for a follow up on HTN.      Combined HPI and A/P:      Diagnosis Orders   1. Essential hypertension  Basic Metabolic Panel    Hepatic Function Panel    CBC with Auto Differential    Magnesium      2. Impacted cerumen of left ear  Ear wax removal      3. 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 choroidal neovascularization (HCC)        4. Chronic systolic (congestive) heart failure (HCC)        5. Coagulation defect (HCC)        6. Stage 3 chronic kidney disease, unspecified whether stage 3a or 3b CKD (HCC)        7. Parkinson's disease with dyskinesia and fluctuating manifestations  TSH with Reflex to FT4    Vitamin B12 & Folate      8. Stable angina pectoris        9. Chronic fatigue  TSH with Reflex to FT4    Vitamin B12 & Folate          1.) Hypertension/CKD3:        - This is a chronic, controlled problem. He does not check his BP at home.The patient does follow a low salt diet. The patient denies headaches, blurry vision, dizziness, heart palpitations, or chest pain.        - The patient's BP is at goal.        - The patient currently takes Losartan 50 mg 1 tab daily and Metoprolol succinate ER 25 mg 1 daily. He reports he is compliant with these. I will continue these medications at the current dose.        - The patient has recently had labs to monitor kidney function and electrolytes    2.) Decreased Hearing:      - The patient has chronic decreased hearing bilaterally. He feels like he has had increased difficulty hearing recently. He feels this may have started afgter being changed to Mesalamine for his Crohn's disease. I discussed this would be very unlikely to be caused by the medication changes. On exam his left canal is impacted with cerumen. This was flushed in office, with improvement of his hearing.     3.) Systolic Congestive heart Failure: This is a chronic, stable problem. He has mild leg edema. This is stable. He is currently taking

## 2024-02-25 PROBLEM — G20.B2 PARKINSON'S DISEASE WITH DYSKINESIA AND FLUCTUATING MANIFESTATIONS: Status: ACTIVE | Noted: 2023-03-07

## 2024-03-08 ENCOUNTER — HOSPITAL ENCOUNTER (OUTPATIENT)
Dept: NON INVASIVE DIAGNOSTICS | Age: 89
Discharge: HOME | End: 2024-03-08
Payer: MEDICARE

## 2024-03-08 DIAGNOSIS — Z95.2 HISTORY OF TRANSCATHETER AORTIC VALVE REPLACEMENT (TAVR): ICD-10-CM

## 2024-03-08 PROCEDURE — 6360000004 HC RX CONTRAST MEDICATION: Performed by: NURSE PRACTITIONER

## 2024-03-08 PROCEDURE — C8929 TTE W OR WO FOL WCON,DOPPLER: HCPCS

## 2024-03-08 RX ADMIN — PERFLUTREN 1.5 ML: 6.52 INJECTION, SUSPENSION INTRAVENOUS at 14:31

## 2024-03-14 ENCOUNTER — OFFICE VISIT (OUTPATIENT)
Dept: PRIMARY CARE CLINIC | Age: 89
End: 2024-03-14
Payer: MEDICARE

## 2024-03-14 VITALS
WEIGHT: 214 LBS | HEART RATE: 56 BPM | DIASTOLIC BLOOD PRESSURE: 68 MMHG | HEIGHT: 68 IN | BODY MASS INDEX: 32.43 KG/M2 | SYSTOLIC BLOOD PRESSURE: 118 MMHG | OXYGEN SATURATION: 98 %

## 2024-03-14 DIAGNOSIS — K40.90 NON-RECURRENT UNILATERAL INGUINAL HERNIA WITHOUT OBSTRUCTION OR GANGRENE: Primary | ICD-10-CM

## 2024-03-14 PROCEDURE — 1123F ACP DISCUSS/DSCN MKR DOCD: CPT | Performed by: FAMILY MEDICINE

## 2024-03-14 PROCEDURE — 99213 OFFICE O/P EST LOW 20 MIN: CPT | Performed by: FAMILY MEDICINE

## 2024-03-14 PROCEDURE — G8427 DOCREV CUR MEDS BY ELIG CLIN: HCPCS | Performed by: FAMILY MEDICINE

## 2024-03-14 PROCEDURE — G8484 FLU IMMUNIZE NO ADMIN: HCPCS | Performed by: FAMILY MEDICINE

## 2024-03-14 PROCEDURE — G8417 CALC BMI ABV UP PARAM F/U: HCPCS | Performed by: FAMILY MEDICINE

## 2024-03-14 PROCEDURE — 1036F TOBACCO NON-USER: CPT | Performed by: FAMILY MEDICINE

## 2024-03-19 DIAGNOSIS — R06.00 DYSPNEA, UNSPECIFIED TYPE: Primary | ICD-10-CM

## 2024-03-20 ASSESSMENT — ENCOUNTER SYMPTOMS
NAUSEA: 0
ABDOMINAL PAIN: 1
SHORTNESS OF BREATH: 0
CONSTIPATION: 0
COUGH: 0
VOMITING: 0
TROUBLE SWALLOWING: 0
DIARRHEA: 0

## 2024-03-21 DIAGNOSIS — I44.2 COMPLETE HEART BLOCK (HCC): ICD-10-CM

## 2024-03-21 DIAGNOSIS — Z95.0 PACEMAKER: Primary | ICD-10-CM

## 2024-03-21 NOTE — PROGRESS NOTES
Disease Neg Hx      Social History     Tobacco Use    Smoking status: Never    Smokeless tobacco: Never   Substance Use Topics    Alcohol use: No      Current Outpatient Medications   Medication Sig Dispense Refill    warfarin (COUMADIN) 7.5 MG tablet TAKE ONE TABLET BY MOUTH M, T, TH and take 1 and half tablets on Sun, Wed, Fri, Sat 135 tablet 2    mesalamine (LIALDA) 1.2 g EC tablet Take 3 tablets by mouth daily (with breakfast) 90 tablet 11    cyanocobalamin 1000 MCG/ML injection INJECT 1ML INTO THE MUSCLE ONCE A MONTH 6 mL 0    carbidopa-levodopa (SINEMET)  MG per tablet TAKE ONE TABLET BY MOUTH THREE TIMES DAILY 90 tablet 3    losartan (COZAAR) 50 MG tablet Take 1 tablet by mouth daily 90 tablet 3    metoprolol succinate (TOPROL XL) 25 MG extended release tablet TAKE ONE TABLET BY MOUTH DAILY 90 tablet 1    bumetanide (BUMEX) 0.5 MG tablet Take 1 tablet by mouth as needed (swelling) 30 tablet 1    Multiple Vitamins-Minerals (PRESERVISION AREDS 2+MULTI VIT PO) Take by mouth in the morning and at bedtime      lansoprazole (PREVACID) 15 MG delayed release capsule by NOT APPLICABLE route      Cholecalciferol (VITAMIN D3) 5000 units TABS Take by mouth      B Complex-C-E-Zn (STRESS FORMULA/ZINC PO) Take by mouth      Bismuth Subsalicylate (KAOPECTATE PO) Take by mouth (Patient not taking: Reported on 2/19/2024)       No current facility-administered medications for this visit.     No Known Allergies    Review of Systems   Constitutional:  Negative for chills, fatigue and fever.   HENT:  Negative for hearing loss and trouble swallowing.    Eyes:  Negative for visual disturbance.   Respiratory:  Negative for cough and shortness of breath.    Cardiovascular:  Negative for chest pain and palpitations.   Gastrointestinal:  Positive for abdominal pain. Negative for constipation, diarrhea, nausea and vomiting.   Skin:  Negative for rash and wound.   Neurological:  Negative for dizziness and syncope.         Objective:

## 2024-03-25 ENCOUNTER — ANTI-COAG VISIT (OUTPATIENT)
Dept: CARDIOLOGY CLINIC | Age: 89
End: 2024-03-25
Payer: MEDICARE

## 2024-03-25 DIAGNOSIS — Z79.01 CHRONIC ANTICOAGULATION: ICD-10-CM

## 2024-03-25 DIAGNOSIS — I87.2 DEEP VENOUS INSUFFICIENCY: Primary | Chronic | ICD-10-CM

## 2024-03-25 LAB
INTERNATIONAL NORMALIZATION RATIO, POC: 2.7
PROTHROMBIN TIME, POC: 27.3

## 2024-03-25 PROCEDURE — 93793 ANTICOAG MGMT PT WARFARIN: CPT | Performed by: NURSE PRACTITIONER

## 2024-03-25 RX ORDER — METOPROLOL SUCCINATE 25 MG/1
TABLET, EXTENDED RELEASE ORAL
Qty: 90 TABLET | Refills: 3 | Status: SHIPPED | OUTPATIENT
Start: 2024-03-25

## 2024-04-01 ENCOUNTER — TELEPHONE (OUTPATIENT)
Dept: GASTROENTEROLOGY | Age: 89
End: 2024-04-01

## 2024-04-01 NOTE — TELEPHONE ENCOUNTER
04- Called patient he stated that he is having diarrhea x 1 daily.  Questioned if it has been since he stated the Mesalamine back in Feb.       Patient and wife stated that since the Mesalamine it has been better.      Wife stated that he had baked chicken, baked potato and baked beans.     Today he has not been in the bathroom.     He used Kaopectate as needed and took a dose yesterday.       Questioned if he had any pain?  He stated that he denies any pain today but when it happens then it is really bad.      Advised that [facility]IR[facility] doesn't prescribe pain medications that he would need to Follow up with his PCP     Moved apt from 05- to 04-    Routed to [facility]OLFG[facility] MIRANDA

## 2024-04-01 NOTE — TELEPHONE ENCOUNTER
Pt called and said that since his meds have been changed he has pain and diarrhea, please call patient. He would like to talk with someone, Thanks !

## 2024-04-23 ENCOUNTER — HOSPITAL ENCOUNTER (OUTPATIENT)
Dept: NUCLEAR MEDICINE | Age: 89
Discharge: HOME | End: 2024-04-25
Payer: MEDICARE

## 2024-04-23 DIAGNOSIS — R06.00 DYSPNEA, UNSPECIFIED TYPE: ICD-10-CM

## 2024-04-23 PROCEDURE — A9502 TC99M TETROFOSMIN: HCPCS | Performed by: NURSE PRACTITIONER

## 2024-04-23 PROCEDURE — 93016 CV STRESS TEST SUPVJ ONLY: CPT | Performed by: INTERNAL MEDICINE

## 2024-04-23 PROCEDURE — 78452 HT MUSCLE IMAGE SPECT MULT: CPT | Performed by: INTERNAL MEDICINE

## 2024-04-23 PROCEDURE — 3430000000 HC RX DIAGNOSTIC RADIOPHARMACEUTICAL: Performed by: NURSE PRACTITIONER

## 2024-04-23 PROCEDURE — 93018 CV STRESS TEST I&R ONLY: CPT | Performed by: INTERNAL MEDICINE

## 2024-04-23 PROCEDURE — 78452 HT MUSCLE IMAGE SPECT MULT: CPT

## 2024-04-23 PROCEDURE — 93017 CV STRESS TEST TRACING ONLY: CPT

## 2024-04-23 PROCEDURE — 6360000002 HC RX W HCPCS: Performed by: NURSE PRACTITIONER

## 2024-04-23 RX ORDER — REGADENOSON 0.08 MG/ML
0.4 INJECTION, SOLUTION INTRAVENOUS
Status: COMPLETED | OUTPATIENT
Start: 2024-04-23 | End: 2024-04-23

## 2024-04-23 RX ORDER — TETRAKIS(2-METHOXYISOBUTYLISOCYANIDE)COPPER(I) TETRAFLUOROBORATE 1 MG/ML
30 INJECTION, POWDER, LYOPHILIZED, FOR SOLUTION INTRAVENOUS
Status: DISCONTINUED | OUTPATIENT
Start: 2024-04-23 | End: 2024-04-23

## 2024-04-23 RX ORDER — TETRAKIS(2-METHOXYISOBUTYLISOCYANIDE)COPPER(I) TETRAFLUOROBORATE 1 MG/ML
10 INJECTION, POWDER, LYOPHILIZED, FOR SOLUTION INTRAVENOUS
Status: DISCONTINUED | OUTPATIENT
Start: 2024-04-23 | End: 2024-04-23

## 2024-04-23 RX ADMIN — REGADENOSON 0.4 MG: 0.08 INJECTION, SOLUTION INTRAVENOUS at 10:35

## 2024-04-23 RX ADMIN — TETROFOSMIN 24 MILLICURIE: 0.23 INJECTION, POWDER, LYOPHILIZED, FOR SOLUTION INTRAVENOUS at 13:09

## 2024-04-23 RX ADMIN — TETROFOSMIN 8 MILLICURIE: 0.23 INJECTION, POWDER, LYOPHILIZED, FOR SOLUTION INTRAVENOUS at 13:09

## 2024-04-30 RX ORDER — ISOSORBIDE MONONITRATE 30 MG/1
TABLET, EXTENDED RELEASE ORAL
Qty: 30 TABLET | Refills: 3 | Status: CANCELLED
Start: 2024-04-30

## 2024-05-03 ENCOUNTER — OFFICE VISIT (OUTPATIENT)
Dept: GASTROENTEROLOGY | Age: 89
End: 2024-05-03
Payer: MEDICARE

## 2024-05-03 VITALS
SYSTOLIC BLOOD PRESSURE: 138 MMHG | BODY MASS INDEX: 32.58 KG/M2 | OXYGEN SATURATION: 94 % | DIASTOLIC BLOOD PRESSURE: 80 MMHG | HEART RATE: 67 BPM | HEIGHT: 68 IN | WEIGHT: 215 LBS

## 2024-05-03 DIAGNOSIS — K40.20 BILATERAL INGUINAL HERNIA WITHOUT OBSTRUCTION OR GANGRENE, RECURRENCE NOT SPECIFIED: Primary | ICD-10-CM

## 2024-05-03 DIAGNOSIS — K50.919 CROHN'S DISEASE WITH COMPLICATION, UNSPECIFIED GASTROINTESTINAL TRACT LOCATION (HCC): ICD-10-CM

## 2024-05-03 PROCEDURE — G8417 CALC BMI ABV UP PARAM F/U: HCPCS | Performed by: NURSE PRACTITIONER

## 2024-05-03 PROCEDURE — 1036F TOBACCO NON-USER: CPT | Performed by: NURSE PRACTITIONER

## 2024-05-03 PROCEDURE — 1123F ACP DISCUSS/DSCN MKR DOCD: CPT | Performed by: NURSE PRACTITIONER

## 2024-05-03 PROCEDURE — 99214 OFFICE O/P EST MOD 30 MIN: CPT | Performed by: NURSE PRACTITIONER

## 2024-05-03 PROCEDURE — G8427 DOCREV CUR MEDS BY ELIG CLIN: HCPCS | Performed by: NURSE PRACTITIONER

## 2024-05-03 RX ORDER — MESALAMINE 1.2 G/1
4800 TABLET, DELAYED RELEASE ORAL DAILY
Qty: 360 TABLET | Refills: 3 | Status: SHIPPED | OUTPATIENT
Start: 2024-05-03

## 2024-05-03 NOTE — PROGRESS NOTES
hernia (left inguinal area) is present.   Musculoskeletal:         General: Normal range of motion.      Cervical back: Normal range of motion and neck supple.   Skin:     General: Skin is warm and dry.      Coloration: Skin is not pale.   Neurological:      Mental Status: He is alert and oriented to person, place, and time.   Psychiatric:         Behavior: Behavior normal.

## 2024-05-06 ENCOUNTER — HOSPITAL ENCOUNTER (OUTPATIENT)
Dept: ULTRASOUND IMAGING | Age: 89
Discharge: HOME | End: 2024-05-06
Payer: MEDICARE

## 2024-05-06 DIAGNOSIS — K40.20 BILATERAL INGUINAL HERNIA WITHOUT OBSTRUCTION OR GANGRENE, RECURRENCE NOT SPECIFIED: ICD-10-CM

## 2024-05-06 PROCEDURE — 76856 US EXAM PELVIC COMPLETE: CPT

## 2024-05-07 ENCOUNTER — OFFICE VISIT (OUTPATIENT)
Dept: CARDIOLOGY CLINIC | Age: 89
End: 2024-05-07
Payer: MEDICARE

## 2024-05-07 VITALS
DIASTOLIC BLOOD PRESSURE: 70 MMHG | WEIGHT: 216 LBS | BODY MASS INDEX: 33.9 KG/M2 | OXYGEN SATURATION: 97 % | HEART RATE: 60 BPM | HEIGHT: 67 IN | SYSTOLIC BLOOD PRESSURE: 116 MMHG

## 2024-05-07 DIAGNOSIS — G20.A1 PARKINSON'S DISEASE, UNSPECIFIED WHETHER DYSKINESIA PRESENT, UNSPECIFIED WHETHER MANIFESTATIONS FLUCTUATE (HCC): ICD-10-CM

## 2024-05-07 DIAGNOSIS — Z95.2 HISTORY OF TRANSCATHETER AORTIC VALVE REPLACEMENT (TAVR): ICD-10-CM

## 2024-05-07 DIAGNOSIS — I48.92 ATRIAL FLUTTER, PAROXYSMAL (HCC): Primary | ICD-10-CM

## 2024-05-07 DIAGNOSIS — I87.2 DEEP VENOUS INSUFFICIENCY: Chronic | ICD-10-CM

## 2024-05-07 DIAGNOSIS — Z79.01 CHRONIC ANTICOAGULATION: ICD-10-CM

## 2024-05-07 LAB
INTERNATIONAL NORMALIZATION RATIO, POC: 3.2
PROTHROMBIN TIME, POC: 31.5

## 2024-05-07 PROCEDURE — G8417 CALC BMI ABV UP PARAM F/U: HCPCS | Performed by: INTERNAL MEDICINE

## 2024-05-07 PROCEDURE — 1123F ACP DISCUSS/DSCN MKR DOCD: CPT | Performed by: INTERNAL MEDICINE

## 2024-05-07 PROCEDURE — 85610 PROTHROMBIN TIME: CPT | Performed by: INTERNAL MEDICINE

## 2024-05-07 PROCEDURE — 99214 OFFICE O/P EST MOD 30 MIN: CPT | Performed by: INTERNAL MEDICINE

## 2024-05-07 PROCEDURE — G8427 DOCREV CUR MEDS BY ELIG CLIN: HCPCS | Performed by: INTERNAL MEDICINE

## 2024-05-07 PROCEDURE — 1036F TOBACCO NON-USER: CPT | Performed by: INTERNAL MEDICINE

## 2024-05-07 RX ORDER — ISOSORBIDE MONONITRATE 30 MG/1
30 TABLET, EXTENDED RELEASE ORAL EVERY EVENING
Qty: 90 TABLET | Refills: 0 | Status: SHIPPED | OUTPATIENT
Start: 2024-05-07 | End: 2024-08-05

## 2024-05-07 ASSESSMENT — ENCOUNTER SYMPTOMS
NAUSEA: 0
COUGH: 0
VOMITING: 0
WHEEZING: 0
DIARRHEA: 0
APNEA: 0
FACIAL SWELLING: 0
EYE REDNESS: 0
SHORTNESS OF BREATH: 0
BLOOD IN STOOL: 0
CHEST TIGHTNESS: 0
EYE PAIN: 0
ABDOMINAL PAIN: 0
SORE THROAT: 0
CONSTIPATION: 0
EYE DISCHARGE: 0
ABDOMINAL DISTENTION: 0

## 2024-05-07 NOTE — PROGRESS NOTES
Range Status   10/27/2023 12 5 - 41 U/L Final              Imaging:    Pharmacological nuclear medicine stress test 4/2024  Summary impressions:  Normal ejection fraction 59% borderline perfusion study with  inferolateral apical defects possible minimal reversible ischemia in  the apical region correlate clinically.  Signed by Dr El Irby    2D echo carotid with Doppler 3/2023  Technically difficult study with some images suboptimal quality   Normal left ventricular size with preserved systolic function estimated ejection fraction 55% Mild concentric left ventricular hypertrophy with mitral stenosis precluding standard assessment of diastolic function   Mild right atrial enlargement with normal right ventricular size and systolic function   Mild left atrial enlargement   Dilated IVC with less than 50% inspiratory collapse consistent with elevated right atrial filling pressures of 11 to 15 mmHg Trace pulmonic valvular insufficiency Mild tricuspid regurgitation with RVSP estimate 44 mmHg Mesh embedded bioprosthesis in the aortic position with peak/mean gradients of 27/13 mmHg consistent with very mild stenosis and no associated insufficiency   Mitral annular calcification with calcification and moderate to severe thickening of the mitral leaflets which currently have markedly reduced mobility and give rise to a 6 mm mean gradient consistent with moderate mitral stenosis   Moderate posteriorly directed mitral regurgitation with a vena contracta of 0.6 cm.   No significant pericardial effusion   Pacemaker leads seen traversing right-sided chambers   Definity contrast utilized to better define endocardial borders   Signature Electronically signed by Dhiraj Vaughn MD(Interpreting physician) on 03/23/2023 07:38 AM    Catheterization prior to his TAVR   Moderate nonocclusive disease, specifically a 70% stenosis at distal LAD and a 60 to 70% stenosis at distal RCA.       ASSESSMENT and PLAN:      Chronic coronary

## 2024-05-10 ASSESSMENT — ENCOUNTER SYMPTOMS
CHOKING: 0
BLOOD IN STOOL: 0
NAUSEA: 0
COUGH: 0
ABDOMINAL PAIN: 0
ANAL BLEEDING: 0
TROUBLE SWALLOWING: 0
CONSTIPATION: 0
ABDOMINAL DISTENTION: 0
RECTAL PAIN: 0
DIARRHEA: 0
SHORTNESS OF BREATH: 0
VOMITING: 0

## 2024-05-10 NOTE — RESULT ENCOUNTER NOTE
He does has a reducible left inguinal hernia bowel is not involved, is this is not causing pain he does not need a surgical referral if he is having pain we can refer him to gen surg.

## 2024-05-13 ENCOUNTER — TELEPHONE (OUTPATIENT)
Dept: GASTROENTEROLOGY | Age: 89
End: 2024-05-13

## 2024-05-13 ENCOUNTER — ANTI-COAG VISIT (OUTPATIENT)
Dept: CARDIOLOGY CLINIC | Age: 89
End: 2024-05-13

## 2024-05-13 DIAGNOSIS — I87.2 DEEP VENOUS INSUFFICIENCY: Chronic | ICD-10-CM

## 2024-05-13 DIAGNOSIS — Z79.01 CHRONIC ANTICOAGULATION: Primary | ICD-10-CM

## 2024-05-13 NOTE — TELEPHONE ENCOUNTER
5-13-24     Thanks Terri,  pt has been notified of results and recommendations. Pt is not in any pain so I told him to call us if it starts hurting and we will refer him.   Pt voiced understanding.      Results routed to PCP Chino Mari MD

## 2024-05-13 NOTE — TELEPHONE ENCOUNTER
----- Message from RUBEN Rogers sent at 5/10/2024 12:50 PM CDT -----  He does has a reducible left inguinal hernia bowel is not involved, is this is not causing pain he does not need a surgical referral if he is having pain we can refer him to gen surg.

## 2024-05-14 ENCOUNTER — ANTI-COAG VISIT (OUTPATIENT)
Dept: CARDIOLOGY CLINIC | Age: 89
End: 2024-05-14

## 2024-05-14 DIAGNOSIS — R53.82 CHRONIC FATIGUE: ICD-10-CM

## 2024-05-14 DIAGNOSIS — G20.B2 PARKINSON'S DISEASE WITH DYSKINESIA AND FLUCTUATING MANIFESTATIONS (HCC): ICD-10-CM

## 2024-05-14 DIAGNOSIS — I87.2 DEEP VENOUS INSUFFICIENCY: Chronic | ICD-10-CM

## 2024-05-14 DIAGNOSIS — E78.2 MIXED HYPERLIPIDEMIA: Chronic | ICD-10-CM

## 2024-05-14 DIAGNOSIS — E78.2 MIXED HYPERLIPIDEMIA: Primary | Chronic | ICD-10-CM

## 2024-05-14 DIAGNOSIS — I10 ESSENTIAL HYPERTENSION: Chronic | ICD-10-CM

## 2024-05-14 DIAGNOSIS — Z79.01 CHRONIC ANTICOAGULATION: Primary | ICD-10-CM

## 2024-05-14 LAB
ALBUMIN SERPL-MCNC: 3.6 G/DL (ref 3.5–5.2)
ALP SERPL-CCNC: 105 U/L (ref 40–130)
ALT SERPL-CCNC: 7 U/L (ref 5–41)
ANION GAP SERPL CALCULATED.3IONS-SCNC: 13 MMOL/L (ref 7–19)
AST SERPL-CCNC: 13 U/L (ref 5–40)
BASOPHILS # BLD: 0 K/UL (ref 0–0.2)
BASOPHILS NFR BLD: 0.7 % (ref 0–1)
BILIRUB DIRECT SERPL-MCNC: 0.2 MG/DL (ref 0–0.3)
BILIRUB INDIRECT SERPL-MCNC: 0.4 MG/DL (ref 0.1–1)
BILIRUB SERPL-MCNC: 0.6 MG/DL (ref 0.2–1.2)
BUN SERPL-MCNC: 12 MG/DL (ref 8–23)
CALCIUM SERPL-MCNC: 9 MG/DL (ref 8.8–10.2)
CHLORIDE SERPL-SCNC: 104 MMOL/L (ref 98–111)
CHOLEST SERPL-MCNC: 153 MG/DL (ref 160–199)
CO2 SERPL-SCNC: 26 MMOL/L (ref 22–29)
CREAT SERPL-MCNC: 1.1 MG/DL (ref 0.5–1.2)
EOSINOPHIL # BLD: 0.2 K/UL (ref 0–0.6)
EOSINOPHIL NFR BLD: 3.8 % (ref 0–5)
ERYTHROCYTE [DISTWIDTH] IN BLOOD BY AUTOMATED COUNT: 14.8 % (ref 11.5–14.5)
FOLATE SERPL-MCNC: 19.9 NG/ML (ref 4.5–32.2)
GLUCOSE SERPL-MCNC: 86 MG/DL (ref 74–109)
HCT VFR BLD AUTO: 36.1 % (ref 42–52)
HDLC SERPL-MCNC: 65 MG/DL (ref 55–121)
HGB BLD-MCNC: 11.8 G/DL (ref 14–18)
IMM GRANULOCYTES # BLD: 0 K/UL
LDLC SERPL CALC-MCNC: 67 MG/DL
LYMPHOCYTES # BLD: 2.1 K/UL (ref 1.1–4.5)
LYMPHOCYTES NFR BLD: 36.6 % (ref 20–40)
MAGNESIUM SERPL-MCNC: 1.8 MG/DL (ref 1.6–2.4)
MCH RBC QN AUTO: 32.6 PG (ref 27–31)
MCHC RBC AUTO-ENTMCNC: 32.7 G/DL (ref 33–37)
MCV RBC AUTO: 99.7 FL (ref 80–94)
MONOCYTES # BLD: 0.4 K/UL (ref 0–0.9)
MONOCYTES NFR BLD: 7.5 % (ref 0–10)
NEUTROPHILS # BLD: 2.9 K/UL (ref 1.5–7.5)
NEUTS SEG NFR BLD: 51.2 % (ref 50–65)
PLATELET # BLD AUTO: 128 K/UL (ref 130–400)
PMV BLD AUTO: 10.2 FL (ref 9.4–12.4)
POTASSIUM SERPL-SCNC: 3.6 MMOL/L (ref 3.5–5)
PROT SERPL-MCNC: 6 G/DL (ref 6.6–8.7)
RBC # BLD AUTO: 3.62 M/UL (ref 4.7–6.1)
SODIUM SERPL-SCNC: 143 MMOL/L (ref 136–145)
T4 FREE SERPL-MCNC: 1.03 NG/DL (ref 0.93–1.7)
TRIGL SERPL-MCNC: 103 MG/DL (ref 0–149)
TSH SERPL DL<=0.005 MIU/L-ACNC: 4.7 UIU/ML (ref 0.27–4.2)
VIT B12 SERPL-MCNC: 579 PG/ML (ref 211–946)
WBC # BLD AUTO: 5.7 K/UL (ref 4.8–10.8)

## 2024-05-15 ENCOUNTER — OFFICE VISIT (OUTPATIENT)
Dept: NEUROLOGY | Age: 89
End: 2024-05-15
Payer: MEDICARE

## 2024-05-15 VITALS
DIASTOLIC BLOOD PRESSURE: 82 MMHG | WEIGHT: 216 LBS | SYSTOLIC BLOOD PRESSURE: 174 MMHG | BODY MASS INDEX: 33.9 KG/M2 | HEIGHT: 67 IN | HEART RATE: 60 BPM

## 2024-05-15 DIAGNOSIS — G20.A1 PARKINSON'S DISEASE, UNSPECIFIED WHETHER DYSKINESIA PRESENT, UNSPECIFIED WHETHER MANIFESTATIONS FLUCTUATE (HCC): Primary | ICD-10-CM

## 2024-05-15 DIAGNOSIS — R26.9 GAIT ABNORMALITY: ICD-10-CM

## 2024-05-15 DIAGNOSIS — R25.1 TREMOR: ICD-10-CM

## 2024-05-15 PROCEDURE — 99213 OFFICE O/P EST LOW 20 MIN: CPT | Performed by: PSYCHIATRY & NEUROLOGY

## 2024-05-15 PROCEDURE — G8427 DOCREV CUR MEDS BY ELIG CLIN: HCPCS | Performed by: PSYCHIATRY & NEUROLOGY

## 2024-05-15 PROCEDURE — 1036F TOBACCO NON-USER: CPT | Performed by: PSYCHIATRY & NEUROLOGY

## 2024-05-15 PROCEDURE — 1123F ACP DISCUSS/DSCN MKR DOCD: CPT | Performed by: PSYCHIATRY & NEUROLOGY

## 2024-05-15 PROCEDURE — G8417 CALC BMI ABV UP PARAM F/U: HCPCS | Performed by: PSYCHIATRY & NEUROLOGY

## 2024-05-15 NOTE — PROGRESS NOTES
Review of Systems    Constitutional - No fever or chills.  No diaphoresis or significant fatigue.  HENT -  No tinnitus or significant hearing loss.  Eyes - no sudden vision change or eye pain  Respiratory - no significant shortness of breath or cough  Cardiovascular - no chest pain yes palpitations or significant leg swelling  Gastrointestinal - no abdominal swelling or pain.    Genitourinary - No difficulty urinating, dysuria  Musculoskeletal - no back pain or myalgia.  Skin - no color change or rash  Neurologic - No seizures.  No lateralizing weakness.  Hematologic - no easy bruising or excessive bleeding.  Psychiatric - no severe anxiety or nervousness.   All other review of systems are negative.    
heart block (HCC)     Stable angina pectoris ([facility]) 02/08/2021    Coagulation defect (HCC) 06/09/2021    Parkinson disease ([facility]) 06/09/2021    Localized edema 06/28/2021    Sleep disturbance 06/28/2021    After-cataract with vision obscured 02/25/2020    Retinal drusen 11/08/2018    Retinal hemorrhage 11/08/2018    Vitreous degeneration 11/08/2018    Pseudophakia 07/22/2021    COVID-19 06/29/2022    Palliative care patient 07/01/2022    Chronic systolic (congestive) heart failure 07/08/2022    Presence of intraocular lens 06/27/2022    Post covid-19 condition, 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    Tremor 03/07/2023    Parkinson's disease with dyskinesia and fluctuating manifestations ([facility]) 03/07/2023    Gait abnormality 03/07/2023    Cellulitis of right lower extremity 05/31/2023    Parkinson's disease 05/07/2024     Resolved Ambulatory Problems     Diagnosis Date Noted    No Resolved Ambulatory Problems     Past Medical History:   Diagnosis Date    Cancer ([facility])     CHF (congestive heart failure) ([facility])     Cholelithiasis     CKD (chronic kidney disease) stage 3, GFR 30-59 ml/min ([facility]) 11/13/2018    Crohn's disease ([facility])     Heart murmur     Hx of blood clots     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[facility]) 2002       Past Surgical History:   Procedure Laterality Date    AORTIC VALVE REPLACEMENT  01/21/2021    Transfemoral transcatheter aortic valve replacment(TAVR) using a 29 mm Arreaga Doe S3 valve.    CARDIAC PACEMAKER PLACEMENT      CARDIAC SURGERY      CARDIAC CATH    CATARACT REMOVAL WITH IMPLANT  2018    COLECTOMY      (18 inches per pt) due to colon mass    COLONOSCOPY  09/28/2020    Dr. 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

## 2024-05-20 ENCOUNTER — OFFICE VISIT (OUTPATIENT)
Dept: PRIMARY CARE CLINIC | Age: 89
End: 2024-05-20
Payer: MEDICARE

## 2024-05-20 VITALS
SYSTOLIC BLOOD PRESSURE: 132 MMHG | HEART RATE: 60 BPM | OXYGEN SATURATION: 91 % | WEIGHT: 218 LBS | HEIGHT: 67 IN | BODY MASS INDEX: 34.21 KG/M2 | DIASTOLIC BLOOD PRESSURE: 80 MMHG

## 2024-05-20 DIAGNOSIS — D50.8 IRON DEFICIENCY ANEMIA SECONDARY TO INADEQUATE DIETARY IRON INTAKE: Primary | ICD-10-CM

## 2024-05-20 DIAGNOSIS — I10 ESSENTIAL HYPERTENSION: Chronic | ICD-10-CM

## 2024-05-20 DIAGNOSIS — L98.9 SKIN LESION: ICD-10-CM

## 2024-05-20 PROCEDURE — 1123F ACP DISCUSS/DSCN MKR DOCD: CPT | Performed by: FAMILY MEDICINE

## 2024-05-20 PROCEDURE — G8417 CALC BMI ABV UP PARAM F/U: HCPCS | Performed by: FAMILY MEDICINE

## 2024-05-20 PROCEDURE — 1036F TOBACCO NON-USER: CPT | Performed by: FAMILY MEDICINE

## 2024-05-20 PROCEDURE — G8427 DOCREV CUR MEDS BY ELIG CLIN: HCPCS | Performed by: FAMILY MEDICINE

## 2024-05-20 PROCEDURE — 99214 OFFICE O/P EST MOD 30 MIN: CPT | Performed by: FAMILY MEDICINE

## 2024-05-20 RX ORDER — FERROUS SULFATE 325(65) MG
325 TABLET ORAL
Qty: 90 TABLET | Refills: 1 | Status: SHIPPED | OUTPATIENT
Start: 2024-05-20

## 2024-05-20 NOTE — PROGRESS NOTES
Reason For Visit:   Follow up on HTN    Combined HPI and A/P:      Diagnosis Orders   1. Iron deficiency anemia secondary to inadequate dietary iron intake  ferrous sulfate (IRON 325) 325 (65 Fe) MG tablet      2. Essential hypertension        3. Skin lesion  mupirocin (BACTROBAN) 2 % ointment          1.) Chronic Anemia: This is a chronic, stable problem. This is likely multifactorial. He has known CKD3. His recently labs were stable. I will prescribe an Iron supplement daily.     2.) Hypertension/CKD3:        - This is a chronic, controlled problem. He does not check his BP at home.The patient does follow a low salt diet. The patient denies headaches, blurry vision, dizziness, heart palpitations, or chest pain.        - The patient's BP is at goal.        - The patient currently takes Losartan 50 mg 1 tab daily and Metoprolol succinate ER 25 mg 1 daily. He reports he is compliant with these. I will continue these medications at the current dose.        - The patient has recently had labs to monitor kidney function and electrolytes         Return in about 6 months (around 11/20/2024).    We discussed use, benefit, and side effects of prescribed medications.  All patient questions answered.   Patient agreed with treatment plan.   I reviewed available records in our system and care everywhere. In cases where records are not available, the records have been requested and will be reviewed when available.     Subjective      Past Surgical History:   Procedure Laterality Date    AORTIC VALVE REPLACEMENT  01/21/2021    Transfemoral transcatheter aortic valve replacment(TAVR) using a 29 mm Arreaga Doe S3 valve.    CARDIAC PACEMAKER PLACEMENT      CARDIAC SURGERY      CARDIAC CATH    CATARACT REMOVAL WITH IMPLANT  2018    COLECTOMY      (18 inches per pt) due to colon mass    COLONOSCOPY  09/28/2020    Dr. Hector:  stricture TI    OTHER SURGICAL HISTORY      Fistula-in-ano repair    PACEMAKER INSERTION  01/22/2021

## 2024-05-23 ENCOUNTER — HOSPITAL ENCOUNTER (OUTPATIENT)
Age: 89
Discharge: HOME | End: 2024-05-23
Attending: INTERNAL MEDICINE
Payer: MEDICARE

## 2024-05-23 ENCOUNTER — HOSPITAL ENCOUNTER (OUTPATIENT)
Dept: VASCULAR LAB | Age: 89
End: 2024-05-23
Attending: INTERNAL MEDICINE
Payer: MEDICARE

## 2024-05-23 VITALS
DIASTOLIC BLOOD PRESSURE: 84 MMHG | SYSTOLIC BLOOD PRESSURE: 176 MMHG | BODY MASS INDEX: 32.96 KG/M2 | HEIGHT: 67 IN | WEIGHT: 210 LBS

## 2024-05-23 DIAGNOSIS — I87.2 DEEP VENOUS INSUFFICIENCY: ICD-10-CM

## 2024-05-23 DIAGNOSIS — Z86.718 PERSONAL HISTORY OF DEEP VEIN THROMBOSIS: Primary | ICD-10-CM

## 2024-05-23 DIAGNOSIS — Z86.718 PERSONAL HISTORY OF DEEP VEIN THROMBOSIS: ICD-10-CM

## 2024-05-23 DIAGNOSIS — Z95.2 HISTORY OF TRANSCATHETER AORTIC VALVE REPLACEMENT (TAVR): ICD-10-CM

## 2024-05-23 LAB
ECHO AO ASC DIAM: 3.4 CM
ECHO AO ASCENDING AORTA INDEX: 1.65 CM/M2
ECHO AO ROOT DIAM: 2.9 CM
ECHO AO ROOT INDEX: 1.41 CM/M2
ECHO AO SINUS VALSALVA DIAM: 3.6 CM
ECHO AO SINUS VALSALVA INDEX: 1.75 CM/M2
ECHO AO ST JNCT DIAM: 2.9 CM
ECHO AV AREA PEAK VELOCITY: 1.7 CM2
ECHO AV AREA VTI: 1.7 CM2
ECHO AV AREA/BSA PEAK VELOCITY: 0.8 CM2/M2
ECHO AV AREA/BSA VTI: 0.8 CM2/M2
ECHO AV MEAN GRADIENT: 13 MMHG
ECHO AV MEAN VELOCITY: 1.7 M/S
ECHO AV PEAK GRADIENT: 24 MMHG
ECHO AV PEAK VELOCITY: 2.5 M/S
ECHO AV REGURGITANT FRACTION: -1077 %
ECHO AV REGURGITANT VOLUME: -1059.2 ML
ECHO AV VELOCITY RATIO: 0.48
ECHO AV VTI: 58.2 CM
ECHO BSA: 2.12 M2
ECHO EST RA PRESSURE: 3 MMHG
ECHO IVC PROX: 2 CM
ECHO LA AREA 2C: 31 CM2
ECHO LA AREA 4C: 27.5 CM2
ECHO LA DIAMETER INDEX: 3.06 CM/M2
ECHO LA DIAMETER: 6.3 CM
ECHO LA MAJOR AXIS: 7.7 CM
ECHO LA MINOR AXIS: 7.6 CM
ECHO LA TO AORTIC ROOT RATIO: 2.17
ECHO LA VOL BP: 92 ML (ref 18–58)
ECHO LA VOL MOD A2C: 97 ML (ref 18–58)
ECHO LA VOL MOD A4C: 93 ML (ref 18–58)
ECHO LA VOL/BSA BIPLANE: 45 ML/M2 (ref 16–34)
ECHO LA VOLUME INDEX MOD A2C: 47 ML/M2 (ref 16–34)
ECHO LA VOLUME INDEX MOD A4C: 45 ML/M2 (ref 16–34)
ECHO LV E' LATERAL VELOCITY: 5 CM/S
ECHO LV E' SEPTAL VELOCITY: 4 CM/S
ECHO LV EDV A2C: 152 ML
ECHO LV EDV A4C: 220 ML
ECHO LV EDV INDEX A4C: 107 ML/M2
ECHO LV EDV NDEX A2C: 74 ML/M2
ECHO LV EJECTION FRACTION A2C: 45 %
ECHO LV EJECTION FRACTION A4C: 51 %
ECHO LV EJECTION FRACTION BIPLANE: 47 % (ref 55–100)
ECHO LV ESV A2C: 84 ML
ECHO LV ESV A4C: 108 ML
ECHO LV ESV INDEX A2C: 41 ML/M2
ECHO LV ESV INDEX A4C: 52 ML/M2
ECHO LV FRACTIONAL SHORTENING: 38 % (ref 28–44)
ECHO LV INTERNAL DIMENSION DIASTOLE INDEX: 2.33 CM/M2
ECHO LV INTERNAL DIMENSION DIASTOLIC: 4.8 CM (ref 4.2–5.9)
ECHO LV INTERNAL DIMENSION SYSTOLIC INDEX: 1.46 CM/M2
ECHO LV INTERNAL DIMENSION SYSTOLIC: 3 CM
ECHO LV ISOVOLUMETRIC RELAXATION TIME (IVRT): 109 MS
ECHO LV IVSD: 1.4 CM (ref 0.6–1)
ECHO LV MASS 2D: 259.6 G (ref 88–224)
ECHO LV MASS INDEX 2D: 126 G/M2 (ref 49–115)
ECHO LV POSTERIOR WALL DIASTOLIC: 1.3 CM (ref 0.6–1)
ECHO LV RELATIVE WALL THICKNESS RATIO: 0.54
ECHO LVOT AREA: 3.5 CM2
ECHO LVOT AV VTI INDEX: 0.49
ECHO LVOT DIAM: 2.1 CM
ECHO LVOT MEAN GRADIENT: 4 MMHG
ECHO LVOT PEAK GRADIENT: 6 MMHG
ECHO LVOT PEAK VELOCITY: 1.2 M/S
ECHO LVOT STROKE VOLUME INDEX: 47.7 ML/M2
ECHO LVOT SV: 98.3 ML
ECHO LVOT VTI: 28.4 CM
ECHO MV A VELOCITY: 1.94 M/S
ECHO MV ANNULUS DIAMETER: 2.4 CM
ECHO MV AREA VTI: 1.7 CM2
ECHO MV E DECELERATION TIME (DT): 205 MS
ECHO MV E VELOCITY: 1.97 M/S
ECHO MV E/A RATIO: 1.02
ECHO MV E/E' LATERAL: 39.4
ECHO MV E/E' RATIO (AVERAGED): 44.33
ECHO MV E/E' SEPTAL: 49.25
ECHO MV LVOT VTI INDEX: 2.06
ECHO MV MAX VELOCITY: 2.3 M/S
ECHO MV MEAN GRADIENT: 13 MMHG
ECHO MV MEAN VELOCITY: 1.7 M/S
ECHO MV PEAK GRADIENT: 22 MMHG
ECHO MV REGURGITANT ALIASING (NYQUIST) VELOCITY: 24 CM/S
ECHO MV REGURGITANT FRACTION CONT EQ: 92 %
ECHO MV REGURGITANT PEAK GRADIENT: 169 MMHG
ECHO MV REGURGITANT PEAK VELOCITY: 6.5 M/S
ECHO MV REGURGITANT RADIUS PISA: 0.5 CM
ECHO MV REGURGITANT VOLUME: 1059.21 ML
ECHO MV REGURGITANT VTIA: 256 CM
ECHO MV VTI: 58.5 CM
ECHO RA AREA 4C: 19 CM2
ECHO RA END SYSTOLIC VOLUME APICAL 4 CHAMBER INDEX BSA: 27 ML/M2
ECHO RA MAJOR AXIS INDEX: 3.01 CM/M2
ECHO RA MAJOR AXIS: 6.2 CM
ECHO RA MINOR AXIS INDEX: 2.33 CM/M2
ECHO RA MINOR AXIS: 4.8 CM
ECHO RA VOLUME: 55 ML
ECHO RIGHT VENTRICULAR SYSTOLIC PRESSURE (RVSP): 35 MMHG
ECHO RV BASAL DIMENSION: 3.1 CM
ECHO RV INTERNAL DIMENSION: 3 CM
ECHO RV LONGITUDINAL DIMENSION: 9.3 CM
ECHO RV MID DIMENSION: 3.4 CM
ECHO RV TAPSE: 2.5 CM (ref 1.7–?)
ECHO TV REGURGITANT MAX VELOCITY: 2.81 M/S
ECHO TV REGURGITANT PEAK GRADIENT: 32 MMHG

## 2024-05-23 PROCEDURE — 93971 EXTREMITY STUDY: CPT

## 2024-05-23 PROCEDURE — 6360000004 HC RX CONTRAST MEDICATION: Performed by: INTERNAL MEDICINE

## 2024-05-23 PROCEDURE — 2580000003 HC RX 258: Performed by: INTERNAL MEDICINE

## 2024-05-23 PROCEDURE — C8929 TTE W OR WO FOL WCON,DOPPLER: HCPCS

## 2024-05-23 RX ADMIN — SODIUM CHLORIDE, PRESERVATIVE FREE 1.5 ML: 5 INJECTION INTRAVENOUS at 14:22

## 2024-05-24 LAB
ECHO BSA: 2.12 M2
VAS RIGHT POP RFX: 3.7 S

## 2024-05-27 ASSESSMENT — ENCOUNTER SYMPTOMS
BACK PAIN: 1
DIARRHEA: 0
COUGH: 0
ABDOMINAL PAIN: 0
CONSTIPATION: 0
SHORTNESS OF BREATH: 0
VOMITING: 0
NAUSEA: 0
TROUBLE SWALLOWING: 0

## 2024-05-30 DIAGNOSIS — I82.5Y1 CHRONIC DEEP VEIN THROMBOSIS (DVT) OF PROXIMAL VEIN OF RIGHT LOWER EXTREMITY (HCC): Primary | ICD-10-CM

## 2024-05-31 DIAGNOSIS — I82.509 CHRONIC DEEP VEIN THROMBOSIS (DVT) OF LOWER EXTREMITY, UNSPECIFIED LATERALITY, UNSPECIFIED VEIN (HCC): Primary | ICD-10-CM

## 2024-05-31 NOTE — TELEPHONE ENCOUNTER
Requested Prescriptions     Pending Prescriptions Disp Refills    carbidopa-levodopa (SINEMET)  MG per tablet 90 tablet 3     Sig: Take 1 tablet by mouth 3 times daily       Last Office Visit: Visit date not found  Next Office Visit: 11/20/2024  Last Medication Refill: 1/08/2024

## 2024-06-03 NOTE — TELEPHONE ENCOUNTER
Requested Prescriptions     Pending Prescriptions Disp Refills    carbidopa-levodopa (SINEMET)  MG per tablet [Pharmacy Med Name: carbidopa 25 mg-levodopa 100 mg tablet] 90 tablet 3     Sig: TAKE ONE TABLET BY MOUTH THREE TIMES DAILY       Last Office Visit: 5/15/2024  Next Office Visit: 11/20/2024  Last Medication Refill:04/30/2024

## 2024-06-17 ENCOUNTER — ANTI-COAG VISIT (OUTPATIENT)
Dept: CARDIOLOGY CLINIC | Age: 89
End: 2024-06-17
Payer: MEDICARE

## 2024-06-17 DIAGNOSIS — I87.2 DEEP VENOUS INSUFFICIENCY: Chronic | ICD-10-CM

## 2024-06-17 DIAGNOSIS — Z79.01 CHRONIC ANTICOAGULATION: Primary | ICD-10-CM

## 2024-06-17 DIAGNOSIS — Z79.01 LONG TERM (CURRENT) USE OF ANTICOAGULANTS: ICD-10-CM

## 2024-06-17 LAB
INTERNATIONAL NORMALIZATION RATIO, POC: 2.2
PROTHROMBIN TIME, POC: 23.3

## 2024-06-17 PROCEDURE — 93793 ANTICOAG MGMT PT WARFARIN: CPT | Performed by: NURSE PRACTITIONER

## 2024-06-19 ENCOUNTER — HOSPITAL ENCOUNTER (OUTPATIENT)
Dept: NON INVASIVE DIAGNOSTICS | Age: 89
Discharge: HOME | End: 2024-06-21
Payer: MEDICARE

## 2024-06-19 DIAGNOSIS — I82.5Y1 CHRONIC DEEP VEIN THROMBOSIS (DVT) OF PROXIMAL VEIN OF RIGHT LOWER EXTREMITY (HCC): ICD-10-CM

## 2024-06-19 PROCEDURE — 93971 EXTREMITY STUDY: CPT | Performed by: SURGERY

## 2024-06-19 PROCEDURE — 93971 EXTREMITY STUDY: CPT

## 2024-06-21 LAB
VAS RIGHT FV RFX: 1.8 S
VAS RIGHT POP RFX: 1.7 S

## 2024-06-25 DIAGNOSIS — I82.512 CHRONIC DEEP VEIN THROMBOSIS (DVT) OF FEMORAL VEIN OF LEFT LOWER EXTREMITY (HCC): ICD-10-CM

## 2024-06-25 DIAGNOSIS — I82.511 CHRONIC DEEP VEIN THROMBOSIS (DVT) OF FEMORAL VEIN OF RIGHT LOWER EXTREMITY (HCC): ICD-10-CM

## 2024-06-26 ENCOUNTER — OFFICE VISIT (OUTPATIENT)
Dept: VASCULAR SURGERY | Age: 89
End: 2024-06-26
Payer: MEDICARE

## 2024-06-26 VITALS
TEMPERATURE: 98.3 F | SYSTOLIC BLOOD PRESSURE: 176 MMHG | HEART RATE: 61 BPM | OXYGEN SATURATION: 96 % | DIASTOLIC BLOOD PRESSURE: 82 MMHG

## 2024-06-26 DIAGNOSIS — M79.89 LEG SWELLING: ICD-10-CM

## 2024-06-26 DIAGNOSIS — M79.604 LEG PAIN, RIGHT: Primary | ICD-10-CM

## 2024-06-26 DIAGNOSIS — I82.5Y1 CHRONIC DEEP VEIN THROMBOSIS (DVT) OF PROXIMAL VEIN OF RIGHT LOWER EXTREMITY (HCC): ICD-10-CM

## 2024-06-26 PROCEDURE — G8427 DOCREV CUR MEDS BY ELIG CLIN: HCPCS | Performed by: NURSE PRACTITIONER

## 2024-06-26 PROCEDURE — 99203 OFFICE O/P NEW LOW 30 MIN: CPT | Performed by: NURSE PRACTITIONER

## 2024-06-26 PROCEDURE — 1036F TOBACCO NON-USER: CPT | Performed by: NURSE PRACTITIONER

## 2024-06-26 PROCEDURE — G8417 CALC BMI ABV UP PARAM F/U: HCPCS | Performed by: NURSE PRACTITIONER

## 2024-06-26 PROCEDURE — 1123F ACP DISCUSS/DSCN MKR DOCD: CPT | Performed by: NURSE PRACTITIONER

## 2024-06-26 NOTE — PROGRESS NOTES
symmetric.  Skin - warm, dry, and intact.  no  wound  Psychiatric - mood, affect, and behavior appear normal.  Judgment and thought processes appear normal.    Risk factors for atherosclerosis of all vascular beds have been reviewed with the patient including:  Family history, tobacco abuse in all forms, elevated cholesterol, hyperlipidemia, and diabetes.    Venous Scan:  Chronic deep vein thrombosis in the right common femoral vein.    Chronic deep vein thrombosis in the right proximal femoral vein    Chronic deep vein thrombosis in the right middle femoral vein.    Chronic deep vein thrombosis in the right popliteal vein.    No evidence of superficial thrombosis in the right lower extremity.     Individual films reviewed: Yes.  Test results were reviewed with the patient  Disease process is stable, chronic illness        Reviewed on this visit: speciality care notes from cardiology    Reviewed previous studies including: venous scan  Individual images were reviewed.  I agree with the findings  Results were discussed with the patient.    Options were discussed with the patient including continued medical management versus proceeding with  ivc filter placement.  We have discussed risks of discontinuing anticoagulation and risk at some point filter could become clogged as well as risk of fall and bleed/head injury while on antiocagulation.  Patient has opted to proceed with considering this.  Risks have been discussed with the patient including but not limited to MI, death, CVA, bleed, nerve injury, infection, contrast nephropathy, possible need for dialysis, and need for further surgery.         ASSESSMENT/PLAN:  1. Leg pain, right  2. Leg swelling  3. Chronic deep vein thrombosis (DVT) of proximal vein of right lower extremity (HCC)         Patient and wife will consider ivc filter and let us now there decision  Elevate legs 30 minutes 3 times daily above heart  Support hose daily     Patient instructed to keep leg

## 2024-07-29 ENCOUNTER — TELEPHONE (OUTPATIENT)
Dept: VASCULAR SURGERY | Age: 89
End: 2024-07-29

## 2024-07-29 ENCOUNTER — ANTI-COAG VISIT (OUTPATIENT)
Dept: CARDIOLOGY CLINIC | Age: 89
End: 2024-07-29
Payer: MEDICARE

## 2024-07-29 DIAGNOSIS — Z79.01 CHRONIC ANTICOAGULATION: ICD-10-CM

## 2024-07-29 DIAGNOSIS — Z79.01 LONG TERM (CURRENT) USE OF ANTICOAGULANTS: ICD-10-CM

## 2024-07-29 DIAGNOSIS — I87.2 DEEP VENOUS INSUFFICIENCY: Chronic | ICD-10-CM

## 2024-07-29 DIAGNOSIS — I48.92 ATRIAL FLUTTER, PAROXYSMAL (HCC): Primary | ICD-10-CM

## 2024-07-29 LAB
INTERNATIONAL NORMALIZATION RATIO, POC: 2.4
PROTHROMBIN TIME, POC: 24.4

## 2024-07-29 PROCEDURE — 93793 ANTICOAG MGMT PT WARFARIN: CPT | Performed by: NURSE PRACTITIONER

## 2024-07-29 NOTE — TELEPHONE ENCOUNTER
The patient stopped in the office today and stated that he is not wanting to proceed with any kind of surgery.  He stated that his heart had stopped 3 times the last time he had a procedure and he wanted to be sure that Dee knew he was not having any kind of surgery.  I let him know that I would pass that along.

## 2024-08-05 DIAGNOSIS — E53.8 VITAMIN B 12 DEFICIENCY: ICD-10-CM

## 2024-08-05 DIAGNOSIS — I10 ESSENTIAL HYPERTENSION: Chronic | ICD-10-CM

## 2024-08-05 RX ORDER — LOSARTAN POTASSIUM 50 MG/1
50 TABLET ORAL DAILY
Qty: 90 TABLET | Refills: 3 | Status: SHIPPED | OUTPATIENT
Start: 2024-08-05

## 2024-08-05 RX ORDER — CYANOCOBALAMIN 1000 UG/ML
INJECTION, SOLUTION INTRAMUSCULAR; SUBCUTANEOUS
Qty: 6 ML | Refills: 0 | Status: SHIPPED | OUTPATIENT
Start: 2024-08-05

## 2024-08-06 ENCOUNTER — OFFICE VISIT (OUTPATIENT)
Dept: GASTROENTEROLOGY | Age: 89
End: 2024-08-06
Payer: MEDICARE

## 2024-08-06 VITALS
HEIGHT: 67 IN | WEIGHT: 212 LBS | HEART RATE: 59 BPM | BODY MASS INDEX: 33.27 KG/M2 | DIASTOLIC BLOOD PRESSURE: 70 MMHG | OXYGEN SATURATION: 95 % | SYSTOLIC BLOOD PRESSURE: 120 MMHG

## 2024-08-06 DIAGNOSIS — K50.919 CROHN'S DISEASE WITH COMPLICATION, UNSPECIFIED GASTROINTESTINAL TRACT LOCATION (HCC): Primary | ICD-10-CM

## 2024-08-06 DIAGNOSIS — K50.919 CROHN'S DISEASE WITH COMPLICATION, UNSPECIFIED GASTROINTESTINAL TRACT LOCATION (HCC): ICD-10-CM

## 2024-08-06 LAB
CRP SERPL HS-MCNC: <0.3 MG/DL (ref 0–0.5)
ERYTHROCYTE [SEDIMENTATION RATE] IN BLOOD BY WESTERGREN METHOD: 19 MM/HR (ref 0–15)

## 2024-08-06 PROCEDURE — 93296 REM INTERROG EVL PM/IDS: CPT | Performed by: CLINICAL NURSE SPECIALIST

## 2024-08-06 PROCEDURE — 1123F ACP DISCUSS/DSCN MKR DOCD: CPT | Performed by: NURSE PRACTITIONER

## 2024-08-06 PROCEDURE — 99214 OFFICE O/P EST MOD 30 MIN: CPT | Performed by: NURSE PRACTITIONER

## 2024-08-06 PROCEDURE — 1036F TOBACCO NON-USER: CPT | Performed by: NURSE PRACTITIONER

## 2024-08-06 PROCEDURE — G8427 DOCREV CUR MEDS BY ELIG CLIN: HCPCS | Performed by: NURSE PRACTITIONER

## 2024-08-06 PROCEDURE — G8417 CALC BMI ABV UP PARAM F/U: HCPCS | Performed by: NURSE PRACTITIONER

## 2024-08-06 PROCEDURE — 93294 REM INTERROG EVL PM/LDLS PM: CPT | Performed by: CLINICAL NURSE SPECIALIST

## 2024-08-06 RX ORDER — MESALAMINE 1.2 G/1
4800 TABLET, DELAYED RELEASE ORAL DAILY
Qty: 360 TABLET | Refills: 5 | Status: SHIPPED | OUTPATIENT
Start: 2024-08-06

## 2024-08-06 NOTE — PROGRESS NOTES
Subjective:     Patient ID: Bernard Vidales is a 89 y.o. male  PCP: Chino Mari MD  Referring Provider: No ref. provider found    HPI  Patient presents to the office today with the following complaints: 3 Month Follow-Up      Patient seen in the office today for follow up on crohn's disease  Today he reports he is doing \"just fine\" states he is not having diarrhea stools and he is not seeing blood in his stool   He is taking Lialda 1.2 gm 3-4 tablets daily and this works well for him   He denies any needs at this time  We will get updated labs today     Assessment:     1. Crohn's disease with complication, unspecified gastrointestinal tract location (HCC)  -     C-Reactive Protein; Future  -     Sedimentation Rate; Future  -     Calprotectin Stool; Future       Review of Systems   Constitutional:  Negative for activity change, appetite change, fatigue, fever and unexpected weight change.   HENT:  Negative for trouble swallowing.    Respiratory:  Negative for cough, choking and shortness of breath.    Cardiovascular:  Negative for chest pain.   Gastrointestinal:  Negative for abdominal distention, abdominal pain, anal bleeding, blood in stool, constipation, diarrhea, nausea, rectal pain and vomiting.   Allergic/Immunologic: Negative for food allergies.   All other systems reviewed and are negative.      Plan:   Follow up in 6 months or sooner if needed   Labs today   Orders  Orders Placed This Encounter   Procedures    C-Reactive Protein     Standing Status:   Future     Number of Occurrences:   1     Standing Expiration Date:   8/6/2025    Sedimentation Rate     Standing Status:   Future     Number of Occurrences:   1     Standing Expiration Date:   8/6/2025    Calprotectin Stool     Standing Status:   Future     Number of Occurrences:   1     Standing Expiration Date:   8/6/2025     Medications  Orders Placed This Encounter   Medications    mesalamine (LIALDA) 1.2 g EC tablet     Sig: Take 4 tablets by mouth

## 2024-08-07 DIAGNOSIS — I47.29 NSVT (NONSUSTAINED VENTRICULAR TACHYCARDIA) (HCC): ICD-10-CM

## 2024-08-07 DIAGNOSIS — K50.919 CROHN'S DISEASE WITH COMPLICATION, UNSPECIFIED GASTROINTESTINAL TRACT LOCATION (HCC): ICD-10-CM

## 2024-08-07 DIAGNOSIS — I44.2 COMPLETE HEART BLOCK (HCC): ICD-10-CM

## 2024-08-07 DIAGNOSIS — I48.92 ATRIAL FLUTTER, PAROXYSMAL (HCC): ICD-10-CM

## 2024-08-07 DIAGNOSIS — Z95.0 PACEMAKER: Primary | ICD-10-CM

## 2024-08-11 LAB — CALPROTECTIN STL-MCNT: 143 UG/G

## 2024-08-13 DIAGNOSIS — K50.919 CROHN'S DISEASE WITH COMPLICATION, UNSPECIFIED GASTROINTESTINAL TRACT LOCATION (HCC): Primary | ICD-10-CM

## 2024-08-13 DIAGNOSIS — R70.0 ELEVATED SED RATE: ICD-10-CM

## 2024-08-13 RX ORDER — BUDESONIDE 3 MG/1
9 CAPSULE, COATED PELLETS ORAL EVERY MORNING
Qty: 90 CAPSULE | Refills: 2 | Status: SHIPPED | OUTPATIENT
Start: 2024-08-13 | End: 2024-11-11

## 2024-08-13 ASSESSMENT — ENCOUNTER SYMPTOMS
CONSTIPATION: 0
SHORTNESS OF BREATH: 0
CHOKING: 0
ABDOMINAL DISTENTION: 0
ANAL BLEEDING: 0
VOMITING: 0
BLOOD IN STOOL: 0
DIARRHEA: 0
NAUSEA: 0
RECTAL PAIN: 0
COUGH: 0
ABDOMINAL PAIN: 0

## 2024-08-13 NOTE — RESULT ENCOUNTER NOTE
Calprotectin stool is elevated and sed rate is slightly elevated. I would like for him to take a 3 month round of Entocort to calm the inflammation down. I have sent the RX in

## 2024-08-15 ENCOUNTER — TELEPHONE (OUTPATIENT)
Dept: GASTROENTEROLOGY | Age: 89
End: 2024-08-15

## 2024-08-15 NOTE — TELEPHONE ENCOUNTER
----- Message from RUBEN Miller sent at 8/13/2024  4:03 PM CDT -----  Calprotectin stool is elevated and sed rate is slightly elevated. I would like for him to take a 3 month round of Entocort to calm the inflammation down. I have sent the RX in

## 2024-08-15 NOTE — TELEPHONE ENCOUNTER
8-15-24     Thanks Terri,  pt has been notified of results and recommendations.      Pt picked up RX and started it today.

## 2024-08-20 RX ORDER — ISOSORBIDE MONONITRATE 30 MG/1
30 TABLET, EXTENDED RELEASE ORAL EVERY EVENING
Qty: 90 TABLET | Refills: 1 | Status: SHIPPED | OUTPATIENT
Start: 2024-08-20 | End: 2025-02-16

## 2024-09-10 ENCOUNTER — OFFICE VISIT (OUTPATIENT)
Dept: CARDIOLOGY CLINIC | Age: 89
End: 2024-09-10
Payer: MEDICARE

## 2024-09-10 VITALS
SYSTOLIC BLOOD PRESSURE: 102 MMHG | DIASTOLIC BLOOD PRESSURE: 58 MMHG | BODY MASS INDEX: 33.51 KG/M2 | OXYGEN SATURATION: 98 % | WEIGHT: 214 LBS | HEART RATE: 61 BPM

## 2024-09-10 DIAGNOSIS — Z95.0 PACEMAKER: ICD-10-CM

## 2024-09-10 DIAGNOSIS — Z79.01 CHRONIC ANTICOAGULATION: Primary | ICD-10-CM

## 2024-09-10 DIAGNOSIS — I44.2 COMPLETE HEART BLOCK (HCC): ICD-10-CM

## 2024-09-10 DIAGNOSIS — I10 ESSENTIAL HYPERTENSION: Chronic | ICD-10-CM

## 2024-09-10 DIAGNOSIS — I48.92 ATRIAL FLUTTER, PAROXYSMAL (HCC): ICD-10-CM

## 2024-09-10 DIAGNOSIS — I87.2 DEEP VENOUS INSUFFICIENCY: Chronic | ICD-10-CM

## 2024-09-10 LAB
INTERNATIONAL NORMALIZATION RATIO, POC: 2.4
PROTHROMBIN TIME, POC: 24.8

## 2024-09-10 PROCEDURE — 1123F ACP DISCUSS/DSCN MKR DOCD: CPT | Performed by: INTERNAL MEDICINE

## 2024-09-10 PROCEDURE — G8417 CALC BMI ABV UP PARAM F/U: HCPCS | Performed by: INTERNAL MEDICINE

## 2024-09-10 PROCEDURE — G8427 DOCREV CUR MEDS BY ELIG CLIN: HCPCS | Performed by: INTERNAL MEDICINE

## 2024-09-10 PROCEDURE — 99214 OFFICE O/P EST MOD 30 MIN: CPT | Performed by: INTERNAL MEDICINE

## 2024-09-10 PROCEDURE — 93280 PM DEVICE PROGR EVAL DUAL: CPT | Performed by: INTERNAL MEDICINE

## 2024-09-10 PROCEDURE — 85610 PROTHROMBIN TIME: CPT | Performed by: INTERNAL MEDICINE

## 2024-09-10 PROCEDURE — 1036F TOBACCO NON-USER: CPT | Performed by: INTERNAL MEDICINE

## 2024-09-10 RX ORDER — LOSARTAN POTASSIUM 25 MG/1
25 TABLET ORAL DAILY
Qty: 30 TABLET | Refills: 2 | Status: SHIPPED | OUTPATIENT
Start: 2024-09-10 | End: 2024-12-09

## 2024-09-10 ASSESSMENT — ENCOUNTER SYMPTOMS
BLOOD IN STOOL: 0
CONSTIPATION: 0
ABDOMINAL DISTENTION: 0
EYE DISCHARGE: 0
VOMITING: 0
WHEEZING: 0
SORE THROAT: 0
SHORTNESS OF BREATH: 0
COUGH: 0
EYE REDNESS: 0
FACIAL SWELLING: 0
DIARRHEA: 0
CHEST TIGHTNESS: 0
ABDOMINAL PAIN: 0
APNEA: 0
EYE PAIN: 0
NAUSEA: 0

## 2024-09-16 ENCOUNTER — TELEPHONE (OUTPATIENT)
Dept: GASTROENTEROLOGY | Age: 89
End: 2024-09-16

## 2024-09-16 ENCOUNTER — NURSE ONLY (OUTPATIENT)
Dept: PRIMARY CARE CLINIC | Age: 89
End: 2024-09-16

## 2024-09-16 VITALS — SYSTOLIC BLOOD PRESSURE: 138 MMHG | DIASTOLIC BLOOD PRESSURE: 81 MMHG

## 2024-09-16 DIAGNOSIS — I10 ESSENTIAL HYPERTENSION: Chronic | ICD-10-CM

## 2024-09-16 DIAGNOSIS — I10 ESSENTIAL HYPERTENSION: Primary | Chronic | ICD-10-CM

## 2024-09-16 SDOH — ECONOMIC STABILITY: FOOD INSECURITY: WITHIN THE PAST 12 MONTHS, YOU WORRIED THAT YOUR FOOD WOULD RUN OUT BEFORE YOU GOT MONEY TO BUY MORE.: NEVER TRUE

## 2024-09-16 SDOH — ECONOMIC STABILITY: INCOME INSECURITY: HOW HARD IS IT FOR YOU TO PAY FOR THE VERY BASICS LIKE FOOD, HOUSING, MEDICAL CARE, AND HEATING?: NOT HARD AT ALL

## 2024-09-16 SDOH — ECONOMIC STABILITY: FOOD INSECURITY: WITHIN THE PAST 12 MONTHS, THE FOOD YOU BOUGHT JUST DIDN'T LAST AND YOU DIDN'T HAVE MONEY TO GET MORE.: NEVER TRUE

## 2024-09-17 ENCOUNTER — TELEPHONE (OUTPATIENT)
Dept: GASTROENTEROLOGY | Age: 89
End: 2024-09-17

## 2024-09-17 DIAGNOSIS — K50.919 CROHN'S DISEASE WITH COMPLICATION, UNSPECIFIED GASTROINTESTINAL TRACT LOCATION (HCC): Primary | ICD-10-CM

## 2024-09-17 DIAGNOSIS — I10 ESSENTIAL HYPERTENSION: Chronic | ICD-10-CM

## 2024-09-17 RX ORDER — LOSARTAN POTASSIUM 25 MG/1
50 TABLET ORAL DAILY
Qty: 60 TABLET | Refills: 2 | Status: SHIPPED | OUTPATIENT
Start: 2024-09-17 | End: 2024-12-16

## 2024-09-17 RX ORDER — LOSARTAN POTASSIUM 25 MG/1
50 TABLET ORAL DAILY
Qty: 60 TABLET | Refills: 2 | OUTPATIENT
Start: 2024-09-17

## 2024-09-17 RX ORDER — MESALAMINE 1.2 G/1
2400 TABLET, DELAYED RELEASE ORAL 3 TIMES DAILY
Qty: 90 TABLET | Refills: 3 | Status: SHIPPED | OUTPATIENT
Start: 2024-09-17 | End: 2024-09-19 | Stop reason: SDUPTHER

## 2024-09-19 ENCOUNTER — OFFICE VISIT (OUTPATIENT)
Dept: PRIMARY CARE CLINIC | Age: 89
End: 2024-09-19
Payer: MEDICARE

## 2024-09-19 ENCOUNTER — TELEPHONE (OUTPATIENT)
Dept: GASTROENTEROLOGY | Age: 89
End: 2024-09-19

## 2024-09-19 VITALS
DIASTOLIC BLOOD PRESSURE: 80 MMHG | SYSTOLIC BLOOD PRESSURE: 160 MMHG | HEIGHT: 67 IN | OXYGEN SATURATION: 91 % | HEART RATE: 76 BPM | WEIGHT: 205 LBS | BODY MASS INDEX: 32.18 KG/M2

## 2024-09-19 DIAGNOSIS — K50.10 CROHN'S DISEASE OF LARGE INTESTINE WITHOUT COMPLICATION (HCC): Primary | ICD-10-CM

## 2024-09-19 DIAGNOSIS — K50.919 CROHN'S DISEASE WITH COMPLICATION, UNSPECIFIED GASTROINTESTINAL TRACT LOCATION (HCC): ICD-10-CM

## 2024-09-19 PROCEDURE — 1123F ACP DISCUSS/DSCN MKR DOCD: CPT | Performed by: FAMILY MEDICINE

## 2024-09-19 PROCEDURE — G8427 DOCREV CUR MEDS BY ELIG CLIN: HCPCS | Performed by: FAMILY MEDICINE

## 2024-09-19 PROCEDURE — G8417 CALC BMI ABV UP PARAM F/U: HCPCS | Performed by: FAMILY MEDICINE

## 2024-09-19 PROCEDURE — 99214 OFFICE O/P EST MOD 30 MIN: CPT | Performed by: FAMILY MEDICINE

## 2024-09-19 PROCEDURE — 1036F TOBACCO NON-USER: CPT | Performed by: FAMILY MEDICINE

## 2024-09-19 RX ORDER — MESALAMINE 1.2 G/1
2400 TABLET, DELAYED RELEASE ORAL 3 TIMES DAILY
Qty: 180 TABLET | Refills: 3 | Status: SHIPPED | OUTPATIENT
Start: 2024-09-19

## 2024-09-19 RX ORDER — CIPROFLOXACIN 500 MG/1
500 TABLET, FILM COATED ORAL 2 TIMES DAILY
Qty: 14 TABLET | Refills: 0 | Status: SHIPPED | OUTPATIENT
Start: 2024-09-19 | End: 2024-09-30 | Stop reason: SDUPTHER

## 2024-09-19 NOTE — PROGRESS NOTES
Reason For Visit:   Colitis.     Combined HPI and A/P:      Diagnosis Orders   1. Crohn's disease of large intestine without complication (HCC)  DISCONTINUED: ciprofloxacin (CIPRO) 500 MG tablet          1.) Colitis:      - The patient has known crohn's disease. He is having worsening diarrhea. He follows with GI. He has tried to reach out to them, but has not been able to get ahold of them. The last time he had diarrhea at this level, he had to be treated with ABX for colitis. I will prescribe a course of Ciprofloxacin.          Return for previously scheduled appointment.    We discussed use, benefit, and side effects of prescribed medications.  All patient questions answered.   Patient agreed with treatment plan.   I reviewed available records in our system and care everywhere. In cases where records are not available, the records have been requested and will be reviewed when available.     Subjective      Past Surgical History:   Procedure Laterality Date    AORTIC VALVE REPLACEMENT  01/21/2021    Transfemoral transcatheter aortic valve replacment(TAVR) using a 29 mm Arreaga Doe S3 valve.    CARDIAC PACEMAKER PLACEMENT      CARDIAC SURGERY      CARDIAC CATH    CATARACT REMOVAL WITH IMPLANT  2018    COLECTOMY      (18 inches per pt) due to colon mass    COLONOSCOPY  09/28/2020    Dr. Hector:  stricture TI    OTHER SURGICAL HISTORY      Fistula-in-ano repair    PACEMAKER INSERTION  01/22/2021    3rd degree AV block-[employer]  Implant MD Dr Wilfred Luna    SKIN CANCER EXCISION      SUBTOTAL COLECTOMY      TONSILLECTOMY      TOTAL HIP ARTHROPLASTY  2011    UPPER GASTROINTESTINAL ENDOSCOPY  09/25/2014    Dr Hector     Family History   Problem Relation Age of Onset    Breast Cancer Sister 29    Stroke Other     Other Other         atherosclerosis of extremities    Colon Cancer Neg Hx     Colon Polyps Neg Hx     Rectal Cancer Neg Hx     Stomach Cancer Neg Hx     Liver Cancer Neg Hx     Liver Disease Neg Hx

## 2024-09-26 ENCOUNTER — TELEPHONE (OUTPATIENT)
Dept: PRIMARY CARE CLINIC | Age: 89
End: 2024-09-26

## 2024-09-26 NOTE — TELEPHONE ENCOUNTER
Patient called stating he had completed the Cipro. She is asking if he needs to take any test to see if he needs more antibiotics. She stated she was told he may need 2 rounds of antibiotics. Please advise.

## 2024-09-27 DIAGNOSIS — K50.10 CROHN'S DISEASE OF LARGE INTESTINE WITHOUT COMPLICATION (HCC): ICD-10-CM

## 2024-09-30 DIAGNOSIS — K50.10 CROHN'S DISEASE OF LARGE INTESTINE WITHOUT COMPLICATION (HCC): ICD-10-CM

## 2024-09-30 RX ORDER — CIPROFLOXACIN 500 MG/1
500 TABLET, FILM COATED ORAL 2 TIMES DAILY
Qty: 14 TABLET | Refills: 0 | Status: SHIPPED | OUTPATIENT
Start: 2024-09-30 | End: 2024-10-07

## 2024-09-30 RX ORDER — CIPROFLOXACIN 500 MG/1
TABLET, FILM COATED ORAL
Qty: 14 TABLET | Refills: 0 | OUTPATIENT
Start: 2024-09-30

## 2024-09-30 NOTE — TELEPHONE ENCOUNTER
Bernard Vidales called to request a refill on his medication.      Last office visit : 9/19/2024   Next office visit : 11/21/2024     Requested Prescriptions     Pending Prescriptions Disp Refills    ciprofloxacin (CIPRO) 500 MG tablet 14 tablet 0     Sig: Take 1 tablet by mouth 2 times daily for 7 days            Darling Vaughn LPN

## 2024-10-16 ASSESSMENT — ENCOUNTER SYMPTOMS
VOMITING: 0
DIARRHEA: 1
NAUSEA: 1
TROUBLE SWALLOWING: 0
ABDOMINAL PAIN: 1
COUGH: 0
BACK PAIN: 1
SHORTNESS OF BREATH: 0
CONSTIPATION: 0

## 2024-10-21 ENCOUNTER — ANTI-COAG VISIT (OUTPATIENT)
Dept: CARDIOLOGY CLINIC | Age: 89
End: 2024-10-21
Payer: MEDICARE

## 2024-10-21 DIAGNOSIS — Z79.01 CHRONIC ANTICOAGULATION: ICD-10-CM

## 2024-10-21 DIAGNOSIS — I48.92 ATRIAL FLUTTER, PAROXYSMAL (HCC): Primary | ICD-10-CM

## 2024-10-21 DIAGNOSIS — I87.2 DEEP VENOUS INSUFFICIENCY: Chronic | ICD-10-CM

## 2024-10-21 LAB
INTERNATIONAL NORMALIZATION RATIO, POC: 3.3
PROTHROMBIN TIME, POC: 33.8

## 2024-10-21 PROCEDURE — 93793 ANTICOAG MGMT PT WARFARIN: CPT | Performed by: NURSE PRACTITIONER

## 2024-10-21 PROCEDURE — 85610 PROTHROMBIN TIME: CPT | Performed by: NURSE PRACTITIONER

## 2024-10-23 ENCOUNTER — OFFICE VISIT (OUTPATIENT)
Dept: GASTROENTEROLOGY | Age: 89
End: 2024-10-23
Payer: MEDICARE

## 2024-10-23 VITALS
SYSTOLIC BLOOD PRESSURE: 136 MMHG | WEIGHT: 210 LBS | DIASTOLIC BLOOD PRESSURE: 80 MMHG | HEIGHT: 67 IN | BODY MASS INDEX: 32.96 KG/M2 | OXYGEN SATURATION: 97 % | HEART RATE: 76 BPM

## 2024-10-23 DIAGNOSIS — K50.919 CROHN'S DISEASE WITH COMPLICATION, UNSPECIFIED GASTROINTESTINAL TRACT LOCATION (HCC): ICD-10-CM

## 2024-10-23 DIAGNOSIS — R19.7 DIARRHEA, UNSPECIFIED TYPE: ICD-10-CM

## 2024-10-23 DIAGNOSIS — K50.919 CROHN'S DISEASE WITH COMPLICATION, UNSPECIFIED GASTROINTESTINAL TRACT LOCATION (HCC): Primary | ICD-10-CM

## 2024-10-23 LAB
CRP SERPL HS-MCNC: <0.3 MG/DL (ref 0–0.5)
ERYTHROCYTE [SEDIMENTATION RATE] IN BLOOD BY WESTERGREN METHOD: 20 MM/HR (ref 0–15)

## 2024-10-23 PROCEDURE — 1123F ACP DISCUSS/DSCN MKR DOCD: CPT | Performed by: NURSE PRACTITIONER

## 2024-10-23 PROCEDURE — G8417 CALC BMI ABV UP PARAM F/U: HCPCS | Performed by: NURSE PRACTITIONER

## 2024-10-23 PROCEDURE — G8427 DOCREV CUR MEDS BY ELIG CLIN: HCPCS | Performed by: NURSE PRACTITIONER

## 2024-10-23 PROCEDURE — 99214 OFFICE O/P EST MOD 30 MIN: CPT | Performed by: NURSE PRACTITIONER

## 2024-10-23 PROCEDURE — 1036F TOBACCO NON-USER: CPT | Performed by: NURSE PRACTITIONER

## 2024-10-23 PROCEDURE — G8484 FLU IMMUNIZE NO ADMIN: HCPCS | Performed by: NURSE PRACTITIONER

## 2024-10-23 PROCEDURE — 1159F MED LIST DOCD IN RCRD: CPT | Performed by: NURSE PRACTITIONER

## 2024-10-23 RX ORDER — DICYCLOMINE HYDROCHLORIDE 10 MG/1
10 CAPSULE ORAL
Qty: 120 CAPSULE | Refills: 0 | Status: SHIPPED | OUTPATIENT
Start: 2024-10-23

## 2024-10-23 NOTE — PROGRESS NOTES
Conjunctivae normal.      Pupils: Pupils are equal, round, and reactive to light.   Cardiovascular:      Rate and Rhythm: Normal rate and regular rhythm.      Heart sounds: Normal heart sounds. No murmur heard.  Pulmonary:      Effort: Pulmonary effort is normal. No respiratory distress.      Breath sounds: Normal breath sounds. No wheezing or rales.   Abdominal:      General: Bowel sounds are normal. There is no distension.      Palpations: Abdomen is soft. There is no mass.      Tenderness: There is no abdominal tenderness. There is no guarding or rebound.   Musculoskeletal:         General: Normal range of motion.      Cervical back: Normal range of motion and neck supple.   Skin:     General: Skin is warm and dry.      Coloration: Skin is not pale.   Neurological:      Mental Status: He is alert and oriented to person, place, and time.   Psychiatric:         Behavior: Behavior normal.

## 2024-10-24 DIAGNOSIS — K50.919 CROHN'S DISEASE WITH COMPLICATION, UNSPECIFIED GASTROINTESTINAL TRACT LOCATION (HCC): ICD-10-CM

## 2024-10-28 LAB — CALPROTECTIN STL-MCNT: 95 UG/G

## 2024-10-29 ENCOUNTER — TELEPHONE (OUTPATIENT)
Dept: GASTROENTEROLOGY | Age: 89
End: 2024-10-29

## 2024-10-29 ASSESSMENT — ENCOUNTER SYMPTOMS
RECTAL PAIN: 0
CHOKING: 0
VOMITING: 0
DIARRHEA: 1
COUGH: 0
ABDOMINAL PAIN: 0
NAUSEA: 0
BLOOD IN STOOL: 0
TROUBLE SWALLOWING: 0
SHORTNESS OF BREATH: 0
ANAL BLEEDING: 0
CONSTIPATION: 0
ABDOMINAL DISTENTION: 0

## 2024-10-29 NOTE — TELEPHONE ENCOUNTER
----- Message from RUBEN Miller sent at 10/25/2024  4:23 PM CDT -----  Sed rate is stable and crp is normal waiting for fecal calprotectin

## 2024-10-29 NOTE — TELEPHONE ENCOUNTER
----- Message from RUBEN Miller sent at 10/29/2024 11:33 AM CDT -----  Calprotectin stool is improved since increasing lialda continue with current dose

## 2024-10-31 DIAGNOSIS — Z95.2 S/P TAVR (TRANSCATHETER AORTIC VALVE REPLACEMENT): ICD-10-CM

## 2024-11-01 RX ORDER — WARFARIN SODIUM 7.5 MG/1
TABLET ORAL
Qty: 135 TABLET | Refills: 2 | Status: SHIPPED | OUTPATIENT
Start: 2024-11-01

## 2024-11-20 ENCOUNTER — HOSPITAL ENCOUNTER (OUTPATIENT)
Dept: CT IMAGING | Age: 89
Discharge: HOME | End: 2024-11-20
Payer: MEDICARE

## 2024-11-20 ENCOUNTER — OFFICE VISIT (OUTPATIENT)
Dept: NEUROLOGY | Age: 89
End: 2024-11-20
Payer: MEDICARE

## 2024-11-20 VITALS
SYSTOLIC BLOOD PRESSURE: 152 MMHG | DIASTOLIC BLOOD PRESSURE: 90 MMHG | BODY MASS INDEX: 32.11 KG/M2 | WEIGHT: 205 LBS | HEART RATE: 77 BPM

## 2024-11-20 DIAGNOSIS — G20.A1 PARKINSON'S DISEASE, UNSPECIFIED WHETHER DYSKINESIA PRESENT, UNSPECIFIED WHETHER MANIFESTATIONS FLUCTUATE (HCC): Primary | ICD-10-CM

## 2024-11-20 DIAGNOSIS — R25.1 TREMOR: ICD-10-CM

## 2024-11-20 DIAGNOSIS — R26.9 GAIT ABNORMALITY: ICD-10-CM

## 2024-11-20 DIAGNOSIS — K50.919 CROHN'S DISEASE WITH COMPLICATION, UNSPECIFIED GASTROINTESTINAL TRACT LOCATION (HCC): ICD-10-CM

## 2024-11-20 LAB
CREAT SERPL-MCNC: 0.9 MG/DL (ref 0.3–1.3)
PERFORMED ON: NORMAL

## 2024-11-20 PROCEDURE — 1123F ACP DISCUSS/DSCN MKR DOCD: CPT | Performed by: PSYCHIATRY & NEUROLOGY

## 2024-11-20 PROCEDURE — G8484 FLU IMMUNIZE NO ADMIN: HCPCS | Performed by: PSYCHIATRY & NEUROLOGY

## 2024-11-20 PROCEDURE — 99213 OFFICE O/P EST LOW 20 MIN: CPT | Performed by: PSYCHIATRY & NEUROLOGY

## 2024-11-20 PROCEDURE — 74178 CT ABD&PLV WO CNTR FLWD CNTR: CPT

## 2024-11-20 PROCEDURE — 82565 ASSAY OF CREATININE: CPT

## 2024-11-20 PROCEDURE — G8427 DOCREV CUR MEDS BY ELIG CLIN: HCPCS | Performed by: PSYCHIATRY & NEUROLOGY

## 2024-11-20 PROCEDURE — G8417 CALC BMI ABV UP PARAM F/U: HCPCS | Performed by: PSYCHIATRY & NEUROLOGY

## 2024-11-20 PROCEDURE — 1036F TOBACCO NON-USER: CPT | Performed by: PSYCHIATRY & NEUROLOGY

## 2024-11-20 PROCEDURE — 1159F MED LIST DOCD IN RCRD: CPT | Performed by: PSYCHIATRY & NEUROLOGY

## 2024-11-20 PROCEDURE — 6360000004 HC RX CONTRAST MEDICATION: Performed by: NURSE PRACTITIONER

## 2024-11-20 RX ORDER — TESTOSTERONE 25 MG/2.5G
5 GEL TRANSDERMAL DAILY
COMMUNITY
End: 2024-11-20 | Stop reason: ALTCHOICE

## 2024-11-20 RX ORDER — IOPAMIDOL 755 MG/ML
70 INJECTION, SOLUTION INTRAVASCULAR
Status: COMPLETED | OUTPATIENT
Start: 2024-11-20 | End: 2024-11-20

## 2024-11-20 RX ADMIN — IOPAMIDOL 70 ML: 755 INJECTION, SOLUTION INTRAVENOUS at 10:38

## 2024-11-20 NOTE — PROGRESS NOTES
REVIEW OF SYSTEMS    Constitutional: []Fever []Sweats []Chills [] Recent Injury   [x] Denies all unless marked  HENT:[]Headache  [] Head Injury  [] Sore Throat  [] Ear Pain  [] Dizziness [] Hearing Loss   [x] Denies all unless marked  Spine:  [] Neck pain  [] Back pain  [] Sciatica  [x] Denies all unless marked  Cardiovascular:[]Chest Pain []Palpitations [] Heart Disease  [x] Denies all unless marked  Pulmonary: []Shortness of Breath []Cough   [x] Denies all unless marked  Gastrointestinal:  []Abdominal Pain  []Blood in Stool  []Diarrhea []Constipation []Nausea  []Vomiting  [x] Denies all unless marked  Genitourinary:  [] Dysuria [] Frequency  [] Incontinence [] Urgency   [x] Denies all unless marked  Musculoskeletal: [] Arthralgia  [] Myalgias [x] Muscle cramps  [x] Muscle twitches   [] Denies all unless marked   Extremities:   [] Pain   [x] Swelling   [x] Denies all unless marked  Skin:[] Rash  [] Color Change  [x] Denies all unless marked  Neurological:[] Visual Disturbance [] Double Vision [] Slurred Speech [] Trouble swallowing  [] Vertigo [] Tingling [] Numbness [] Weakness [] Loss of Balance   [] Loss of Consciousness [] Memory Loss [] Seizures  [x] Denies all unless marked  Psychiatric/Behavioral:[] Depression [] Anxiety  [x] Denies all unless marked  Sleep: []  Insomnia [] Sleep Disturbance [] Snoring [] Restless Legs [] Daytime Sleepiness [] Sleep Apnea  [x] Denies all unless marked    
Legs [] Daytime Sleepiness [] Sleep Apnea  [x] Denies all unless marked            Current Outpatient Medications   Medication Sig Dispense Refill    warfarin (COUMADIN) 7.5 MG tablet TAKE ONE TABLET BY MOUTH M, T, TH and take 1 and half tablets on Sun, Wed, Fri, Sat 135 tablet 2    dicyclomine (BENTYL) 10 MG capsule Take 1 capsule by mouth 4 times daily (before meals and nightly) 120 capsule 0    mesalamine (LIALDA) 1.2 g EC tablet Take 2 tablets by mouth 3 times daily 180 tablet 3    losartan (COZAAR) 25 MG tablet Take 2 tablets by mouth daily 60 tablet 2    isosorbide mononitrate (IMDUR) 30 MG extended release tablet Take 1 tablet by mouth every evening 90 tablet 1    cyanocobalamin 1000 MCG/ML injection INJECT 1ML INTO THE MUSCLE ONCE A MONTH 6 mL 0    carbidopa-levodopa (SINEMET)  MG per tablet TAKE ONE TABLET BY MOUTH THREE TIMES DAILY 90 tablet 11    metoprolol succinate (TOPROL XL) 25 MG extended release tablet TAKE ONE TABLET BY MOUTH DAILY 90 tablet 3    bumetanide (BUMEX) 0.5 MG tablet Take 1 tablet by mouth as needed (swelling) 30 tablet 1    Multiple Vitamins-Minerals (PRESERVISION AREDS 2+MULTI VIT PO) Take by mouth in the morning and at bedtime      lansoprazole (PREVACID) 15 MG delayed release capsule by NOT APPLICABLE route      Cholecalciferol (VITAMIN D3) 5000 units TABS Take by mouth      B Complex-C-E-Zn (STRESS FORMULA/ZINC PO) Take by mouth       No current facility-administered medications for this visit.       BP (!) 152/90   Pulse 77   Wt 93 kg (205 lb)   BMI 32.11 kg/m²     Constitutional - well developed, well nourished.    Eyes - conjunctiva normal.  Ear, nose, throat - No scars, masses, or lesions over external nose or ears, no atrophy of tongue  Neck-symmetric, no masses noted, no jugular vein distension  Respiration- chest wall appears symmetric, good expansion,   normal effort without use of accessory muscles  Musculoskeletal - no significant wasting of muscles noted, no

## 2024-11-21 ENCOUNTER — OFFICE VISIT (OUTPATIENT)
Dept: PRIMARY CARE CLINIC | Age: 89
End: 2024-11-21

## 2024-11-21 VITALS
SYSTOLIC BLOOD PRESSURE: 138 MMHG | HEIGHT: 67 IN | DIASTOLIC BLOOD PRESSURE: 86 MMHG | HEART RATE: 67 BPM | WEIGHT: 212 LBS | BODY MASS INDEX: 33.27 KG/M2 | OXYGEN SATURATION: 97 %

## 2024-11-21 DIAGNOSIS — Z12.5 SCREENING FOR PROSTATE CANCER: ICD-10-CM

## 2024-11-21 DIAGNOSIS — R79.89 ELEVATED TSH: ICD-10-CM

## 2024-11-21 DIAGNOSIS — R53.82 CHRONIC FATIGUE: ICD-10-CM

## 2024-11-21 DIAGNOSIS — N18.31 ANEMIA DUE TO STAGE 3A CHRONIC KIDNEY DISEASE (HCC): ICD-10-CM

## 2024-11-21 DIAGNOSIS — E78.2 MIXED HYPERLIPIDEMIA: Chronic | ICD-10-CM

## 2024-11-21 DIAGNOSIS — Z00.00 MEDICARE ANNUAL WELLNESS VISIT, SUBSEQUENT: Primary | ICD-10-CM

## 2024-11-21 DIAGNOSIS — N18.31 STAGE 3A CHRONIC KIDNEY DISEASE (HCC): Chronic | ICD-10-CM

## 2024-11-21 DIAGNOSIS — D63.1 ANEMIA DUE TO STAGE 3A CHRONIC KIDNEY DISEASE (HCC): ICD-10-CM

## 2024-11-21 DIAGNOSIS — I10 ESSENTIAL HYPERTENSION: Chronic | ICD-10-CM

## 2024-11-21 DIAGNOSIS — Z85.828 HISTORY OF SKIN CANCER: ICD-10-CM

## 2024-11-21 DIAGNOSIS — Z23 FLU VACCINE NEED: ICD-10-CM

## 2024-11-21 DIAGNOSIS — L98.9 SKIN LESION OF SCALP: ICD-10-CM

## 2024-11-21 LAB
ALBUMIN SERPL-MCNC: 3.8 G/DL (ref 3.5–5.2)
ALP SERPL-CCNC: 112 U/L (ref 40–129)
ALT SERPL-CCNC: <5 U/L (ref 5–41)
ANION GAP SERPL CALCULATED.3IONS-SCNC: 12 MMOL/L (ref 7–19)
AST SERPL-CCNC: 21 U/L (ref 5–40)
BASOPHILS # BLD: 0.1 K/UL (ref 0–0.2)
BASOPHILS NFR BLD: 0.6 % (ref 0–1)
BILIRUB DIRECT SERPL-MCNC: 0.1 MG/DL (ref 0–0.3)
BILIRUB INDIRECT SERPL-MCNC: 0.3 MG/DL (ref 0–1)
BILIRUB SERPL-MCNC: 0.4 MG/DL (ref 0.2–1.2)
BUN SERPL-MCNC: 13 MG/DL (ref 8–23)
CALCIUM SERPL-MCNC: 8.9 MG/DL (ref 8.8–10.2)
CHLORIDE SERPL-SCNC: 105 MMOL/L (ref 98–111)
CO2 SERPL-SCNC: 24 MMOL/L (ref 22–29)
CREAT SERPL-MCNC: 0.9 MG/DL (ref 0.7–1.2)
EOSINOPHIL # BLD: 0.1 K/UL (ref 0–0.6)
EOSINOPHIL NFR BLD: 1.2 % (ref 0–5)
ERYTHROCYTE [DISTWIDTH] IN BLOOD BY AUTOMATED COUNT: 15 % (ref 11.5–14.5)
GLUCOSE SERPL-MCNC: 96 MG/DL (ref 70–99)
HCT VFR BLD AUTO: 40.2 % (ref 42–52)
HGB BLD-MCNC: 13.2 G/DL (ref 14–18)
IMM GRANULOCYTES # BLD: 0 K/UL
IRON SATN MFR SERPL: 32 % (ref 14–50)
IRON SERPL-MCNC: 85 UG/DL (ref 59–158)
LYMPHOCYTES # BLD: 3 K/UL (ref 1.1–4.5)
LYMPHOCYTES NFR BLD: 39 % (ref 20–40)
MAGNESIUM SERPL-MCNC: 1.9 MG/DL (ref 1.6–2.4)
MCH RBC QN AUTO: 33.1 PG (ref 27–31)
MCHC RBC AUTO-ENTMCNC: 32.8 G/DL (ref 33–37)
MCV RBC AUTO: 100.8 FL (ref 80–94)
MONOCYTES # BLD: 0.4 K/UL (ref 0–0.9)
MONOCYTES NFR BLD: 4.9 % (ref 0–10)
NEUTROPHILS # BLD: 4.2 K/UL (ref 1.5–7.5)
NEUTS SEG NFR BLD: 54 % (ref 50–65)
PLATELET # BLD AUTO: 137 K/UL (ref 130–400)
PMV BLD AUTO: 9.5 FL (ref 9.4–12.4)
POTASSIUM SERPL-SCNC: 3.3 MMOL/L (ref 3.5–5)
PROT SERPL-MCNC: 6.2 G/DL (ref 6.4–8.3)
PSA SERPL-MCNC: 1.09 NG/ML (ref 0–4)
RBC # BLD AUTO: 3.99 M/UL (ref 4.7–6.1)
SODIUM SERPL-SCNC: 141 MMOL/L (ref 136–145)
T3FREE SERPL-MCNC: 2.1 PG/ML (ref 2–4.4)
T4 FREE SERPL-MCNC: 1.02 NG/DL (ref 0.93–1.7)
TIBC SERPL-MCNC: 268 UG/DL (ref 250–400)
TSH SERPL DL<=0.005 MIU/L-ACNC: 4.76 UIU/ML (ref 0.27–4.2)
WBC # BLD AUTO: 7.7 K/UL (ref 4.8–10.8)

## 2024-11-21 ASSESSMENT — PATIENT HEALTH QUESTIONNAIRE - PHQ9
SUM OF ALL RESPONSES TO PHQ QUESTIONS 1-9: 0
SUM OF ALL RESPONSES TO PHQ QUESTIONS 1-9: 0
SUM OF ALL RESPONSES TO PHQ9 QUESTIONS 1 & 2: 0
1. LITTLE INTEREST OR PLEASURE IN DOING THINGS: NOT AT ALL
SUM OF ALL RESPONSES TO PHQ QUESTIONS 1-9: 0
2. FEELING DOWN, DEPRESSED OR HOPELESS: NOT AT ALL
SUM OF ALL RESPONSES TO PHQ QUESTIONS 1-9: 0

## 2024-11-21 ASSESSMENT — LIFESTYLE VARIABLES
HOW MANY STANDARD DRINKS CONTAINING ALCOHOL DO YOU HAVE ON A TYPICAL DAY: PATIENT DOES NOT DRINK
HOW OFTEN DO YOU HAVE A DRINK CONTAINING ALCOHOL: NEVER

## 2024-11-21 NOTE — PROGRESS NOTES
at home.The patient does follow a low salt diet. The patient denies headaches, blurry vision, dizziness, heart palpitations, or chest pain.        - The patient's BP is at goal.        - The patient currently takes Losartan 50 mg 1 tab daily and Metoprolol succinate ER 25 mg 1 daily. He reports he is compliant with these. I will continue these medications at the current dose.        - The patient has recently had labs to monitor kidney function and electrolytes    4.) Skin lesion on scalp: He has a hx of skin cancer on his scalp. He has skin lesion on his scalp where this was removed in the past. I will refer to Sachin Ashley MD at [facility].        Return in about 6 months (around 5/21/2025).     Subjective       Patient's complete Health Risk Assessment and screening values have been reviewed and are found in Flowsheets. The following problems were reviewed today and where indicated follow up appointments were made and/or referrals ordered.    Positive Risk Factor Screenings with Interventions:    Fall Risk:  Do you feel unsteady or are you worried about falling? : (!) yes  2 or more falls in past year?: (!) yes  Fall with injury in past year?: no     Interventions:    Reviewed medications, home hazards, visual acuity, and co-morbidities that can increase risk for falls  See AVS for additional education material           Self-assessment of health:  In general, how would you say your health is?: (!) Poor    Interventions:  See AVS for additional education material       Abnormal BMI (obese):  Body mass index is 33.2 kg/m². (!) Abnormal  Interventions:  See AVS for additional education material         Hearing Screen:  Do you or your family notice any trouble with your hearing that hasn't been managed with hearing aids?: (!) Yes    Interventions:  See AVS for additional education material      ADL's:   Patient reports needing help with:  Select all that apply: (!) Dressing  Select all that apply: (!)

## 2024-11-28 ASSESSMENT — ENCOUNTER SYMPTOMS
SHORTNESS OF BREATH: 0
TROUBLE SWALLOWING: 0
COUGH: 0
VOMITING: 0
DIARRHEA: 1
BACK PAIN: 1
NAUSEA: 1
ABDOMINAL PAIN: 1
CONSTIPATION: 0

## 2024-12-03 ENCOUNTER — OFFICE VISIT (OUTPATIENT)
Dept: CARDIOLOGY CLINIC | Age: 89
End: 2024-12-03

## 2024-12-03 VITALS
WEIGHT: 216 LBS | OXYGEN SATURATION: 98 % | DIASTOLIC BLOOD PRESSURE: 74 MMHG | HEART RATE: 87 BPM | SYSTOLIC BLOOD PRESSURE: 128 MMHG | BODY MASS INDEX: 33.82 KG/M2

## 2024-12-03 DIAGNOSIS — I35.0 NONRHEUMATIC AORTIC VALVE STENOSIS: ICD-10-CM

## 2024-12-03 DIAGNOSIS — Z95.0 PACEMAKER: Primary | ICD-10-CM

## 2024-12-03 DIAGNOSIS — I44.2 COMPLETE HEART BLOCK (HCC): ICD-10-CM

## 2024-12-03 DIAGNOSIS — I10 ESSENTIAL HYPERTENSION: Chronic | ICD-10-CM

## 2024-12-03 RX ORDER — ISOSORBIDE MONONITRATE 30 MG/1
30 TABLET, EXTENDED RELEASE ORAL EVERY EVENING
Qty: 90 TABLET | Refills: 3 | Status: SHIPPED | OUTPATIENT
Start: 2024-12-03 | End: 2024-12-05 | Stop reason: SDUPTHER

## 2024-12-03 RX ORDER — LOSARTAN POTASSIUM 50 MG/1
50 TABLET ORAL DAILY
Qty: 90 EACH | Refills: 1 | Status: SHIPPED | OUTPATIENT
Start: 2024-12-03 | End: 2024-12-05 | Stop reason: SDUPTHER

## 2024-12-03 RX ORDER — LOSARTAN POTASSIUM 25 MG/1
25 TABLET ORAL DAILY
Qty: 90 TABLET | Refills: 0 | Status: SHIPPED | OUTPATIENT
Start: 2024-12-03 | End: 2024-12-05 | Stop reason: SDUPTHER

## 2024-12-03 ASSESSMENT — ENCOUNTER SYMPTOMS
SORE THROAT: 0
DIARRHEA: 0
NAUSEA: 0
VOMITING: 0
BLOOD IN STOOL: 0
FACIAL SWELLING: 0
EYE DISCHARGE: 0
ABDOMINAL DISTENTION: 0
EYE REDNESS: 0
CHEST TIGHTNESS: 0
APNEA: 0
ABDOMINAL PAIN: 0
CONSTIPATION: 0
EYE PAIN: 0
SHORTNESS OF BREATH: 0
COUGH: 0
WHEEZING: 0

## 2024-12-03 NOTE — PROGRESS NOTES
Cardiology Office Visit Note  [address] [facility] [address] [place] [address]  Phone: [phone]  Fax: [phone]                            Date:  12/3/2024  Patient: Bernard Vidales  Age:  89 y.o., 1935    Referral: No ref. 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     Priority: High    Complete heart block (HCC)      Priority: High    Tremor 03/07/2023     Priority: Medium    Parkinson's disease with dyskinesia and fluctuating manifestations (HCC) 03/07/2023     Priority: Medium    Gait abnormality 03/07/2023     Priority: Medium    Post covid-19 condition, 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     Priority: Medium    Chronic systolic (congestive) heart failure 07/08/2022     Priority: Medium    Palliative care patient 07/01/2022     Priority: Medium    COVID-19 06/29/2022     Priority: Medium    Presence of intraocular lens 06/27/2022     Priority: Medium    Pseudophakia 07/22/2021     Priority: Medium    Localized edema 06/28/2021     Priority: Low    Sleep disturbance 06/28/2021     Priority: Low    Coagulation defect (HCC) 06/09/2021     Priority: Low    Parkinson disease (HCC) 06/09/2021     Priority: Low    Stable angina pectoris (HCC) 02/08/2021     Priority: Low    Aortic stenosis, severe      Priority: Low    Bilateral carotid bruits 10/28/2020     Priority: Low    History of DVT (deep vein thrombosis) 10/28/2020     Priority: Low    Dizziness 10/28/2020     Priority: Low    Moderate aortic stenosis 10/28/2020     Priority: Low    After-cataract with vision obscured 02/25/2020     Priority: Low    S/P partial resection of colon 05/01/2019     Priority: Low    Intestinal adhesions with partial obstruction (HCC) 05/01/2019     Priority: Low    Diastolic congestive heart failure with preserved left ventricular function, NYHA class 2 ([facility])

## 2024-12-03 NOTE — PROGRESS NOTES
Pacemaker interrogated  Presenting rhythm:  AP , AP 57%,  98.1%  Battey voltage 7.9 years  Lead status:  Lead impedance within range and stable  Sensing:  P waves 2.8 mV,  R waves paced  Thresholds:  Atrial 0.5V @ 0.4ms, ventricular 0.75@ 0.4ms  Observations:  1 monitored NSVT  See scanned report for details  Reprogramming for sensitivity and threshold testing  Next [facility] appointment:  3/6/25

## 2024-12-05 DIAGNOSIS — I10 ESSENTIAL HYPERTENSION: Chronic | ICD-10-CM

## 2024-12-05 RX ORDER — LOSARTAN POTASSIUM 50 MG/1
50 TABLET ORAL DAILY
Qty: 90 TABLET | Refills: 1 | Status: SHIPPED | OUTPATIENT
Start: 2024-12-05

## 2024-12-05 RX ORDER — LOSARTAN POTASSIUM 25 MG/1
25 TABLET ORAL DAILY
Qty: 90 TABLET | Refills: 0 | Status: SHIPPED | OUTPATIENT
Start: 2024-12-05 | End: 2025-03-05

## 2024-12-05 RX ORDER — ISOSORBIDE MONONITRATE 30 MG/1
30 TABLET, EXTENDED RELEASE ORAL EVERY EVENING
Qty: 90 TABLET | Refills: 3 | Status: SHIPPED | OUTPATIENT
Start: 2024-12-05 | End: 2025-06-03

## 2024-12-09 ENCOUNTER — ANTI-COAG VISIT (OUTPATIENT)
Dept: CARDIOLOGY CLINIC | Age: 89
End: 2024-12-09
Payer: MEDICARE

## 2024-12-09 DIAGNOSIS — I48.92 ATRIAL FLUTTER, PAROXYSMAL (HCC): ICD-10-CM

## 2024-12-09 DIAGNOSIS — Z79.01 CHRONIC ANTICOAGULATION: Primary | ICD-10-CM

## 2024-12-09 DIAGNOSIS — I87.2 DEEP VENOUS INSUFFICIENCY: Chronic | ICD-10-CM

## 2024-12-09 DIAGNOSIS — Z79.01 LONG TERM (CURRENT) USE OF ANTICOAGULANTS: ICD-10-CM

## 2024-12-09 LAB
INTERNATIONAL NORMALIZATION RATIO, POC: 3.6
PROTHROMBIN TIME, POC: 35.7

## 2024-12-09 PROCEDURE — 85610 PROTHROMBIN TIME: CPT | Performed by: CLINICAL NURSE SPECIALIST

## 2024-12-09 PROCEDURE — 93793 ANTICOAG MGMT PT WARFARIN: CPT | Performed by: CLINICAL NURSE SPECIALIST

## 2024-12-09 NOTE — PROGRESS NOTES
Anticoagulation Summary  As of 12/9/2024      INR goal:  2.0-3.0   TTR:  73.3% (7.4 y)   INR used for dosing:  3.6 (12/9/2024)   Warfarin maintenance plan:  11.25 mg (7.5 mg x 1.5) every Sun, Fri; 7.5 mg (7.5 mg x 1) all other days   Weekly warfarin total:  60 mg   Plan last modified:  Colleen Lenz MA (12/9/2024)   Next INR check:  1/9/2025   Priority:  Maintenance   Target end date:  --    Indications    Chronic anticoagulation [Z79.01]  Deep venous insufficiency [I87.2]                 Anticoagulation Episode Summary       INR check location:  --    Preferred lab:  --    Send INR reminders to:  [facility] PT/INR RESULTS POOL    Comments:  --          Anticoagulation Care Providers       Provider Role Specialty Phone number    Hi Fisher MD [facility] [phone]                Dosing Plan  As of 12/9/2024      TTR:  73.3% (7.4 y)   Full warfarin instructions:  12/9: Hold; Otherwise 11.25 mg every Sun, Fri; 7.5 mg all other days               Made Bernard Vidales aware of findings by phone.     Electronically signed by RUBEN Stewart on 12/9/24 at 1:52 PM CST

## 2024-12-17 ENCOUNTER — TELEPHONE (OUTPATIENT)
Dept: GASTROENTEROLOGY | Age: 89
End: 2024-12-17

## 2024-12-17 NOTE — TELEPHONE ENCOUNTER
Lvm with results, advised to keep apt, call with any questions.     ----- Message from RUBEN Miller sent at 12/17/2024 10:11 AM CST -----  No new findings on the CT scan

## 2024-12-20 ENCOUNTER — TELEPHONE (OUTPATIENT)
Dept: GASTROENTEROLOGY | Age: 89
End: 2024-12-20

## 2024-12-20 DIAGNOSIS — K50.919 CROHN'S DISEASE WITH COMPLICATION, UNSPECIFIED GASTROINTESTINAL TRACT LOCATION (HCC): Primary | ICD-10-CM

## 2024-12-20 DIAGNOSIS — R19.7 DIARRHEA, UNSPECIFIED TYPE: ICD-10-CM

## 2024-12-20 RX ORDER — DICYCLOMINE HYDROCHLORIDE 10 MG/1
10 CAPSULE ORAL
Qty: 120 CAPSULE | Refills: 5 | Status: SHIPPED | OUTPATIENT
Start: 2024-12-20

## 2024-12-20 NOTE — TELEPHONE ENCOUNTER
12- Nel from [facility] called patient is needing a refill on his Dicyclomine     Routed to CT APRN

## 2024-12-23 DIAGNOSIS — K50.919 CROHN'S DISEASE WITH COMPLICATION, UNSPECIFIED GASTROINTESTINAL TRACT LOCATION (HCC): ICD-10-CM

## 2024-12-23 LAB
ALBUMIN SERPL-MCNC: 3.8 G/DL (ref 3.5–5.2)
ALP SERPL-CCNC: 109 U/L (ref 40–129)
ALT SERPL-CCNC: 6 U/L (ref 5–41)
ANION GAP SERPL CALCULATED.3IONS-SCNC: 12 MMOL/L (ref 7–19)
AST SERPL-CCNC: 18 U/L (ref 5–40)
BILIRUB SERPL-MCNC: 0.4 MG/DL (ref 0.2–1.2)
BUN SERPL-MCNC: 15 MG/DL (ref 8–23)
CALCIUM SERPL-MCNC: 8.9 MG/DL (ref 8.8–10.2)
CHLORIDE SERPL-SCNC: 106 MMOL/L (ref 98–111)
CO2 SERPL-SCNC: 27 MMOL/L (ref 22–29)
CREAT SERPL-MCNC: 1.1 MG/DL (ref 0.7–1.2)
CRP SERPL HS-MCNC: <0.3 MG/DL (ref 0–0.5)
ERYTHROCYTE [DISTWIDTH] IN BLOOD BY AUTOMATED COUNT: 16 % (ref 11.5–14.5)
ERYTHROCYTE [SEDIMENTATION RATE] IN BLOOD BY WESTERGREN METHOD: 18 MM/HR (ref 0–15)
GLUCOSE SERPL-MCNC: 101 MG/DL (ref 70–99)
HCT VFR BLD AUTO: 39 % (ref 42–52)
HGB BLD-MCNC: 12.8 G/DL (ref 14–18)
MCH RBC QN AUTO: 33.8 PG (ref 27–31)
MCHC RBC AUTO-ENTMCNC: 32.8 G/DL (ref 33–37)
MCV RBC AUTO: 102.9 FL (ref 80–94)
PLATELET # BLD AUTO: 141 K/UL (ref 130–400)
PMV BLD AUTO: 10.3 FL (ref 9.4–12.4)
POTASSIUM SERPL-SCNC: 3.1 MMOL/L (ref 3.5–5)
PROT SERPL-MCNC: 6.3 G/DL (ref 6.4–8.3)
RBC # BLD AUTO: 3.79 M/UL (ref 4.7–6.1)
SODIUM SERPL-SCNC: 145 MMOL/L (ref 136–145)
WBC # BLD AUTO: 6.9 K/UL (ref 4.8–10.8)

## 2024-12-26 DIAGNOSIS — K50.919 CROHN'S DISEASE WITH COMPLICATION, UNSPECIFIED GASTROINTESTINAL TRACT LOCATION (HCC): ICD-10-CM

## 2024-12-28 LAB — CALPROTECTIN STL-MCNT: 81 UG/G

## 2024-12-30 ENCOUNTER — TELEPHONE (OUTPATIENT)
Dept: GASTROENTEROLOGY | Age: 89
End: 2024-12-30

## 2024-12-30 NOTE — TELEPHONE ENCOUNTER
----- Message from RUBEN Miller sent at 12/29/2024 11:07 PM CST -----  Thony protectin stool slightly elevated but improved from last stool study 2 months ago

## 2025-01-02 ENCOUNTER — ANTI-COAG VISIT (OUTPATIENT)
Dept: CARDIOLOGY CLINIC | Age: 89
End: 2025-01-02

## 2025-01-02 ENCOUNTER — TELEPHONE (OUTPATIENT)
Dept: PRIMARY CARE CLINIC | Age: 89
End: 2025-01-02

## 2025-01-02 DIAGNOSIS — Z79.01 LONG TERM (CURRENT) USE OF ANTICOAGULANTS: ICD-10-CM

## 2025-01-02 DIAGNOSIS — Z95.2 HISTORY OF TRANSCATHETER AORTIC VALVE REPLACEMENT (TAVR): ICD-10-CM

## 2025-01-02 DIAGNOSIS — Z79.01 CHRONIC ANTICOAGULATION: Primary | ICD-10-CM

## 2025-01-02 DIAGNOSIS — I87.2 DEEP VENOUS INSUFFICIENCY: Chronic | ICD-10-CM

## 2025-01-02 LAB
INTERNATIONAL NORMALIZATION RATIO, POC: 2.6
PROTHROMBIN TIME, POC: 28

## 2025-01-02 NOTE — PROGRESS NOTES
Anticoagulation Summary  As of 2025      INR goal:  2.0-3.0   TTR:  73.0% (7.5 y)   INR used for dosin.6 (2025)   Warfarin maintenance plan:  11.25 mg (7.5 mg x 1.5) every Sun, Fri; 7.5 mg (7.5 mg x 1) all other days   Weekly warfarin total:  60 mg   Plan last modified:  Colleen Lenz MA (2024)   Next INR check:  2025   Priority:  Maintenance   Target end date:  --    Indications    Chronic anticoagulation [Z79.01]  Deep venous insufficiency [I87.2]                 Anticoagulation Episode Summary       INR check location:  --    Preferred lab:  --    Send INR reminders to:  [facility] IM PT/INR RESULTS POOL    Comments:  --          Anticoagulation Care Providers       Provider Role Specialty Phone number    Hi Fisher MD [facility] [phone]                Dosing Plan  As of 2025      TTR:  73.0% (7.5 y)   Full warfarin instructions:  11.25 mg every Sun, Fri; 7.5 mg all other days               Made Bernard Vidales aware of findings by phone.     Electronically signed by RUBEN Stewart on 25 at 2:57 PM CST

## 2025-01-02 NOTE — TELEPHONE ENCOUNTER
----- Message from Dr. Chino Mari MD sent at 12/30/2024 12:12 AM CST -----  - WNL: Hepatic function panel,  BMP, PSA, Magnesium, and Iron  - TSH was just slightly elevated, but his Free T3 and Free T4,   - CBC showed that his H&H has remained stable.

## 2025-01-08 RX ORDER — CARBIDOPA AND LEVODOPA 25; 100 MG/1; MG/1
1 TABLET ORAL 3 TIMES DAILY
Qty: 90 TABLET | Refills: 11 | Status: SHIPPED | OUTPATIENT
Start: 2025-01-08

## 2025-01-08 NOTE — TELEPHONE ENCOUNTER
Requested Prescriptions     Pending Prescriptions Disp Refills    carbidopa-levodopa (SINEMET)  MG per tablet [Pharmacy Med Name: carbidopa 25 mg-levodopa 100 mg tablet] 90 tablet 11     Sig: TAKE ONE TABLET BY MOUTH THREE TIMES DAILY       Last Office Visit: 11/20/2024  Next Office Visit: 5/20/2025  Last Medication Refill: 6/3/2024 with 11 RF

## 2025-01-22 ENCOUNTER — OFFICE VISIT (OUTPATIENT)
Dept: GASTROENTEROLOGY | Age: 89
End: 2025-01-22
Payer: MEDICARE

## 2025-01-22 VITALS
OXYGEN SATURATION: 95 % | WEIGHT: 214 LBS | HEIGHT: 67 IN | HEART RATE: 85 BPM | DIASTOLIC BLOOD PRESSURE: 60 MMHG | BODY MASS INDEX: 33.59 KG/M2 | SYSTOLIC BLOOD PRESSURE: 120 MMHG

## 2025-01-22 DIAGNOSIS — K50.919 CROHN'S DISEASE WITH COMPLICATION, UNSPECIFIED GASTROINTESTINAL TRACT LOCATION (HCC): Primary | ICD-10-CM

## 2025-01-22 DIAGNOSIS — R19.7 DIARRHEA, UNSPECIFIED TYPE: ICD-10-CM

## 2025-01-22 PROCEDURE — 99214 OFFICE O/P EST MOD 30 MIN: CPT | Performed by: NURSE PRACTITIONER

## 2025-01-22 PROCEDURE — 1159F MED LIST DOCD IN RCRD: CPT | Performed by: NURSE PRACTITIONER

## 2025-01-22 PROCEDURE — G8417 CALC BMI ABV UP PARAM F/U: HCPCS | Performed by: NURSE PRACTITIONER

## 2025-01-22 PROCEDURE — G8427 DOCREV CUR MEDS BY ELIG CLIN: HCPCS | Performed by: NURSE PRACTITIONER

## 2025-01-22 PROCEDURE — 1123F ACP DISCUSS/DSCN MKR DOCD: CPT | Performed by: NURSE PRACTITIONER

## 2025-01-22 PROCEDURE — 1036F TOBACCO NON-USER: CPT | Performed by: NURSE PRACTITIONER

## 2025-01-22 NOTE — PROGRESS NOTES
Subjective:     Patient ID: Bernard Vidales is a 89 y.o. male  PCP: Chino Mari MD  Referring Provider: No ref. provider found    HPI  Patient presents to the office today with the following complaints: Follow-up      Patient seen in the office today for follow up on his crohn's disease and chronic diarrhea  He is taking Lialda as well as Bentyl   States Bentyl is helping his post prandial diarrhea   He denies any abd pain and no blood noted in his stool   Labs reviewed and discussed with the patient  He denies any needs at this time       Assessment:     1. Crohn's disease with complication, unspecified gastrointestinal tract location (HCC)  2. Diarrhea, unspecified type       Review of Systems   Constitutional:  Negative for activity change, appetite change, fatigue, fever and unexpected weight change.   HENT:  Negative for trouble swallowing.    Respiratory:  Negative for cough, choking and shortness of breath.    Cardiovascular:  Negative for chest pain.   Gastrointestinal:  Positive for diarrhea. Negative for abdominal distention, abdominal pain, anal bleeding, blood in stool, constipation, nausea, rectal pain and vomiting.   Allergic/Immunologic: Negative for food allergies.   All other systems reviewed and are negative.      Plan:   Follow up in 6 months with crohn's labs prior to OV   Follow up sooner if needed   Orders  No orders of the defined types were placed in this encounter.    Medications  No orders of the defined types were placed in this encounter.        Patient History:     Past Medical History:   Diagnosis Date    Cancer (HCC)     skin    CHF (congestive heart failure) (HCC)     Cholelithiasis     CKD (chronic kidney disease) stage 3, GFR 30-59 ml/min (HCC) 11/13/2018    Crohn's disease (HCC)     Crohn's disease of large intestine with complication (HCC) 11/07/2017    Deep venous insufficiency 11/07/2017    Essential hypertension 11/07/2017    Gastroesophageal reflux disease without

## 2025-01-24 ENCOUNTER — TELEPHONE (OUTPATIENT)
Dept: PRIMARY CARE CLINIC | Age: 89
End: 2025-01-24

## 2025-01-24 DIAGNOSIS — R60.0 LOWER EXTREMITY EDEMA: ICD-10-CM

## 2025-01-24 NOTE — TELEPHONE ENCOUNTER
Bernard called requesting a refill of the below medication which has been pended for you:     Requested Prescriptions     Pending Prescriptions Disp Refills    bumetanide (BUMEX) 0.5 MG tablet 30 tablet 3     Sig: Take 1 tablet by mouth as needed (swelling)       Last Appointment Date: 11/21/2024  Next Appointment Date: 5/22/2025    No Known Allergies

## 2025-01-27 DIAGNOSIS — R60.0 LOWER EXTREMITY EDEMA: ICD-10-CM

## 2025-01-27 RX ORDER — BUMETANIDE 0.5 MG/1
TABLET ORAL
Qty: 30 TABLET | Refills: 3 | OUTPATIENT
Start: 2025-01-27

## 2025-01-27 RX ORDER — BUMETANIDE 0.5 MG/1
0.5 TABLET ORAL PRN
Qty: 30 TABLET | Refills: 3 | Status: SHIPPED | OUTPATIENT
Start: 2025-01-27

## 2025-01-27 NOTE — TELEPHONE ENCOUNTER
Bernard Vidales called to request a refill on his medication.      Last office visit : 11/21/2024   Next office visit : 5/22/2025     Requested Prescriptions     Pending Prescriptions Disp Refills    bumetanide (BUMEX) 0.5 MG tablet [Pharmacy Med Name: bumetanide 0.5 mg tablet] 30 tablet 3     Sig: TAKE ONE TABLET BY MOUTH DAILY AS NEEDED for swelling            Darling Vaughn, DENIN

## 2025-01-29 ASSESSMENT — ENCOUNTER SYMPTOMS
DIARRHEA: 1
COUGH: 0
ABDOMINAL PAIN: 0
VOMITING: 0
ABDOMINAL DISTENTION: 0
RECTAL PAIN: 0
ANAL BLEEDING: 0
BLOOD IN STOOL: 0
CONSTIPATION: 0
SHORTNESS OF BREATH: 0
TROUBLE SWALLOWING: 0
CHOKING: 0
NAUSEA: 0

## 2025-02-10 ENCOUNTER — ANTI-COAG VISIT (OUTPATIENT)
Dept: CARDIOLOGY CLINIC | Age: 89
End: 2025-02-10
Payer: MEDICARE

## 2025-02-10 DIAGNOSIS — I87.2 DEEP VENOUS INSUFFICIENCY: Chronic | ICD-10-CM

## 2025-02-10 DIAGNOSIS — Z79.01 CHRONIC ANTICOAGULATION: Primary | ICD-10-CM

## 2025-02-10 DIAGNOSIS — Z79.01 LONG TERM (CURRENT) USE OF ANTICOAGULANTS: ICD-10-CM

## 2025-02-10 LAB
INTERNATIONAL NORMALIZATION RATIO, POC: 2.6
PROTHROMBIN TIME, POC: 27.2

## 2025-02-10 PROCEDURE — 93793 ANTICOAG MGMT PT WARFARIN: CPT | Performed by: CLINICAL NURSE SPECIALIST

## 2025-02-10 PROCEDURE — 85610 PROTHROMBIN TIME: CPT | Performed by: CLINICAL NURSE SPECIALIST

## 2025-02-10 PROCEDURE — 36415 COLL VENOUS BLD VENIPUNCTURE: CPT | Performed by: CLINICAL NURSE SPECIALIST

## 2025-02-10 NOTE — PROGRESS NOTES
Anticoagulation Summary  As of 2/10/2025      INR goal:  2.0-3.0   TTR:  73.3% (7.6 y)   INR used for dosin.6 (2/10/2025)   Warfarin maintenance plan:  11.25 mg (7.5 mg x 1.5) every Sun, Fri; 7.5 mg (7.5 mg x 1) all other days   Weekly warfarin total:  60 mg   Plan last modified:  Colleen Lenz MA (2024)   Next INR check:  3/24/2025   Priority:  Maintenance   Target end date:  --    Indications    Chronic anticoagulation [Z79.01]  Deep venous insufficiency [I87.2]                 Anticoagulation Episode Summary       INR check location:  --    Preferred lab:  --    Send INR reminders to:  [facility] PT/INR RESULTS POOL    Comments:  --          Anticoagulation Care Providers       Provider Role Specialty Phone number    Hi Fisher MD [facility] [phone]                Dosing Plan  As of 2/10/2025      TTR:  73.3% (7.6 y)   Full warfarin instructions:  11.25 mg every Sun, Fri; 7.5 mg all other days               Made Bernard Vidales aware of findings by phone.     Electronically signed by RUBEN Casillas on 2/10/25 at 3:58 PM CST

## 2025-02-24 RX ORDER — LOSARTAN POTASSIUM 50 MG/1
50 TABLET ORAL DAILY
Qty: 90 TABLET | Refills: 1 | Status: SHIPPED | OUTPATIENT
Start: 2025-02-24

## 2025-03-03 RX ORDER — METOPROLOL SUCCINATE 25 MG/1
TABLET, EXTENDED RELEASE ORAL
Qty: 90 TABLET | Refills: 1 | Status: SHIPPED | OUTPATIENT
Start: 2025-03-03

## 2025-03-06 DIAGNOSIS — I44.2 COMPLETE HEART BLOCK (HCC): ICD-10-CM

## 2025-03-06 DIAGNOSIS — I47.29 NSVT (NONSUSTAINED VENTRICULAR TACHYCARDIA) (HCC): ICD-10-CM

## 2025-03-06 DIAGNOSIS — Z95.0 PACEMAKER: Primary | ICD-10-CM

## 2025-03-10 ENCOUNTER — TELEPHONE (OUTPATIENT)
Dept: GASTROENTEROLOGY | Age: 89
End: 2025-03-10

## 2025-03-10 NOTE — TELEPHONE ENCOUNTER
03- Patient called and lvm that he is having diarrhea every day and can't get it stopped.       Called the patient   He stated that he has had thin watery diarrhea 2-3 times daily for the past 2-3 weeks.  States that Imodium isn't helping any at all.  Complains that his stomach rolls then the diarrhea hits and can't seem to make it to the bathroom fast enough.      Mentions that he was on Pentasa for about 20 years and it stopped working  and now is on Mesalamine 2 pills tid along with Bentyl.       Also makes mention that he has had about 18 inches of his colon removed.     Notified that WOLFGANG MIRANDA is not in the office today but will be back in tomorrow.  Will call back once she makes any recommendations       Routed to WOLFGANG MIRANDA

## 2025-03-11 DIAGNOSIS — K50.919 CROHN'S DISEASE WITH COMPLICATION, UNSPECIFIED GASTROINTESTINAL TRACT LOCATION (HCC): ICD-10-CM

## 2025-03-11 DIAGNOSIS — K50.118 CROHN'S DISEASE OF LARGE INTESTINE WITH OTHER COMPLICATION (HCC): ICD-10-CM

## 2025-03-11 DIAGNOSIS — R19.7 DIARRHEA, UNSPECIFIED TYPE: ICD-10-CM

## 2025-03-11 DIAGNOSIS — R19.7 DIARRHEA, UNSPECIFIED TYPE: Primary | ICD-10-CM

## 2025-03-11 LAB
ADV 40+41 DNA STL QL NAA+NON-PROBE: NOT DETECTED
C CAYETANENSIS DNA STL QL NAA+NON-PROBE: NOT DETECTED
C COLI+JEJ+UPSA DNA STL QL NAA+NON-PROBE: NOT DETECTED
C DIFF TOX A+B STL QL IA: NORMAL
CRYPTOSP DNA STL QL NAA+NON-PROBE: NOT DETECTED
E HISTOLYT DNA STL QL NAA+NON-PROBE: NOT DETECTED
EAEC PAA PLAS AGGR+AATA ST NAA+NON-PRB: NOT DETECTED
EC STX1+STX2 GENES STL QL NAA+NON-PROBE: NOT DETECTED
EPEC EAE GENE STL QL NAA+NON-PROBE: NOT DETECTED
ETEC LTA+ST1A+ST1B TOX ST NAA+NON-PROBE: NOT DETECTED
G LAMBLIA DNA STL QL NAA+NON-PROBE: NOT DETECTED
GI PATH DNA+RNA PNL STL NAA+NON-PROBE: NOT DETECTED
NOROVIRUS GI+II RNA STL QL NAA+NON-PROBE: NOT DETECTED
P SHIGELLOIDES DNA STL QL NAA+NON-PROBE: NOT DETECTED
RVA RNA STL QL NAA+NON-PROBE: NOT DETECTED
S ENT+BONG DNA STL QL NAA+NON-PROBE: NOT DETECTED
SAPO I+II+IV+V RNA STL QL NAA+NON-PROBE: NOT DETECTED
SHIGELLA SP+EIEC IPAH ST NAA+NON-PROBE: NOT DETECTED
V CHOL+PARA+VUL DNA STL QL NAA+NON-PROBE: NOT DETECTED
V CHOLERAE DNA STL QL NAA+NON-PROBE: NOT DETECTED
Y ENTEROCOL DNA STL QL NAA+NON-PROBE: NOT DETECTED

## 2025-03-11 NOTE — TELEPHONE ENCOUNTER
03- Called and notified Magi medellin of recommendations as per CT RUBEN Friend per Magi Medellin

## 2025-03-12 ENCOUNTER — RESULTS FOLLOW-UP (OUTPATIENT)
Dept: GASTROENTEROLOGY | Age: 89
End: 2025-03-12

## 2025-03-14 LAB — CALPROTECTIN STL-MCNT: 80 UG/G

## 2025-03-21 NOTE — RESULT ENCOUNTER NOTE
I called and spoke with Bernard and Magi about his results and to check on his diarrhea  He stated he is doing better he is taking his crohn's medication along with his Bentyl

## 2025-03-24 ENCOUNTER — ANTI-COAG VISIT (OUTPATIENT)
Dept: CARDIOLOGY CLINIC | Age: 89
End: 2025-03-24
Payer: MEDICARE

## 2025-03-24 DIAGNOSIS — I87.2 DEEP VENOUS INSUFFICIENCY: Chronic | ICD-10-CM

## 2025-03-24 DIAGNOSIS — Z79.01 CHRONIC ANTICOAGULATION: Primary | ICD-10-CM

## 2025-03-24 DIAGNOSIS — R30.0 DYSURIA: Primary | ICD-10-CM

## 2025-03-24 LAB
BACTERIA #/AREA URNS HPF: ABNORMAL /HPF
BILIRUB UR QL STRIP: NEGATIVE
CLARITY UR: ABNORMAL
COLOR UR: ABNORMAL
CRYSTALS URNS MICRO: ABNORMAL /HPF
GLUCOSE UR STRIP.AUTO-MCNC: NEGATIVE MG/DL
HGB UR STRIP.AUTO-MCNC: NEGATIVE MG/L
INTERNATIONAL NORMALIZATION RATIO, POC: 2.8
KETONES UR STRIP.AUTO-MCNC: ABNORMAL MG/DL
LEUKOCYTE ESTERASE UR QL STRIP.AUTO: ABNORMAL
NITRITE UR QL STRIP.AUTO: NEGATIVE
PH UR STRIP.AUTO: 5.5 [PH] (ref 5–8)
PROT UR STRIP.AUTO-MCNC: 30 MG/DL
PROTHROMBIN TIME, POC: 29.2
RBC #/AREA URNS HPF: ABNORMAL /HPF (ref 0–2)
SP GR UR STRIP.AUTO: 1.02 (ref 1–1.03)
SQUAMOUS #/AREA URNS HPF: ABNORMAL /HPF
UROBILINOGEN UR STRIP.AUTO-MCNC: 0.2 E.U./DL
WBC #/AREA URNS HPF: ABNORMAL /HPF (ref 0–5)

## 2025-03-24 PROCEDURE — 36415 COLL VENOUS BLD VENIPUNCTURE: CPT | Performed by: NURSE PRACTITIONER

## 2025-03-24 PROCEDURE — 93793 ANTICOAG MGMT PT WARFARIN: CPT | Performed by: NURSE PRACTITIONER

## 2025-03-24 PROCEDURE — 85610 PROTHROMBIN TIME: CPT | Performed by: NURSE PRACTITIONER

## 2025-05-01 ENCOUNTER — OFFICE VISIT (OUTPATIENT)
Dept: PRIMARY CARE CLINIC | Age: 89
End: 2025-05-01
Payer: MEDICARE

## 2025-05-01 ENCOUNTER — TELEPHONE (OUTPATIENT)
Dept: GASTROENTEROLOGY | Age: 89
End: 2025-05-01

## 2025-05-01 VITALS — HEART RATE: 77 BPM | OXYGEN SATURATION: 95 % | TEMPERATURE: 97.7 F

## 2025-05-01 DIAGNOSIS — K50.119 CROHN'S DISEASE OF LARGE INTESTINE WITH COMPLICATION (HCC): Primary | Chronic | ICD-10-CM

## 2025-05-01 DIAGNOSIS — R79.89 ELEVATED TSH: ICD-10-CM

## 2025-05-01 DIAGNOSIS — I10 ESSENTIAL HYPERTENSION: Chronic | ICD-10-CM

## 2025-05-01 DIAGNOSIS — N18.31 STAGE 3A CHRONIC KIDNEY DISEASE (HCC): Chronic | ICD-10-CM

## 2025-05-01 DIAGNOSIS — Z79.01 LONG TERM (CURRENT) USE OF ANTICOAGULANTS: Primary | ICD-10-CM

## 2025-05-01 DIAGNOSIS — E78.2 MIXED HYPERLIPIDEMIA: Chronic | ICD-10-CM

## 2025-05-01 DIAGNOSIS — K50.919 CROHN'S DISEASE WITH COMPLICATION, UNSPECIFIED GASTROINTESTINAL TRACT LOCATION (HCC): ICD-10-CM

## 2025-05-01 LAB
ALBUMIN SERPL-MCNC: 3.6 G/DL (ref 3.5–5.2)
ALP SERPL-CCNC: 110 U/L (ref 40–129)
ALT SERPL-CCNC: 8 U/L (ref 10–50)
ANION GAP SERPL CALCULATED.3IONS-SCNC: 12 MMOL/L (ref 8–16)
AST SERPL-CCNC: 29 U/L (ref 10–50)
BASOPHILS # BLD: 0.1 K/UL (ref 0–0.2)
BASOPHILS NFR BLD: 0.9 % (ref 0–1)
BILIRUB DIRECT SERPL-MCNC: 0.3 MG/DL (ref 0–0.3)
BILIRUB INDIRECT SERPL-MCNC: 0.3 MG/DL (ref 0–1)
BILIRUB SERPL-MCNC: 0.6 MG/DL (ref 0.2–1.2)
BUN SERPL-MCNC: 15 MG/DL (ref 8–23)
CALCIUM SERPL-MCNC: 9.1 MG/DL (ref 8.8–10.2)
CHLORIDE SERPL-SCNC: 104 MMOL/L (ref 98–107)
CO2 SERPL-SCNC: 25 MMOL/L (ref 22–29)
CREAT SERPL-MCNC: 0.9 MG/DL (ref 0.7–1.2)
EOSINOPHIL # BLD: 0.1 K/UL (ref 0–0.6)
EOSINOPHIL NFR BLD: 0.7 % (ref 0–5)
ERYTHROCYTE [DISTWIDTH] IN BLOOD BY AUTOMATED COUNT: 15.3 % (ref 11.5–14.5)
GLUCOSE SERPL-MCNC: 96 MG/DL (ref 70–99)
HCT VFR BLD AUTO: 42.4 % (ref 42–52)
HGB BLD-MCNC: 14.1 G/DL (ref 14–18)
IMM GRANULOCYTES # BLD: 0 K/UL
LYMPHOCYTES # BLD: 2.4 K/UL (ref 1.1–4.5)
LYMPHOCYTES NFR BLD: 29.4 % (ref 20–40)
MAGNESIUM SERPL-MCNC: 1.8 MG/DL (ref 1.6–2.4)
MCH RBC QN AUTO: 33.9 PG (ref 27–31)
MCHC RBC AUTO-ENTMCNC: 33.3 G/DL (ref 33–37)
MCV RBC AUTO: 101.9 FL (ref 80–94)
MONOCYTES # BLD: 0.4 K/UL (ref 0–0.9)
MONOCYTES NFR BLD: 5 % (ref 0–10)
NEUTROPHILS # BLD: 5.2 K/UL (ref 1.5–7.5)
NEUTS SEG NFR BLD: 63.8 % (ref 50–65)
PLATELET # BLD AUTO: 156 K/UL (ref 130–400)
PMV BLD AUTO: 9.9 FL (ref 9.4–12.4)
POTASSIUM SERPL-SCNC: 3.4 MMOL/L (ref 3.5–5.1)
PROT SERPL-MCNC: 6.3 G/DL (ref 6.4–8.3)
RBC # BLD AUTO: 4.16 M/UL (ref 4.7–6.1)
SODIUM SERPL-SCNC: 141 MMOL/L (ref 136–145)
T4 FREE SERPL-MCNC: 1.08 NG/DL (ref 0.93–1.7)
TSH SERPL DL<=0.005 MIU/L-ACNC: 4.28 UIU/ML (ref 0.27–4.2)
WBC # BLD AUTO: 8.2 K/UL (ref 4.8–10.8)

## 2025-05-01 PROCEDURE — 36415 COLL VENOUS BLD VENIPUNCTURE: CPT | Performed by: CLINICAL NURSE SPECIALIST

## 2025-05-01 PROCEDURE — 1123F ACP DISCUSS/DSCN MKR DOCD: CPT | Performed by: FAMILY MEDICINE

## 2025-05-01 PROCEDURE — G8417 CALC BMI ABV UP PARAM F/U: HCPCS | Performed by: FAMILY MEDICINE

## 2025-05-01 PROCEDURE — 1159F MED LIST DOCD IN RCRD: CPT | Performed by: FAMILY MEDICINE

## 2025-05-01 PROCEDURE — G8427 DOCREV CUR MEDS BY ELIG CLIN: HCPCS | Performed by: FAMILY MEDICINE

## 2025-05-01 PROCEDURE — 85610 PROTHROMBIN TIME: CPT | Performed by: CLINICAL NURSE SPECIALIST

## 2025-05-01 PROCEDURE — 1036F TOBACCO NON-USER: CPT | Performed by: FAMILY MEDICINE

## 2025-05-01 PROCEDURE — 99213 OFFICE O/P EST LOW 20 MIN: CPT | Performed by: FAMILY MEDICINE

## 2025-05-01 RX ORDER — MESALAMINE 1.2 G/1
2400 TABLET, DELAYED RELEASE ORAL 3 TIMES DAILY
Qty: 180 TABLET | Refills: 5 | Status: SHIPPED | OUTPATIENT
Start: 2025-05-01

## 2025-05-01 SDOH — ECONOMIC STABILITY: FOOD INSECURITY: WITHIN THE PAST 12 MONTHS, YOU WORRIED THAT YOUR FOOD WOULD RUN OUT BEFORE YOU GOT MONEY TO BUY MORE.: NEVER TRUE

## 2025-05-01 SDOH — ECONOMIC STABILITY: FOOD INSECURITY: WITHIN THE PAST 12 MONTHS, THE FOOD YOU BOUGHT JUST DIDN'T LAST AND YOU DIDN'T HAVE MONEY TO GET MORE.: NEVER TRUE

## 2025-05-01 ASSESSMENT — PATIENT HEALTH QUESTIONNAIRE - PHQ9
SUM OF ALL RESPONSES TO PHQ QUESTIONS 1-9: 0
2. FEELING DOWN, DEPRESSED OR HOPELESS: NOT AT ALL
SUM OF ALL RESPONSES TO PHQ QUESTIONS 1-9: 0
1. LITTLE INTEREST OR PLEASURE IN DOING THINGS: NOT AT ALL

## 2025-05-01 NOTE — PROGRESS NOTES
Reason For Visit:   Diarrhea    Combined HPI and A/P:      Diagnosis Orders   1. Crohn's disease of large intestine with complication (HCC)        2. Essential hypertension  CBC with Auto Differential    Hepatic Function Panel    Basic Metabolic Panel      3. Mixed hyperlipidemia  TSH    T4, Free      4. Stage 3a chronic kidney disease (HCC)        5. Elevated TSH  TSH    T4, Free    Magnesium          1.) Crohn's Disease: This is a chronic, stable problem. He has been having increased diarrhea for the last 8 months. He will take Imodium at times. This will help with the loose stool. He has been having increased fatigue and muscle weakness. I will order labs to assess for electrolyte abnormalities.          Return for previously scheduled appointment.    We discussed use, benefit, and side effects of prescribed medications.  All patient questions answered.   Patient agreed with treatment plan.   I reviewed available records in our system and care everywhere. In cases where records are not available, the records have been requested and will be reviewed when available.     Subjective      Past Surgical History:   Procedure Laterality Date    AORTIC VALVE REPLACEMENT  01/21/2021    Transfemoral transcatheter aortic valve replacment(TAVR) using a 29 mm Arreaga Doe S3 valve.    CARDIAC PACEMAKER PLACEMENT      CARDIAC SURGERY      CARDIAC CATH    CATARACT REMOVAL WITH IMPLANT  2018    COLECTOMY      (18 inches per pt) due to colon mass    COLONOSCOPY  09/28/2020    Dr. Hector:  stricture TI    OTHER SURGICAL HISTORY      Fistula-in-ano repair    PACEMAKER INSERTION  01/22/2021    3rd degree AV block-[employer] [place]  Implant MD Dr Wilfred Luna    SKIN CANCER EXCISION      SUBTOTAL COLECTOMY      TONSILLECTOMY      TOTAL HIP ARTHROPLASTY  2011    UPPER GASTROINTESTINAL ENDOSCOPY  09/25/2014    Dr Hector     Family History   Problem Relation Age of Onset    Breast Cancer Sister 29    Stroke Other     Other Other

## 2025-05-01 NOTE — TELEPHONE ENCOUNTER
05- [facility] called for a refill on patients Mesalamine Lialda 2 tablets tid     Routed to CT APRN

## 2025-05-04 ENCOUNTER — RESULTS FOLLOW-UP (OUTPATIENT)
Dept: PRIMARY CARE CLINIC | Age: 89
End: 2025-05-04

## 2025-05-05 ENCOUNTER — ANTI-COAG VISIT (OUTPATIENT)
Dept: CARDIOLOGY CLINIC | Age: 89
End: 2025-05-05
Payer: MEDICARE

## 2025-05-05 DIAGNOSIS — I87.2 DEEP VENOUS INSUFFICIENCY: Chronic | ICD-10-CM

## 2025-05-05 DIAGNOSIS — Z79.01 LONG TERM (CURRENT) USE OF ANTICOAGULANTS: ICD-10-CM

## 2025-05-05 DIAGNOSIS — Z79.01 CHRONIC ANTICOAGULATION: Primary | ICD-10-CM

## 2025-05-05 LAB
INTERNATIONAL NORMALIZATION RATIO, POC: 2.8
PROTHROMBIN TIME, POC: 30

## 2025-05-05 PROCEDURE — 85610 PROTHROMBIN TIME: CPT | Performed by: NURSE PRACTITIONER

## 2025-05-05 PROCEDURE — 93793 ANTICOAG MGMT PT WARFARIN: CPT | Performed by: NURSE PRACTITIONER

## 2025-05-05 PROCEDURE — 36415 COLL VENOUS BLD VENIPUNCTURE: CPT | Performed by: NURSE PRACTITIONER

## 2025-05-06 DIAGNOSIS — E53.8 VITAMIN B 12 DEFICIENCY: ICD-10-CM

## 2025-05-06 RX ORDER — CYANOCOBALAMIN 1000 UG/ML
INJECTION, SOLUTION INTRAMUSCULAR; SUBCUTANEOUS
Qty: 3 ML | Refills: 1 | Status: SHIPPED | OUTPATIENT
Start: 2025-05-06

## 2025-05-07 DIAGNOSIS — E87.6 LOW SERUM POTASSIUM: Primary | ICD-10-CM

## 2025-05-07 RX ORDER — POTASSIUM CHLORIDE 750 MG/1
10 TABLET, EXTENDED RELEASE ORAL 2 TIMES DAILY
Qty: 60 TABLET | Refills: 0 | Status: SHIPPED | OUTPATIENT
Start: 2025-05-07

## 2025-05-07 NOTE — TELEPHONE ENCOUNTER
Requested Prescriptions     Signed Prescriptions Disp Refills    potassium chloride (KLOR-CON M) 10 MEQ extended release tablet 60 tablet 0     Sig: Take 1 tablet by mouth 2 times daily     Authorizing Provider: ARPITA POZO     Ordering User: TOSIN GERARDO LPN

## 2025-05-08 ENCOUNTER — OFFICE VISIT (OUTPATIENT)
Dept: GASTROENTEROLOGY | Age: 89
End: 2025-05-08
Payer: MEDICARE

## 2025-05-08 VITALS
OXYGEN SATURATION: 96 % | SYSTOLIC BLOOD PRESSURE: 118 MMHG | BODY MASS INDEX: 33.9 KG/M2 | DIASTOLIC BLOOD PRESSURE: 80 MMHG | HEIGHT: 67 IN | HEART RATE: 74 BPM | WEIGHT: 216 LBS

## 2025-05-08 DIAGNOSIS — K50.919 CROHN'S DISEASE WITH COMPLICATION, UNSPECIFIED GASTROINTESTINAL TRACT LOCATION (HCC): Primary | ICD-10-CM

## 2025-05-08 DIAGNOSIS — R19.7 DIARRHEA, UNSPECIFIED TYPE: ICD-10-CM

## 2025-05-08 DIAGNOSIS — G20.A1 PARKINSON'S DISEASE, UNSPECIFIED WHETHER DYSKINESIA PRESENT, UNSPECIFIED WHETHER MANIFESTATIONS FLUCTUATE (HCC): ICD-10-CM

## 2025-05-08 PROCEDURE — 1036F TOBACCO NON-USER: CPT | Performed by: NURSE PRACTITIONER

## 2025-05-08 PROCEDURE — 1159F MED LIST DOCD IN RCRD: CPT | Performed by: NURSE PRACTITIONER

## 2025-05-08 PROCEDURE — G8427 DOCREV CUR MEDS BY ELIG CLIN: HCPCS | Performed by: NURSE PRACTITIONER

## 2025-05-08 PROCEDURE — 99213 OFFICE O/P EST LOW 20 MIN: CPT | Performed by: NURSE PRACTITIONER

## 2025-05-08 PROCEDURE — G8417 CALC BMI ABV UP PARAM F/U: HCPCS | Performed by: NURSE PRACTITIONER

## 2025-05-08 PROCEDURE — 1123F ACP DISCUSS/DSCN MKR DOCD: CPT | Performed by: NURSE PRACTITIONER

## 2025-05-08 ASSESSMENT — ENCOUNTER SYMPTOMS
VOMITING: 0
SHORTNESS OF BREATH: 0
DIARRHEA: 1
BACK PAIN: 1
CONSTIPATION: 0
COUGH: 0
TROUBLE SWALLOWING: 0
NAUSEA: 1
ABDOMINAL PAIN: 1

## 2025-05-09 ENCOUNTER — TELEPHONE (OUTPATIENT)
Dept: GASTROENTEROLOGY | Age: 89
End: 2025-05-09

## 2025-05-09 NOTE — TELEPHONE ENCOUNTER
Magi was not at home when I called back, so Bernard has been told that I will call him closer to [place]si and remind them about his blood work.    Bernard voiced understanding.

## 2025-05-09 NOTE — TELEPHONE ENCOUNTER
Bernard Vidales wife called  with questions about lab instructions for upcoming appt in July. Pt received recall, but is already scheduled. Needs to know if lab will be for blood work or stool sample. I did not see active on in chart. Please call Magi to advise.

## 2025-05-14 NOTE — PROGRESS NOTES
unspecified whether manifestations fluctuate (HCC)       Review of Systems    Plan:   Continue current medication and continue to follow up with all other providers  Follow up in our office in July as scheduled     Orders  No orders of the defined types were placed in this encounter.    Medications  No orders of the defined types were placed in this encounter.        Patient History:     Past Medical History:   Diagnosis Date    Cancer (HCC)     skin    CHF (congestive heart failure) ([facility])     Cholelithiasis     CKD (chronic kidney disease) stage 3, GFR 30-59 ml/min ([facility]) 11/13/2018    Crohn's disease ([facility])     Crohn's disease of large intestine with complication ([facility]) 11/07/2017    Deep venous insufficiency 11/07/2017    Essential hypertension 11/07/2017    Gastroesophageal reflux disease without esophagitis 11/07/2017    Heart murmur     Hx of blood clots     Mixed hyperlipidemia 11/07/2017    Nonrheumatic aortic valve stenosis 05/19/2018    Palliative care patient 06/30/2022    Parkinson disease ([facility])     Primary osteoarthritis involving multiple joints 11/07/2017    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[facility]) 2002       Past Surgical History:   Procedure Laterality Date    AORTIC VALVE REPLACEMENT  01/21/2021    Transfemoral transcatheter aortic valve replacment(TAVR) using a 29 mm Arreaga Doe S3 valve.    CARDIAC PACEMAKER PLACEMENT      CARDIAC SURGERY      CARDIAC CATH    CATARACT REMOVAL WITH IMPLANT  2018    COLECTOMY      (18 inches per pt) due to colon mass    COLONOSCOPY  09/28/2020    Dr. 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

## 2025-05-16 DIAGNOSIS — E87.6 LOW SERUM POTASSIUM: ICD-10-CM

## 2025-05-16 LAB
ANION GAP SERPL CALCULATED.3IONS-SCNC: 14 MMOL/L (ref 8–16)
BUN SERPL-MCNC: 18 MG/DL (ref 8–23)
CALCIUM SERPL-MCNC: 9.4 MG/DL (ref 8.8–10.2)
CHLORIDE SERPL-SCNC: 103 MMOL/L (ref 98–107)
CO2 SERPL-SCNC: 22 MMOL/L (ref 22–29)
CREAT SERPL-MCNC: 1 MG/DL (ref 0.7–1.2)
GLUCOSE SERPL-MCNC: 111 MG/DL (ref 70–99)
POTASSIUM SERPL-SCNC: 3.7 MMOL/L (ref 3.5–5.1)
SODIUM SERPL-SCNC: 139 MMOL/L (ref 136–145)

## 2025-05-20 ENCOUNTER — RESULTS FOLLOW-UP (OUTPATIENT)
Dept: PRIMARY CARE CLINIC | Age: 89
End: 2025-05-20

## 2025-05-20 ENCOUNTER — OFFICE VISIT (OUTPATIENT)
Dept: NEUROLOGY | Age: 89
End: 2025-05-20
Payer: MEDICARE

## 2025-05-20 VITALS
BODY MASS INDEX: 33.9 KG/M2 | WEIGHT: 216 LBS | HEART RATE: 76 BPM | HEIGHT: 67 IN | RESPIRATION RATE: 16 BRPM | DIASTOLIC BLOOD PRESSURE: 76 MMHG | SYSTOLIC BLOOD PRESSURE: 137 MMHG

## 2025-05-20 DIAGNOSIS — R26.9 GAIT ABNORMALITY: ICD-10-CM

## 2025-05-20 DIAGNOSIS — G20.A1 PARKINSON'S DISEASE, UNSPECIFIED WHETHER DYSKINESIA PRESENT, UNSPECIFIED WHETHER MANIFESTATIONS FLUCTUATE (HCC): Primary | ICD-10-CM

## 2025-05-20 DIAGNOSIS — R25.1 TREMOR: ICD-10-CM

## 2025-05-20 PROCEDURE — 1036F TOBACCO NON-USER: CPT | Performed by: PSYCHIATRY & NEUROLOGY

## 2025-05-20 PROCEDURE — G8427 DOCREV CUR MEDS BY ELIG CLIN: HCPCS | Performed by: PSYCHIATRY & NEUROLOGY

## 2025-05-20 PROCEDURE — 1123F ACP DISCUSS/DSCN MKR DOCD: CPT | Performed by: PSYCHIATRY & NEUROLOGY

## 2025-05-20 PROCEDURE — 1159F MED LIST DOCD IN RCRD: CPT | Performed by: PSYCHIATRY & NEUROLOGY

## 2025-05-20 PROCEDURE — G8417 CALC BMI ABV UP PARAM F/U: HCPCS | Performed by: PSYCHIATRY & NEUROLOGY

## 2025-05-20 PROCEDURE — 99214 OFFICE O/P EST MOD 30 MIN: CPT | Performed by: PSYCHIATRY & NEUROLOGY

## 2025-05-20 RX ORDER — ROTIGOTINE 2 MG/24H
1 PATCH, EXTENDED RELEASE TRANSDERMAL DAILY
Qty: 30 PATCH | Refills: 5 | Status: SHIPPED | OUTPATIENT
Start: 2025-05-20

## 2025-05-20 NOTE — PROGRESS NOTES
Chief Complaint   Patient presents with    Follow-up     Patient states he has Crohn disease          Bernard Vidales is a 89 y.o. year old male who is seen for evaluation of tremor.  The patient indicates that he has had tremor since at least early 2023.  This was primarily in the hands.  He does use a cane to ambulate and has had a previous fall.  He denied any back pain.  He denied any significant cognitive issues.  His voice was softer.  Stress and tension could make his tremor worse.  There was no family history of tremor.  He indicated he had COVID in 2021 and in early 2023.. More tremors noted. Doing better with Sinemet. Issues with hernia and vascular issues. Feels tremors worse. More issues with Crohnes causes diarrhea.     Active Ambulatory Problems     Diagnosis Date Noted    Chronic anticoagulation 06/21/2017    Essential hypertension 11/07/2017    Crohn's disease of large intestine with complication ([facility]) 11/07/2017    Mixed hyperlipidemia 11/07/2017    Deep venous insufficiency 11/07/2017    Primary osteoarthritis involving multiple joints 11/07/2017    Gastroesophageal reflux disease without esophagitis 11/07/2017    Aortic systolic murmur on examination - suspect aortic stenosis 05/07/2018    Nonrheumatic aortic valve stenosis 05/19/2018    Chronic kidney disease, stage III (moderate) ([facility]) 11/13/2018    S/P partial resection of colon 05/01/2019    Intestinal adhesions with partial obstruction ([facility]) 05/01/2019    Diastolic congestive heart failure with preserved left ventricular function, NYHA class 2 ([facility]) 05/01/2019    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 choroidal neovascularization ([facility]) 05/01/2019    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 serious comorbidity with body mass index (BMI) of 32.0 to 32.9 in adult 05/01/2019    Bilateral carotid bruits 10/28/2020    History of DVT (deep vein thrombosis) 10/28/2020    Dizziness 10/28/2020    Moderate aortic stenosis 10/28/2020

## 2025-05-20 NOTE — PROGRESS NOTES
REVIEW OF SYSTEMS    Constitutional: []Fever []Sweat []Chills [] Recent Injury [x] Denies all unless marked  HEENT:[]Headache  [] Head Injury/Hearing Loss  [] Sore Throat  [] Ear Ache/Dizziness  [x] Denies all unless marked  Spine:  [] Neck pain  [] Back pain  [] Sciaticia  [x] Denies all unless marked  Cardiovascular:[]Heart Disease []Chest Pain [] Palpitations  [x] Denies all unless marked  Pulmonary: []Shortness of Breath []Cough   [x] Denies all unless marke  Gastrointestinal: []Nausea  []Vomiting  [x]Abdominal Pain  []Constipation  []Diarrhea  []Dark Bloody Stools  [x] Denies all unless marked  Psychiatric/Behavioral:[] Depression [] Anxiety [x] Denies all unless marked  Genitourinary:   [] Frequency  [] Urgency  [] Incontinence [] Pain with Urination  [x] Denies all unless marked  Extremities: []Pain  []Swelling  [x] Denies all unless marked  Musculoskeletal: [] Muscle Pain  [] Joint Pain  [] Arthritis [] Muscle Cramps [] Muscle Twitches  [x] Denies all unless marked  Sleep: [] Insomnia [] Snoring [] Restless Legs [] Sleep Apnea  [] Daytime Sleepiness  [x] Denies all unless marked  Skin:[] Rash [] Skin Discoloration [x] Denies all unless marked   Neurological: []Visual Disturbance/Memory Loss [] Loss of Balance [] Slurred Speech/Weakness [] Seizures  [] Vertigo/Dizziness [x] Denies all unless marked

## 2025-05-27 ENCOUNTER — HOSPITAL ENCOUNTER (OUTPATIENT)
Age: 89
Discharge: HOME | End: 2025-05-29
Attending: INTERNAL MEDICINE
Payer: MEDICARE

## 2025-05-27 VITALS
SYSTOLIC BLOOD PRESSURE: 151 MMHG | BODY MASS INDEX: 31.08 KG/M2 | DIASTOLIC BLOOD PRESSURE: 77 MMHG | WEIGHT: 198 LBS | HEIGHT: 67 IN

## 2025-05-27 DIAGNOSIS — I35.0 NONRHEUMATIC AORTIC VALVE STENOSIS: ICD-10-CM

## 2025-05-27 PROCEDURE — 6360000004 HC RX CONTRAST MEDICATION: Performed by: INTERNAL MEDICINE

## 2025-05-27 PROCEDURE — C8929 TTE W OR WO FOL WCON,DOPPLER: HCPCS

## 2025-05-27 RX ADMIN — SULFUR HEXAFLUORIDE 2 ML: 60.7; .19; .19 INJECTION, POWDER, LYOPHILIZED, FOR SUSPENSION INTRAVENOUS; INTRAVESICAL at 15:39

## 2025-05-28 LAB
ECHO AO ASC DIAM: 3.3 CM
ECHO AO ASCENDING AORTA INDEX: 1.64 CM/M2
ECHO AO ROOT DIAM: 2 CM
ECHO AO ROOT INDEX: 1 CM/M2
ECHO AO SINUS VALSALVA DIAM: 4 CM
ECHO AO SINUS VALSALVA INDEX: 1.99 CM/M2
ECHO AO ST JNCT DIAM: 3.3 CM
ECHO AV AREA PEAK VELOCITY: 1.9 CM2
ECHO AV AREA VTI: 2.1 CM2
ECHO AV AREA/BSA PEAK VELOCITY: 0.9 CM2/M2
ECHO AV AREA/BSA VTI: 1 CM2/M2
ECHO AV MEAN GRADIENT: 13 MMHG
ECHO AV MEAN VELOCITY: 1.7 M/S
ECHO AV PEAK GRADIENT: 26 MMHG
ECHO AV PEAK GRADIENT: 26 MMHG
ECHO AV PEAK VELOCITY: 2.6 M/S
ECHO AV REGURGITANT FRACTION: -1002 %
ECHO AV REGURGITANT VOLUME: -1148.4 ML
ECHO AV VELOCITY RATIO: 0.58
ECHO AV VTI: 54.9 CM
ECHO BSA: 2.06 M2
ECHO LA AREA 2C: 25.4 CM2
ECHO LA AREA 4C: 26.3 CM2
ECHO LA DIAMETER INDEX: 2.69 CM/M2
ECHO LA DIAMETER: 5.4 CM
ECHO LA MAJOR AXIS: 7.5 CM
ECHO LA MINOR AXIS: 6 CM
ECHO LA TO AORTIC ROOT RATIO: 2.7
ECHO LA VOL BP: 94 ML (ref 18–58)
ECHO LA VOL MOD A2C: 83 ML (ref 18–58)
ECHO LA VOL MOD A4C: 96 ML (ref 18–58)
ECHO LA VOL/BSA BIPLANE: 47 ML/M2 (ref 16–34)
ECHO LA VOLUME INDEX MOD A2C: 41 ML/M2 (ref 16–34)
ECHO LA VOLUME INDEX MOD A4C: 48 ML/M2 (ref 16–34)
ECHO LV E' LATERAL VELOCITY: 6.85 CM/S
ECHO LV E' SEPTAL VELOCITY: 5.55 CM/S
ECHO LV EDV A2C: 160 ML
ECHO LV EDV A4C: 203 ML
ECHO LV EDV INDEX A4C: 101 ML/M2
ECHO LV EDV NDEX A2C: 80 ML/M2
ECHO LV EF PHYSICIAN: 47 %
ECHO LV EJECTION FRACTION A2C: 45 %
ECHO LV EJECTION FRACTION A4C: 50 %
ECHO LV EJECTION FRACTION BIPLANE: 47 % (ref 55–100)
ECHO LV ESV A2C: 88 ML
ECHO LV ESV A4C: 101 ML
ECHO LV ESV INDEX A2C: 44 ML/M2
ECHO LV ESV INDEX A4C: 50 ML/M2
ECHO LV FRACTIONAL SHORTENING: 35 % (ref 28–44)
ECHO LV INTERNAL DIMENSION DIASTOLE INDEX: 2.29 CM/M2
ECHO LV INTERNAL DIMENSION DIASTOLIC: 4.6 CM (ref 4.2–5.9)
ECHO LV INTERNAL DIMENSION SYSTOLIC INDEX: 1.49 CM/M2
ECHO LV INTERNAL DIMENSION SYSTOLIC: 3 CM
ECHO LV ISOVOLUMETRIC RELAXATION TIME (IVRT): 141 MS
ECHO LV IVSD: 1.5 CM (ref 0.6–1)
ECHO LV IVSS: 1.9 CM
ECHO LV MASS 2D: 299.5 G (ref 88–224)
ECHO LV MASS INDEX 2D: 149 G/M2 (ref 49–115)
ECHO LV POSTERIOR WALL DIASTOLIC: 1.6 CM (ref 0.6–1)
ECHO LV RELATIVE WALL THICKNESS RATIO: 0.7
ECHO LVOT AREA: 3.1 CM2
ECHO LVOT AV VTI INDEX: 0.66
ECHO LVOT DIAM: 2 CM
ECHO LVOT MEAN GRADIENT: 6 MMHG
ECHO LVOT PEAK GRADIENT: 9 MMHG
ECHO LVOT PEAK GRADIENT: 9 MMHG
ECHO LVOT PEAK VELOCITY: 1.5 M/S
ECHO LVOT STROKE VOLUME INDEX: 57 ML/M2
ECHO LVOT SV: 114.6 ML
ECHO LVOT VTI: 36.5 CM
ECHO MV A VELOCITY: 1.83 M/S
ECHO MV ANNULUS DIAMETER: 2.6 CM
ECHO MV AREA VTI: 1.6 CM2
ECHO MV E DECELERATION TIME (DT): 491 MS
ECHO MV E VELOCITY: 1.3 M/S
ECHO MV E/A RATIO: 0.71
ECHO MV E/E' LATERAL: 18.98
ECHO MV E/E' RATIO (AVERAGED): 21.2
ECHO MV E/E' SEPTAL: 23.42
ECHO MV LVOT VTI INDEX: 1.95
ECHO MV MAX VELOCITY: 2.1 M/S
ECHO MV MAX VELOCITY: 2.1 M/S
ECHO MV MEAN GRADIENT: 7 MMHG
ECHO MV MEAN VELOCITY: 1.3 M/S
ECHO MV PEAK GRADIENT: 17 MMHG
ECHO MV REGURGITANT ALIASING (NYQUIST) VELOCITY: 28 CM/S
ECHO MV REGURGITANT FRACTION CONT EQ: 91 %
ECHO MV REGURGITANT RADIUS PISA: 0.9 CM
ECHO MV REGURGITANT VOLUME: 1148.36 CM3
ECHO MV REGURGITANT VTIA: 238 CM
ECHO MV VTI: 71.1 CM
ECHO RA AREA 4C: 14.7 CM2
ECHO RA END SYSTOLIC VOLUME APICAL 4 CHAMBER INDEX BSA: 17 ML/M2
ECHO RA MAJOR AXIS INDEX: 3.08 CM/M2
ECHO RA MAJOR AXIS: 6.2 CM
ECHO RA MINOR AXIS INDEX: 1.64 CM/M2
ECHO RA MINOR AXIS: 3.3 CM
ECHO RA VOLUME: 34 ML
ECHO RV BASAL DIMENSION: 2.5 CM
ECHO RV INTERNAL DIMENSION: 3.5 CM
ECHO RV LONGITUDINAL DIMENSION: 10.3 CM
ECHO RV MID DIMENSION: 2.9 CM
ECHO RV TAPSE: 2.1 CM (ref 1.7–?)
ECHO TV REGURGITANT MAX VELOCITY: 2.6 M/S
ECHO TV REGURGITANT PEAK GRADIENT: 27 MMHG
ECHO TV REGURGITANT PEAK GRADIENT: 27 MMHG

## 2025-05-28 PROCEDURE — 93306 TTE W/DOPPLER COMPLETE: CPT | Performed by: INTERNAL MEDICINE

## 2025-05-29 ENCOUNTER — RESULTS FOLLOW-UP (OUTPATIENT)
Dept: CARDIOLOGY CLINIC | Age: 89
End: 2025-05-29

## 2025-05-29 NOTE — RESULT ENCOUNTER NOTE
Patient largely sedentary without any overt clinical symptoms of chest pain or shortness of breath.  Echocardiogram shows at least moderate valvular heart disease.  Previous TAVR functioning well.  Recommend repeating echo in 6-12 months or earlier should his clinical condition change.

## 2025-06-03 ENCOUNTER — OFFICE VISIT (OUTPATIENT)
Dept: CARDIOLOGY CLINIC | Age: 89
End: 2025-06-03

## 2025-06-03 VITALS
WEIGHT: 206 LBS | BODY MASS INDEX: 32.33 KG/M2 | HEIGHT: 67 IN | SYSTOLIC BLOOD PRESSURE: 136 MMHG | DIASTOLIC BLOOD PRESSURE: 60 MMHG | HEART RATE: 60 BPM

## 2025-06-03 DIAGNOSIS — R60.0 LOWER EXTREMITY EDEMA: ICD-10-CM

## 2025-06-03 DIAGNOSIS — I10 ESSENTIAL HYPERTENSION: ICD-10-CM

## 2025-06-03 DIAGNOSIS — I48.92 ATRIAL FLUTTER, PAROXYSMAL (HCC): ICD-10-CM

## 2025-06-03 DIAGNOSIS — I44.2 COMPLETE HEART BLOCK (HCC): ICD-10-CM

## 2025-06-03 DIAGNOSIS — I50.22 CHRONIC SYSTOLIC (CONGESTIVE) HEART FAILURE (HCC): ICD-10-CM

## 2025-06-03 DIAGNOSIS — Z79.01 LONG TERM (CURRENT) USE OF ANTICOAGULANTS: ICD-10-CM

## 2025-06-03 DIAGNOSIS — Z95.2 HISTORY OF TRANSCATHETER AORTIC VALVE REPLACEMENT (TAVR): ICD-10-CM

## 2025-06-03 DIAGNOSIS — Z95.0 PACEMAKER: Primary | ICD-10-CM

## 2025-06-03 LAB
INTERNATIONAL NORMALIZATION RATIO, POC: 2.6
PROTHROMBIN TIME, POC: 27.8

## 2025-06-03 RX ORDER — BUMETANIDE 0.5 MG/1
0.5 TABLET ORAL DAILY
Qty: 90 TABLET | Refills: 3 | Status: SHIPPED | OUTPATIENT
Start: 2025-06-03 | End: 2025-09-01

## 2025-06-03 NOTE — PROGRESS NOTES
Pacemaker interrogated  Presenting rhythm:  AP , AP 53.6%,  99.6%  Battey voltage 7.6 years  Lead status:  Lead impedance within range and stable  Sensing:  P waves 2.9 mV,  R waves paced  Thresholds:  Atrial 0.5V @ 0.4ms, ventricular 0.5@ 0.4ms  Observations:  no new observations since last carelink report  Patient had 2 brief NSVT in January  See scanned report for details  Reprogramming for sensitivity and threshold testing  Next carelink appointment:  9/8/25    
  06/29/2022 0.01 0.00 - 0.03 ng/mL Final     Comment:     <0.030 ng/ml       No measurable cardiac damage.    0.030-0.099 ng/ml  Values of troponin in this range suggest  possible myocardial damage. Repeat assay  4 to 6 hours after the current specimen.    >= 0.100 ng/ml     Indicative of myocardial damage.  Recommend continued monitoring of  patient status and cardiac markers.        LIPID PANEL: No results found for: \"LIPIDPAN\"  LIVER PROFILE:   AST   Date Value Ref Range Status   05/01/2025 29 10 - 50 U/L Final     ALT   Date Value Ref Range Status   05/01/2025 8 (L) 10 - 50 U/L Final                Electronically signed by Arben Olivares MD on 6/3/2025 at 2:44 PM    Arben Olivares MD, LE[place]ARDO, [facility]  Noninvasive Cardiology Consultant    This dictation was generated by voice recognition computer software.  Although all attempts are made to edit the dictation for accuracy, there may be errors in the transcription that are not intended.

## 2025-06-05 ENCOUNTER — RESULTS FOLLOW-UP (OUTPATIENT)
Dept: CARDIOLOGY CLINIC | Age: 89
End: 2025-06-05

## 2025-06-09 ENCOUNTER — OFFICE VISIT (OUTPATIENT)
Age: 89
End: 2025-06-09
Payer: MEDICARE

## 2025-06-09 ENCOUNTER — RESULTS FOLLOW-UP (OUTPATIENT)
Dept: CARDIOLOGY CLINIC | Age: 89
End: 2025-06-09

## 2025-06-09 VITALS
HEIGHT: 67 IN | WEIGHT: 207 LBS | TEMPERATURE: 97.8 F | BODY MASS INDEX: 32.49 KG/M2 | SYSTOLIC BLOOD PRESSURE: 126 MMHG | DIASTOLIC BLOOD PRESSURE: 78 MMHG | OXYGEN SATURATION: 93 % | HEART RATE: 70 BPM

## 2025-06-09 DIAGNOSIS — I48.92 ATRIAL FLUTTER, PAROXYSMAL (HCC): ICD-10-CM

## 2025-06-09 DIAGNOSIS — I25.10 CORONARY ARTERY DISEASE INVOLVING NATIVE CORONARY ARTERY OF NATIVE HEART WITHOUT ANGINA PECTORIS: ICD-10-CM

## 2025-06-09 DIAGNOSIS — I48.0 PAROXYSMAL ATRIAL FIBRILLATION (HCC): ICD-10-CM

## 2025-06-09 DIAGNOSIS — E78.2 MIXED HYPERLIPIDEMIA: Chronic | ICD-10-CM

## 2025-06-09 DIAGNOSIS — H35.3221 EXUDATIVE AGE-RELATED MACULAR DEGENERATION OF LEFT EYE WITH ACTIVE CHOROIDAL NEOVASCULARIZATION (HCC): ICD-10-CM

## 2025-06-09 DIAGNOSIS — R60.0 PERIPHERAL EDEMA: ICD-10-CM

## 2025-06-09 DIAGNOSIS — Z95.2 STATUS POST TRANSCATHETER AORTIC VALVE REPLACEMENT: ICD-10-CM

## 2025-06-09 DIAGNOSIS — I25.5 ISCHEMIC CARDIOMYOPATHY: ICD-10-CM

## 2025-06-09 DIAGNOSIS — K50.919 CROHN'S DISEASE WITH COMPLICATION, UNSPECIFIED GASTROINTESTINAL TRACT LOCATION (HCC): ICD-10-CM

## 2025-06-09 DIAGNOSIS — I50.42 CHRONIC COMBINED SYSTOLIC AND DIASTOLIC CONGESTIVE HEART FAILURE (HCC): ICD-10-CM

## 2025-06-09 DIAGNOSIS — Z79.01 ANTICOAGULATED ON COUMADIN: ICD-10-CM

## 2025-06-09 DIAGNOSIS — K21.9 GASTROESOPHAGEAL REFLUX DISEASE, UNSPECIFIED WHETHER ESOPHAGITIS PRESENT: ICD-10-CM

## 2025-06-09 DIAGNOSIS — K40.90 INGUINAL HERNIA, LEFT: ICD-10-CM

## 2025-06-09 DIAGNOSIS — I44.2 COMPLETE HEART BLOCK (HCC): ICD-10-CM

## 2025-06-09 DIAGNOSIS — I10 PRIMARY HYPERTENSION: Primary | ICD-10-CM

## 2025-06-09 DIAGNOSIS — Z95.0 PACEMAKER: Primary | ICD-10-CM

## 2025-06-09 DIAGNOSIS — G20.B2 PARKINSON'S DISEASE WITH DYSKINESIA AND FLUCTUATING MANIFESTATIONS (HCC): ICD-10-CM

## 2025-06-09 DIAGNOSIS — I05.0 MITRAL VALVE STENOSIS, UNSPECIFIED ETIOLOGY: ICD-10-CM

## 2025-06-09 DIAGNOSIS — Z90.49 S/P PARTIAL RESECTION OF COLON: ICD-10-CM

## 2025-06-09 DIAGNOSIS — E87.6 HYPOKALEMIA: ICD-10-CM

## 2025-06-09 PROBLEM — I35.0 MODERATE AORTIC STENOSIS: Status: RESOLVED | Noted: 2020-10-28 | Resolved: 2025-06-09

## 2025-06-09 PROBLEM — N18.30 CHRONIC KIDNEY DISEASE, STAGE III (MODERATE) (HCC): Chronic | Status: RESOLVED | Noted: 2018-11-13 | Resolved: 2025-06-09

## 2025-06-09 PROBLEM — K56.51 INTESTINAL ADHESIONS WITH PARTIAL OBSTRUCTION (HCC): Status: RESOLVED | Noted: 2019-05-01 | Resolved: 2025-06-09

## 2025-06-09 PROBLEM — I35.8 AORTIC SYSTOLIC MURMUR ON EXAMINATION: Chronic | Status: RESOLVED | Noted: 2018-05-07 | Resolved: 2025-06-09

## 2025-06-09 PROBLEM — L03.115 CELLULITIS OF RIGHT LOWER EXTREMITY: Status: RESOLVED | Noted: 2023-05-31 | Resolved: 2025-06-09

## 2025-06-09 PROBLEM — G47.9 SLEEP DISTURBANCE: Status: RESOLVED | Noted: 2021-06-28 | Resolved: 2025-06-09

## 2025-06-09 PROBLEM — D68.9 COAGULATION DEFECT: Status: RESOLVED | Noted: 2021-06-09 | Resolved: 2025-06-09

## 2025-06-09 PROBLEM — I50.30 DIASTOLIC CONGESTIVE HEART FAILURE WITH PRESERVED LEFT VENTRICULAR FUNCTION, NYHA CLASS 2 (HCC): Chronic | Status: RESOLVED | Noted: 2019-05-01 | Resolved: 2025-06-09

## 2025-06-09 PROCEDURE — 99205 OFFICE O/P NEW HI 60 MIN: CPT | Performed by: PEDIATRICS

## 2025-06-09 PROCEDURE — G8427 DOCREV CUR MEDS BY ELIG CLIN: HCPCS | Performed by: PEDIATRICS

## 2025-06-09 PROCEDURE — G8417 CALC BMI ABV UP PARAM F/U: HCPCS | Performed by: PEDIATRICS

## 2025-06-09 PROCEDURE — 1123F ACP DISCUSS/DSCN MKR DOCD: CPT | Performed by: PEDIATRICS

## 2025-06-09 PROCEDURE — 1159F MED LIST DOCD IN RCRD: CPT | Performed by: PEDIATRICS

## 2025-06-09 PROCEDURE — G2212 PROLONG OUTPT/OFFICE VIS: HCPCS | Performed by: PEDIATRICS

## 2025-06-09 PROCEDURE — 1160F RVW MEDS BY RX/DR IN RCRD: CPT | Performed by: PEDIATRICS

## 2025-06-09 RX ORDER — LOSARTAN POTASSIUM 100 MG/1
100 TABLET ORAL DAILY
Qty: 90 TABLET | Refills: 3 | Status: SHIPPED | OUTPATIENT
Start: 2025-06-09

## 2025-06-09 RX ORDER — ROPINIROLE 2 MG/1
2 TABLET, FILM COATED ORAL 3 TIMES DAILY
Qty: 90 TABLET | Refills: 3 | Status: SHIPPED | OUTPATIENT
Start: 2025-06-09

## 2025-06-09 RX ORDER — BUMETANIDE 1 MG/1
1 TABLET ORAL DAILY
Qty: 30 TABLET | Refills: 3 | Status: SHIPPED | OUTPATIENT
Start: 2025-06-09

## 2025-06-09 RX ORDER — BUDESONIDE 3 MG/1
9 CAPSULE, COATED PELLETS ORAL 2 TIMES DAILY
Qty: 180 CAPSULE | Refills: 0 | Status: SHIPPED | OUTPATIENT
Start: 2025-06-09 | End: 2025-07-09

## 2025-06-09 RX ORDER — POTASSIUM CHLORIDE 750 MG/1
10 TABLET, EXTENDED RELEASE ORAL 2 TIMES DAILY
Qty: 60 TABLET | Refills: 11 | Status: SHIPPED | OUTPATIENT
Start: 2025-06-09

## 2025-06-09 SDOH — ECONOMIC STABILITY: FOOD INSECURITY: WITHIN THE PAST 12 MONTHS, YOU WORRIED THAT YOUR FOOD WOULD RUN OUT BEFORE YOU GOT MONEY TO BUY MORE.: NEVER TRUE

## 2025-06-09 SDOH — ECONOMIC STABILITY: FOOD INSECURITY: WITHIN THE PAST 12 MONTHS, THE FOOD YOU BOUGHT JUST DIDN'T LAST AND YOU DIDN'T HAVE MONEY TO GET MORE.: NEVER TRUE

## 2025-06-09 ASSESSMENT — ENCOUNTER SYMPTOMS
DIARRHEA: 1
ALLERGIC/IMMUNOLOGIC NEGATIVE: 1
SHORTNESS OF BREATH: 1
BLOOD IN STOOL: 0
EYES NEGATIVE: 1
ABDOMINAL PAIN: 1

## 2025-06-09 ASSESSMENT — PATIENT HEALTH QUESTIONNAIRE - PHQ9
SUM OF ALL RESPONSES TO PHQ QUESTIONS 1-9: 0
2. FEELING DOWN, DEPRESSED OR HOPELESS: NOT AT ALL
1. LITTLE INTEREST OR PLEASURE IN DOING THINGS: NOT AT ALL
SUM OF ALL RESPONSES TO PHQ QUESTIONS 1-9: 0

## 2025-06-09 NOTE — PROGRESS NOTES
Face to Face Supporting Documentation - Home Health    The encounter with this patient was in whole or in part the primary reason for home health admission.    Date of encounter:   Patient:                    MRN:                       YOB: 2022  Gloria Herndon  [id number]  1937     Home health to see patient for:  Skilled Nursing care for assessment, interventions & education, Physical Therapy evaluation and treatment, and Occupational therapy evaluation and treatment    Skilled need for:  Exacerbation of Chronic Disease State atrial fibrillation, constipation and New Onset Medical Diagnosis constipation     Skilled nursing interventions to include:  Comment: per  recommendation     Homebound status evidenced by:  Need the aid of supportive devices such as crutches, canes, wheelchairs or walkers or Needs the assistance of another person in order to leave the home. Leaving home requires a considerable and taxing effort. There is a normal inability to leave the home.    Community Physician to provide follow up care: Jimy Lange M.D.     Optional Interventions? Yes, Details: per  recommendations       I certify the face to face encounter for this home health care referral meets the CMS requirements and the encounter/clinical assessment with the patient was, in whole, or in part, for the medical condition(s) listed above, which is the primary reason for home health care. Based on my clinical findings: the service(s) are medically necessary, support the need for home health care, and the homebound criteria are met.  I certify that this patient has had a face to face encounter by myself.  Shanika Kohler M.D. - NPI: [id number]  
release tablet        Return in about 1 month (around 7/9/2025) for 30.       Subjective   History of Present Illness  The patient presents today as a new patient to establish care. He was previously seen by Dr. Mar at [facility]. He has multiple health issues that he would like to discuss.    GERD  He has a history of GERD and is taking lansoprazole 15 mg daily.  - Character: GERD symptoms managed with lansoprazole.  - Timing: Daily lansoprazole use.    Peripheral Edema  He experiences peripheral edema and is taking Bumex 0.5 mg daily.  - Character: Peripheral edema.  - Timing: Daily Bumex use.    Parkinson's Disease  He has a history of Parkinson's disease and is followed by neurology. He was taking Sinemet 25/100 but has stopped due to hallucinations. He continues to experience tremors despite taking carbidopa-levodopa three times daily. He reports no hallucinations at present but was informed that this could be a side effect of his medication. He declined a trial of a new medication due to cost concerns.  - Character: Tremors despite carbidopa-levodopa use; hallucinations previously associated with Sinemet.  - Alleviating/Aggravating Factors: Declined new medication trial due to cost concerns.  - Timing: Tremors persist despite current medication regimen.    Hypertension  He has hypertension and is taking losartan 75 mg daily. He also takes metoprolol succinate 25 mg daily. His blood pressure today is 126/78 with a heart rate of 70.  - Character: Hypertension managed with losartan and metoprolol.  - Timing: Daily medication use.  - Severity: Blood pressure today is 126/78 with a heart rate of 70.    Hyperlipidemia  He has a history of hyperlipidemia but is not on any lipid-lowering medications.  - Character: Hyperlipidemia not currently managed with medication.    Crohn's Disease  He has a history of Crohn's disease and is taking mesalamine 1.2 g, 2 tablets three times daily, but reports no improvement in

## 2025-06-12 ENCOUNTER — RESULTS FOLLOW-UP (OUTPATIENT)
Age: 89
End: 2025-06-12

## 2025-06-12 DIAGNOSIS — I48.0 PAROXYSMAL ATRIAL FIBRILLATION (HCC): ICD-10-CM

## 2025-06-12 DIAGNOSIS — Z79.01 ANTICOAGULATED ON COUMADIN: ICD-10-CM

## 2025-06-12 DIAGNOSIS — I25.10 CORONARY ARTERY DISEASE INVOLVING NATIVE CORONARY ARTERY OF NATIVE HEART WITHOUT ANGINA PECTORIS: ICD-10-CM

## 2025-06-12 DIAGNOSIS — Z79.01 ANTICOAGULATED ON COUMADIN: Primary | ICD-10-CM

## 2025-06-12 DIAGNOSIS — I10 PRIMARY HYPERTENSION: ICD-10-CM

## 2025-06-12 DIAGNOSIS — K50.919 CROHN'S DISEASE WITH COMPLICATION, UNSPECIFIED GASTROINTESTINAL TRACT LOCATION (HCC): ICD-10-CM

## 2025-06-12 LAB
ALBUMIN SERPL-MCNC: 3.6 G/DL (ref 3.5–5.2)
ALP SERPL-CCNC: 118 U/L (ref 40–129)
ALT SERPL-CCNC: 8 U/L (ref 10–50)
ANION GAP SERPL CALCULATED.3IONS-SCNC: 14 MMOL/L (ref 8–16)
AST SERPL-CCNC: 25 U/L (ref 10–50)
BASOPHILS # BLD: 0.1 K/UL (ref 0–0.2)
BASOPHILS NFR BLD: 0.6 % (ref 0–1)
BILIRUB SERPL-MCNC: 0.4 MG/DL (ref 0.2–1.2)
BUN SERPL-MCNC: 21 MG/DL (ref 8–23)
CALCIUM SERPL-MCNC: 9.5 MG/DL (ref 8.8–10.2)
CHLORIDE SERPL-SCNC: 107 MMOL/L (ref 98–107)
CO2 SERPL-SCNC: 20 MMOL/L (ref 22–29)
CREAT SERPL-MCNC: 1 MG/DL (ref 0.7–1.2)
CRP SERPL-MCNC: <3 MG/L (ref 0–5)
EOSINOPHIL # BLD: 0.1 K/UL (ref 0–0.6)
EOSINOPHIL NFR BLD: 1.2 % (ref 0–5)
ERYTHROCYTE [DISTWIDTH] IN BLOOD BY AUTOMATED COUNT: 15.6 % (ref 11.5–14.5)
ERYTHROCYTE [SEDIMENTATION RATE] IN BLOOD BY WESTERGREN METHOD: 13 MM/HR (ref 0–15)
GLUCOSE SERPL-MCNC: 117 MG/DL (ref 70–99)
HCT VFR BLD AUTO: 40.4 % (ref 42–52)
HGB BLD-MCNC: 13.6 G/DL (ref 14–18)
IMM GRANULOCYTES # BLD: 0 K/UL
INR PPP: 2.29 (ref 0.88–1.18)
LYMPHOCYTES # BLD: 3.2 K/UL (ref 1.1–4.5)
LYMPHOCYTES NFR BLD: 38.2 % (ref 20–40)
MCH RBC QN AUTO: 34 PG (ref 27–31)
MCHC RBC AUTO-ENTMCNC: 33.7 G/DL (ref 33–37)
MCV RBC AUTO: 101 FL (ref 80–94)
MONOCYTES # BLD: 0.4 K/UL (ref 0–0.9)
MONOCYTES NFR BLD: 5 % (ref 0–10)
NEUTROPHILS # BLD: 4.6 K/UL (ref 1.5–7.5)
NEUTS SEG NFR BLD: 54.9 % (ref 50–65)
PLATELET # BLD AUTO: 142 K/UL (ref 130–400)
PMV BLD AUTO: 10 FL (ref 9.4–12.4)
POTASSIUM SERPL-SCNC: 3.6 MMOL/L (ref 3.5–5.1)
PROT SERPL-MCNC: 6 G/DL (ref 6.4–8.3)
PROTHROMBIN TIME: 24.8 SEC (ref 12–14.6)
RBC # BLD AUTO: 4 M/UL (ref 4.7–6.1)
SODIUM SERPL-SCNC: 141 MMOL/L (ref 136–145)
T4 FREE SERPL-MCNC: 0.9 NG/DL (ref 0.93–1.7)
TSH SERPL DL<=0.005 MIU/L-ACNC: 2.5 UIU/ML (ref 0.27–4.2)
WBC # BLD AUTO: 8.4 K/UL (ref 4.8–10.8)

## 2025-06-13 RX ORDER — METOPROLOL SUCCINATE 25 MG/1
25 TABLET, EXTENDED RELEASE ORAL DAILY
Qty: 90 TABLET | Refills: 1 | Status: SHIPPED | OUTPATIENT
Start: 2025-06-13

## 2025-07-01 ENCOUNTER — OFFICE VISIT (OUTPATIENT)
Dept: GASTROENTEROLOGY | Age: 89
End: 2025-07-01
Payer: MEDICARE

## 2025-07-01 VITALS
OXYGEN SATURATION: 96 % | SYSTOLIC BLOOD PRESSURE: 120 MMHG | HEART RATE: 76 BPM | DIASTOLIC BLOOD PRESSURE: 80 MMHG | HEIGHT: 67 IN | WEIGHT: 210 LBS | BODY MASS INDEX: 32.96 KG/M2

## 2025-07-01 DIAGNOSIS — R19.5 ELEVATED FECAL CALPROTECTIN: ICD-10-CM

## 2025-07-01 DIAGNOSIS — K50.919 CROHN'S DISEASE WITH COMPLICATION, UNSPECIFIED GASTROINTESTINAL TRACT LOCATION (HCC): ICD-10-CM

## 2025-07-01 DIAGNOSIS — K50.919 CROHN'S DISEASE WITH COMPLICATION, UNSPECIFIED GASTROINTESTINAL TRACT LOCATION (HCC): Primary | ICD-10-CM

## 2025-07-01 DIAGNOSIS — R19.7 DIARRHEA, UNSPECIFIED TYPE: ICD-10-CM

## 2025-07-01 LAB
HAV IGM SERPL QL IA: NONREACTIVE
HBV CORE IGM SERPL QL IA: NONREACTIVE
HBV SURFACE AG SERPL QL IA: NORMAL
HCV AB SERPL QL IA: NORMAL

## 2025-07-01 PROCEDURE — 1123F ACP DISCUSS/DSCN MKR DOCD: CPT | Performed by: NURSE PRACTITIONER

## 2025-07-01 PROCEDURE — 99214 OFFICE O/P EST MOD 30 MIN: CPT | Performed by: NURSE PRACTITIONER

## 2025-07-01 PROCEDURE — 1036F TOBACCO NON-USER: CPT | Performed by: NURSE PRACTITIONER

## 2025-07-01 PROCEDURE — G8417 CALC BMI ABV UP PARAM F/U: HCPCS | Performed by: NURSE PRACTITIONER

## 2025-07-01 PROCEDURE — G8428 CUR MEDS NOT DOCUMENT: HCPCS | Performed by: NURSE PRACTITIONER

## 2025-07-01 NOTE — PROGRESS NOTES
Subjective:     Patient ID: Bernard Vidales is a 89 y.o. male  PCP: WHITNEY Wallace DO  Referring Provider: WHITNEY Wallace DO    HPI  History of Present Illness  The patient presents for evaluation of Crohn's disease.    He is considering treatment options such as injections or infusions, specifically Humira or Remicade, but is concerned about insurance coverage.  We discussed this option in the past and they were not interested as they feared the trouble of getting to a facility to have the infusion/injections done however Dr. Wallace's office is very close to their residence and he can get his injection there so they would like to pursue injections with Remicade.   His current medication regimen includes mesalamine, budesonide 9mg BID despite this adversely affecting his blood pressure in the past.   He reports that his bowel movements are currently manageable.  He has not experienced any instances of incontinence.He does take bentyl which helps his loose stools.     He is on Bumex for fluid retention. He is on losartan 100 mg for blood pressure management. He is on warfarin.    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 Crohn's disease:  - Recent laboratory results indicate normal levels of C-reactive protein (CRP) and sedimentation rate (sed rate), suggesting no current inflammation.  - Reduce budesonide dosage to 3 pills once daily.  - Initiate Remicade injections, pending insurance approval.  - Continue Lialda (mesalamine) as per the current regimen.  - Conduct necessary lab work today.    2. Medication management:  - Currently taking losartan 100 mg and warfarin.  - Blood pressure stable at 120/80 mmHg.  - Taking Bumex daily, which has helped with swelling.    Follow-up: 3-month follow-up 
Ambulatory

## 2025-07-02 ENCOUNTER — ANTI-COAG VISIT (OUTPATIENT)
Dept: PRIMARY CARE CLINIC | Age: 89
End: 2025-07-02

## 2025-07-03 DIAGNOSIS — K50.919 CROHN'S DISEASE WITH COMPLICATION, UNSPECIFIED GASTROINTESTINAL TRACT LOCATION (HCC): ICD-10-CM

## 2025-07-03 DIAGNOSIS — R19.5 ELEVATED FECAL CALPROTECTIN: ICD-10-CM

## 2025-07-03 DIAGNOSIS — R19.7 DIARRHEA, UNSPECIFIED TYPE: ICD-10-CM

## 2025-07-03 LAB
QUANTI TB1 MINUS NIL: 0.01 IU/ML
QUANTI TB2 MINUS NIL: 0.01 IU/ML
QUANTIFERON MITOGEN MINUS NIL: 2.87 IU/ML
QUANTIFERON NIL: 0.03 IU/ML
QUANTIFERON TB INTERPRETATION: NEGATIVE IU/ML

## 2025-07-03 ASSESSMENT — ENCOUNTER SYMPTOMS
RECTAL PAIN: 0
CONSTIPATION: 0
ANAL BLEEDING: 0
CHOKING: 0
TROUBLE SWALLOWING: 0
SHORTNESS OF BREATH: 0
NAUSEA: 0
ABDOMINAL PAIN: 0
ABDOMINAL DISTENTION: 0
BLOOD IN STOOL: 0
DIARRHEA: 0
COUGH: 0
VOMITING: 0

## 2025-07-07 ENCOUNTER — TELEPHONE (OUTPATIENT)
Age: 89
End: 2025-07-07

## 2025-07-07 DIAGNOSIS — R60.0 PERIPHERAL EDEMA: ICD-10-CM

## 2025-07-07 DIAGNOSIS — K50.919 CROHN'S DISEASE WITH COMPLICATION, UNSPECIFIED GASTROINTESTINAL TRACT LOCATION (HCC): ICD-10-CM

## 2025-07-07 DIAGNOSIS — I10 PRIMARY HYPERTENSION: ICD-10-CM

## 2025-07-07 DIAGNOSIS — I50.42 CHRONIC COMBINED SYSTOLIC AND DIASTOLIC CONGESTIVE HEART FAILURE (HCC): ICD-10-CM

## 2025-07-07 DIAGNOSIS — G20.B2 PARKINSON'S DISEASE WITH DYSKINESIA AND FLUCTUATING MANIFESTATIONS (HCC): ICD-10-CM

## 2025-07-07 DIAGNOSIS — E87.6 HYPOKALEMIA: ICD-10-CM

## 2025-07-07 DIAGNOSIS — I25.5 ISCHEMIC CARDIOMYOPATHY: ICD-10-CM

## 2025-07-07 RX ORDER — BUDESONIDE 3 MG/1
CAPSULE, COATED PELLETS ORAL
Qty: 180 CAPSULE | Refills: 0 | Status: SHIPPED | OUTPATIENT
Start: 2025-07-07 | End: 2025-07-07 | Stop reason: SDUPTHER

## 2025-07-07 RX ORDER — LOSARTAN POTASSIUM 100 MG/1
100 TABLET ORAL DAILY
Qty: 90 TABLET | Refills: 3 | Status: SHIPPED | OUTPATIENT
Start: 2025-07-07

## 2025-07-07 RX ORDER — BUDESONIDE 3 MG/1
9 CAPSULE, COATED PELLETS ORAL EVERY MORNING
Qty: 270 CAPSULE | Refills: 3 | Status: SHIPPED | OUTPATIENT
Start: 2025-07-07

## 2025-07-07 RX ORDER — ROPINIROLE 2 MG/1
2 TABLET, FILM COATED ORAL 3 TIMES DAILY
Qty: 270 TABLET | Refills: 3 | Status: SHIPPED | OUTPATIENT
Start: 2025-07-07

## 2025-07-07 RX ORDER — POTASSIUM CHLORIDE 750 MG/1
10 TABLET, EXTENDED RELEASE ORAL 2 TIMES DAILY
Qty: 180 TABLET | Refills: 3 | Status: SHIPPED | OUTPATIENT
Start: 2025-07-07

## 2025-07-07 RX ORDER — BUMETANIDE 1 MG/1
1 TABLET ORAL DAILY
Qty: 90 TABLET | Refills: 3 | Status: SHIPPED | OUTPATIENT
Start: 2025-07-07

## 2025-07-07 NOTE — TELEPHONE ENCOUNTER
Patient's wife came in with letter from insurance no longer will cover [facility] but will [facility]. Will resend meds from today to [facility] and will send in others. Verbal order received. Readback verified.

## 2025-07-07 NOTE — TELEPHONE ENCOUNTER
Received fax from pharmacy requesting refill on pts medication(s). Pt was last seen in office on 6/9/2025  and has a follow up scheduled for 7/16/2025. Will send request to  Dr. Wallace  for authorization.     Requested Prescriptions     Pending Prescriptions Disp Refills    budesonide (ENTOCORT EC) 3 MG delayed release capsule [Pharmacy Med Name: budesonide DR - ER 3 mg capsule,delayed,extended release] 180 capsule 0     Sig: TAKE THREE CAPSULES BY MOUTH TWICE DAILY

## 2025-07-08 ENCOUNTER — TELEPHONE (OUTPATIENT)
Dept: GASTROENTEROLOGY | Age: 89
End: 2025-07-08

## 2025-07-08 NOTE — TELEPHONE ENCOUNTER
7/8/25    PA stated on Cover My Meds- Humira   (Key: [id number])          [employer] is reviewing your PA request. Typically an electronic response will be received within 24-72 hours.

## 2025-07-09 NOTE — TELEPHONE ENCOUNTER
7/9/25    Humira has been approved from 7/8/25 thru 12/31/25    Approval letter scanned in media and attached to this encounter.

## 2025-07-16 ENCOUNTER — OFFICE VISIT (OUTPATIENT)
Age: 89
End: 2025-07-16

## 2025-07-16 VITALS
BODY MASS INDEX: 34.06 KG/M2 | HEIGHT: 67 IN | TEMPERATURE: 97.2 F | HEART RATE: 77 BPM | OXYGEN SATURATION: 96 % | DIASTOLIC BLOOD PRESSURE: 72 MMHG | SYSTOLIC BLOOD PRESSURE: 122 MMHG | WEIGHT: 217 LBS

## 2025-07-16 DIAGNOSIS — I50.22 CHRONIC SYSTOLIC (CONGESTIVE) HEART FAILURE (HCC): ICD-10-CM

## 2025-07-16 DIAGNOSIS — K50.919 CROHN'S DISEASE WITH COMPLICATION, UNSPECIFIED GASTROINTESTINAL TRACT LOCATION (HCC): ICD-10-CM

## 2025-07-16 DIAGNOSIS — R60.0 PERIPHERAL EDEMA: ICD-10-CM

## 2025-07-16 DIAGNOSIS — G20.A1 PARKINSON'S DISEASE, UNSPECIFIED WHETHER DYSKINESIA PRESENT, UNSPECIFIED WHETHER MANIFESTATIONS FLUCTUATE (HCC): Primary | ICD-10-CM

## 2025-07-16 DIAGNOSIS — K50.119 CROHN'S DISEASE OF LARGE INTESTINE WITH COMPLICATION (HCC): Chronic | ICD-10-CM

## 2025-07-16 PROBLEM — U07.1 COVID-19: Status: RESOLVED | Noted: 2022-06-29 | Resolved: 2025-07-16

## 2025-07-16 PROBLEM — U09.9 POST COVID-19 CONDITION, UNSPECIFIED: Status: RESOLVED | Noted: 2022-07-21 | Resolved: 2025-07-16

## 2025-07-16 RX ORDER — METOLAZONE 2.5 MG/1
2.5 TABLET ORAL DAILY
Qty: 30 TABLET | Refills: 3 | Status: SHIPPED | OUTPATIENT
Start: 2025-07-16

## 2025-07-16 RX ORDER — POTASSIUM CHLORIDE 1500 MG/1
20 TABLET, EXTENDED RELEASE ORAL 2 TIMES DAILY
Qty: 60 TABLET | Refills: 3 | Status: SHIPPED | OUTPATIENT
Start: 2025-07-16

## 2025-07-16 SDOH — ECONOMIC STABILITY: FOOD INSECURITY: WITHIN THE PAST 12 MONTHS, YOU WORRIED THAT YOUR FOOD WOULD RUN OUT BEFORE YOU GOT MONEY TO BUY MORE.: NEVER TRUE

## 2025-07-16 SDOH — ECONOMIC STABILITY: FOOD INSECURITY: WITHIN THE PAST 12 MONTHS, THE FOOD YOU BOUGHT JUST DIDN'T LAST AND YOU DIDN'T HAVE MONEY TO GET MORE.: NEVER TRUE

## 2025-07-16 ASSESSMENT — ENCOUNTER SYMPTOMS
ABDOMINAL PAIN: 1
DIARRHEA: 1
ALLERGIC/IMMUNOLOGIC NEGATIVE: 1
SHORTNESS OF BREATH: 1
EYES NEGATIVE: 1
BLOOD IN STOOL: 0

## 2025-07-16 ASSESSMENT — PATIENT HEALTH QUESTIONNAIRE - PHQ9
SUM OF ALL RESPONSES TO PHQ QUESTIONS 1-9: 0
2. FEELING DOWN, DEPRESSED OR HOPELESS: NOT AT ALL
1. LITTLE INTEREST OR PLEASURE IN DOING THINGS: NOT AT ALL

## 2025-07-16 NOTE — PROGRESS NOTES
Bernard Vidales (:  1935) is a 89 y.o. male, Established patient, here for evaluation of the following chief complaint(s):  Follow-up (Wondering about remecaid /), Mixed hyperlipidemia (Follow up chronic condition ), Essential hypertension (Follow up chronic condition ), Crohn's Disease (Follow up chronic condition /Had diarrhea this am /Only taking the entocort 3 qm instead of 3 tid and dizziness is much better ), and Edema (In bilateral feet and legs and around his hips wondering if bumex is still working, does not eat added salts )         Assessment & Plan  1. Crohn's disease: Stable. Gastroenterology discussed both Humira and Remicade and has initiated insurance approval for Remicade injections. A QuantiFERON gold test and hepatitis profile were performed to rule out tuberculosis and hepatitis due to the immunosuppressive nature of the medications. The dosage of budesonide has been reduced.  - Continue with the current treatment plan and await insurance approval for Remicade injections.    2. Parkinson's disease: Chronic. No significant improvement in mobility has been noted with the addition of ropinirole. The potential side effects of increasing the dose of ropinirole, including hallucinations, were discussed.  - Continue with the current medication regimen.    3. Peripheral edema: Chronic. The dosage of bumetanide was previously increased from 0.5 mg to 1 mg but has not resulted in significant improvement.  - Elevate feet higher than head when possible.  - Minimize salt intake.  - Increase bumetanide to 2 mg.  - Add metolazone to be taken half an hour before bumetanide for up to 3 consecutive days. If back pain occurs, take metolazone every other day.  - Provide potassium 20 mEq tablets for use on the days metolazone is taken.    4. Paroxysmal atrial fibrillation: Status post pacemaker implantation and congestive heart failure. INR 2.29.  - Check warfarin level today.    5. Hypertension: Chronic.  -

## 2025-07-30 ENCOUNTER — TELEPHONE (OUTPATIENT)
Age: 89
End: 2025-07-30

## 2025-07-30 NOTE — TELEPHONE ENCOUNTER
Entocort is a temporary fix.  He really needs a medication such as Humira.  This is what is likely to get him better.  The Entocort is just to help reduce the symptoms until he can get Humira

## 2025-07-30 NOTE — TELEPHONE ENCOUNTER
Verbally spoke with Dr. Wallace. Ok that patient stopped metolazone.     Ok per patient to only take bumex prn and follow low NA diet.     Per Dr. Wallace patient needs to try the humira that [facility] has prescribed to help with diarrhea.     We can check a magnesium lab level if he wants to.

## 2025-07-30 NOTE — TELEPHONE ENCOUNTER
Wife came into office to discuss medications. She was distraught with how much medication patient takes and what all they were for, patient is now down to 195 lbs with getting excess fluid off and up all night peeing multiple times.     Wondering if he needs to take magnesium bc he saw an ad that magnesium would help weakness.    Patient also complaining of of diarrhea. Has not started humira.    Wrote concerns down, Dr. Wallace was seeing patients.     With wife's permission wrote the purpose of the pills on each pill bottle per her request. Ex: losartan blood pressure etc.

## 2025-07-30 NOTE — TELEPHONE ENCOUNTER
Spoke with patient's wife over the phone and relayed what Dr. Wallace had said. Stated understanding.     Wife is concerned with the Humira bc she would like this done by medical staff vs at home. Has a son on remicade and he gets this through a medical office. Told patient she may need to reach out to [facility] and see what medical staff are able to do. If they can give that to him in their office or if they would allow him to bring it for injections at our office. Wife stated understanding but then expressed that she would rather continue first the medication Dr. Wallace prescribed (Entocort).     Will notified Dr. Wallace.

## 2025-07-31 NOTE — TELEPHONE ENCOUNTER
Spoke with [facility], think we need to have them also talk with patient again about humira importance in his treatment, they want to see him tomorrow 8/1 @ 1:15 pm if this does not work patient needs to call and they'll get him in somewhere. Called patient home number, got the patient, he was having trouble understanding and told our office to call this afternoon after his wife got home from the dentist. Will send to Bubba Sylvester as this nurse is out this afternoon and tomorrow. Will send to Dr. Wallace for a FYI.

## 2025-08-01 ENCOUNTER — OFFICE VISIT (OUTPATIENT)
Dept: GASTROENTEROLOGY | Age: 89
End: 2025-08-01
Payer: MEDICARE

## 2025-08-01 VITALS
HEIGHT: 67 IN | OXYGEN SATURATION: 96 % | DIASTOLIC BLOOD PRESSURE: 70 MMHG | BODY MASS INDEX: 32.02 KG/M2 | SYSTOLIC BLOOD PRESSURE: 124 MMHG | HEART RATE: 89 BPM | WEIGHT: 204 LBS

## 2025-08-01 DIAGNOSIS — K50.919 CROHN'S DISEASE WITH COMPLICATION, UNSPECIFIED GASTROINTESTINAL TRACT LOCATION (HCC): Primary | ICD-10-CM

## 2025-08-01 DIAGNOSIS — R19.5 ELEVATED FECAL CALPROTECTIN: ICD-10-CM

## 2025-08-01 PROCEDURE — 99214 OFFICE O/P EST MOD 30 MIN: CPT | Performed by: NURSE PRACTITIONER

## 2025-08-01 PROCEDURE — 1036F TOBACCO NON-USER: CPT | Performed by: NURSE PRACTITIONER

## 2025-08-01 PROCEDURE — 1159F MED LIST DOCD IN RCRD: CPT | Performed by: NURSE PRACTITIONER

## 2025-08-01 PROCEDURE — 1123F ACP DISCUSS/DSCN MKR DOCD: CPT | Performed by: NURSE PRACTITIONER

## 2025-08-01 PROCEDURE — G8417 CALC BMI ABV UP PARAM F/U: HCPCS | Performed by: NURSE PRACTITIONER

## 2025-08-01 PROCEDURE — G8427 DOCREV CUR MEDS BY ELIG CLIN: HCPCS | Performed by: NURSE PRACTITIONER

## 2025-08-04 DIAGNOSIS — R19.5 ELEVATED FECAL CALPROTECTIN: ICD-10-CM

## 2025-08-04 DIAGNOSIS — K50.919 CROHN'S DISEASE WITH COMPLICATION, UNSPECIFIED GASTROINTESTINAL TRACT LOCATION (HCC): ICD-10-CM

## 2025-08-06 LAB — CALPROTECTIN STL-MCNT: 108 UG/G

## 2025-08-07 ASSESSMENT — ENCOUNTER SYMPTOMS
COUGH: 0
SHORTNESS OF BREATH: 0
ANAL BLEEDING: 0
CONSTIPATION: 0
ABDOMINAL PAIN: 0
BLOOD IN STOOL: 0
RECTAL PAIN: 0
DIARRHEA: 0
TROUBLE SWALLOWING: 0
ABDOMINAL DISTENTION: 0
VOMITING: 0
NAUSEA: 0
CHOKING: 0

## 2025-08-15 ENCOUNTER — TELEPHONE (OUTPATIENT)
Age: 89
End: 2025-08-15

## 2025-08-19 ENCOUNTER — OFFICE VISIT (OUTPATIENT)
Age: 89
End: 2025-08-19
Payer: MEDICARE

## 2025-08-19 VITALS
TEMPERATURE: 98 F | WEIGHT: 212 LBS | HEART RATE: 78 BPM | SYSTOLIC BLOOD PRESSURE: 122 MMHG | BODY MASS INDEX: 33.27 KG/M2 | OXYGEN SATURATION: 98 % | DIASTOLIC BLOOD PRESSURE: 68 MMHG | HEIGHT: 67 IN

## 2025-08-19 DIAGNOSIS — Z79.01 CHRONIC ANTICOAGULATION: ICD-10-CM

## 2025-08-19 DIAGNOSIS — R80.9 PROTEINURIA, UNSPECIFIED TYPE: ICD-10-CM

## 2025-08-19 DIAGNOSIS — Z86.718 HISTORY OF DVT (DEEP VEIN THROMBOSIS): ICD-10-CM

## 2025-08-19 DIAGNOSIS — K50.119 CROHN'S DISEASE OF LARGE INTESTINE WITH COMPLICATION (HCC): Chronic | ICD-10-CM

## 2025-08-19 DIAGNOSIS — I50.22 CHRONIC SYSTOLIC (CONGESTIVE) HEART FAILURE (HCC): ICD-10-CM

## 2025-08-19 DIAGNOSIS — R31.0 GROSS HEMATURIA: ICD-10-CM

## 2025-08-19 DIAGNOSIS — G20.B2 PARKINSON'S DISEASE WITH DYSKINESIA AND FLUCTUATING MANIFESTATIONS (HCC): Primary | ICD-10-CM

## 2025-08-19 DIAGNOSIS — R60.0 PERIPHERAL EDEMA: ICD-10-CM

## 2025-08-19 PROBLEM — G20.A1 PARKINSON'S DISEASE (HCC): Status: RESOLVED | Noted: 2024-05-07 | Resolved: 2025-08-19

## 2025-08-19 PROBLEM — G20.A1 PARKINSON DISEASE (HCC): Status: RESOLVED | Noted: 2021-06-09 | Resolved: 2025-08-19

## 2025-08-19 PROBLEM — I87.2 DEEP VENOUS INSUFFICIENCY: Chronic | Status: RESOLVED | Noted: 2017-11-07 | Resolved: 2025-08-19

## 2025-08-19 PROBLEM — R42 DIZZINESS: Status: RESOLVED | Noted: 2020-10-28 | Resolved: 2025-08-19

## 2025-08-19 PROBLEM — E66.1 CLASS 1 DRUG-INDUCED OBESITY WITHOUT SERIOUS COMORBIDITY WITH BODY MASS INDEX (BMI) OF 32.0 TO 32.9 IN ADULT: Status: RESOLVED | Noted: 2019-05-01 | Resolved: 2025-08-19

## 2025-08-19 PROBLEM — E66.811 CLASS 1 DRUG-INDUCED OBESITY WITHOUT SERIOUS COMORBIDITY WITH BODY MASS INDEX (BMI) OF 32.0 TO 32.9 IN ADULT: Status: RESOLVED | Noted: 2019-05-01 | Resolved: 2025-08-19

## 2025-08-19 LAB
ALBUMIN SERPL-MCNC: 3.8 G/DL (ref 3.5–5.2)
ALP SERPL-CCNC: 160 U/L (ref 40–129)
ALT SERPL-CCNC: 14 U/L (ref 10–50)
ANION GAP SERPL CALCULATED.3IONS-SCNC: 13 MMOL/L (ref 8–16)
APPEARANCE FLUID: ABNORMAL
AST SERPL-CCNC: 22 U/L (ref 10–50)
BASOPHILS # BLD: 0.1 K/UL (ref 0–0.2)
BASOPHILS NFR BLD: 0.7 % (ref 0–1)
BILIRUB SERPL-MCNC: 0.5 MG/DL (ref 0.2–1.2)
BILIRUBIN, POC: ABNORMAL
BLOOD URINE, POC: ABNORMAL
BNP BLD-MCNC: 1098 PG/ML (ref 0–449)
BUN SERPL-MCNC: 22 MG/DL (ref 8–23)
CALCIUM SERPL-MCNC: 9.4 MG/DL (ref 8.8–10.2)
CHLORIDE SERPL-SCNC: 106 MMOL/L (ref 98–107)
CLARITY, POC: ABNORMAL
CO2 SERPL-SCNC: 21 MMOL/L (ref 22–29)
COLOR, POC: ABNORMAL
CREAT SERPL-MCNC: 1 MG/DL (ref 0.7–1.2)
CREAT UR-MCNC: 85.4 MG/DL (ref 39–259)
CRP SERPL-MCNC: <3 MG/L (ref 0–5)
EOSINOPHIL # BLD: 0.1 K/UL (ref 0–0.6)
EOSINOPHIL NFR BLD: 0.8 % (ref 0–5)
ERYTHROCYTE [DISTWIDTH] IN BLOOD BY AUTOMATED COUNT: 15.9 % (ref 11.5–14.5)
ERYTHROCYTE [SEDIMENTATION RATE] IN BLOOD BY WESTERGREN METHOD: 37 MM/HR (ref 0–15)
GLUCOSE SERPL-MCNC: 100 MG/DL (ref 70–99)
GLUCOSE URINE, POC: ABNORMAL MG/DL
HCT VFR BLD AUTO: 39.6 % (ref 42–52)
HGB BLD-MCNC: 13 G/DL (ref 14–18)
IMM GRANULOCYTES # BLD: 0 K/UL
INR PPP: 2.71 (ref 0.88–1.18)
KETONES, POC: ABNORMAL MG/DL
LEUKOCYTE EST, POC: ABNORMAL
LYMPHOCYTES # BLD: 2.7 K/UL (ref 1.1–4.5)
LYMPHOCYTES NFR BLD: 30.8 % (ref 20–40)
MCH RBC QN AUTO: 34.4 PG (ref 27–31)
MCHC RBC AUTO-ENTMCNC: 32.8 G/DL (ref 33–37)
MCV RBC AUTO: 104.8 FL (ref 80–94)
MONOCYTES # BLD: 0.5 K/UL (ref 0–0.9)
MONOCYTES NFR BLD: 5.2 % (ref 0–10)
NEUTROPHILS # BLD: 5.5 K/UL (ref 1.5–7.5)
NEUTS SEG NFR BLD: 62.3 % (ref 50–65)
NITRITE, POC: ABNORMAL
PH, POC: 6
PLATELET # BLD AUTO: 160 K/UL (ref 130–400)
PMV BLD AUTO: 9.9 FL (ref 9.4–12.4)
POTASSIUM SERPL-SCNC: 3.6 MMOL/L (ref 3.5–5.1)
PROT SERPL-MCNC: 6.6 G/DL (ref 6.4–8.3)
PROT UR-MCNC: 16 MG/DL (ref 0–12)
PROTEIN, POC: 30 MG/DL
PROTHROMBIN TIME: 28.3 SEC (ref 12–14.6)
RBC # BLD AUTO: 3.78 M/UL (ref 4.7–6.1)
SODIUM SERPL-SCNC: 140 MMOL/L (ref 136–145)
SPECIFIC GRAVITY, POC: 1.02
UROBILINOGEN, POC: 0.2 MG/DL
WBC # BLD AUTO: 8.8 K/UL (ref 4.8–10.8)

## 2025-08-19 PROCEDURE — 81002 URINALYSIS NONAUTO W/O SCOPE: CPT | Performed by: PEDIATRICS

## 2025-08-19 SDOH — ECONOMIC STABILITY: FOOD INSECURITY: WITHIN THE PAST 12 MONTHS, YOU WORRIED THAT YOUR FOOD WOULD RUN OUT BEFORE YOU GOT MONEY TO BUY MORE.: NEVER TRUE

## 2025-08-19 SDOH — ECONOMIC STABILITY: FOOD INSECURITY: WITHIN THE PAST 12 MONTHS, THE FOOD YOU BOUGHT JUST DIDN'T LAST AND YOU DIDN'T HAVE MONEY TO GET MORE.: NEVER TRUE

## 2025-08-19 ASSESSMENT — PATIENT HEALTH QUESTIONNAIRE - PHQ9
2. FEELING DOWN, DEPRESSED OR HOPELESS: NOT AT ALL
SUM OF ALL RESPONSES TO PHQ QUESTIONS 1-9: 0
1. LITTLE INTEREST OR PLEASURE IN DOING THINGS: NOT AT ALL
SUM OF ALL RESPONSES TO PHQ QUESTIONS 1-9: 0

## 2025-08-19 ASSESSMENT — ENCOUNTER SYMPTOMS
ALLERGIC/IMMUNOLOGIC NEGATIVE: 1
ABDOMINAL PAIN: 1
DIARRHEA: 1
EYES NEGATIVE: 1
BLOOD IN STOOL: 0
SHORTNESS OF BREATH: 1

## 2025-08-20 ENCOUNTER — TELEPHONE (OUTPATIENT)
Dept: GASTROENTEROLOGY | Age: 89
End: 2025-08-20

## 2025-08-22 ENCOUNTER — TELEPHONE (OUTPATIENT)
Dept: GASTROENTEROLOGY | Age: 89
End: 2025-08-22

## 2025-08-22 LAB
ANCA AB PATTERN SER IF-IMP: NORMAL
ANCA IGG TITR SER IF: NORMAL {TITER}
BM IGG SER IF-ACNC: 0 AU/ML (ref 0–19)
C3 SERPL-MCNC: 165 MG/DL (ref 90–180)
C4 SERPL-MCNC: 25 MG/DL (ref 10–40)
MYELOPEROXIDASE AB SER-ACNC: 0 AU/ML (ref 0–19)
NUCLEAR IGG SER QL IA: NORMAL
PROTEINASE3 AB SER-ACNC: 1 AU/ML (ref 0–19)

## 2025-08-26 ENCOUNTER — CLINICAL SUPPORT (OUTPATIENT)
Age: 89
End: 2025-08-26
Payer: MEDICARE

## 2025-08-26 DIAGNOSIS — K50.119 CROHN'S DISEASE OF LARGE INTESTINE WITH COMPLICATION (HCC): Primary | Chronic | ICD-10-CM

## 2025-08-26 PROCEDURE — NBSRV NON-BILLABLE SERVICE: Performed by: PEDIATRICS

## 2025-08-26 PROCEDURE — 96372 THER/PROPH/DIAG INJ SC/IM: CPT | Performed by: NURSE PRACTITIONER

## 2025-08-27 ENCOUNTER — APPOINTMENT (OUTPATIENT)
Dept: GENERAL RADIOLOGY | Age: 89
DRG: 563 | End: 2025-08-27
Payer: MEDICARE

## 2025-08-27 ENCOUNTER — HOSPITAL ENCOUNTER (INPATIENT)
Age: 89
LOS: 6 days | Discharge: NURSING, REHAB OR LONG-TERM CARE | DRG: 563 | End: 2025-09-03
Attending: STUDENT IN AN ORGANIZED HEALTH CARE EDUCATION/TRAINING PROGRAM | Admitting: INTERNAL MEDICINE
Payer: MEDICARE

## 2025-08-27 DIAGNOSIS — S42.301A CLOSED FRACTURE OF SHAFT OF RIGHT HUMERUS, UNSPECIFIED FRACTURE MORPHOLOGY, INITIAL ENCOUNTER: ICD-10-CM

## 2025-08-27 DIAGNOSIS — R53.81 DEBILITY: Primary | ICD-10-CM

## 2025-08-27 DIAGNOSIS — Z95.2 S/P TAVR (TRANSCATHETER AORTIC VALVE REPLACEMENT): ICD-10-CM

## 2025-08-27 PROBLEM — E87.6 HYPOKALEMIA: Status: ACTIVE | Noted: 2025-08-27

## 2025-08-27 PROBLEM — E83.42 HYPOMAGNESEMIA: Status: ACTIVE | Noted: 2025-08-27

## 2025-08-27 LAB
25(OH)D3 SERPL-MCNC: 62.7 NG/ML
ABO/RH: NORMAL
ANION GAP SERPL CALCULATED.3IONS-SCNC: 11 MMOL/L (ref 8–16)
ANTIBODY SCREEN: NORMAL
APTT PPP: 36.5 SEC (ref 26–36.2)
BASOPHILS # BLD: 0.1 K/UL (ref 0–0.2)
BASOPHILS NFR BLD: 0.5 % (ref 0–1)
BUN SERPL-MCNC: 18 MG/DL (ref 8–23)
CALCIUM SERPL-MCNC: 8.4 MG/DL (ref 8.8–10.2)
CHLORIDE SERPL-SCNC: 108 MMOL/L (ref 98–107)
CO2 SERPL-SCNC: 23 MMOL/L (ref 22–29)
CREAT SERPL-MCNC: 0.9 MG/DL (ref 0.7–1.2)
EOSINOPHIL # BLD: 0 K/UL (ref 0–0.6)
EOSINOPHIL NFR BLD: 0.3 % (ref 0–5)
ERYTHROCYTE [DISTWIDTH] IN BLOOD BY AUTOMATED COUNT: 16.1 % (ref 11.5–14.5)
GLUCOSE SERPL-MCNC: 119 MG/DL (ref 70–99)
HCT VFR BLD AUTO: 33.8 % (ref 42–52)
HGB BLD-MCNC: 11.6 G/DL (ref 14–18)
IMM GRANULOCYTES # BLD: 0 K/UL
INR PPP: 3.2 (ref 0.88–1.18)
LYMPHOCYTES # BLD: 1.6 K/UL (ref 1.1–4.5)
LYMPHOCYTES NFR BLD: 15.4 % (ref 20–40)
MAGNESIUM SERPL-MCNC: 1.4 MG/DL (ref 1.6–2.4)
MCH RBC QN AUTO: 34.9 PG (ref 27–31)
MCHC RBC AUTO-ENTMCNC: 34.3 G/DL (ref 33–37)
MCV RBC AUTO: 101.8 FL (ref 80–94)
MONOCYTES # BLD: 0.5 K/UL (ref 0–0.9)
MONOCYTES NFR BLD: 5.2 % (ref 0–10)
NEUTROPHILS # BLD: 8.1 K/UL (ref 1.5–7.5)
NEUTS SEG NFR BLD: 78.2 % (ref 50–65)
PLATELET # BLD AUTO: 129 K/UL (ref 130–400)
PMV BLD AUTO: 9.4 FL (ref 9.4–12.4)
POTASSIUM SERPL-SCNC: 2.6 MMOL/L (ref 3.5–5.1)
PROTHROMBIN TIME: 31.9 SEC (ref 12–14.6)
RBC # BLD AUTO: 3.32 M/UL (ref 4.7–6.1)
SODIUM SERPL-SCNC: 142 MMOL/L (ref 136–145)
WBC # BLD AUTO: 10.3 K/UL (ref 4.8–10.8)

## 2025-08-27 PROCEDURE — G0378 HOSPITAL OBSERVATION PER HR: HCPCS

## 2025-08-27 PROCEDURE — 85610 PROTHROMBIN TIME: CPT

## 2025-08-27 PROCEDURE — 73560 X-RAY EXAM OF KNEE 1 OR 2: CPT

## 2025-08-27 PROCEDURE — 85730 THROMBOPLASTIN TIME PARTIAL: CPT

## 2025-08-27 PROCEDURE — 82306 VITAMIN D 25 HYDROXY: CPT

## 2025-08-27 PROCEDURE — 72170 X-RAY EXAM OF PELVIS: CPT

## 2025-08-27 PROCEDURE — 83735 ASSAY OF MAGNESIUM: CPT

## 2025-08-27 PROCEDURE — 80048 BASIC METABOLIC PNL TOTAL CA: CPT

## 2025-08-27 PROCEDURE — 86850 RBC ANTIBODY SCREEN: CPT

## 2025-08-27 PROCEDURE — 6360000002 HC RX W HCPCS: Performed by: HOSPITALIST

## 2025-08-27 PROCEDURE — 99285 EMERGENCY DEPT VISIT HI MDM: CPT

## 2025-08-27 PROCEDURE — 6370000000 HC RX 637 (ALT 250 FOR IP): Performed by: HOSPITALIST

## 2025-08-27 PROCEDURE — 73060 X-RAY EXAM OF HUMERUS: CPT

## 2025-08-27 PROCEDURE — 85025 COMPLETE CBC W/AUTO DIFF WBC: CPT

## 2025-08-27 PROCEDURE — 73030 X-RAY EXAM OF SHOULDER: CPT

## 2025-08-27 PROCEDURE — 2500000003 HC RX 250 WO HCPCS: Performed by: HOSPITALIST

## 2025-08-27 PROCEDURE — 86900 BLOOD TYPING SEROLOGIC ABO: CPT

## 2025-08-27 PROCEDURE — 36415 COLL VENOUS BLD VENIPUNCTURE: CPT

## 2025-08-27 PROCEDURE — 86901 BLOOD TYPING SEROLOGIC RH(D): CPT

## 2025-08-27 RX ORDER — POLYETHYLENE GLYCOL 3350 17 G/17G
17 POWDER, FOR SOLUTION ORAL 2 TIMES DAILY PRN
Status: DISCONTINUED | OUTPATIENT
Start: 2025-08-27 | End: 2025-09-03 | Stop reason: HOSPADM

## 2025-08-27 RX ORDER — OXYCODONE HYDROCHLORIDE 10 MG/1
10 TABLET ORAL EVERY 4 HOURS PRN
Refills: 0 | Status: DISCONTINUED | OUTPATIENT
Start: 2025-08-27 | End: 2025-09-03 | Stop reason: HOSPADM

## 2025-08-27 RX ORDER — POTASSIUM CHLORIDE 1500 MG/1
40 TABLET, EXTENDED RELEASE ORAL PRN
Status: DISCONTINUED | OUTPATIENT
Start: 2025-08-27 | End: 2025-09-03 | Stop reason: HOSPADM

## 2025-08-27 RX ORDER — BUDESONIDE 3 MG/1
9 CAPSULE, COATED PELLETS ORAL EVERY MORNING
Status: DISCONTINUED | OUTPATIENT
Start: 2025-08-28 | End: 2025-09-03 | Stop reason: HOSPADM

## 2025-08-27 RX ORDER — LOSARTAN POTASSIUM 100 MG/1
100 TABLET ORAL DAILY
Status: DISCONTINUED | OUTPATIENT
Start: 2025-08-28 | End: 2025-09-03 | Stop reason: HOSPADM

## 2025-08-27 RX ORDER — MORPHINE SULFATE 2 MG/ML
2 INJECTION, SOLUTION INTRAMUSCULAR; INTRAVENOUS EVERY 4 HOURS PRN
Refills: 0 | Status: DISCONTINUED | OUTPATIENT
Start: 2025-08-27 | End: 2025-09-03 | Stop reason: HOSPADM

## 2025-08-27 RX ORDER — POTASSIUM CHLORIDE 7.45 MG/ML
10 INJECTION INTRAVENOUS PRN
Status: DISCONTINUED | OUTPATIENT
Start: 2025-08-27 | End: 2025-09-03 | Stop reason: HOSPADM

## 2025-08-27 RX ORDER — POTASSIUM CHLORIDE 1500 MG/1
40 TABLET, EXTENDED RELEASE ORAL ONCE
Status: DISCONTINUED | OUTPATIENT
Start: 2025-08-27 | End: 2025-09-03 | Stop reason: HOSPADM

## 2025-08-27 RX ORDER — PANTOPRAZOLE SODIUM 20 MG/1
20 TABLET, DELAYED RELEASE ORAL
Status: DISCONTINUED | OUTPATIENT
Start: 2025-08-28 | End: 2025-09-03 | Stop reason: HOSPADM

## 2025-08-27 RX ORDER — NALOXONE HYDROCHLORIDE 0.4 MG/ML
0.4 INJECTION, SOLUTION INTRAMUSCULAR; INTRAVENOUS; SUBCUTANEOUS PRN
Status: DISCONTINUED | OUTPATIENT
Start: 2025-08-27 | End: 2025-09-03 | Stop reason: HOSPADM

## 2025-08-27 RX ORDER — M-VIT,TX,IRON,MINS/CALC/FOLIC 27MG-0.4MG
1 TABLET ORAL DAILY
Status: DISCONTINUED | OUTPATIENT
Start: 2025-08-28 | End: 2025-09-03 | Stop reason: HOSPADM

## 2025-08-27 RX ORDER — OXYCODONE HYDROCHLORIDE 5 MG/1
2.5 TABLET ORAL EVERY 4 HOURS PRN
Refills: 0 | Status: DISCONTINUED | OUTPATIENT
Start: 2025-08-27 | End: 2025-09-03 | Stop reason: HOSPADM

## 2025-08-27 RX ORDER — ROPINIROLE 1 MG/1
2 TABLET, FILM COATED ORAL 3 TIMES DAILY
Status: DISCONTINUED | OUTPATIENT
Start: 2025-08-27 | End: 2025-09-03 | Stop reason: HOSPADM

## 2025-08-27 RX ORDER — MESALAMINE 1.2 G/1
2400 TABLET, DELAYED RELEASE ORAL 3 TIMES DAILY
Status: DISCONTINUED | OUTPATIENT
Start: 2025-08-27 | End: 2025-09-03 | Stop reason: HOSPADM

## 2025-08-27 RX ORDER — MECOBALAMIN 5000 MCG
5 TABLET,DISINTEGRATING ORAL NIGHTLY PRN
Status: DISCONTINUED | OUTPATIENT
Start: 2025-08-27 | End: 2025-09-03 | Stop reason: HOSPADM

## 2025-08-27 RX ORDER — VITAMIN B COMPLEX
5000 TABLET ORAL DAILY
Status: DISCONTINUED | OUTPATIENT
Start: 2025-08-28 | End: 2025-09-03 | Stop reason: HOSPADM

## 2025-08-27 RX ORDER — MORPHINE SULFATE 4 MG/ML
4 INJECTION, SOLUTION INTRAMUSCULAR; INTRAVENOUS EVERY 4 HOURS PRN
Refills: 0 | Status: DISCONTINUED | OUTPATIENT
Start: 2025-08-27 | End: 2025-09-03 | Stop reason: HOSPADM

## 2025-08-27 RX ORDER — DICYCLOMINE HYDROCHLORIDE 10 MG/1
10 CAPSULE ORAL
Status: DISCONTINUED | OUTPATIENT
Start: 2025-08-27 | End: 2025-09-03 | Stop reason: HOSPADM

## 2025-08-27 RX ORDER — MORPHINE SULFATE 2 MG/ML
1 INJECTION, SOLUTION INTRAMUSCULAR; INTRAVENOUS EVERY 4 HOURS PRN
Refills: 0 | Status: DISCONTINUED | OUTPATIENT
Start: 2025-08-27 | End: 2025-09-03 | Stop reason: HOSPADM

## 2025-08-27 RX ORDER — OXYCODONE HYDROCHLORIDE 5 MG/1
5 TABLET ORAL EVERY 4 HOURS PRN
Refills: 0 | Status: DISCONTINUED | OUTPATIENT
Start: 2025-08-27 | End: 2025-09-03 | Stop reason: HOSPADM

## 2025-08-27 RX ORDER — CARBIDOPA AND LEVODOPA 25; 100 MG/1; MG/1
1 TABLET ORAL 3 TIMES DAILY
Status: DISCONTINUED | OUTPATIENT
Start: 2025-08-27 | End: 2025-09-03 | Stop reason: HOSPADM

## 2025-08-27 RX ORDER — CALCIUM CARBONATE 500 MG/1
1000 TABLET, CHEWABLE ORAL 3 TIMES DAILY PRN
Status: DISCONTINUED | OUTPATIENT
Start: 2025-08-27 | End: 2025-09-03 | Stop reason: HOSPADM

## 2025-08-27 RX ORDER — SODIUM CHLORIDE 9 MG/ML
INJECTION, SOLUTION INTRAVENOUS PRN
Status: DISCONTINUED | OUTPATIENT
Start: 2025-08-27 | End: 2025-09-03 | Stop reason: HOSPADM

## 2025-08-27 RX ORDER — MULTIVITAMIN WITH IRON
1 TABLET ORAL DAILY
Status: DISCONTINUED | OUTPATIENT
Start: 2025-08-28 | End: 2025-09-03 | Stop reason: HOSPADM

## 2025-08-27 RX ORDER — PROMETHAZINE HYDROCHLORIDE 12.5 MG/1
12.5 TABLET ORAL EVERY 6 HOURS PRN
Status: DISCONTINUED | OUTPATIENT
Start: 2025-08-27 | End: 2025-09-03 | Stop reason: HOSPADM

## 2025-08-27 RX ORDER — MAGNESIUM SULFATE 1 G/100ML
1000 INJECTION INTRAVENOUS PRN
Status: DISCONTINUED | OUTPATIENT
Start: 2025-08-27 | End: 2025-09-03 | Stop reason: HOSPADM

## 2025-08-27 RX ORDER — MAGNESIUM SULFATE IN WATER 40 MG/ML
2000 INJECTION, SOLUTION INTRAVENOUS ONCE
Status: COMPLETED | OUTPATIENT
Start: 2025-08-27 | End: 2025-08-28

## 2025-08-27 RX ORDER — SODIUM CHLORIDE 0.9 % (FLUSH) 0.9 %
5-40 SYRINGE (ML) INJECTION EVERY 12 HOURS SCHEDULED
Status: DISCONTINUED | OUTPATIENT
Start: 2025-08-27 | End: 2025-09-03 | Stop reason: HOSPADM

## 2025-08-27 RX ORDER — POTASSIUM CHLORIDE 1500 MG/1
40 TABLET, EXTENDED RELEASE ORAL ONCE
Status: COMPLETED | OUTPATIENT
Start: 2025-08-27 | End: 2025-08-27

## 2025-08-27 RX ORDER — PROMETHAZINE HYDROCHLORIDE 25 MG/ML
12.5 INJECTION, SOLUTION INTRAMUSCULAR; INTRAVENOUS EVERY 6 HOURS PRN
Status: DISCONTINUED | OUTPATIENT
Start: 2025-08-27 | End: 2025-09-03 | Stop reason: HOSPADM

## 2025-08-27 RX ORDER — ACETAMINOPHEN 500 MG
1000 TABLET ORAL EVERY 8 HOURS SCHEDULED
Status: DISCONTINUED | OUTPATIENT
Start: 2025-08-27 | End: 2025-09-03 | Stop reason: HOSPADM

## 2025-08-27 RX ORDER — ONDANSETRON 2 MG/ML
4 INJECTION INTRAMUSCULAR; INTRAVENOUS EVERY 6 HOURS PRN
Status: DISCONTINUED | OUTPATIENT
Start: 2025-08-27 | End: 2025-09-03 | Stop reason: HOSPADM

## 2025-08-27 RX ORDER — ISOSORBIDE MONONITRATE 30 MG/1
30 TABLET, EXTENDED RELEASE ORAL EVERY EVENING
Status: DISCONTINUED | OUTPATIENT
Start: 2025-08-27 | End: 2025-09-03 | Stop reason: HOSPADM

## 2025-08-27 RX ORDER — METOPROLOL SUCCINATE 25 MG/1
25 TABLET, EXTENDED RELEASE ORAL DAILY
Status: DISCONTINUED | OUTPATIENT
Start: 2025-08-28 | End: 2025-09-03 | Stop reason: HOSPADM

## 2025-08-27 RX ORDER — SODIUM CHLORIDE 0.9 % (FLUSH) 0.9 %
5-40 SYRINGE (ML) INJECTION PRN
Status: DISCONTINUED | OUTPATIENT
Start: 2025-08-27 | End: 2025-09-03 | Stop reason: HOSPADM

## 2025-08-27 RX ADMIN — MAGNESIUM SULFATE HEPTAHYDRATE 2000 MG: 40 INJECTION, SOLUTION INTRAVENOUS at 23:23

## 2025-08-27 RX ADMIN — CARBIDOPA AND LEVODOPA 1 TABLET: 25; 100 TABLET ORAL at 23:07

## 2025-08-27 RX ADMIN — ROPINIROLE HYDROCHLORIDE 2 MG: 1 TABLET, FILM COATED ORAL at 23:08

## 2025-08-27 RX ADMIN — DICYCLOMINE HYDROCHLORIDE 10 MG: 10 CAPSULE ORAL at 23:08

## 2025-08-27 RX ADMIN — POTASSIUM CHLORIDE 40 MEQ: 1500 TABLET, EXTENDED RELEASE ORAL at 23:19

## 2025-08-27 RX ADMIN — ISOSORBIDE MONONITRATE 30 MG: 30 TABLET, EXTENDED RELEASE ORAL at 23:07

## 2025-08-27 RX ADMIN — POTASSIUM CHLORIDE 40 MEQ: 1500 TABLET, EXTENDED RELEASE ORAL at 23:07

## 2025-08-27 RX ADMIN — SODIUM CHLORIDE, PRESERVATIVE FREE 10 ML: 5 INJECTION INTRAVENOUS at 23:00

## 2025-08-27 RX ADMIN — ACETAMINOPHEN 1000 MG: 500 TABLET ORAL at 23:07

## 2025-08-27 ASSESSMENT — PAIN DESCRIPTION - DESCRIPTORS: DESCRIPTORS: ACHING;DISCOMFORT;DULL

## 2025-08-27 ASSESSMENT — PAIN SCALES - GENERAL: PAINLEVEL_OUTOF10: 6

## 2025-08-27 ASSESSMENT — PAIN DESCRIPTION - LOCATION: LOCATION: ARM

## 2025-08-27 ASSESSMENT — PAIN - FUNCTIONAL ASSESSMENT
PAIN_FUNCTIONAL_ASSESSMENT: 0-10
PAIN_FUNCTIONAL_ASSESSMENT: ACTIVITIES ARE NOT PREVENTED

## 2025-08-27 ASSESSMENT — PAIN DESCRIPTION - ORIENTATION: ORIENTATION: RIGHT

## 2025-08-28 ENCOUNTER — APPOINTMENT (OUTPATIENT)
Dept: CT IMAGING | Age: 89
DRG: 563 | End: 2025-08-28
Payer: MEDICARE

## 2025-08-28 PROBLEM — R53.81 DEBILITY: Status: ACTIVE | Noted: 2025-08-28

## 2025-08-28 PROBLEM — S42.351A CLOSED DISPLACED COMMINUTED FRACTURE OF SHAFT OF RIGHT HUMERUS, INITIAL ENCOUNTER: Status: ACTIVE | Noted: 2025-08-28

## 2025-08-28 LAB
ANION GAP SERPL CALCULATED.3IONS-SCNC: 6 MMOL/L (ref 8–16)
BASOPHILS # BLD: 0 K/UL (ref 0–0.2)
BASOPHILS # BLD: 0 K/UL (ref 0–0.2)
BASOPHILS NFR BLD: 0.3 % (ref 0–1)
BASOPHILS NFR BLD: 0.5 % (ref 0–1)
BUN SERPL-MCNC: 19 MG/DL (ref 8–23)
CALCIUM SERPL-MCNC: 8.2 MG/DL (ref 8.8–10.2)
CHLORIDE SERPL-SCNC: 112 MMOL/L (ref 98–107)
CO2 SERPL-SCNC: 25 MMOL/L (ref 22–29)
CREAT SERPL-MCNC: 0.9 MG/DL (ref 0.7–1.2)
EOSINOPHIL # BLD: 0 K/UL (ref 0–0.6)
EOSINOPHIL # BLD: 0.1 K/UL (ref 0–0.6)
EOSINOPHIL NFR BLD: 0.3 % (ref 0–5)
EOSINOPHIL NFR BLD: 0.8 % (ref 0–5)
ERYTHROCYTE [DISTWIDTH] IN BLOOD BY AUTOMATED COUNT: 15.8 % (ref 11.5–14.5)
ERYTHROCYTE [DISTWIDTH] IN BLOOD BY AUTOMATED COUNT: 15.9 % (ref 11.5–14.5)
FERRITIN SERPL-MCNC: 68.4 NG/ML (ref 30–400)
FOLATE SERPL-MCNC: 21.6 NG/ML (ref 4.5–32.2)
GLUCOSE SERPL-MCNC: 106 MG/DL (ref 70–99)
HCT VFR BLD AUTO: 25.1 % (ref 42–52)
HCT VFR BLD AUTO: 25.9 % (ref 42–52)
HCT VFR BLD AUTO: 25.9 % (ref 42–52)
HGB BLD-MCNC: 8.6 G/DL (ref 14–18)
HGB BLD-MCNC: 8.7 G/DL (ref 14–18)
HGB BLD-MCNC: 8.8 G/DL (ref 14–18)
IMM GRANULOCYTES # BLD: 0 K/UL
IMM GRANULOCYTES # BLD: 0 K/UL
INR PPP: 3.37 (ref 0.88–1.18)
IRON SATN MFR SERPL: 63 % (ref 20–50)
IRON SERPL-MCNC: 114 UG/DL (ref 59–158)
LYMPHOCYTES # BLD: 1.6 K/UL (ref 1.1–4.5)
LYMPHOCYTES # BLD: 2.4 K/UL (ref 1.1–4.5)
LYMPHOCYTES NFR BLD: 22.7 % (ref 20–40)
LYMPHOCYTES NFR BLD: 31.2 % (ref 20–40)
MAGNESIUM SERPL-MCNC: 1.7 MG/DL (ref 1.6–2.4)
MCH RBC QN AUTO: 35.1 PG (ref 27–31)
MCH RBC QN AUTO: 35.2 PG (ref 27–31)
MCHC RBC AUTO-ENTMCNC: 34 G/DL (ref 33–37)
MCHC RBC AUTO-ENTMCNC: 34.7 G/DL (ref 33–37)
MCV RBC AUTO: 101.6 FL (ref 80–94)
MCV RBC AUTO: 103.2 FL (ref 80–94)
MONOCYTES # BLD: 0.4 K/UL (ref 0–0.9)
MONOCYTES # BLD: 0.5 K/UL (ref 0–0.9)
MONOCYTES NFR BLD: 5.8 % (ref 0–10)
MONOCYTES NFR BLD: 6.6 % (ref 0–10)
NEUTROPHILS # BLD: 4.6 K/UL (ref 1.5–7.5)
NEUTROPHILS # BLD: 5 K/UL (ref 1.5–7.5)
NEUTS SEG NFR BLD: 60.5 % (ref 50–65)
NEUTS SEG NFR BLD: 70.6 % (ref 50–65)
PLATELET # BLD AUTO: 104 K/UL (ref 130–400)
PLATELET # BLD AUTO: 109 K/UL (ref 130–400)
PMV BLD AUTO: 9.6 FL (ref 9.4–12.4)
PMV BLD AUTO: 9.8 FL (ref 9.4–12.4)
POTASSIUM SERPL-SCNC: 3 MMOL/L (ref 3.5–5)
PROTHROMBIN TIME: 33.1 SEC (ref 12–14.6)
RBC # BLD AUTO: 2.47 M/UL (ref 4.7–6.1)
RBC # BLD AUTO: 2.51 M/UL (ref 4.7–6.1)
RETICS # AUTO: 0.05 M/UL (ref 0.03–0.12)
RETICS/RBC NFR: 2.18 % (ref 0.5–1.5)
SODIUM SERPL-SCNC: 143 MMOL/L (ref 136–145)
TIBC SERPL-MCNC: 181 UG/DL (ref 250–400)
VIT B12 SERPL-MCNC: 579 PG/ML (ref 232–1245)
WBC # BLD AUTO: 7.1 K/UL (ref 4.8–10.8)
WBC # BLD AUTO: 7.6 K/UL (ref 4.8–10.8)

## 2025-08-28 PROCEDURE — 6370000000 HC RX 637 (ALT 250 FOR IP): Performed by: HOSPITALIST

## 2025-08-28 PROCEDURE — 85014 HEMATOCRIT: CPT

## 2025-08-28 PROCEDURE — 97165 OT EVAL LOW COMPLEX 30 MIN: CPT

## 2025-08-28 PROCEDURE — 83540 ASSAY OF IRON: CPT

## 2025-08-28 PROCEDURE — 36415 COLL VENOUS BLD VENIPUNCTURE: CPT

## 2025-08-28 PROCEDURE — 80048 BASIC METABOLIC PNL TOTAL CA: CPT

## 2025-08-28 PROCEDURE — 85018 HEMOGLOBIN: CPT

## 2025-08-28 PROCEDURE — 2500000003 HC RX 250 WO HCPCS: Performed by: HOSPITALIST

## 2025-08-28 PROCEDURE — 82607 VITAMIN B-12: CPT

## 2025-08-28 PROCEDURE — 82746 ASSAY OF FOLIC ACID SERUM: CPT

## 2025-08-28 PROCEDURE — 85610 PROTHROMBIN TIME: CPT

## 2025-08-28 PROCEDURE — 83550 IRON BINDING TEST: CPT

## 2025-08-28 PROCEDURE — 97535 SELF CARE MNGMENT TRAINING: CPT

## 2025-08-28 PROCEDURE — 83735 ASSAY OF MAGNESIUM: CPT

## 2025-08-28 PROCEDURE — 82728 ASSAY OF FERRITIN: CPT

## 2025-08-28 PROCEDURE — 97530 THERAPEUTIC ACTIVITIES: CPT

## 2025-08-28 PROCEDURE — 94150 VITAL CAPACITY TEST: CPT

## 2025-08-28 PROCEDURE — 70450 CT HEAD/BRAIN W/O DYE: CPT

## 2025-08-28 PROCEDURE — 1200000000 HC SEMI PRIVATE

## 2025-08-28 PROCEDURE — 94760 N-INVAS EAR/PLS OXIMETRY 1: CPT

## 2025-08-28 PROCEDURE — 85025 COMPLETE CBC W/AUTO DIFF WBC: CPT

## 2025-08-28 PROCEDURE — 6370000000 HC RX 637 (ALT 250 FOR IP)

## 2025-08-28 PROCEDURE — 85045 AUTOMATED RETICULOCYTE COUNT: CPT

## 2025-08-28 PROCEDURE — 97162 PT EVAL MOD COMPLEX 30 MIN: CPT

## 2025-08-28 RX ORDER — POTASSIUM CHLORIDE 1500 MG/1
40 TABLET, EXTENDED RELEASE ORAL 2 TIMES DAILY
Status: COMPLETED | OUTPATIENT
Start: 2025-08-28 | End: 2025-08-28

## 2025-08-28 RX ADMIN — ROPINIROLE HYDROCHLORIDE 2 MG: 1 TABLET, FILM COATED ORAL at 09:43

## 2025-08-28 RX ADMIN — DICYCLOMINE HYDROCHLORIDE 10 MG: 10 CAPSULE ORAL at 18:24

## 2025-08-28 RX ADMIN — POTASSIUM CHLORIDE 40 MEQ: 1500 TABLET, EXTENDED RELEASE ORAL at 21:09

## 2025-08-28 RX ADMIN — DICYCLOMINE HYDROCHLORIDE 10 MG: 10 CAPSULE ORAL at 09:43

## 2025-08-28 RX ADMIN — SODIUM CHLORIDE, PRESERVATIVE FREE 10 ML: 5 INJECTION INTRAVENOUS at 09:49

## 2025-08-28 RX ADMIN — MESALAMINE 2.4 G: 1.2 TABLET, DELAYED RELEASE ORAL at 09:43

## 2025-08-28 RX ADMIN — CARBIDOPA AND LEVODOPA 1 TABLET: 25; 100 TABLET ORAL at 21:09

## 2025-08-28 RX ADMIN — PANTOPRAZOLE SODIUM 20 MG: 20 TABLET, DELAYED RELEASE ORAL at 06:10

## 2025-08-28 RX ADMIN — Medication 1 TABLET: at 09:43

## 2025-08-28 RX ADMIN — MESALAMINE 2.4 G: 1.2 TABLET, DELAYED RELEASE ORAL at 00:40

## 2025-08-28 RX ADMIN — LOSARTAN POTASSIUM 100 MG: 100 TABLET, FILM COATED ORAL at 09:43

## 2025-08-28 RX ADMIN — CARBIDOPA AND LEVODOPA 1 TABLET: 25; 100 TABLET ORAL at 14:02

## 2025-08-28 RX ADMIN — DICYCLOMINE HYDROCHLORIDE 10 MG: 10 CAPSULE ORAL at 21:09

## 2025-08-28 RX ADMIN — MESALAMINE 2.4 G: 1.2 TABLET, DELAYED RELEASE ORAL at 14:02

## 2025-08-28 RX ADMIN — METOPROLOL SUCCINATE 25 MG: 25 TABLET, EXTENDED RELEASE ORAL at 09:43

## 2025-08-28 RX ADMIN — ACETAMINOPHEN 1000 MG: 500 TABLET ORAL at 06:10

## 2025-08-28 RX ADMIN — SODIUM CHLORIDE, PRESERVATIVE FREE 10 ML: 5 INJECTION INTRAVENOUS at 21:11

## 2025-08-28 RX ADMIN — Medication 5000 UNITS: at 09:43

## 2025-08-28 RX ADMIN — BUDESONIDE 9 MG: 3 CAPSULE ORAL at 09:43

## 2025-08-28 RX ADMIN — ACETAMINOPHEN 1000 MG: 500 TABLET ORAL at 14:02

## 2025-08-28 RX ADMIN — DICYCLOMINE HYDROCHLORIDE 10 MG: 10 CAPSULE ORAL at 06:10

## 2025-08-28 RX ADMIN — ROPINIROLE HYDROCHLORIDE 2 MG: 1 TABLET, FILM COATED ORAL at 14:02

## 2025-08-28 RX ADMIN — ACETAMINOPHEN 1000 MG: 500 TABLET ORAL at 21:07

## 2025-08-28 RX ADMIN — CARBIDOPA AND LEVODOPA 1 TABLET: 25; 100 TABLET ORAL at 09:43

## 2025-08-28 RX ADMIN — MESALAMINE 2.4 G: 1.2 TABLET, DELAYED RELEASE ORAL at 21:08

## 2025-08-28 RX ADMIN — ROPINIROLE HYDROCHLORIDE 2 MG: 1 TABLET, FILM COATED ORAL at 21:09

## 2025-08-28 RX ADMIN — POTASSIUM CHLORIDE 40 MEQ: 1500 TABLET, EXTENDED RELEASE ORAL at 14:02

## 2025-08-28 ASSESSMENT — PAIN DESCRIPTION - DESCRIPTORS
DESCRIPTORS: ACHING;SORE
DESCRIPTORS: SORE

## 2025-08-28 ASSESSMENT — PAIN - FUNCTIONAL ASSESSMENT
PAIN_FUNCTIONAL_ASSESSMENT: PREVENTS OR INTERFERES SOME ACTIVE ACTIVITIES AND ADLS
PAIN_FUNCTIONAL_ASSESSMENT: 0-10
PAIN_FUNCTIONAL_ASSESSMENT: 0-10
PAIN_FUNCTIONAL_ASSESSMENT: ACTIVITIES ARE NOT PREVENTED

## 2025-08-28 ASSESSMENT — PAIN DESCRIPTION - LOCATION
LOCATION: ARM
LOCATION: ARM

## 2025-08-28 ASSESSMENT — PAIN DESCRIPTION - ORIENTATION
ORIENTATION: RIGHT
ORIENTATION: RIGHT

## 2025-08-28 ASSESSMENT — PAIN SCALES - GENERAL
PAINLEVEL_OUTOF10: 4
PAINLEVEL_OUTOF10: 0

## 2025-08-29 ENCOUNTER — APPOINTMENT (OUTPATIENT)
Dept: CT IMAGING | Age: 89
DRG: 563 | End: 2025-08-29
Payer: MEDICARE

## 2025-08-29 LAB
ANION GAP SERPL CALCULATED.3IONS-SCNC: 9 MMOL/L (ref 8–16)
BASOPHILS # BLD: 0 K/UL (ref 0–0.2)
BASOPHILS NFR BLD: 0.4 % (ref 0–1)
BNP BLD-MCNC: 1483 PG/ML (ref 0–449)
BUN SERPL-MCNC: 18 MG/DL (ref 8–23)
CALCIUM SERPL-MCNC: 8.1 MG/DL (ref 8.8–10.2)
CHLORIDE SERPL-SCNC: 109 MMOL/L (ref 98–107)
CK SERPL-CCNC: 84 U/L (ref 39–308)
CO2 SERPL-SCNC: 22 MMOL/L (ref 22–29)
CREAT SERPL-MCNC: 0.8 MG/DL (ref 0.7–1.2)
EOSINOPHIL # BLD: 0.1 K/UL (ref 0–0.6)
EOSINOPHIL NFR BLD: 1.5 % (ref 0–5)
ERYTHROCYTE [DISTWIDTH] IN BLOOD BY AUTOMATED COUNT: 15.9 % (ref 11.5–14.5)
GLUCOSE SERPL-MCNC: 99 MG/DL (ref 70–99)
HCT VFR BLD AUTO: 25.2 % (ref 42–52)
HCT VFR BLD AUTO: 25.3 % (ref 42–52)
HCT VFR BLD AUTO: 26 % (ref 42–52)
HCT VFR BLD AUTO: 26.8 % (ref 42–52)
HCT VFR BLD AUTO: 27.5 % (ref 42–52)
HGB BLD-MCNC: 8.4 G/DL (ref 14–18)
HGB BLD-MCNC: 8.5 G/DL (ref 14–18)
HGB BLD-MCNC: 8.7 G/DL (ref 14–18)
HGB BLD-MCNC: 9 G/DL (ref 14–18)
HGB BLD-MCNC: 9.1 G/DL (ref 14–18)
IMM GRANULOCYTES # BLD: 0 K/UL
INR PPP: 3.4 (ref 0.88–1.18)
LYMPHOCYTES # BLD: 2.6 K/UL (ref 1.1–4.5)
LYMPHOCYTES NFR BLD: 34.7 % (ref 20–40)
MAGNESIUM SERPL-MCNC: 1.7 MG/DL (ref 1.6–2.4)
MCH RBC QN AUTO: 34.8 PG (ref 27–31)
MCHC RBC AUTO-ENTMCNC: 33.6 G/DL (ref 33–37)
MCV RBC AUTO: 103.7 FL (ref 80–94)
MONOCYTES # BLD: 0.5 K/UL (ref 0–0.9)
MONOCYTES NFR BLD: 6.8 % (ref 0–10)
NEUTROPHILS # BLD: 4.3 K/UL (ref 1.5–7.5)
NEUTS SEG NFR BLD: 56.3 % (ref 50–65)
PLATELET # BLD AUTO: 104 K/UL (ref 130–400)
PMV BLD AUTO: 10 FL (ref 9.4–12.4)
POTASSIUM SERPL-SCNC: 3.1 MMOL/L (ref 3.5–5)
PROTHROMBIN TIME: 33.6 SEC (ref 12–14.6)
RBC # BLD AUTO: 2.44 M/UL (ref 4.7–6.1)
SODIUM SERPL-SCNC: 140 MMOL/L (ref 136–145)
WBC # BLD AUTO: 7.5 K/UL (ref 4.8–10.8)

## 2025-08-29 PROCEDURE — 85014 HEMATOCRIT: CPT

## 2025-08-29 PROCEDURE — 6370000000 HC RX 637 (ALT 250 FOR IP): Performed by: HOSPITALIST

## 2025-08-29 PROCEDURE — 83735 ASSAY OF MAGNESIUM: CPT

## 2025-08-29 PROCEDURE — 6360000004 HC RX CONTRAST MEDICATION: Performed by: INTERNAL MEDICINE

## 2025-08-29 PROCEDURE — 85610 PROTHROMBIN TIME: CPT

## 2025-08-29 PROCEDURE — 73206 CT ANGIO UPR EXTRM W/O&W/DYE: CPT

## 2025-08-29 PROCEDURE — 36415 COLL VENOUS BLD VENIPUNCTURE: CPT

## 2025-08-29 PROCEDURE — 80048 BASIC METABOLIC PNL TOTAL CA: CPT

## 2025-08-29 PROCEDURE — 85018 HEMOGLOBIN: CPT

## 2025-08-29 PROCEDURE — 2500000003 HC RX 250 WO HCPCS: Performed by: HOSPITALIST

## 2025-08-29 PROCEDURE — 97530 THERAPEUTIC ACTIVITIES: CPT

## 2025-08-29 PROCEDURE — 6360000002 HC RX W HCPCS

## 2025-08-29 PROCEDURE — 51702 INSERT TEMP BLADDER CATH: CPT

## 2025-08-29 PROCEDURE — 82550 ASSAY OF CK (CPK): CPT

## 2025-08-29 PROCEDURE — 83880 ASSAY OF NATRIURETIC PEPTIDE: CPT

## 2025-08-29 PROCEDURE — 94150 VITAL CAPACITY TEST: CPT

## 2025-08-29 PROCEDURE — 97535 SELF CARE MNGMENT TRAINING: CPT

## 2025-08-29 PROCEDURE — 85025 COMPLETE CBC W/AUTO DIFF WBC: CPT

## 2025-08-29 PROCEDURE — 94760 N-INVAS EAR/PLS OXIMETRY 1: CPT

## 2025-08-29 PROCEDURE — 1200000000 HC SEMI PRIVATE

## 2025-08-29 RX ORDER — BUMETANIDE 0.25 MG/ML
1 INJECTION, SOLUTION INTRAMUSCULAR; INTRAVENOUS ONCE
Status: COMPLETED | OUTPATIENT
Start: 2025-08-29 | End: 2025-08-29

## 2025-08-29 RX ORDER — IOPAMIDOL 755 MG/ML
75 INJECTION, SOLUTION INTRAVASCULAR
Status: COMPLETED | OUTPATIENT
Start: 2025-08-29 | End: 2025-08-29

## 2025-08-29 RX ADMIN — Medication 5000 UNITS: at 09:33

## 2025-08-29 RX ADMIN — MESALAMINE 2.4 G: 1.2 TABLET, DELAYED RELEASE ORAL at 14:19

## 2025-08-29 RX ADMIN — ACETAMINOPHEN 1000 MG: 500 TABLET ORAL at 05:28

## 2025-08-29 RX ADMIN — BUMETANIDE 1 MG: 0.25 INJECTION INTRAMUSCULAR; INTRAVENOUS at 13:57

## 2025-08-29 RX ADMIN — CARBIDOPA AND LEVODOPA 1 TABLET: 25; 100 TABLET ORAL at 20:59

## 2025-08-29 RX ADMIN — CARBIDOPA AND LEVODOPA 1 TABLET: 25; 100 TABLET ORAL at 09:35

## 2025-08-29 RX ADMIN — DICYCLOMINE HYDROCHLORIDE 10 MG: 10 CAPSULE ORAL at 20:58

## 2025-08-29 RX ADMIN — ISOSORBIDE MONONITRATE 30 MG: 30 TABLET, EXTENDED RELEASE ORAL at 17:36

## 2025-08-29 RX ADMIN — ACETAMINOPHEN 1000 MG: 500 TABLET ORAL at 13:55

## 2025-08-29 RX ADMIN — PANTOPRAZOLE SODIUM 20 MG: 20 TABLET, DELAYED RELEASE ORAL at 05:28

## 2025-08-29 RX ADMIN — OXYCODONE 5 MG: 5 TABLET ORAL at 20:59

## 2025-08-29 RX ADMIN — SODIUM CHLORIDE, PRESERVATIVE FREE 10 ML: 5 INJECTION INTRAVENOUS at 13:58

## 2025-08-29 RX ADMIN — DICYCLOMINE HYDROCHLORIDE 10 MG: 10 CAPSULE ORAL at 05:28

## 2025-08-29 RX ADMIN — Medication 1 TABLET: at 09:34

## 2025-08-29 RX ADMIN — DICYCLOMINE HYDROCHLORIDE 10 MG: 10 CAPSULE ORAL at 11:40

## 2025-08-29 RX ADMIN — ROPINIROLE HYDROCHLORIDE 2 MG: 1 TABLET, FILM COATED ORAL at 13:55

## 2025-08-29 RX ADMIN — MESALAMINE 2.4 G: 1.2 TABLET, DELAYED RELEASE ORAL at 21:00

## 2025-08-29 RX ADMIN — ROPINIROLE HYDROCHLORIDE 2 MG: 1 TABLET, FILM COATED ORAL at 09:33

## 2025-08-29 RX ADMIN — SODIUM CHLORIDE, PRESERVATIVE FREE 10 ML: 5 INJECTION INTRAVENOUS at 09:44

## 2025-08-29 RX ADMIN — Medication 1 TABLET: at 09:33

## 2025-08-29 RX ADMIN — ACETAMINOPHEN 1000 MG: 500 TABLET ORAL at 21:00

## 2025-08-29 RX ADMIN — BUDESONIDE 9 MG: 3 CAPSULE ORAL at 09:33

## 2025-08-29 RX ADMIN — SODIUM CHLORIDE, PRESERVATIVE FREE 10 ML: 5 INJECTION INTRAVENOUS at 21:02

## 2025-08-29 RX ADMIN — METOPROLOL SUCCINATE 25 MG: 25 TABLET, EXTENDED RELEASE ORAL at 09:34

## 2025-08-29 RX ADMIN — ROPINIROLE HYDROCHLORIDE 2 MG: 1 TABLET, FILM COATED ORAL at 20:59

## 2025-08-29 RX ADMIN — MESALAMINE 2.4 G: 1.2 TABLET, DELAYED RELEASE ORAL at 09:34

## 2025-08-29 RX ADMIN — DICYCLOMINE HYDROCHLORIDE 10 MG: 10 CAPSULE ORAL at 17:36

## 2025-08-29 RX ADMIN — IOPAMIDOL 75 ML: 755 INJECTION, SOLUTION INTRAVENOUS at 13:56

## 2025-08-29 RX ADMIN — CARBIDOPA AND LEVODOPA 1 TABLET: 25; 100 TABLET ORAL at 13:58

## 2025-08-29 ASSESSMENT — PAIN DESCRIPTION - PAIN TYPE: TYPE: ACUTE PAIN

## 2025-08-29 ASSESSMENT — PAIN - FUNCTIONAL ASSESSMENT
PAIN_FUNCTIONAL_ASSESSMENT: 0-10
PAIN_FUNCTIONAL_ASSESSMENT: FACE, LEGS, ACTIVITY, CRY, AND CONSOLABILITY (FLACC)
PAIN_FUNCTIONAL_ASSESSMENT: ACTIVITIES ARE NOT PREVENTED
PAIN_FUNCTIONAL_ASSESSMENT: 0-10
PAIN_FUNCTIONAL_ASSESSMENT: 0-10
PAIN_FUNCTIONAL_ASSESSMENT: ACTIVITIES ARE NOT PREVENTED
PAIN_FUNCTIONAL_ASSESSMENT: INTOLERABLE, UNABLE TO DO ANY ACTIVE OR PASSIVE ACTIVITIES

## 2025-08-29 ASSESSMENT — PAIN DESCRIPTION - LOCATION
LOCATION: ABDOMEN;ARM
LOCATION: OTHER (COMMENT)
LOCATION: ARM
LOCATION: KNEE

## 2025-08-29 ASSESSMENT — PAIN SCALES - GENERAL
PAINLEVEL_OUTOF10: 3
PAINLEVEL_OUTOF10: 2
PAINLEVEL_OUTOF10: 6
PAINLEVEL_OUTOF10: 0
PAINLEVEL_OUTOF10: 0

## 2025-08-29 ASSESSMENT — PAIN DESCRIPTION - FREQUENCY: FREQUENCY: CONTINUOUS

## 2025-08-29 ASSESSMENT — PAIN DESCRIPTION - DESCRIPTORS
DESCRIPTORS: OTHER (COMMENT)
DESCRIPTORS: ACHING;SPASM
DESCRIPTORS: ACHING
DESCRIPTORS: DISCOMFORT;SORE

## 2025-08-29 ASSESSMENT — PAIN DESCRIPTION - ORIENTATION
ORIENTATION: RIGHT
ORIENTATION: RIGHT
ORIENTATION: MID
ORIENTATION: OTHER (COMMENT)

## 2025-08-30 LAB
ANION GAP SERPL CALCULATED.3IONS-SCNC: 6 MMOL/L (ref 8–16)
BACTERIA URNS QL MICRO: ABNORMAL /HPF
BASOPHILS # BLD: 0 K/UL (ref 0–0.2)
BASOPHILS NFR BLD: 0.4 % (ref 0–1)
BILIRUB UR QL STRIP: NEGATIVE
BUN SERPL-MCNC: 17 MG/DL (ref 8–23)
CALCIUM SERPL-MCNC: 8.1 MG/DL (ref 8.8–10.2)
CHLORIDE SERPL-SCNC: 110 MMOL/L (ref 98–107)
CLARITY UR: CLEAR
CO2 SERPL-SCNC: 22 MMOL/L (ref 22–29)
COLOR UR: YELLOW
CREAT SERPL-MCNC: 0.9 MG/DL (ref 0.7–1.2)
CRYSTALS URNS MICRO: ABNORMAL /HPF
EOSINOPHIL # BLD: 0.1 K/UL (ref 0–0.6)
EOSINOPHIL NFR BLD: 1 % (ref 0–5)
EPI CELLS #/AREA URNS AUTO: 0 /HPF (ref 0–5)
ERYTHROCYTE [DISTWIDTH] IN BLOOD BY AUTOMATED COUNT: 15.8 % (ref 11.5–14.5)
GLUCOSE SERPL-MCNC: 100 MG/DL (ref 70–99)
GLUCOSE UR STRIP.AUTO-MCNC: NEGATIVE MG/DL
HCT VFR BLD AUTO: 21.1 % (ref 42–52)
HGB BLD-MCNC: 7.5 G/DL (ref 14–18)
HGB UR STRIP.AUTO-MCNC: ABNORMAL MG/L
HYALINE CASTS #/AREA URNS AUTO: 6 /HPF (ref 0–8)
IMM GRANULOCYTES # BLD: 0 K/UL
INR PPP: 2.91 (ref 0.88–1.18)
KETONES UR STRIP.AUTO-MCNC: NEGATIVE MG/DL
LEUKOCYTE ESTERASE UR QL STRIP.AUTO: ABNORMAL
LYMPHOCYTES # BLD: 2.5 K/UL (ref 1.1–4.5)
LYMPHOCYTES NFR BLD: 34.3 % (ref 20–40)
MAGNESIUM SERPL-MCNC: 1.7 MG/DL (ref 1.6–2.4)
MCH RBC QN AUTO: 36.2 PG (ref 27–31)
MCHC RBC AUTO-ENTMCNC: 35.5 G/DL (ref 33–37)
MCV RBC AUTO: 101.9 FL (ref 80–94)
MONOCYTES # BLD: 0.5 K/UL (ref 0–0.9)
MONOCYTES NFR BLD: 7.1 % (ref 0–10)
NEUTROPHILS # BLD: 4.1 K/UL (ref 1.5–7.5)
NEUTS SEG NFR BLD: 56.9 % (ref 50–65)
NITRITE UR QL STRIP.AUTO: NEGATIVE
PH UR STRIP.AUTO: 5 [PH] (ref 5–8)
PLATELET # BLD AUTO: 97 K/UL (ref 130–400)
PMV BLD AUTO: 10.1 FL (ref 9.4–12.4)
POTASSIUM SERPL-SCNC: 2.8 MMOL/L (ref 3.5–5)
POTASSIUM SERPL-SCNC: 3.2 MMOL/L (ref 3.5–5.1)
PROT UR STRIP.AUTO-MCNC: ABNORMAL MG/DL
PROTHROMBIN TIME: 29.8 SEC (ref 12–14.6)
RBC # BLD AUTO: 2.07 M/UL (ref 4.7–6.1)
RBC #/AREA URNS AUTO: 127 /HPF (ref 0–4)
SODIUM SERPL-SCNC: 138 MMOL/L (ref 136–145)
SP GR UR STRIP.AUTO: 1.01 (ref 1–1.03)
UROBILINOGEN UR STRIP.AUTO-MCNC: 0.2 E.U./DL
WBC # BLD AUTO: 7.1 K/UL (ref 4.8–10.8)
WBC #/AREA URNS AUTO: 23 /HPF (ref 0–5)

## 2025-08-30 PROCEDURE — 2500000003 HC RX 250 WO HCPCS: Performed by: HOSPITALIST

## 2025-08-30 PROCEDURE — 83735 ASSAY OF MAGNESIUM: CPT

## 2025-08-30 PROCEDURE — 97530 THERAPEUTIC ACTIVITIES: CPT

## 2025-08-30 PROCEDURE — 6370000000 HC RX 637 (ALT 250 FOR IP): Performed by: HOSPITALIST

## 2025-08-30 PROCEDURE — 81001 URINALYSIS AUTO W/SCOPE: CPT

## 2025-08-30 PROCEDURE — 94760 N-INVAS EAR/PLS OXIMETRY 1: CPT

## 2025-08-30 PROCEDURE — 87186 SC STD MICRODIL/AGAR DIL: CPT

## 2025-08-30 PROCEDURE — 6360000002 HC RX W HCPCS: Performed by: HOSPITALIST

## 2025-08-30 PROCEDURE — 85610 PROTHROMBIN TIME: CPT

## 2025-08-30 PROCEDURE — 87077 CULTURE AEROBIC IDENTIFY: CPT

## 2025-08-30 PROCEDURE — 84132 ASSAY OF SERUM POTASSIUM: CPT

## 2025-08-30 PROCEDURE — 94150 VITAL CAPACITY TEST: CPT

## 2025-08-30 PROCEDURE — 6370000000 HC RX 637 (ALT 250 FOR IP)

## 2025-08-30 PROCEDURE — 36415 COLL VENOUS BLD VENIPUNCTURE: CPT

## 2025-08-30 PROCEDURE — 87086 URINE CULTURE/COLONY COUNT: CPT

## 2025-08-30 PROCEDURE — 2500000003 HC RX 250 WO HCPCS

## 2025-08-30 PROCEDURE — 80048 BASIC METABOLIC PNL TOTAL CA: CPT

## 2025-08-30 PROCEDURE — 1200000000 HC SEMI PRIVATE

## 2025-08-30 PROCEDURE — 6360000002 HC RX W HCPCS

## 2025-08-30 PROCEDURE — 85025 COMPLETE CBC W/AUTO DIFF WBC: CPT

## 2025-08-30 RX ORDER — BUMETANIDE 0.25 MG/ML
1 INJECTION, SOLUTION INTRAMUSCULAR; INTRAVENOUS ONCE
Status: COMPLETED | OUTPATIENT
Start: 2025-08-30 | End: 2025-08-30

## 2025-08-30 RX ORDER — WARFARIN SODIUM 5 MG/1
5 TABLET ORAL
Status: COMPLETED | OUTPATIENT
Start: 2025-08-30 | End: 2025-08-30

## 2025-08-30 RX ORDER — TAMSULOSIN HYDROCHLORIDE 0.4 MG/1
0.4 CAPSULE ORAL DAILY
Status: DISCONTINUED | OUTPATIENT
Start: 2025-08-31 | End: 2025-09-03 | Stop reason: HOSPADM

## 2025-08-30 RX ORDER — POTASSIUM CHLORIDE 1500 MG/1
40 TABLET, EXTENDED RELEASE ORAL 2 TIMES DAILY
Status: DISCONTINUED | OUTPATIENT
Start: 2025-08-30 | End: 2025-09-03 | Stop reason: HOSPADM

## 2025-08-30 RX ORDER — BUMETANIDE 0.25 MG/ML
1 INJECTION, SOLUTION INTRAMUSCULAR; INTRAVENOUS 2 TIMES DAILY
Status: COMPLETED | OUTPATIENT
Start: 2025-08-30 | End: 2025-08-31

## 2025-08-30 RX ADMIN — MESALAMINE 2.4 G: 1.2 TABLET, DELAYED RELEASE ORAL at 07:45

## 2025-08-30 RX ADMIN — ACETAMINOPHEN 1000 MG: 500 TABLET ORAL at 20:36

## 2025-08-30 RX ADMIN — CARBIDOPA AND LEVODOPA 1 TABLET: 25; 100 TABLET ORAL at 07:45

## 2025-08-30 RX ADMIN — ROPINIROLE HYDROCHLORIDE 2 MG: 1 TABLET, FILM COATED ORAL at 20:36

## 2025-08-30 RX ADMIN — POTASSIUM CHLORIDE 10 MEQ: 7.46 INJECTION, SOLUTION INTRAVENOUS at 07:00

## 2025-08-30 RX ADMIN — Medication 5000 UNITS: at 07:45

## 2025-08-30 RX ADMIN — ACETAMINOPHEN 1000 MG: 500 TABLET ORAL at 06:28

## 2025-08-30 RX ADMIN — DICYCLOMINE HYDROCHLORIDE 10 MG: 10 CAPSULE ORAL at 17:10

## 2025-08-30 RX ADMIN — Medication 1 TABLET: at 07:45

## 2025-08-30 RX ADMIN — METOPROLOL SUCCINATE 25 MG: 25 TABLET, EXTENDED RELEASE ORAL at 07:45

## 2025-08-30 RX ADMIN — CARBIDOPA AND LEVODOPA 1 TABLET: 25; 100 TABLET ORAL at 20:36

## 2025-08-30 RX ADMIN — ACETAMINOPHEN 1000 MG: 500 TABLET ORAL at 14:17

## 2025-08-30 RX ADMIN — BUMETANIDE 1 MG: 0.25 INJECTION INTRAMUSCULAR; INTRAVENOUS at 17:10

## 2025-08-30 RX ADMIN — POTASSIUM CHLORIDE 40 MEQ: 1500 TABLET, EXTENDED RELEASE ORAL at 20:36

## 2025-08-30 RX ADMIN — POTASSIUM CHLORIDE 10 MEQ: 7.46 INJECTION, SOLUTION INTRAVENOUS at 04:27

## 2025-08-30 RX ADMIN — ROPINIROLE HYDROCHLORIDE 2 MG: 1 TABLET, FILM COATED ORAL at 14:17

## 2025-08-30 RX ADMIN — DICYCLOMINE HYDROCHLORIDE 10 MG: 10 CAPSULE ORAL at 20:36

## 2025-08-30 RX ADMIN — MESALAMINE 2.4 G: 1.2 TABLET, DELAYED RELEASE ORAL at 14:17

## 2025-08-30 RX ADMIN — BUDESONIDE 9 MG: 3 CAPSULE ORAL at 07:45

## 2025-08-30 RX ADMIN — POTASSIUM CHLORIDE 10 MEQ: 7.46 INJECTION, SOLUTION INTRAVENOUS at 10:10

## 2025-08-30 RX ADMIN — POTASSIUM CHLORIDE 10 MEQ: 7.46 INJECTION, SOLUTION INTRAVENOUS at 05:42

## 2025-08-30 RX ADMIN — WARFARIN SODIUM 5 MG: 5 TABLET ORAL at 17:09

## 2025-08-30 RX ADMIN — PANTOPRAZOLE SODIUM 20 MG: 20 TABLET, DELAYED RELEASE ORAL at 06:29

## 2025-08-30 RX ADMIN — POTASSIUM CHLORIDE 10 MEQ: 7.46 INJECTION, SOLUTION INTRAVENOUS at 11:14

## 2025-08-30 RX ADMIN — CARBIDOPA AND LEVODOPA 1 TABLET: 25; 100 TABLET ORAL at 14:17

## 2025-08-30 RX ADMIN — SODIUM CHLORIDE, PRESERVATIVE FREE 10 ML: 5 INJECTION INTRAVENOUS at 20:37

## 2025-08-30 RX ADMIN — ISOSORBIDE MONONITRATE 30 MG: 30 TABLET, EXTENDED RELEASE ORAL at 17:10

## 2025-08-30 RX ADMIN — POTASSIUM CHLORIDE 10 MEQ: 7.46 INJECTION, SOLUTION INTRAVENOUS at 08:59

## 2025-08-30 RX ADMIN — WATER 2000 MG: 1 INJECTION INTRAMUSCULAR; INTRAVENOUS; SUBCUTANEOUS at 14:17

## 2025-08-30 RX ADMIN — BUMETANIDE 1 MG: 0.25 INJECTION INTRAMUSCULAR; INTRAVENOUS at 07:46

## 2025-08-30 RX ADMIN — DICYCLOMINE HYDROCHLORIDE 10 MG: 10 CAPSULE ORAL at 06:29

## 2025-08-30 RX ADMIN — DICYCLOMINE HYDROCHLORIDE 10 MG: 10 CAPSULE ORAL at 11:23

## 2025-08-30 RX ADMIN — MESALAMINE 2.4 G: 1.2 TABLET, DELAYED RELEASE ORAL at 20:36

## 2025-08-30 RX ADMIN — ROPINIROLE HYDROCHLORIDE 2 MG: 1 TABLET, FILM COATED ORAL at 07:45

## 2025-08-30 ASSESSMENT — PAIN SCALES - GENERAL
PAINLEVEL_OUTOF10: 0
PAINLEVEL_OUTOF10: 0
PAINLEVEL_OUTOF10: 3

## 2025-08-30 ASSESSMENT — PAIN DESCRIPTION - LOCATION: LOCATION: ARM

## 2025-08-30 ASSESSMENT — PAIN - FUNCTIONAL ASSESSMENT
PAIN_FUNCTIONAL_ASSESSMENT: 0-10
PAIN_FUNCTIONAL_ASSESSMENT: INTOLERABLE, UNABLE TO DO ANY ACTIVE OR PASSIVE ACTIVITIES
PAIN_FUNCTIONAL_ASSESSMENT: 0-10
PAIN_FUNCTIONAL_ASSESSMENT: PREVENTS OR INTERFERES SOME ACTIVE ACTIVITIES AND ADLS
PAIN_FUNCTIONAL_ASSESSMENT: 0-10

## 2025-08-30 ASSESSMENT — PAIN DESCRIPTION - ORIENTATION: ORIENTATION: RIGHT

## 2025-08-30 ASSESSMENT — PAIN DESCRIPTION - DESCRIPTORS: DESCRIPTORS: ACHING;DISCOMFORT

## 2025-08-31 ENCOUNTER — APPOINTMENT (OUTPATIENT)
Dept: CT IMAGING | Age: 89
DRG: 563 | End: 2025-08-31
Payer: MEDICARE

## 2025-08-31 LAB
ABO + RH BLD: NORMAL
ALBUMIN SERPL-MCNC: 2.6 G/DL (ref 3.5–5.2)
ALP SERPL-CCNC: 88 U/L (ref 40–129)
ALT SERPL-CCNC: <5 U/L (ref 10–50)
ANION GAP SERPL CALCULATED.3IONS-SCNC: 10 MMOL/L (ref 8–16)
APTT PPP: 35.4 SEC (ref 26–36.2)
AST SERPL-CCNC: 14 U/L (ref 10–50)
BASOPHILS # BLD: 0 K/UL (ref 0–0.2)
BASOPHILS NFR BLD: 0.3 % (ref 0–1)
BILIRUB DIRECT SERPL-MCNC: 0.2 MG/DL (ref 0–0.3)
BILIRUB INDIRECT SERPL-MCNC: 0.1 MG/DL (ref 0–1)
BILIRUB SERPL-MCNC: 0.3 MG/DL (ref 0.2–1.2)
BLD GP AB SCN SERPL QL: NORMAL
BLOOD BANK DISPENSE STATUS: NORMAL
BLOOD BANK PRODUCT CODE: NORMAL
BPU ID: NORMAL
BUN SERPL-MCNC: 17 MG/DL (ref 8–23)
CALCIUM SERPL-MCNC: 8 MG/DL (ref 8.8–10.2)
CHLORIDE SERPL-SCNC: 107 MMOL/L (ref 98–107)
CO2 SERPL-SCNC: 21 MMOL/L (ref 22–29)
CREAT SERPL-MCNC: 0.9 MG/DL (ref 0.7–1.2)
DESCRIPTION BLOOD BANK: NORMAL
EOSINOPHIL # BLD: 0.1 K/UL (ref 0–0.6)
EOSINOPHIL NFR BLD: 1.3 % (ref 0–5)
ERYTHROCYTE [DISTWIDTH] IN BLOOD BY AUTOMATED COUNT: 15.9 % (ref 11.5–14.5)
GLUCOSE SERPL-MCNC: 104 MG/DL (ref 70–99)
HCT VFR BLD AUTO: 22.6 % (ref 42–52)
HGB BLD-MCNC: 7.8 G/DL (ref 14–18)
IMM GRANULOCYTES # BLD: 0.1 K/UL
INR PPP: 1.57 (ref 0.88–1.18)
INR PPP: 2.31 (ref 0.88–1.18)
LYMPHOCYTES # BLD: 2.2 K/UL (ref 1.1–4.5)
LYMPHOCYTES NFR BLD: 26 % (ref 20–40)
MAGNESIUM SERPL-MCNC: 1.6 MG/DL (ref 1.6–2.4)
MCH RBC QN AUTO: 35.3 PG (ref 27–31)
MCHC RBC AUTO-ENTMCNC: 34.5 G/DL (ref 33–37)
MCV RBC AUTO: 102.3 FL (ref 80–94)
MONOCYTES # BLD: 0.6 K/UL (ref 0–0.9)
MONOCYTES NFR BLD: 6.8 % (ref 0–10)
NEUTROPHILS # BLD: 5.6 K/UL (ref 1.5–7.5)
NEUTS SEG NFR BLD: 65 % (ref 50–65)
PLATELET # BLD AUTO: 118 K/UL (ref 130–400)
PMV BLD AUTO: 10 FL (ref 9.4–12.4)
POTASSIUM SERPL-SCNC: 3.4 MMOL/L (ref 3.5–5)
PROT SERPL-MCNC: 4.6 G/DL (ref 6.4–8.3)
PROTHROMBIN TIME: 18.6 SEC (ref 12–14.6)
PROTHROMBIN TIME: 25 SEC (ref 12–14.6)
RBC # BLD AUTO: 2.21 M/UL (ref 4.7–6.1)
SODIUM SERPL-SCNC: 138 MMOL/L (ref 136–145)
WBC # BLD AUTO: 8.6 K/UL (ref 4.8–10.8)

## 2025-08-31 PROCEDURE — 6360000002 HC RX W HCPCS

## 2025-08-31 PROCEDURE — 85025 COMPLETE CBC W/AUTO DIFF WBC: CPT

## 2025-08-31 PROCEDURE — 83735 ASSAY OF MAGNESIUM: CPT

## 2025-08-31 PROCEDURE — 6370000000 HC RX 637 (ALT 250 FOR IP): Performed by: HOSPITALIST

## 2025-08-31 PROCEDURE — P9059 PLASMA, FRZ BETWEEN 8-24HOUR: HCPCS

## 2025-08-31 PROCEDURE — 86900 BLOOD TYPING SEROLOGIC ABO: CPT

## 2025-08-31 PROCEDURE — 30233K1 TRANSFUSION OF NONAUTOLOGOUS FROZEN PLASMA INTO PERIPHERAL VEIN, PERCUTANEOUS APPROACH: ICD-10-PCS | Performed by: INTERNAL MEDICINE

## 2025-08-31 PROCEDURE — 2500000003 HC RX 250 WO HCPCS

## 2025-08-31 PROCEDURE — 74176 CT ABD & PELVIS W/O CONTRAST: CPT

## 2025-08-31 PROCEDURE — 85730 THROMBOPLASTIN TIME PARTIAL: CPT

## 2025-08-31 PROCEDURE — 1200000000 HC SEMI PRIVATE

## 2025-08-31 PROCEDURE — 80048 BASIC METABOLIC PNL TOTAL CA: CPT

## 2025-08-31 PROCEDURE — 85610 PROTHROMBIN TIME: CPT

## 2025-08-31 PROCEDURE — 94760 N-INVAS EAR/PLS OXIMETRY 1: CPT

## 2025-08-31 PROCEDURE — 86850 RBC ANTIBODY SCREEN: CPT

## 2025-08-31 PROCEDURE — 94150 VITAL CAPACITY TEST: CPT

## 2025-08-31 PROCEDURE — 36415 COLL VENOUS BLD VENIPUNCTURE: CPT

## 2025-08-31 PROCEDURE — 6370000000 HC RX 637 (ALT 250 FOR IP)

## 2025-08-31 PROCEDURE — 80076 HEPATIC FUNCTION PANEL: CPT

## 2025-08-31 PROCEDURE — 86901 BLOOD TYPING SEROLOGIC RH(D): CPT

## 2025-08-31 PROCEDURE — 36430 TRANSFUSION BLD/BLD COMPNT: CPT

## 2025-08-31 PROCEDURE — 2500000003 HC RX 250 WO HCPCS: Performed by: HOSPITALIST

## 2025-08-31 RX ORDER — SODIUM CHLORIDE 9 MG/ML
INJECTION, SOLUTION INTRAVENOUS PRN
Status: DISCONTINUED | OUTPATIENT
Start: 2025-08-31 | End: 2025-09-03 | Stop reason: HOSPADM

## 2025-08-31 RX ORDER — MAGNESIUM SULFATE IN WATER 40 MG/ML
2000 INJECTION, SOLUTION INTRAVENOUS ONCE
Status: COMPLETED | OUTPATIENT
Start: 2025-08-31 | End: 2025-08-31

## 2025-08-31 RX ORDER — PHYTONADIONE 10 MG/ML
10 INJECTION, EMULSION INTRAMUSCULAR; INTRAVENOUS; SUBCUTANEOUS ONCE
Status: COMPLETED | OUTPATIENT
Start: 2025-08-31 | End: 2025-08-31

## 2025-08-31 RX ADMIN — ACETAMINOPHEN 1000 MG: 500 TABLET ORAL at 14:12

## 2025-08-31 RX ADMIN — DICYCLOMINE HYDROCHLORIDE 10 MG: 10 CAPSULE ORAL at 17:06

## 2025-08-31 RX ADMIN — MESALAMINE 2.4 G: 1.2 TABLET, DELAYED RELEASE ORAL at 14:12

## 2025-08-31 RX ADMIN — ROPINIROLE HYDROCHLORIDE 2 MG: 1 TABLET, FILM COATED ORAL at 07:32

## 2025-08-31 RX ADMIN — ROPINIROLE HYDROCHLORIDE 2 MG: 1 TABLET, FILM COATED ORAL at 14:12

## 2025-08-31 RX ADMIN — Medication 5000 UNITS: at 07:31

## 2025-08-31 RX ADMIN — ACETAMINOPHEN 1000 MG: 500 TABLET ORAL at 21:56

## 2025-08-31 RX ADMIN — WATER 2000 MG: 1 INJECTION INTRAMUSCULAR; INTRAVENOUS; SUBCUTANEOUS at 14:12

## 2025-08-31 RX ADMIN — DICYCLOMINE HYDROCHLORIDE 10 MG: 10 CAPSULE ORAL at 06:41

## 2025-08-31 RX ADMIN — CARBIDOPA AND LEVODOPA 1 TABLET: 25; 100 TABLET ORAL at 21:55

## 2025-08-31 RX ADMIN — POTASSIUM CHLORIDE 40 MEQ: 1500 TABLET, EXTENDED RELEASE ORAL at 21:56

## 2025-08-31 RX ADMIN — ISOSORBIDE MONONITRATE 30 MG: 30 TABLET, EXTENDED RELEASE ORAL at 17:05

## 2025-08-31 RX ADMIN — POTASSIUM CHLORIDE 40 MEQ: 1500 TABLET, EXTENDED RELEASE ORAL at 07:40

## 2025-08-31 RX ADMIN — ROPINIROLE HYDROCHLORIDE 2 MG: 1 TABLET, FILM COATED ORAL at 21:55

## 2025-08-31 RX ADMIN — BUDESONIDE 9 MG: 3 CAPSULE ORAL at 07:31

## 2025-08-31 RX ADMIN — DICYCLOMINE HYDROCHLORIDE 10 MG: 10 CAPSULE ORAL at 09:52

## 2025-08-31 RX ADMIN — BUMETANIDE 1 MG: 0.25 INJECTION INTRAMUSCULAR; INTRAVENOUS at 17:06

## 2025-08-31 RX ADMIN — ACETAMINOPHEN 1000 MG: 500 TABLET ORAL at 06:41

## 2025-08-31 RX ADMIN — POTASSIUM CHLORIDE 40 MEQ: 1500 TABLET, EXTENDED RELEASE ORAL at 06:41

## 2025-08-31 RX ADMIN — DICYCLOMINE HYDROCHLORIDE 10 MG: 10 CAPSULE ORAL at 21:55

## 2025-08-31 RX ADMIN — Medication 1 TABLET: at 07:31

## 2025-08-31 RX ADMIN — METOPROLOL SUCCINATE 25 MG: 25 TABLET, EXTENDED RELEASE ORAL at 07:32

## 2025-08-31 RX ADMIN — CARBIDOPA AND LEVODOPA 1 TABLET: 25; 100 TABLET ORAL at 14:12

## 2025-08-31 RX ADMIN — MESALAMINE 2.4 G: 1.2 TABLET, DELAYED RELEASE ORAL at 07:31

## 2025-08-31 RX ADMIN — SODIUM CHLORIDE, PRESERVATIVE FREE 10 ML: 5 INJECTION INTRAVENOUS at 21:56

## 2025-08-31 RX ADMIN — PHYTONADIONE 10 MG: 10 INJECTION, EMULSION INTRAMUSCULAR; INTRAVENOUS; SUBCUTANEOUS at 18:11

## 2025-08-31 RX ADMIN — TAMSULOSIN HYDROCHLORIDE 0.4 MG: 0.4 CAPSULE ORAL at 07:32

## 2025-08-31 RX ADMIN — PANTOPRAZOLE SODIUM 20 MG: 20 TABLET, DELAYED RELEASE ORAL at 06:41

## 2025-08-31 RX ADMIN — BUMETANIDE 1 MG: 0.25 INJECTION INTRAMUSCULAR; INTRAVENOUS at 07:31

## 2025-08-31 RX ADMIN — Medication 1 TABLET: at 07:32

## 2025-08-31 RX ADMIN — CARBIDOPA AND LEVODOPA 1 TABLET: 25; 100 TABLET ORAL at 07:32

## 2025-08-31 RX ADMIN — MAGNESIUM SULFATE HEPTAHYDRATE 2000 MG: 40 INJECTION, SOLUTION INTRAVENOUS at 09:52

## 2025-08-31 RX ADMIN — MESALAMINE 2.4 G: 1.2 TABLET, DELAYED RELEASE ORAL at 21:56

## 2025-09-01 PROBLEM — S42.301A CLOSED FRACTURE OF SHAFT OF RIGHT HUMERUS: Status: ACTIVE | Noted: 2025-09-01

## 2025-09-01 LAB
ANION GAP SERPL CALCULATED.3IONS-SCNC: 9 MMOL/L (ref 8–16)
BACTERIA UR CULT: ABNORMAL
BACTERIA UR CULT: ABNORMAL
BACTERIA URNS QL MICRO: NEGATIVE /HPF
BASOPHILS # BLD: 0 K/UL (ref 0–0.2)
BASOPHILS NFR BLD: 0.4 % (ref 0–1)
BILIRUB UR QL STRIP: NEGATIVE
BUN SERPL-MCNC: 17 MG/DL (ref 8–23)
CALCIUM SERPL-MCNC: 8.1 MG/DL (ref 8.8–10.2)
CHLORIDE SERPL-SCNC: 108 MMOL/L (ref 98–107)
CLARITY UR: CLEAR
CO2 SERPL-SCNC: 23 MMOL/L (ref 22–29)
COLOR UR: YELLOW
CREAT SERPL-MCNC: 0.9 MG/DL (ref 0.7–1.2)
CRYSTALS URNS MICRO: ABNORMAL /HPF
EOSINOPHIL # BLD: 0.1 K/UL (ref 0–0.6)
EOSINOPHIL NFR BLD: 1.6 % (ref 0–5)
EPI CELLS #/AREA URNS AUTO: 2 /HPF (ref 0–5)
ERYTHROCYTE [DISTWIDTH] IN BLOOD BY AUTOMATED COUNT: 16.1 % (ref 11.5–14.5)
FIBRINOGEN PPP-MCNC: 563 MG/DL (ref 238–505)
GLUCOSE SERPL-MCNC: 104 MG/DL (ref 70–99)
GLUCOSE UR STRIP.AUTO-MCNC: NEGATIVE MG/DL
HAPTOGLOB SERPL-MCNC: 181 MG/DL (ref 30–200)
HCT VFR BLD AUTO: 20.9 % (ref 42–52)
HCT VFR BLD AUTO: 26.1 % (ref 42–52)
HGB BLD-MCNC: 7.2 G/DL (ref 14–18)
HGB BLD-MCNC: 8.5 G/DL (ref 14–18)
HGB UR STRIP.AUTO-MCNC: ABNORMAL MG/L
HYALINE CASTS #/AREA URNS AUTO: 7 /HPF (ref 0–8)
IMM GRANULOCYTES # BLD: 0 K/UL
INR PPP: 1.61 (ref 0.88–1.18)
KETONES UR STRIP.AUTO-MCNC: ABNORMAL MG/DL
LDH SERPL-CCNC: 173 U/L (ref 135–225)
LEUKOCYTE ESTERASE UR QL STRIP.AUTO: ABNORMAL
LYMPHOCYTES # BLD: 2.2 K/UL (ref 1.1–4.5)
LYMPHOCYTES NFR BLD: 28.6 % (ref 20–40)
MAGNESIUM SERPL-MCNC: 1.8 MG/DL (ref 1.6–2.4)
MCH RBC QN AUTO: 36 PG (ref 27–31)
MCHC RBC AUTO-ENTMCNC: 34.4 G/DL (ref 33–37)
MCV RBC AUTO: 104.5 FL (ref 80–94)
MONOCYTES # BLD: 0.6 K/UL (ref 0–0.9)
MONOCYTES NFR BLD: 7.8 % (ref 0–10)
NEUTROPHILS # BLD: 4.6 K/UL (ref 1.5–7.5)
NEUTS SEG NFR BLD: 61.1 % (ref 50–65)
NITRITE UR QL STRIP.AUTO: NEGATIVE
ORGANISM: ABNORMAL
PH UR STRIP.AUTO: 5.5 [PH] (ref 5–8)
PLATELET # BLD AUTO: 111 K/UL (ref 130–400)
PMV BLD AUTO: 9.7 FL (ref 9.4–12.4)
POTASSIUM SERPL-SCNC: 3.5 MMOL/L (ref 3.5–5)
PROT UR STRIP.AUTO-MCNC: 100 MG/DL
PROTHROMBIN TIME: 19 SEC (ref 12–14.6)
RBC # BLD AUTO: 2 M/UL (ref 4.7–6.1)
RBC #/AREA URNS AUTO: 23 /HPF (ref 0–4)
SODIUM SERPL-SCNC: 140 MMOL/L (ref 136–145)
SP GR UR STRIP.AUTO: 1.01 (ref 1–1.03)
UROBILINOGEN UR STRIP.AUTO-MCNC: 0.2 E.U./DL
WBC # BLD AUTO: 7.6 K/UL (ref 4.8–10.8)
WBC #/AREA URNS AUTO: 23 /HPF (ref 0–5)

## 2025-09-01 PROCEDURE — 6370000000 HC RX 637 (ALT 250 FOR IP): Performed by: HOSPITALIST

## 2025-09-01 PROCEDURE — 85014 HEMATOCRIT: CPT

## 2025-09-01 PROCEDURE — 85025 COMPLETE CBC W/AUTO DIFF WBC: CPT

## 2025-09-01 PROCEDURE — 81001 URINALYSIS AUTO W/SCOPE: CPT

## 2025-09-01 PROCEDURE — 2500000003 HC RX 250 WO HCPCS

## 2025-09-01 PROCEDURE — 94150 VITAL CAPACITY TEST: CPT

## 2025-09-01 PROCEDURE — 97530 THERAPEUTIC ACTIVITIES: CPT

## 2025-09-01 PROCEDURE — 87086 URINE CULTURE/COLONY COUNT: CPT

## 2025-09-01 PROCEDURE — 6370000000 HC RX 637 (ALT 250 FOR IP)

## 2025-09-01 PROCEDURE — 83735 ASSAY OF MAGNESIUM: CPT

## 2025-09-01 PROCEDURE — 6360000002 HC RX W HCPCS

## 2025-09-01 PROCEDURE — 85610 PROTHROMBIN TIME: CPT

## 2025-09-01 PROCEDURE — 85384 FIBRINOGEN ACTIVITY: CPT

## 2025-09-01 PROCEDURE — 83615 LACTATE (LD) (LDH) ENZYME: CPT

## 2025-09-01 PROCEDURE — 83010 ASSAY OF HAPTOGLOBIN QUANT: CPT

## 2025-09-01 PROCEDURE — 80048 BASIC METABOLIC PNL TOTAL CA: CPT

## 2025-09-01 PROCEDURE — 2500000003 HC RX 250 WO HCPCS: Performed by: HOSPITALIST

## 2025-09-01 PROCEDURE — 6360000002 HC RX W HCPCS: Performed by: HOSPITALIST

## 2025-09-01 PROCEDURE — 36415 COLL VENOUS BLD VENIPUNCTURE: CPT

## 2025-09-01 PROCEDURE — 1200000000 HC SEMI PRIVATE

## 2025-09-01 PROCEDURE — 85018 HEMOGLOBIN: CPT

## 2025-09-01 PROCEDURE — 51702 INSERT TEMP BLADDER CATH: CPT

## 2025-09-01 PROCEDURE — 94760 N-INVAS EAR/PLS OXIMETRY 1: CPT

## 2025-09-01 RX ORDER — BUMETANIDE 0.25 MG/ML
1 INJECTION, SOLUTION INTRAMUSCULAR; INTRAVENOUS DAILY
Status: COMPLETED | OUTPATIENT
Start: 2025-09-01 | End: 2025-09-03

## 2025-09-01 RX ADMIN — ACETAMINOPHEN 1000 MG: 500 TABLET ORAL at 14:57

## 2025-09-01 RX ADMIN — WATER 2000 MG: 1 INJECTION INTRAMUSCULAR; INTRAVENOUS; SUBCUTANEOUS at 14:56

## 2025-09-01 RX ADMIN — BUDESONIDE 9 MG: 3 CAPSULE ORAL at 08:07

## 2025-09-01 RX ADMIN — ROPINIROLE HYDROCHLORIDE 2 MG: 1 TABLET, FILM COATED ORAL at 08:07

## 2025-09-01 RX ADMIN — SODIUM CHLORIDE, PRESERVATIVE FREE 10 ML: 5 INJECTION INTRAVENOUS at 08:08

## 2025-09-01 RX ADMIN — OXYCODONE 5 MG: 5 TABLET ORAL at 15:15

## 2025-09-01 RX ADMIN — SODIUM CHLORIDE, PRESERVATIVE FREE 10 ML: 5 INJECTION INTRAVENOUS at 20:41

## 2025-09-01 RX ADMIN — Medication 1 TABLET: at 08:07

## 2025-09-01 RX ADMIN — DICYCLOMINE HYDROCHLORIDE 10 MG: 10 CAPSULE ORAL at 06:31

## 2025-09-01 RX ADMIN — ROPINIROLE HYDROCHLORIDE 2 MG: 1 TABLET, FILM COATED ORAL at 14:57

## 2025-09-01 RX ADMIN — CARBIDOPA AND LEVODOPA 1 TABLET: 25; 100 TABLET ORAL at 20:40

## 2025-09-01 RX ADMIN — MORPHINE SULFATE 4 MG: 4 INJECTION, SOLUTION INTRAMUSCULAR; INTRAVENOUS at 08:05

## 2025-09-01 RX ADMIN — ACETAMINOPHEN 1000 MG: 500 TABLET ORAL at 06:30

## 2025-09-01 RX ADMIN — PANTOPRAZOLE SODIUM 20 MG: 20 TABLET, DELAYED RELEASE ORAL at 06:31

## 2025-09-01 RX ADMIN — Medication 5 MG: at 20:41

## 2025-09-01 RX ADMIN — METOPROLOL SUCCINATE 25 MG: 25 TABLET, EXTENDED RELEASE ORAL at 08:07

## 2025-09-01 RX ADMIN — POTASSIUM CHLORIDE 40 MEQ: 1500 TABLET, EXTENDED RELEASE ORAL at 08:07

## 2025-09-01 RX ADMIN — MESALAMINE 2.4 G: 1.2 TABLET, DELAYED RELEASE ORAL at 20:40

## 2025-09-01 RX ADMIN — TAMSULOSIN HYDROCHLORIDE 0.4 MG: 0.4 CAPSULE ORAL at 08:07

## 2025-09-01 RX ADMIN — DICYCLOMINE HYDROCHLORIDE 10 MG: 10 CAPSULE ORAL at 16:44

## 2025-09-01 RX ADMIN — CARBIDOPA AND LEVODOPA 1 TABLET: 25; 100 TABLET ORAL at 08:07

## 2025-09-01 RX ADMIN — ISOSORBIDE MONONITRATE 30 MG: 30 TABLET, EXTENDED RELEASE ORAL at 16:44

## 2025-09-01 RX ADMIN — ROPINIROLE HYDROCHLORIDE 2 MG: 1 TABLET, FILM COATED ORAL at 20:41

## 2025-09-01 RX ADMIN — DICYCLOMINE HYDROCHLORIDE 10 MG: 10 CAPSULE ORAL at 20:41

## 2025-09-01 RX ADMIN — CARBIDOPA AND LEVODOPA 1 TABLET: 25; 100 TABLET ORAL at 14:57

## 2025-09-01 RX ADMIN — MESALAMINE 2.4 G: 1.2 TABLET, DELAYED RELEASE ORAL at 14:56

## 2025-09-01 RX ADMIN — Medication 5000 UNITS: at 08:07

## 2025-09-01 RX ADMIN — ACETAMINOPHEN 1000 MG: 500 TABLET ORAL at 20:40

## 2025-09-01 RX ADMIN — DICYCLOMINE HYDROCHLORIDE 10 MG: 10 CAPSULE ORAL at 11:38

## 2025-09-01 RX ADMIN — POTASSIUM CHLORIDE 40 MEQ: 1500 TABLET, EXTENDED RELEASE ORAL at 20:40

## 2025-09-01 RX ADMIN — MESALAMINE 2.4 G: 1.2 TABLET, DELAYED RELEASE ORAL at 08:07

## 2025-09-01 RX ADMIN — BUMETANIDE 1 MG: 0.25 INJECTION INTRAMUSCULAR; INTRAVENOUS at 15:14

## 2025-09-01 ASSESSMENT — PAIN DESCRIPTION - LOCATION
LOCATION: ARM;SHOULDER
LOCATION: ARM
LOCATION: ARM

## 2025-09-01 ASSESSMENT — PAIN - FUNCTIONAL ASSESSMENT
PAIN_FUNCTIONAL_ASSESSMENT: 0-10
PAIN_FUNCTIONAL_ASSESSMENT: 0-10

## 2025-09-01 ASSESSMENT — PAIN SCALES - GENERAL
PAINLEVEL_OUTOF10: 10
PAINLEVEL_OUTOF10: 5

## 2025-09-01 ASSESSMENT — PAIN DESCRIPTION - DESCRIPTORS
DESCRIPTORS: ACHING;DISCOMFORT
DESCRIPTORS: ACHING;DISCOMFORT;SORE
DESCRIPTORS: TIGHTNESS;ACHING

## 2025-09-01 ASSESSMENT — PAIN DESCRIPTION - PAIN TYPE: TYPE: ACUTE PAIN

## 2025-09-01 ASSESSMENT — PAIN DESCRIPTION - ORIENTATION
ORIENTATION: RIGHT

## 2025-09-02 PROBLEM — R60.1 ANASARCA: Status: ACTIVE | Noted: 2025-09-02

## 2025-09-02 PROBLEM — N50.89 SCROTAL EDEMA: Status: ACTIVE | Noted: 2025-09-02

## 2025-09-02 PROBLEM — R31.0 GROSS HEMATURIA: Status: ACTIVE | Noted: 2025-09-02

## 2025-09-02 PROBLEM — R33.9 URINARY RETENTION: Status: ACTIVE | Noted: 2025-09-02

## 2025-09-02 LAB
ANION GAP SERPL CALCULATED.3IONS-SCNC: 9 MMOL/L (ref 8–16)
BASOPHILS # BLD: 0 K/UL (ref 0–0.2)
BASOPHILS NFR BLD: 0.3 % (ref 0–1)
BUN SERPL-MCNC: 18 MG/DL (ref 8–23)
CALCIUM SERPL-MCNC: 8.1 MG/DL (ref 8.8–10.2)
CHLORIDE SERPL-SCNC: 108 MMOL/L (ref 98–107)
CO2 SERPL-SCNC: 23 MMOL/L (ref 22–29)
CREAT SERPL-MCNC: 1 MG/DL (ref 0.7–1.2)
EOSINOPHIL # BLD: 0.2 K/UL (ref 0–0.6)
EOSINOPHIL NFR BLD: 2.5 % (ref 0–5)
ERYTHROCYTE [DISTWIDTH] IN BLOOD BY AUTOMATED COUNT: 16.3 % (ref 11.5–14.5)
GLUCOSE SERPL-MCNC: 118 MG/DL (ref 70–99)
HCT VFR BLD AUTO: 22.3 % (ref 42–52)
HGB BLD-MCNC: 7.3 G/DL (ref 14–18)
IMM GRANULOCYTES # BLD: 0 K/UL
INR PPP: 1.36 (ref 0.88–1.18)
LYMPHOCYTES # BLD: 2.5 K/UL (ref 1.1–4.5)
LYMPHOCYTES NFR BLD: 32.8 % (ref 20–40)
MCH RBC QN AUTO: 34.6 PG (ref 27–31)
MCHC RBC AUTO-ENTMCNC: 32.7 G/DL (ref 33–37)
MCV RBC AUTO: 105.7 FL (ref 80–94)
MONOCYTES # BLD: 0.5 K/UL (ref 0–0.9)
MONOCYTES NFR BLD: 6.7 % (ref 0–10)
NEUTROPHILS # BLD: 4.3 K/UL (ref 1.5–7.5)
NEUTS SEG NFR BLD: 57.2 % (ref 50–65)
PLATELET # BLD AUTO: 129 K/UL (ref 130–400)
PMV BLD AUTO: 10.1 FL (ref 9.4–12.4)
POTASSIUM SERPL-SCNC: 3.8 MMOL/L (ref 3.5–5)
PROTHROMBIN TIME: 16.7 SEC (ref 12–14.6)
RBC # BLD AUTO: 2.11 M/UL (ref 4.7–6.1)
SODIUM SERPL-SCNC: 140 MMOL/L (ref 136–145)
WBC # BLD AUTO: 7.6 K/UL (ref 4.8–10.8)

## 2025-09-02 PROCEDURE — 2500000003 HC RX 250 WO HCPCS

## 2025-09-02 PROCEDURE — 6360000002 HC RX W HCPCS

## 2025-09-02 PROCEDURE — 6370000000 HC RX 637 (ALT 250 FOR IP)

## 2025-09-02 PROCEDURE — 97530 THERAPEUTIC ACTIVITIES: CPT

## 2025-09-02 PROCEDURE — 6370000000 HC RX 637 (ALT 250 FOR IP): Performed by: HOSPITALIST

## 2025-09-02 PROCEDURE — 51702 INSERT TEMP BLADDER CATH: CPT

## 2025-09-02 PROCEDURE — 1200000000 HC SEMI PRIVATE

## 2025-09-02 PROCEDURE — 80048 BASIC METABOLIC PNL TOTAL CA: CPT

## 2025-09-02 PROCEDURE — 36415 COLL VENOUS BLD VENIPUNCTURE: CPT

## 2025-09-02 PROCEDURE — 99222 1ST HOSP IP/OBS MODERATE 55: CPT | Performed by: UROLOGY

## 2025-09-02 PROCEDURE — 85025 COMPLETE CBC W/AUTO DIFF WBC: CPT

## 2025-09-02 PROCEDURE — 94150 VITAL CAPACITY TEST: CPT

## 2025-09-02 PROCEDURE — 97535 SELF CARE MNGMENT TRAINING: CPT

## 2025-09-02 PROCEDURE — 2500000003 HC RX 250 WO HCPCS: Performed by: HOSPITALIST

## 2025-09-02 PROCEDURE — 94760 N-INVAS EAR/PLS OXIMETRY 1: CPT

## 2025-09-02 PROCEDURE — 85610 PROTHROMBIN TIME: CPT

## 2025-09-02 PROCEDURE — 2580000003 HC RX 258: Performed by: UROLOGY

## 2025-09-02 RX ORDER — SODIUM CHLORIDE 9 MG/ML
INJECTION, SOLUTION INTRAVENOUS CONTINUOUS
Status: DISCONTINUED | OUTPATIENT
Start: 2025-09-02 | End: 2025-09-03

## 2025-09-02 RX ADMIN — OXYCODONE 5 MG: 5 TABLET ORAL at 16:49

## 2025-09-02 RX ADMIN — SODIUM CHLORIDE, PRESERVATIVE FREE 10 ML: 5 INJECTION INTRAVENOUS at 20:20

## 2025-09-02 RX ADMIN — POTASSIUM CHLORIDE 40 MEQ: 1500 TABLET, EXTENDED RELEASE ORAL at 20:19

## 2025-09-02 RX ADMIN — PANTOPRAZOLE SODIUM 20 MG: 20 TABLET, DELAYED RELEASE ORAL at 05:21

## 2025-09-02 RX ADMIN — SODIUM CHLORIDE: 0.9 INJECTION, SOLUTION INTRAVENOUS at 14:32

## 2025-09-02 RX ADMIN — DICYCLOMINE HYDROCHLORIDE 10 MG: 10 CAPSULE ORAL at 20:19

## 2025-09-02 RX ADMIN — ACETAMINOPHEN 1000 MG: 500 TABLET ORAL at 05:21

## 2025-09-02 RX ADMIN — ROPINIROLE HYDROCHLORIDE 2 MG: 1 TABLET, FILM COATED ORAL at 07:36

## 2025-09-02 RX ADMIN — Medication 5000 UNITS: at 07:36

## 2025-09-02 RX ADMIN — TAMSULOSIN HYDROCHLORIDE 0.4 MG: 0.4 CAPSULE ORAL at 07:36

## 2025-09-02 RX ADMIN — MESALAMINE 2.4 G: 1.2 TABLET, DELAYED RELEASE ORAL at 07:36

## 2025-09-02 RX ADMIN — POTASSIUM CHLORIDE 40 MEQ: 1500 TABLET, EXTENDED RELEASE ORAL at 07:36

## 2025-09-02 RX ADMIN — BUDESONIDE 9 MG: 3 CAPSULE ORAL at 07:35

## 2025-09-02 RX ADMIN — Medication 1 TABLET: at 07:36

## 2025-09-02 RX ADMIN — BUMETANIDE 1 MG: 0.25 INJECTION INTRAMUSCULAR; INTRAVENOUS at 07:36

## 2025-09-02 RX ADMIN — WATER 2000 MG: 1 INJECTION INTRAMUSCULAR; INTRAVENOUS; SUBCUTANEOUS at 14:16

## 2025-09-02 RX ADMIN — ISOSORBIDE MONONITRATE 30 MG: 30 TABLET, EXTENDED RELEASE ORAL at 16:49

## 2025-09-02 RX ADMIN — ROPINIROLE HYDROCHLORIDE 2 MG: 1 TABLET, FILM COATED ORAL at 13:58

## 2025-09-02 RX ADMIN — CARBIDOPA AND LEVODOPA 1 TABLET: 25; 100 TABLET ORAL at 13:58

## 2025-09-02 RX ADMIN — MESALAMINE 2.4 G: 1.2 TABLET, DELAYED RELEASE ORAL at 20:19

## 2025-09-02 RX ADMIN — ACETAMINOPHEN 1000 MG: 500 TABLET ORAL at 20:18

## 2025-09-02 RX ADMIN — CARBIDOPA AND LEVODOPA 1 TABLET: 25; 100 TABLET ORAL at 20:19

## 2025-09-02 RX ADMIN — METOPROLOL SUCCINATE 25 MG: 25 TABLET, EXTENDED RELEASE ORAL at 07:36

## 2025-09-02 RX ADMIN — MESALAMINE 2.4 G: 1.2 TABLET, DELAYED RELEASE ORAL at 13:58

## 2025-09-02 RX ADMIN — DICYCLOMINE HYDROCHLORIDE 10 MG: 10 CAPSULE ORAL at 16:49

## 2025-09-02 RX ADMIN — SODIUM CHLORIDE, PRESERVATIVE FREE 10 ML: 5 INJECTION INTRAVENOUS at 07:36

## 2025-09-02 RX ADMIN — DICYCLOMINE HYDROCHLORIDE 10 MG: 10 CAPSULE ORAL at 05:21

## 2025-09-02 RX ADMIN — ROPINIROLE HYDROCHLORIDE 2 MG: 1 TABLET, FILM COATED ORAL at 20:19

## 2025-09-02 RX ADMIN — ACETAMINOPHEN 1000 MG: 500 TABLET ORAL at 13:58

## 2025-09-02 RX ADMIN — CARBIDOPA AND LEVODOPA 1 TABLET: 25; 100 TABLET ORAL at 07:35

## 2025-09-02 ASSESSMENT — PAIN SCALES - GENERAL
PAINLEVEL_OUTOF10: 8
PAINLEVEL_OUTOF10: 0
PAINLEVEL_OUTOF10: 3
PAINLEVEL_OUTOF10: 6

## 2025-09-02 ASSESSMENT — ENCOUNTER SYMPTOMS
RESPIRATORY NEGATIVE: 1
EYES NEGATIVE: 1
NAUSEA: 0
VOMITING: 0

## 2025-09-02 ASSESSMENT — PAIN DESCRIPTION - DESCRIPTORS
DESCRIPTORS: ACHING
DESCRIPTORS: ACHING;DISCOMFORT
DESCRIPTORS: ACHING

## 2025-09-02 ASSESSMENT — PAIN DESCRIPTION - ORIENTATION
ORIENTATION: RIGHT
ORIENTATION: RIGHT

## 2025-09-02 ASSESSMENT — PAIN DESCRIPTION - LOCATION
LOCATION: SHOULDER;ARM
LOCATION: ARM;SHOULDER
LOCATION: ARM;SHOULDER

## 2025-09-02 ASSESSMENT — PAIN - FUNCTIONAL ASSESSMENT
PAIN_FUNCTIONAL_ASSESSMENT: 0-10

## 2025-09-03 ENCOUNTER — APPOINTMENT (OUTPATIENT)
Dept: CT IMAGING | Age: 89
DRG: 563 | End: 2025-09-03
Payer: MEDICARE

## 2025-09-03 VITALS
DIASTOLIC BLOOD PRESSURE: 58 MMHG | BODY MASS INDEX: 36.99 KG/M2 | SYSTOLIC BLOOD PRESSURE: 139 MMHG | HEIGHT: 67 IN | TEMPERATURE: 98.1 F | OXYGEN SATURATION: 99 % | RESPIRATION RATE: 16 BRPM | WEIGHT: 235.67 LBS | HEART RATE: 63 BPM

## 2025-09-03 LAB
ANION GAP SERPL CALCULATED.3IONS-SCNC: 8 MMOL/L (ref 8–16)
BACTERIA UR CULT: NORMAL
BASOPHILS # BLD: 0 K/UL (ref 0–0.2)
BASOPHILS NFR BLD: 0.4 % (ref 0–1)
BUN SERPL-MCNC: 19 MG/DL (ref 8–23)
CALCIUM SERPL-MCNC: 8.5 MG/DL (ref 8.8–10.2)
CHLORIDE SERPL-SCNC: 107 MMOL/L (ref 98–107)
CO2 SERPL-SCNC: 24 MMOL/L (ref 22–29)
CREAT SERPL-MCNC: 0.9 MG/DL (ref 0.7–1.2)
EOSINOPHIL # BLD: 0.3 K/UL (ref 0–0.6)
EOSINOPHIL NFR BLD: 3.4 % (ref 0–5)
ERYTHROCYTE [DISTWIDTH] IN BLOOD BY AUTOMATED COUNT: 16.6 % (ref 11.5–14.5)
GLUCOSE SERPL-MCNC: 94 MG/DL (ref 70–99)
HCT VFR BLD AUTO: 24.8 % (ref 42–52)
HGB BLD-MCNC: 8.2 G/DL (ref 14–18)
IMM GRANULOCYTES # BLD: 0.1 K/UL
INR PPP: 1.16 (ref 0.88–1.18)
LYMPHOCYTES # BLD: 3 K/UL (ref 1.1–4.5)
LYMPHOCYTES NFR BLD: 37.3 % (ref 20–40)
MCH RBC QN AUTO: 35.5 PG (ref 27–31)
MCHC RBC AUTO-ENTMCNC: 33.1 G/DL (ref 33–37)
MCV RBC AUTO: 107.4 FL (ref 80–94)
MONOCYTES # BLD: 0.5 K/UL (ref 0–0.9)
MONOCYTES NFR BLD: 6 % (ref 0–10)
NEUTROPHILS # BLD: 4.2 K/UL (ref 1.5–7.5)
NEUTS SEG NFR BLD: 52.3 % (ref 50–65)
PLATELET # BLD AUTO: 156 K/UL (ref 130–400)
PMV BLD AUTO: 9.6 FL (ref 9.4–12.4)
POTASSIUM SERPL-SCNC: 4.5 MMOL/L (ref 3.5–5)
PROTHROMBIN TIME: 14.8 SEC (ref 12–14.6)
RBC # BLD AUTO: 2.31 M/UL (ref 4.7–6.1)
SODIUM SERPL-SCNC: 139 MMOL/L (ref 136–145)
WBC # BLD AUTO: 8 K/UL (ref 4.8–10.8)

## 2025-09-03 PROCEDURE — 97530 THERAPEUTIC ACTIVITIES: CPT

## 2025-09-03 PROCEDURE — 6360000004 HC RX CONTRAST MEDICATION: Performed by: UROLOGY

## 2025-09-03 PROCEDURE — 2500000003 HC RX 250 WO HCPCS: Performed by: HOSPITALIST

## 2025-09-03 PROCEDURE — 6370000000 HC RX 637 (ALT 250 FOR IP): Performed by: HOSPITALIST

## 2025-09-03 PROCEDURE — 94760 N-INVAS EAR/PLS OXIMETRY 1: CPT

## 2025-09-03 PROCEDURE — 97535 SELF CARE MNGMENT TRAINING: CPT

## 2025-09-03 PROCEDURE — 85610 PROTHROMBIN TIME: CPT

## 2025-09-03 PROCEDURE — 6360000002 HC RX W HCPCS

## 2025-09-03 PROCEDURE — 80048 BASIC METABOLIC PNL TOTAL CA: CPT

## 2025-09-03 PROCEDURE — 99231 SBSQ HOSP IP/OBS SF/LOW 25: CPT | Performed by: NURSE PRACTITIONER

## 2025-09-03 PROCEDURE — 36415 COLL VENOUS BLD VENIPUNCTURE: CPT

## 2025-09-03 PROCEDURE — 2500000003 HC RX 250 WO HCPCS

## 2025-09-03 PROCEDURE — 74178 CT ABD&PLV WO CNTR FLWD CNTR: CPT

## 2025-09-03 PROCEDURE — 6370000000 HC RX 637 (ALT 250 FOR IP)

## 2025-09-03 PROCEDURE — 85025 COMPLETE CBC W/AUTO DIFF WBC: CPT

## 2025-09-03 PROCEDURE — 94150 VITAL CAPACITY TEST: CPT

## 2025-09-03 RX ORDER — CEFDINIR 300 MG/1
300 CAPSULE ORAL 2 TIMES DAILY
Qty: 6 CAPSULE | Refills: 0 | Status: SHIPPED | OUTPATIENT
Start: 2025-09-03 | End: 2025-09-06

## 2025-09-03 RX ORDER — HYDROCODONE BITARTRATE AND ACETAMINOPHEN 5; 325 MG/1; MG/1
1 TABLET ORAL EVERY 8 HOURS PRN
Qty: 9 TABLET | Refills: 0 | Status: SHIPPED | OUTPATIENT
Start: 2025-09-03 | End: 2025-09-06

## 2025-09-03 RX ORDER — IOPAMIDOL 755 MG/ML
100 INJECTION, SOLUTION INTRAVASCULAR
Status: COMPLETED | OUTPATIENT
Start: 2025-09-03 | End: 2025-09-03

## 2025-09-03 RX ORDER — BUMETANIDE 1 MG/1
1 TABLET ORAL DAILY
Qty: 30 TABLET | Refills: 1 | Status: SHIPPED | OUTPATIENT
Start: 2025-09-03

## 2025-09-03 RX ORDER — TAMSULOSIN HYDROCHLORIDE 0.4 MG/1
0.4 CAPSULE ORAL DAILY
Qty: 30 CAPSULE | Refills: 0 | Status: SHIPPED | OUTPATIENT
Start: 2025-09-04

## 2025-09-03 RX ADMIN — METOPROLOL SUCCINATE 25 MG: 25 TABLET, EXTENDED RELEASE ORAL at 09:29

## 2025-09-03 RX ADMIN — CARBIDOPA AND LEVODOPA 1 TABLET: 25; 100 TABLET ORAL at 09:30

## 2025-09-03 RX ADMIN — WATER 2000 MG: 1 INJECTION INTRAMUSCULAR; INTRAVENOUS; SUBCUTANEOUS at 14:14

## 2025-09-03 RX ADMIN — Medication 5000 UNITS: at 09:29

## 2025-09-03 RX ADMIN — Medication 1 TABLET: at 09:29

## 2025-09-03 RX ADMIN — Medication 1 TABLET: at 09:30

## 2025-09-03 RX ADMIN — BUDESONIDE 9 MG: 3 CAPSULE ORAL at 09:30

## 2025-09-03 RX ADMIN — ACETAMINOPHEN 1000 MG: 500 TABLET ORAL at 14:15

## 2025-09-03 RX ADMIN — POTASSIUM CHLORIDE 40 MEQ: 1500 TABLET, EXTENDED RELEASE ORAL at 09:29

## 2025-09-03 RX ADMIN — MESALAMINE 2.4 G: 1.2 TABLET, DELAYED RELEASE ORAL at 09:30

## 2025-09-03 RX ADMIN — SODIUM CHLORIDE, PRESERVATIVE FREE 10 ML: 5 INJECTION INTRAVENOUS at 09:39

## 2025-09-03 RX ADMIN — PANTOPRAZOLE SODIUM 20 MG: 20 TABLET, DELAYED RELEASE ORAL at 05:18

## 2025-09-03 RX ADMIN — CARBIDOPA AND LEVODOPA 1 TABLET: 25; 100 TABLET ORAL at 14:14

## 2025-09-03 RX ADMIN — POLYETHYLENE GLYCOL 3350 17 G: 17 POWDER, FOR SOLUTION ORAL at 10:30

## 2025-09-03 RX ADMIN — ROPINIROLE HYDROCHLORIDE 2 MG: 1 TABLET, FILM COATED ORAL at 09:29

## 2025-09-03 RX ADMIN — TAMSULOSIN HYDROCHLORIDE 0.4 MG: 0.4 CAPSULE ORAL at 09:30

## 2025-09-03 RX ADMIN — BUMETANIDE 1 MG: 0.25 INJECTION INTRAMUSCULAR; INTRAVENOUS at 09:31

## 2025-09-03 RX ADMIN — ROPINIROLE HYDROCHLORIDE 2 MG: 1 TABLET, FILM COATED ORAL at 14:14

## 2025-09-03 RX ADMIN — ACETAMINOPHEN 1000 MG: 500 TABLET ORAL at 05:18

## 2025-09-03 RX ADMIN — IOPAMIDOL 100 ML: 755 INJECTION, SOLUTION INTRAVENOUS at 06:40

## 2025-09-03 RX ADMIN — DICYCLOMINE HYDROCHLORIDE 10 MG: 10 CAPSULE ORAL at 05:18

## 2025-09-03 RX ADMIN — MESALAMINE 2.4 G: 1.2 TABLET, DELAYED RELEASE ORAL at 14:14

## 2025-09-03 ASSESSMENT — PAIN DESCRIPTION - DESCRIPTORS: DESCRIPTORS: ACHING

## 2025-09-03 ASSESSMENT — PAIN DESCRIPTION - ORIENTATION: ORIENTATION: RIGHT

## 2025-09-03 ASSESSMENT — PAIN - FUNCTIONAL ASSESSMENT: PAIN_FUNCTIONAL_ASSESSMENT: 0-10

## 2025-09-03 ASSESSMENT — PAIN SCALES - GENERAL: PAINLEVEL_OUTOF10: 6

## 2025-09-03 ASSESSMENT — PAIN DESCRIPTION - LOCATION: LOCATION: ARM

## 2025-09-04 ENCOUNTER — TELEPHONE (OUTPATIENT)
Age: 89
End: 2025-09-04

## (undated) DEVICE — Device: Brand: DEFENDO AIR/WATER/SUCTION AND BIOPSY VALVE

## (undated) DEVICE — TBG SMPL FLTR LINE NASL 02/C02 A/ BX/100

## (undated) DEVICE — ENDOGATOR AUXILIARY WATER JET CONNECTOR: Brand: ENDOGATOR

## (undated) DEVICE — YANKAUER,BULB TIP WITH VENT: Brand: ARGYLE

## (undated) DEVICE — MSK O2 MD CONCENTR A/ LF 7FT 1P/U

## (undated) DEVICE — SENSR O2 OXIMAX FNGR A/ 18IN NONSTR

## (undated) DEVICE — MASK,OXYGEN,MED CONC,ADLT,7' TUB, UC: Brand: PENDING

## (undated) DEVICE — THE CHANNEL CLEANING BRUSH IS A NYLON FLEXI BRUSH ATTACHED TO A FLEXIBLE PLASTIC SHEATH DESIGNED TO SAFELY REMOVE DEBRIS FROM FLEXIBLE ENDOSCOPES.